# Patient Record
Sex: FEMALE | Race: WHITE | NOT HISPANIC OR LATINO | Employment: FULL TIME | URBAN - METROPOLITAN AREA
[De-identification: names, ages, dates, MRNs, and addresses within clinical notes are randomized per-mention and may not be internally consistent; named-entity substitution may affect disease eponyms.]

---

## 2017-06-20 ENCOUNTER — HOSPITAL ENCOUNTER (EMERGENCY)
Facility: HOSPITAL | Age: 22
Discharge: HOME | End: 2017-06-21
Attending: EMERGENCY MEDICINE | Admitting: EMERGENCY MEDICINE
Payer: COMMERCIAL

## 2017-06-20 VITALS
DIASTOLIC BLOOD PRESSURE: 88 MMHG | BODY MASS INDEX: 33.74 KG/M2 | TEMPERATURE: 97.5 F | SYSTOLIC BLOOD PRESSURE: 143 MMHG | WEIGHT: 214.95 LBS | OXYGEN SATURATION: 98 % | HEIGHT: 67 IN | RESPIRATION RATE: 18 BRPM | HEART RATE: 78 BPM

## 2017-06-20 DIAGNOSIS — L76.82 PAIN AT SURGICAL INCISION: Primary | ICD-10-CM

## 2017-06-20 RX ORDER — CEPHALEXIN 500 MG/1
500 CAPSULE ORAL 4 TIMES DAILY
Qty: 40 CAPSULE | Refills: 0 | Status: SHIPPED | OUTPATIENT
Start: 2017-06-20 | End: 2017-06-30

## 2017-06-20 RX ORDER — LACOSAMIDE 100 MG/1
100 TABLET ORAL 4 TIMES DAILY
COMMUNITY
End: 2017-08-04 | Stop reason: HOSPADM

## 2017-06-20 RX ORDER — CEPHALEXIN 250 MG/1
500 CAPSULE ORAL ONCE
Status: COMPLETED | OUTPATIENT
Start: 2017-06-20 | End: 2017-06-20

## 2017-06-20 RX ADMIN — CEPHALEXIN 500 MG: 250 CAPSULE ORAL at 23:49

## 2017-06-21 PROCEDURE — 99283 EMERGENCY DEPT VISIT LOW MDM: CPT

## 2017-07-30 ENCOUNTER — HOSPITAL ENCOUNTER (EMERGENCY)
Facility: HOSPITAL | Age: 22
Discharge: LEFT BEFORE DISCHARGE | End: 2017-07-30
Attending: EMERGENCY MEDICINE
Payer: COMMERCIAL

## 2017-07-30 ENCOUNTER — APPOINTMENT (EMERGENCY)
Dept: CT IMAGING | Facility: HOSPITAL | Age: 22
End: 2017-07-30
Payer: COMMERCIAL

## 2017-07-30 ENCOUNTER — HOSPITAL ENCOUNTER (EMERGENCY)
Facility: HOSPITAL | Age: 22
Discharge: LEFT BEFORE DISCHARGE | End: 2017-07-30
Admitting: EMERGENCY MEDICINE
Payer: COMMERCIAL

## 2017-07-30 ENCOUNTER — APPOINTMENT (EMERGENCY)
Dept: RADIOLOGY | Facility: HOSPITAL | Age: 22
End: 2017-07-30
Payer: COMMERCIAL

## 2017-07-30 VITALS
TEMPERATURE: 98.7 F | SYSTOLIC BLOOD PRESSURE: 115 MMHG | HEART RATE: 93 BPM | BODY MASS INDEX: 36.53 KG/M2 | OXYGEN SATURATION: 96 % | WEIGHT: 233.25 LBS | DIASTOLIC BLOOD PRESSURE: 62 MMHG | RESPIRATION RATE: 18 BRPM

## 2017-07-30 DIAGNOSIS — R10.9 ABDOMINAL PAIN: ICD-10-CM

## 2017-07-30 DIAGNOSIS — G40.901 STATUS EPILEPTICUS (HCC): Primary | ICD-10-CM

## 2017-07-30 DIAGNOSIS — S09.90XA HEAD TRAUMA, INITIAL ENCOUNTER: ICD-10-CM

## 2017-07-30 LAB
ALBUMIN SERPL BCP-MCNC: 3.7 G/DL (ref 3.5–5)
ALP SERPL-CCNC: 93 U/L (ref 46–116)
ALT SERPL W P-5'-P-CCNC: 75 U/L (ref 12–78)
ANION GAP BLD CALC-SCNC: 14 MMOL/L (ref 4–13)
ANION GAP SERPL CALCULATED.3IONS-SCNC: 11 MMOL/L (ref 4–13)
AST SERPL W P-5'-P-CCNC: 27 U/L (ref 5–45)
B-HCG SERPL-ACNC: <2 MIU/ML
BACTERIA UR QL AUTO: ABNORMAL /HPF
BASOPHILS # BLD AUTO: 0.05 THOUSANDS/ΜL (ref 0–0.1)
BASOPHILS NFR BLD AUTO: 0 % (ref 0–1)
BILIRUB SERPL-MCNC: 0.3 MG/DL (ref 0.2–1)
BILIRUB UR QL STRIP: NEGATIVE
BUN BLD-MCNC: 21 MG/DL (ref 5–25)
BUN SERPL-MCNC: 18 MG/DL (ref 5–25)
CA-I BLD-SCNC: 1.09 MMOL/L (ref 1.12–1.32)
CALCIUM SERPL-MCNC: 9.1 MG/DL (ref 8.3–10.1)
CHLORIDE BLD-SCNC: 109 MMOL/L (ref 100–108)
CHLORIDE SERPL-SCNC: 106 MMOL/L (ref 100–108)
CLARITY UR: CLEAR
CO2 SERPL-SCNC: 23 MMOL/L (ref 21–32)
COLOR UR: YELLOW
CREAT BLD-MCNC: 1.3 MG/DL (ref 0.6–1.3)
CREAT SERPL-MCNC: 1.21 MG/DL (ref 0.6–1.3)
EOSINOPHIL # BLD AUTO: 0.25 THOUSAND/ΜL (ref 0–0.61)
EOSINOPHIL NFR BLD AUTO: 2 % (ref 0–6)
ERYTHROCYTE [DISTWIDTH] IN BLOOD BY AUTOMATED COUNT: 15.8 % (ref 11.6–15.1)
GFR SERPL CREATININE-BSD FRML MDRD: 58 ML/MIN/1.73SQ M
GFR SERPL CREATININE-BSD FRML MDRD: 63 ML/MIN/1.73SQ M
GLUCOSE SERPL-MCNC: 103 MG/DL (ref 65–140)
GLUCOSE SERPL-MCNC: 91 MG/DL (ref 65–140)
GLUCOSE UR STRIP-MCNC: NEGATIVE MG/DL
HCT VFR BLD AUTO: 38.5 % (ref 34.8–46.1)
HCT VFR BLD CALC: 38 % (ref 34.8–46.1)
HGB BLD-MCNC: 12.5 G/DL (ref 11.5–15.4)
HGB BLDA-MCNC: 12.9 G/DL (ref 11.5–15.4)
HGB UR QL STRIP.AUTO: NEGATIVE
KETONES UR STRIP-MCNC: NEGATIVE MG/DL
LACTATE SERPL-SCNC: 1.8 MMOL/L (ref 0.5–2)
LEUKOCYTE ESTERASE UR QL STRIP: NEGATIVE
LIPASE SERPL-CCNC: 133 U/L (ref 73–393)
LYMPHOCYTES # BLD AUTO: 3.7 THOUSANDS/ΜL (ref 0.6–4.47)
LYMPHOCYTES NFR BLD AUTO: 33 % (ref 14–44)
MCH RBC QN AUTO: 24.9 PG (ref 26.8–34.3)
MCHC RBC AUTO-ENTMCNC: 32.5 G/DL (ref 31.4–37.4)
MCV RBC AUTO: 77 FL (ref 82–98)
MONOCYTES # BLD AUTO: 1.04 THOUSAND/ΜL (ref 0.17–1.22)
MONOCYTES NFR BLD AUTO: 9 % (ref 4–12)
NEUTROPHILS # BLD AUTO: 6.18 THOUSANDS/ΜL (ref 1.85–7.62)
NEUTS SEG NFR BLD AUTO: 55 % (ref 43–75)
NITRITE UR QL STRIP: NEGATIVE
NON-SQ EPI CELLS URNS QL MICRO: ABNORMAL /HPF
NRBC BLD AUTO-RTO: 0 /100 WBCS
PCO2 BLD: 25 MMOL/L (ref 21–32)
PH UR STRIP.AUTO: 5.5 [PH] (ref 4.5–8)
PLATELET # BLD AUTO: 311 THOUSANDS/UL (ref 149–390)
PMV BLD AUTO: 10.3 FL (ref 8.9–12.7)
POTASSIUM BLD-SCNC: 4.6 MMOL/L (ref 3.5–5.3)
POTASSIUM SERPL-SCNC: 4 MMOL/L (ref 3.5–5.3)
PROT SERPL-MCNC: 7.5 G/DL (ref 6.4–8.2)
PROT UR STRIP-MCNC: NEGATIVE MG/DL
RBC # BLD AUTO: 5.03 MILLION/UL (ref 3.81–5.12)
RBC #/AREA URNS AUTO: ABNORMAL /HPF
SODIUM BLD-SCNC: 142 MMOL/L (ref 136–145)
SODIUM SERPL-SCNC: 140 MMOL/L (ref 136–145)
SP GR UR STRIP.AUTO: 1.01 (ref 1–1.03)
SPECIMEN SOURCE: ABNORMAL
SPECIMEN SOURCE: NORMAL
TROPONIN I BLD-MCNC: 0 NG/ML (ref 0–0.08)
UROBILINOGEN UR QL STRIP.AUTO: 0.2 E.U./DL
WBC # BLD AUTO: 11.25 THOUSAND/UL (ref 4.31–10.16)
WBC #/AREA URNS AUTO: ABNORMAL /HPF

## 2017-07-30 PROCEDURE — 96361 HYDRATE IV INFUSION ADD-ON: CPT

## 2017-07-30 PROCEDURE — 81001 URINALYSIS AUTO W/SCOPE: CPT | Performed by: EMERGENCY MEDICINE

## 2017-07-30 PROCEDURE — 83605 ASSAY OF LACTIC ACID: CPT | Performed by: EMERGENCY MEDICINE

## 2017-07-30 PROCEDURE — 80047 BASIC METABLC PNL IONIZED CA: CPT

## 2017-07-30 PROCEDURE — 99285 EMERGENCY DEPT VISIT HI MDM: CPT

## 2017-07-30 PROCEDURE — 71020 HB CHEST X-RAY 2VW FRONTAL&LATL: CPT

## 2017-07-30 PROCEDURE — 84702 CHORIONIC GONADOTROPIN TEST: CPT | Performed by: EMERGENCY MEDICINE

## 2017-07-30 PROCEDURE — 96375 TX/PRO/DX INJ NEW DRUG ADDON: CPT

## 2017-07-30 PROCEDURE — 84484 ASSAY OF TROPONIN QUANT: CPT

## 2017-07-30 PROCEDURE — 85014 HEMATOCRIT: CPT

## 2017-07-30 PROCEDURE — 72125 CT NECK SPINE W/O DYE: CPT

## 2017-07-30 PROCEDURE — 70450 CT HEAD/BRAIN W/O DYE: CPT

## 2017-07-30 PROCEDURE — 96374 THER/PROPH/DIAG INJ IV PUSH: CPT

## 2017-07-30 PROCEDURE — 85025 COMPLETE CBC W/AUTO DIFF WBC: CPT | Performed by: EMERGENCY MEDICINE

## 2017-07-30 PROCEDURE — 93005 ELECTROCARDIOGRAM TRACING: CPT | Performed by: EMERGENCY MEDICINE

## 2017-07-30 PROCEDURE — 80053 COMPREHEN METABOLIC PANEL: CPT | Performed by: EMERGENCY MEDICINE

## 2017-07-30 PROCEDURE — 36415 COLL VENOUS BLD VENIPUNCTURE: CPT | Performed by: EMERGENCY MEDICINE

## 2017-07-30 PROCEDURE — 83690 ASSAY OF LIPASE: CPT | Performed by: EMERGENCY MEDICINE

## 2017-07-30 PROCEDURE — 96365 THER/PROPH/DIAG IV INF INIT: CPT

## 2017-07-30 PROCEDURE — 74177 CT ABD & PELVIS W/CONTRAST: CPT

## 2017-07-30 PROCEDURE — 80177 DRUG SCRN QUAN LEVETIRACETAM: CPT | Performed by: EMERGENCY MEDICINE

## 2017-07-30 RX ORDER — LORAZEPAM 2 MG/ML
2 INJECTION INTRAMUSCULAR ONCE
Status: DISCONTINUED | OUTPATIENT
Start: 2017-07-30 | End: 2017-07-30 | Stop reason: HOSPADM

## 2017-07-30 RX ORDER — FENTANYL CITRATE 50 UG/ML
50 INJECTION, SOLUTION INTRAMUSCULAR; INTRAVENOUS ONCE
Status: COMPLETED | OUTPATIENT
Start: 2017-07-30 | End: 2017-07-30

## 2017-07-30 RX ORDER — LORAZEPAM 2 MG/ML
2 INJECTION INTRAMUSCULAR ONCE
Status: COMPLETED | OUTPATIENT
Start: 2017-07-30 | End: 2017-07-30

## 2017-07-30 RX ORDER — LORAZEPAM 2 MG/ML
INJECTION INTRAMUSCULAR
Status: DISCONTINUED
Start: 2017-07-30 | End: 2017-07-30 | Stop reason: HOSPADM

## 2017-07-30 RX ADMIN — FENTANYL CITRATE 50 MCG: 50 INJECTION INTRAMUSCULAR; INTRAVENOUS at 18:29

## 2017-07-30 RX ADMIN — LEVETIRACETAM 1000 MG: 500 INJECTION, SOLUTION, CONCENTRATE INTRAVENOUS at 18:00

## 2017-07-30 RX ADMIN — SODIUM CHLORIDE 1000 ML: 0.9 INJECTION, SOLUTION INTRAVENOUS at 17:59

## 2017-07-30 RX ADMIN — LORAZEPAM 2 MG: 2 INJECTION INTRAMUSCULAR; INTRAVENOUS at 17:32

## 2017-07-30 RX ADMIN — IOHEXOL 100 ML: 350 INJECTION, SOLUTION INTRAVENOUS at 19:39

## 2017-07-31 ENCOUNTER — APPOINTMENT (EMERGENCY)
Dept: CT IMAGING | Facility: HOSPITAL | Age: 22
End: 2017-07-31
Payer: COMMERCIAL

## 2017-07-31 ENCOUNTER — HOSPITAL ENCOUNTER (EMERGENCY)
Facility: HOSPITAL | Age: 22
End: 2017-08-01
Attending: EMERGENCY MEDICINE | Admitting: EMERGENCY MEDICINE
Payer: COMMERCIAL

## 2017-07-31 DIAGNOSIS — G40.901 STATUS EPILEPTICUS (HCC): Primary | ICD-10-CM

## 2017-07-31 LAB
ANION GAP SERPL CALCULATED.3IONS-SCNC: 11 MMOL/L (ref 4–13)
ATRIAL RATE: 109 BPM
BASOPHILS # BLD AUTO: 0.04 THOUSANDS/ΜL (ref 0–0.1)
BASOPHILS NFR BLD AUTO: 0 % (ref 0–1)
BUN SERPL-MCNC: 13 MG/DL (ref 5–25)
CALCIUM SERPL-MCNC: 8.7 MG/DL (ref 8.3–10.1)
CHLORIDE SERPL-SCNC: 107 MMOL/L (ref 100–108)
CO2 SERPL-SCNC: 23 MMOL/L (ref 21–32)
CREAT SERPL-MCNC: 0.8 MG/DL (ref 0.6–1.3)
EOSINOPHIL # BLD AUTO: 0.28 THOUSAND/ΜL (ref 0–0.61)
EOSINOPHIL NFR BLD AUTO: 3 % (ref 0–6)
ERYTHROCYTE [DISTWIDTH] IN BLOOD BY AUTOMATED COUNT: 15.7 % (ref 11.6–15.1)
GFR SERPL CREATININE-BSD FRML MDRD: 104 ML/MIN/1.73SQ M
GLUCOSE SERPL-MCNC: 119 MG/DL (ref 65–140)
HCT VFR BLD AUTO: 34.4 % (ref 34.8–46.1)
HGB BLD-MCNC: 11.3 G/DL (ref 11.5–15.4)
LYMPHOCYTES # BLD AUTO: 3.63 THOUSANDS/ΜL (ref 0.6–4.47)
LYMPHOCYTES NFR BLD AUTO: 33 % (ref 14–44)
MCH RBC QN AUTO: 25 PG (ref 26.8–34.3)
MCHC RBC AUTO-ENTMCNC: 32.8 G/DL (ref 31.4–37.4)
MCV RBC AUTO: 76 FL (ref 82–98)
MONOCYTES # BLD AUTO: 0.69 THOUSAND/ΜL (ref 0.17–1.22)
MONOCYTES NFR BLD AUTO: 6 % (ref 4–12)
NEUTROPHILS # BLD AUTO: 6.21 THOUSANDS/ΜL (ref 1.85–7.62)
NEUTS SEG NFR BLD AUTO: 57 % (ref 43–75)
NRBC BLD AUTO-RTO: 0 /100 WBCS
P AXIS: 53 DEGREES
PLATELET # BLD AUTO: 279 THOUSANDS/UL (ref 149–390)
PMV BLD AUTO: 10.1 FL (ref 8.9–12.7)
POTASSIUM SERPL-SCNC: 3.8 MMOL/L (ref 3.5–5.3)
PR INTERVAL: 146 MS
QRS AXIS: 37 DEGREES
QRSD INTERVAL: 86 MS
QT INTERVAL: 332 MS
QTC INTERVAL: 447 MS
RBC # BLD AUTO: 4.52 MILLION/UL (ref 3.81–5.12)
SODIUM SERPL-SCNC: 141 MMOL/L (ref 136–145)
T WAVE AXIS: 38 DEGREES
VENTRICULAR RATE: 109 BPM
WBC # BLD AUTO: 10.88 THOUSAND/UL (ref 4.31–10.16)

## 2017-07-31 PROCEDURE — 85025 COMPLETE CBC W/AUTO DIFF WBC: CPT | Performed by: EMERGENCY MEDICINE

## 2017-07-31 PROCEDURE — 80048 BASIC METABOLIC PNL TOTAL CA: CPT | Performed by: EMERGENCY MEDICINE

## 2017-07-31 PROCEDURE — 93005 ELECTROCARDIOGRAM TRACING: CPT

## 2017-07-31 PROCEDURE — 96374 THER/PROPH/DIAG INJ IV PUSH: CPT

## 2017-07-31 PROCEDURE — 70496 CT ANGIOGRAPHY HEAD: CPT

## 2017-07-31 PROCEDURE — 36415 COLL VENOUS BLD VENIPUNCTURE: CPT | Performed by: EMERGENCY MEDICINE

## 2017-07-31 RX ORDER — KETOROLAC TROMETHAMINE 30 MG/ML
15 INJECTION, SOLUTION INTRAMUSCULAR; INTRAVENOUS ONCE
Status: COMPLETED | OUTPATIENT
Start: 2017-08-01 | End: 2017-08-01

## 2017-07-31 RX ORDER — LEVETIRACETAM 500 MG/1
500 TABLET ORAL ONCE
Status: COMPLETED | OUTPATIENT
Start: 2017-07-31 | End: 2017-07-31

## 2017-07-31 RX ORDER — LACOSAMIDE 50 MG/1
100 TABLET ORAL ONCE
Status: COMPLETED | OUTPATIENT
Start: 2017-07-31 | End: 2017-07-31

## 2017-07-31 RX ADMIN — LEVETIRACETAM 1000 MG: 100 INJECTION, SOLUTION INTRAVENOUS at 20:43

## 2017-07-31 RX ADMIN — IOHEXOL 85 ML: 350 INJECTION, SOLUTION INTRAVENOUS at 20:07

## 2017-07-31 RX ADMIN — LEVETIRACETAM 500 MG: 500 TABLET ORAL at 19:38

## 2017-07-31 RX ADMIN — LACOSAMIDE 100 MG: 50 TABLET, FILM COATED ORAL at 19:38

## 2017-08-01 ENCOUNTER — APPOINTMENT (INPATIENT)
Dept: NEUROLOGY | Facility: AMBULATORY SURGERY CENTER | Age: 22
DRG: 101 | End: 2017-08-01
Payer: COMMERCIAL

## 2017-08-01 ENCOUNTER — HOSPITAL ENCOUNTER (INPATIENT)
Facility: HOSPITAL | Age: 22
LOS: 3 days | Discharge: HOME | DRG: 101 | End: 2017-08-04
Attending: ANESTHESIOLOGY | Admitting: ANESTHESIOLOGY
Payer: COMMERCIAL

## 2017-08-01 VITALS
TEMPERATURE: 98.2 F | DIASTOLIC BLOOD PRESSURE: 80 MMHG | RESPIRATION RATE: 18 BRPM | HEIGHT: 67 IN | OXYGEN SATURATION: 98 % | WEIGHT: 233.03 LBS | SYSTOLIC BLOOD PRESSURE: 128 MMHG | BODY MASS INDEX: 36.57 KG/M2 | HEART RATE: 80 BPM

## 2017-08-01 DIAGNOSIS — G40.901 STATUS EPILEPTICUS (HCC): Primary | ICD-10-CM

## 2017-08-01 DIAGNOSIS — G40.909 SEIZURE DISORDER (HCC): ICD-10-CM

## 2017-08-01 PROBLEM — R11.0 NAUSEA: Status: ACTIVE | Noted: 2017-08-01

## 2017-08-01 PROBLEM — H57.89 IRRITATION OF RIGHT EYE: Status: ACTIVE | Noted: 2017-08-01

## 2017-08-01 PROBLEM — Z98.891 S/P CESAREAN SECTION: Status: ACTIVE | Noted: 2017-08-01

## 2017-08-01 LAB
ANION GAP SERPL CALCULATED.3IONS-SCNC: 7 MMOL/L (ref 4–13)
BUN SERPL-MCNC: 13 MG/DL (ref 5–25)
CALCIUM SERPL-MCNC: 8.8 MG/DL (ref 8.3–10.1)
CHLORIDE SERPL-SCNC: 110 MMOL/L (ref 100–108)
CO2 SERPL-SCNC: 23 MMOL/L (ref 21–32)
CREAT SERPL-MCNC: 0.64 MG/DL (ref 0.6–1.3)
GFR SERPL CREATININE-BSD FRML MDRD: 125 ML/MIN/1.73SQ M
GLUCOSE SERPL-MCNC: 86 MG/DL (ref 65–140)
POTASSIUM SERPL-SCNC: 4 MMOL/L (ref 3.5–5.3)
SODIUM SERPL-SCNC: 140 MMOL/L (ref 136–145)

## 2017-08-01 PROCEDURE — 96375 TX/PRO/DX INJ NEW DRUG ADDON: CPT

## 2017-08-01 PROCEDURE — 99285 EMERGENCY DEPT VISIT HI MDM: CPT

## 2017-08-01 PROCEDURE — 80048 BASIC METABOLIC PNL TOTAL CA: CPT | Performed by: EMERGENCY MEDICINE

## 2017-08-01 PROCEDURE — 95951 HB EEG MONITORING/VIDEORECORD: CPT

## 2017-08-01 RX ORDER — LACOSAMIDE 100 MG/1
100 TABLET ORAL EVERY 12 HOURS SCHEDULED
Status: DISCONTINUED | OUTPATIENT
Start: 2017-08-01 | End: 2017-08-03

## 2017-08-01 RX ORDER — ACETAMINOPHEN 325 MG/1
975 TABLET ORAL EVERY 6 HOURS PRN
Status: DISCONTINUED | OUTPATIENT
Start: 2017-08-01 | End: 2017-08-04

## 2017-08-01 RX ORDER — LEVETIRACETAM 500 MG/1
500 TABLET ORAL 4 TIMES DAILY
Status: DISCONTINUED | OUTPATIENT
Start: 2017-08-01 | End: 2017-08-01

## 2017-08-01 RX ORDER — LORAZEPAM 2 MG/ML
1 INJECTION INTRAMUSCULAR EVERY 4 HOURS PRN
Status: DISCONTINUED | OUTPATIENT
Start: 2017-08-01 | End: 2017-08-04 | Stop reason: HOSPADM

## 2017-08-01 RX ORDER — LEVETIRACETAM 750 MG/1
750 TABLET ORAL EVERY 12 HOURS
Status: DISCONTINUED | OUTPATIENT
Start: 2017-08-02 | End: 2017-08-03

## 2017-08-01 RX ORDER — LACOSAMIDE 100 MG/1
100 TABLET ORAL 4 TIMES DAILY
Status: DISCONTINUED | OUTPATIENT
Start: 2017-08-01 | End: 2017-08-01

## 2017-08-01 RX ADMIN — LEVETIRACETAM 500 MG: 500 TABLET, FILM COATED ORAL at 08:39

## 2017-08-01 RX ADMIN — LACOSAMIDE 100 MG: 100 TABLET, FILM COATED ORAL at 20:34

## 2017-08-01 RX ADMIN — LEVETIRACETAM 500 MG: 500 TABLET, FILM COATED ORAL at 12:42

## 2017-08-01 RX ADMIN — ENOXAPARIN SODIUM 40 MG: 40 INJECTION SUBCUTANEOUS at 08:39

## 2017-08-01 RX ADMIN — LACOSAMIDE 100 MG: 100 TABLET, FILM COATED ORAL at 08:39

## 2017-08-01 RX ADMIN — ACETAMINOPHEN 975 MG: 325 TABLET, FILM COATED ORAL at 16:21

## 2017-08-01 RX ADMIN — LACOSAMIDE 100 MG: 100 TABLET, FILM COATED ORAL at 12:42

## 2017-08-01 RX ADMIN — KETOROLAC TROMETHAMINE 15 MG: 30 INJECTION, SOLUTION INTRAMUSCULAR at 00:06

## 2017-08-02 ENCOUNTER — APPOINTMENT (INPATIENT)
Dept: NEUROLOGY | Facility: AMBULATORY SURGERY CENTER | Age: 22
DRG: 101 | End: 2017-08-02
Payer: COMMERCIAL

## 2017-08-02 LAB
ANION GAP SERPL CALCULATED.3IONS-SCNC: 6 MMOL/L (ref 4–13)
ATRIAL RATE: 93 BPM
BUN SERPL-MCNC: 13 MG/DL (ref 5–25)
CALCIUM SERPL-MCNC: 8.7 MG/DL (ref 8.3–10.1)
CHLORIDE SERPL-SCNC: 108 MMOL/L (ref 100–108)
CO2 SERPL-SCNC: 25 MMOL/L (ref 21–32)
CREAT SERPL-MCNC: 0.66 MG/DL (ref 0.6–1.3)
GFR SERPL CREATININE-BSD FRML MDRD: 124 ML/MIN/1.73SQ M
GLUCOSE SERPL-MCNC: 96 MG/DL (ref 65–140)
LEVETIRACETAM SERPL-MCNC: 33.5 UG/ML (ref 10–40)
P AXIS: 33 DEGREES
POTASSIUM SERPL-SCNC: 4 MMOL/L (ref 3.5–5.3)
PR INTERVAL: 156 MS
QRS AXIS: 23 DEGREES
QRSD INTERVAL: 86 MS
QT INTERVAL: 334 MS
QTC INTERVAL: 415 MS
SODIUM SERPL-SCNC: 139 MMOL/L (ref 136–145)
T WAVE AXIS: 17 DEGREES
VENTRICULAR RATE: 93 BPM

## 2017-08-02 PROCEDURE — 80048 BASIC METABOLIC PNL TOTAL CA: CPT | Performed by: EMERGENCY MEDICINE

## 2017-08-02 PROCEDURE — 95951 HB EEG MONITORING/VIDEORECORD: CPT

## 2017-08-02 RX ADMIN — ACETAMINOPHEN 975 MG: 325 TABLET, FILM COATED ORAL at 16:25

## 2017-08-02 RX ADMIN — ENOXAPARIN SODIUM 40 MG: 40 INJECTION SUBCUTANEOUS at 08:16

## 2017-08-02 RX ADMIN — LEVETIRACETAM 750 MG: 750 TABLET, FILM COATED ORAL at 00:27

## 2017-08-02 RX ADMIN — LEVETIRACETAM 750 MG: 750 TABLET, FILM COATED ORAL at 12:54

## 2017-08-02 RX ADMIN — LACOSAMIDE 100 MG: 100 TABLET, FILM COATED ORAL at 21:24

## 2017-08-02 RX ADMIN — LACOSAMIDE 100 MG: 100 TABLET, FILM COATED ORAL at 08:16

## 2017-08-03 ENCOUNTER — APPOINTMENT (INPATIENT)
Dept: NEUROLOGY | Facility: AMBULATORY SURGERY CENTER | Age: 22
DRG: 101 | End: 2017-08-03
Payer: COMMERCIAL

## 2017-08-03 PROBLEM — H57.89 IRRITATION OF RIGHT EYE: Status: RESOLVED | Noted: 2017-08-01 | Resolved: 2017-08-03

## 2017-08-03 PROCEDURE — 95951 HB EEG MONITORING/VIDEORECORD: CPT

## 2017-08-03 RX ADMIN — LEVETIRACETAM 750 MG: 750 TABLET, FILM COATED ORAL at 00:55

## 2017-08-03 RX ADMIN — ENOXAPARIN SODIUM 40 MG: 40 INJECTION SUBCUTANEOUS at 08:43

## 2017-08-04 ENCOUNTER — GENERIC CONVERSION - ENCOUNTER (OUTPATIENT)
Dept: OTHER | Facility: OTHER | Age: 22
End: 2017-08-04

## 2017-08-04 ENCOUNTER — APPOINTMENT (INPATIENT)
Dept: NEUROLOGY | Facility: AMBULATORY SURGERY CENTER | Age: 22
DRG: 101 | End: 2017-08-04
Payer: COMMERCIAL

## 2017-08-04 VITALS
HEART RATE: 74 BPM | TEMPERATURE: 98.4 F | BODY MASS INDEX: 35.29 KG/M2 | RESPIRATION RATE: 20 BRPM | OXYGEN SATURATION: 96 % | DIASTOLIC BLOOD PRESSURE: 82 MMHG | HEIGHT: 67 IN | WEIGHT: 224.87 LBS | SYSTOLIC BLOOD PRESSURE: 120 MMHG

## 2017-08-04 PROBLEM — R11.0 NAUSEA: Status: RESOLVED | Noted: 2017-08-01 | Resolved: 2017-08-04

## 2017-08-04 PROCEDURE — 95951 HB EEG MONITORING/VIDEORECORD: CPT

## 2017-08-04 RX ORDER — LEVETIRACETAM 500 MG/1
1000 TABLET ORAL EVERY 12 HOURS SCHEDULED
Qty: 60 TABLET | Refills: 0 | Status: SHIPPED | OUTPATIENT
Start: 2017-08-04 | End: 2018-06-18 | Stop reason: HOSPADM

## 2017-08-04 RX ADMIN — ENOXAPARIN SODIUM 40 MG: 40 INJECTION SUBCUTANEOUS at 09:50

## 2017-11-13 ENCOUNTER — APPOINTMENT (EMERGENCY)
Dept: RADIOLOGY | Facility: HOSPITAL | Age: 22
End: 2017-11-13
Payer: COMMERCIAL

## 2017-11-13 ENCOUNTER — HOSPITAL ENCOUNTER (EMERGENCY)
Facility: HOSPITAL | Age: 22
Discharge: HOME | End: 2017-11-13
Attending: EMERGENCY MEDICINE | Admitting: EMERGENCY MEDICINE
Payer: COMMERCIAL

## 2017-11-13 VITALS
DIASTOLIC BLOOD PRESSURE: 78 MMHG | RESPIRATION RATE: 20 BRPM | OXYGEN SATURATION: 97 % | HEART RATE: 81 BPM | TEMPERATURE: 98.2 F | WEIGHT: 218 LBS | HEIGHT: 67 IN | SYSTOLIC BLOOD PRESSURE: 135 MMHG | BODY MASS INDEX: 34.21 KG/M2

## 2017-11-13 DIAGNOSIS — M54.41 ACUTE BILATERAL LOW BACK PAIN WITH RIGHT-SIDED SCIATICA: Primary | ICD-10-CM

## 2017-11-13 LAB — EXT PREG TEST URINE: NORMAL

## 2017-11-13 PROCEDURE — 99283 EMERGENCY DEPT VISIT LOW MDM: CPT

## 2017-11-13 PROCEDURE — 72100 X-RAY EXAM L-S SPINE 2/3 VWS: CPT

## 2017-11-13 PROCEDURE — 81025 URINE PREGNANCY TEST: CPT | Performed by: PHYSICIAN ASSISTANT

## 2017-11-13 RX ORDER — NAPROXEN 500 MG/1
500 TABLET ORAL 2 TIMES DAILY WITH MEALS
Qty: 20 TABLET | Refills: 0 | Status: SHIPPED | OUTPATIENT
Start: 2017-11-13 | End: 2018-06-10

## 2017-11-13 RX ORDER — METHOCARBAMOL 750 MG/1
750 TABLET, FILM COATED ORAL 4 TIMES DAILY PRN
Qty: 20 TABLET | Refills: 0 | Status: SHIPPED | OUTPATIENT
Start: 2017-11-13 | End: 2018-06-10

## 2017-11-13 RX ORDER — ESCITALOPRAM OXALATE 10 MG/1
10 TABLET ORAL DAILY
COMMUNITY
End: 2018-06-10

## 2017-11-13 NOTE — ED PROVIDER NOTES
History  Chief Complaint   Patient presents with    Back Pain     Pt states she had a mechanical fall on Thursday  Pt states she began to have B/L lower back pain  Pt c/o pain in RLE  Pt has control of bladder and bowels  23 y o  female with past medical history significant for seizures presents to the Emergency Department with chief complaint of back pain  Onset of symptoms is reported as 4 days ago  Location of symptoms is reported as left and right lower back with radiation down the right leg  Quality of symptoms is reported as sharp tight pain  Severity of symptoms is reported as moderate-severe  Associated symptoms:  Denies urinary retention  Denies bowel or bladder incontinence  Denies abdominal pain  Denies fevers  Denies lower extremity paralysis, or weakness  Denies dysuria, urinary frequency or hematuria  Modifiers: Movement, bending and twisting exacerbate pain  Rest partially relieves pain  Context:  Patient reports that she sustained a mechanical fall from standing 4 days ago  She reports since then she has had pain into her lower back  She denies head injury or loss of consciousness  Denies other injuries in the fall  She reports initially she did not have pain but gradually over the past 4 days the pain has gotten increasingly worse and has become radiating down her right leg  Review of past visit history via [facility] demonstrates patient was recently admitted and discharged on August 1, 2017 to August 4, 2017 to [facility] for monitoring of seizure disorder    ddx includes but is not limited to:  Myofascial pain, diskogenic pain, radicular pain, muscle spasm, vertebral compression fracture, spinal stenosis, spondylosis, cancer, osteoporosis, OA, RA, consider but doubt epidural abscess, cauda equina  Plan check xray given mechanical fall to rule out fracture               History provided by:  Patient   used: No    Back Pain   Associated symptoms: no abdominal pain, no chest pain, no dysuria, no fever, no headaches, no numbness, no pelvic pain and no weakness        Prior to Admission Medications   Prescriptions Last Dose Informant Patient Reported? Taking?   escitalopram (LEXAPRO) 10 mg tablet   Yes Yes   Sig: Take 10 mg by mouth daily   levETIRAcetam (KEPPRA) 500 mg tablet   No No   Sig: Take 2 tablets by mouth every 12 (twelve) hours      Facility-Administered Medications: None       Past Medical History:   Diagnosis Date    Seizures ([facility] [address] )        Past Surgical History:   Procedure Laterality Date     SECTION       SECTION      SHOULDER SURGERY      TUMOR REMOVAL      from ear       History reviewed  No pertinent family history  I have reviewed and agree with the history as documented  Social History   Substance Use Topics    Smoking status: Never Smoker    Smokeless tobacco: Never Used    Alcohol use No        Review of Systems   Constitutional: Negative for activity change, appetite change, chills, diaphoresis, fatigue, fever and unexpected weight change  HENT: Negative for congestion, dental problem, drooling, ear discharge, ear pain, facial swelling, hearing loss, mouth sores, nosebleeds, postnasal drip, rhinorrhea, sinus pain, sinus pressure, sneezing, sore throat, tinnitus, trouble swallowing and voice change  Eyes: Negative for photophobia, pain, discharge, redness and itching  Respiratory: Negative for cough, chest tightness, shortness of breath and wheezing  Cardiovascular: Negative for chest pain, palpitations and leg swelling  Gastrointestinal: Negative for abdominal distention, abdominal pain, blood in stool, constipation, diarrhea, nausea and vomiting  Endocrine: Negative for cold intolerance, heat intolerance, polydipsia, polyphagia and polyuria     Genitourinary: Negative for decreased urine volume, difficulty urinating, dysuria, flank pain, frequency, hematuria, menstrual problem, pelvic pain, urgency, vaginal bleeding, vaginal discharge and vaginal pain  Musculoskeletal: Positive for back pain  Negative for arthralgias, gait problem, joint swelling, myalgias, neck pain and neck stiffness  Skin: Negative for color change, pallor, rash and wound  Allergic/Immunologic: Negative for environmental allergies, food allergies and immunocompromised state  Neurological: Negative for dizziness, tremors, seizures, syncope, facial asymmetry, speech difficulty, weakness, light-headedness, numbness and headaches  Hematological: Negative for adenopathy  Does not bruise/bleed easily  Psychiatric/Behavioral: Negative for agitation, confusion, decreased concentration and hallucinations  The patient is not nervous/anxious  All other systems reviewed and are negative  Physical Exam  ED Triage Vitals [11/13/17 1100]   Temperature Pulse Respirations Blood Pressure SpO2   98 2 °F (36 8 °C) 81 20 135/78 97 %      Temp Source Heart Rate Source Patient Position - Orthostatic VS BP Location FiO2 (%)   Oral Monitor Sitting Left arm --      Pain Score       9           Orthostatic Vital Signs  Vitals:    11/13/17 1100   BP: 135/78   Pulse: 81   Patient Position - Orthostatic VS: Sitting       Physical Exam   Constitutional: She is oriented to person, place, and time  She appears well-developed and well-nourished  No distress  /78   Pulse 81   Temp 98 2 °F (36 8 °C) (Oral)   Resp 20   Ht 5' 7" (1 702 m)   Wt 98 9 kg (218 lb)   SpO2 97%   BMI 34 14 kg/m²   Interpretation normal, no intervention    HENT:   Head: Normocephalic and atraumatic  Right Ear: External ear normal    Left Ear: External ear normal    Nose: Nose normal    Mouth/Throat: Oropharynx is clear and moist  No oropharyngeal exudate  Eyes: Conjunctivae and EOM are normal  Pupils are equal, round, and reactive to light  Right eye exhibits no discharge  Left eye exhibits no discharge  No scleral icterus  Neck: Normal range of motion   Neck supple  No JVD present  No tracheal deviation present  Cardiovascular: Normal rate, regular rhythm and intact distal pulses  Pulmonary/Chest: Effort normal and breath sounds normal  No respiratory distress  She has no wheezes  She exhibits no tenderness  Abdominal: Soft  Bowel sounds are normal  She exhibits no distension and no mass  There is no tenderness  There is no rebound and no guarding  No hernia  Musculoskeletal: Normal range of motion  She exhibits tenderness  She exhibits no edema or deformity  There is no midline thoracic spinal tenderness to palpation  Patient reports midline lumbar spinal tenderness at L3/4 level with associated bilateral lumbar paraspinal muscle tenderness to palpation  No bony step offs or deformities on palpation  No saddle anesthesia  Positive straight leg raise test to right leg  Nontender over the costovertebral angle bilaterally  Bilateral lower extremities: The patient is neurovascularly intact in the superficial and deep peroneal, sural, tibial, and saphenous nerve distributions there is normal sensation and good capillary refill within the toes  Strength 5/5 normal to bilateral lower extremities  FROM throughout BLE  No posterior calf pain or palpable cords  Lymphadenopathy:     She has no cervical adenopathy  Neurological: She is alert and oriented to person, place, and time  She displays normal reflexes  No cranial nerve deficit or sensory deficit  She exhibits normal muscle tone  Coordination normal    Skin: Skin is warm and dry  Capillary refill takes less than 2 seconds  No rash noted  She is not diaphoretic  No erythema  No pallor  Psychiatric: She has a normal mood and affect  Her behavior is normal  Judgment and thought content normal    Nursing note and vitals reviewed        ED Medications  Medications - No data to display    Diagnostic Studies  Results Reviewed     Procedure Component Value Units Date/Time    POCT pregnancy, urine [[id number]]  (Normal) Resulted:  11/13/17 1215    Lab Status:  Final result Updated:  11/13/17 1215     EXT PREG TEST UR (Ref: Negative) neg                 XR lumbar spine 2 or 3 views   Final Result by Gerard Dang MD (11/13 1205)      No evidence of a lumbar spine fracture or subluxation  Workstation performed: [id number]                    Procedures  Procedures       Phone Contacts  ED Phone Contact    ED Course  ED Course                                MDM  Number of Diagnoses or Management Options  Acute bilateral low back pain with right-sided sciatica: new and requires workup  Diagnosis management comments: Xray images lumbar spine independently visualized and interpreted by me - no acute vertebral compression fracture or subluxation  Patient meets Back pain low risk criteria, no fever chills or weight loss, no neurological deficit, no history of cancer, no history of injecting drugs, pain improved with rest       I discussed x-ray results with the patient at bedside  Discussed with patient discharge plan of care including rest, use of ice, avoidance of lifting greater than 5 lbs and no bending or twisting  Discussed outpatient use of NSAIDS, and prescribed medications  Instructed regarding follow up with primary care physician in 3-5 days and outpatient neurologist/orthopedist/spine specialist in 5-7 days for further evaluation  Verbally reviewed with patient reasons to return to ED including but not limited to urinary retention, bowel or bladder incontinence, fevers of 100 4 F or higher, lower extremity weakness/paralysis, worsening pain or any other worsening or worrisome symptoms  Patient verbalized understanding and agreement of same           Amount and/or Complexity of Data Reviewed  Clinical lab tests: ordered and reviewed  Tests in the radiology section of CPT®: ordered and reviewed  Discussion of test results with the performing providers: yes  Decide to obtain previous medical records or to obtain history from someone other than the patient: yes (Family member at bedside)  Obtain history from someone other than the patient: yes (Family member at bedside)  Review and summarize past medical records: yes  Independent visualization of images, tracings, or specimens: yes    Patient Progress  Patient progress: stable    CritCare Time    Disposition  Final diagnoses:   Acute bilateral low back pain with right-sided sciatica     Time reflects when diagnosis was documented in both MDM as applicable and the Disposition within this note     Time User Action Codes Description Comment    11/13/2017 12:33 PM Cadnice Queen Add [M54 41] Acute bilateral low back pain with right-sided sciatica       ED Disposition     ED Disposition Condition Comment    Discharge  Rodney Landrum discharge to home/self care      Condition at discharge: Stable        Follow-up Information     Follow up With Specialties Details Why Contact Info [facility]  Call in 2 days for further evaluation of symptoms [address] , [place] Orthopedic Surgery Call in 3 days for further evaluation of symptoms [address] [address] Emergency Department Emergency Medicine Go to If symptoms worsen [address] [facility] [address]  [phone] [place] ED, [address], [place], [address]        Discharge Medication List as of 11/13/2017 12:35 PM      START taking these medications    Details   methocarbamol (ROBAXIN) 750 mg tablet Take 1 tablet by mouth 4 (four) times a day as needed for muscle spasms for up to 5 days, Starting Mon 11/13/2017, Until Sat 11/18/2017, Print      naproxen (EC NAPROSYN) 500 MG EC tablet Take 1 tablet by mouth 2 (two) times a day with meals for 10 days, Starting Mon 11/13/2017, Until Thu 11/23/2017, Print         CONTINUE these medications which have NOT CHANGED    Details   escitalopram (LEXAPRO) 10 mg tablet Take 10 mg by mouth daily, Historical Med      levETIRAcetam (KEPPRA) 500 mg tablet Take 2 tablets by mouth every 12 (twelve) hours, Starting Fri 8/4/2017, Print           No discharge procedures on file      ED Provider  Electronically Signed by           Uli Giron PA-C  11/13/17 4444

## 2017-11-13 NOTE — DISCHARGE INSTRUCTIONS
Acute Low Back Pain   WHAT YOU NEED TO KNOW:   Acute low back pain is sudden discomfort in your lower back area that lasts for up to 6 weeks  The discomfort makes it difficult to tolerate activity  DISCHARGE INSTRUCTIONS:   Return to the emergency department if:   · You have severe pain  · You have sudden stiffness and heaviness on both buttocks down to both legs  · You have numbness or weakness in one leg, or pain in both legs  · You have numbness in your genital area or across your lower back  · You cannot control your urine or bowel movements  Contact your healthcare provider if:   · You have a fever  · You have pain at night or when you rest     · Your pain does not get better with treatment  · You have pain that worsens when you cough or sneeze  · You suddenly feel something pop or snap in your back  · You have questions or concerns about your condition or care  Medicines: The following medicines may be ordered by your healthcare provider:  · Acetaminophen  decreases pain  It is available without a doctor's order  Ask how much to take and how often to take it  Follow directions  Acetaminophen can cause liver damage if not taken correctly  · NSAIDs  help decrease swelling and pain  This medicine is available with or without a doctor's order  NSAIDs can cause stomach bleeding or kidney problems in certain people  If you take blood thinner medicine, always ask your healthcare provider if NSAIDs are safe for you  Always read the medicine label and follow directions  · Prescription pain medicine  may be given  Ask your healthcare provider how to take this medicine safely  · Muscle relaxers  decrease pain by relaxing the muscles in your lower spine  · Take your medicine as directed  Contact your healthcare provider if you think your medicine is not helping or if you have side effects  Tell him of her if you are allergic to any medicine   Keep a list of the medicines, vitamins, and herbs you take  Include the amounts, and when and why you take them  Bring the list or the pill bottles to follow-up visits  Carry your medicine list with you in case of an emergency  Self-care:   · Stay active  as much as you can without causing more pain  Bed rest could make your back pain worse  Start with some light exercises such as walking  Avoid heavy lifting until your pain is gone  Ask for more information about the activities or exercises that are right for you  · Ice  helps decrease swelling, pain, and muscle spams  Put crushed ice in a plastic bag  Cover it with a towel  Place it on your lower back for 20 to 30 minutes every 2 hours  Do this for about 2 to 3 days after your pain starts, or as directed  · Heat  helps decrease pain and muscle spasms  Start to use heat after treatment with ice has stopped  Use a small towel dampened with warm water or a heating pad, or sit in a warm bath  Apply heat on the area for 20 to 30 minutes every 2 hours for as many days as directed  Alternate heat and ice  Prevent acute low back pain:   · Use proper body mechanics  ¨ Bend at the hips and knees when you  objects  Do not bend from the waist  Use your leg muscles as you lift the load  Do not use your back  Keep the object close to your chest as you lift it  Try not to twist or lift anything above your waist     ¨ Change your position often when you stand for long periods of time  Rest one foot on a small box or footrest, and then switch to the other foot often  ¨ Try not to sit for long periods of time  When you do, sit in a straight-backed chair with your feet flat on the floor  Never reach, pull, or push while you are sitting  · Do exercises that strengthen your back muscles  Warm up before you exercise  Ask your healthcare provider the best exercises for you  · Maintain a healthy weight  Ask your healthcare provider how much you should weigh   Ask him to help you create a weight loss plan if you are overweight  Follow up with your healthcare provider as directed:  Return for a follow-up visit if you still have pain after 1 to 3 weeks of treatment  You may need to visit an orthopedist if your back pain lasts more than 12 weeks  Write down your questions so you remember to ask them during your visits  © 2017 [address]  Information is for End User's use only and may not be sold, redistributed or otherwise used for commercial purposes  All illustrations and images included in CareNotes® are the copyrighted property of [employer]  or [employer]  The above information is an  only  It is not intended as medical advice for individual conditions or treatments  Talk to your doctor, nurse or pharmacist before following any medical regimen to see if it is safe and effective for you

## 2017-11-13 NOTE — ED NOTES
D/c instructions and rx reviewed w/ pt  All questions and concerns addressed at this time         Jess Kuo RN  11/13/17 5761

## 2018-01-10 NOTE — PROCEDURES
Procedures by Jennifer Parson MD at 2017   1:38 PM      Author:  Jennifer Parson MD Service:  Neurology Author Type:  Physician    Filed:  2017  1:54 PM Date of Service:  2017  1:38 PM Status:  Signed    :  Jennifer Parson MD (Physician)        Procedure Orders:       1  EEG Video Monitoring 24 Hour [id number] ordered by Jennifer Parson MD at 17 1138                  Continuous Video EEG Monitoring       Patient Name:  Emmanuel Cope  MRN: [id number]   :  1995 File #: [id number] [id number]   Age: 23 y o  Encounter #: [id number]   Date performed: 2017 - 8/3/2017           Report date: 2017          Study type: Continuous video EEG    ICD 10 diagnosis: Spells/Fit NOS R56 9    Start time: 2017 08:00   End time: 8/3/2017: 07:59     -------------------------------------------------------------------------------------------------------------------   Patient History: This recording was observed in a 23 y o  female to determine whether spells  of unresponsiveness and shaking are seizures  Medications include:     enoxaparin 40 mg Subcutaneous Daily       -------------------------------------------------------------------------------------------------------------------   Description of Procedure:  ·  32 channel digital recording with electrodes placed according to the International 10-20 system with additional  T1/T2 electrodes, EOG, EKG, and simultaneous  video  A monitoring technologist supervised the continuous recording  The recording was technically satisfactory  -------------------------------------------------------------------------------------------------------------------   Results:   Manual Review:   Manual review of the tracing was unchanged from the prior day's recording  During wakefulness, there were long  runs of well regulated, low amplitude, posteriorly dominant, symmetric  7-8 cps alpha rhythm that attenuated with eye opening   There were symmetric low amplitude, frontally dominant beta activities  With drowsiness, alpha activity attenuated and was replaced by diffusely distributed theta activities  With sleep, symmetric vertex sharp waves, K complexes  and sleep spindles were present  Other findings: The single lead ECG demonstrated a regular rhythm  Events:   No push buttons were activated  -------------------------------------------------------------------------------------------------------------------   Interpretation: This prolonged, continuous video-EEG recording is abnormal  Mild slowing of the posteirorly dominant alpha rhythm suggests mild, diffuse cerebral dysfunction of non-specific etiology  No clinical  events were reported  No electrographic seizures or interictal epileptiform discharges were seen  Thuan Cr MD   [address] [facility]               Received for:Hakan ABARCA    Aug  4 2017  1:55PM [facility] Standard Time

## 2018-01-10 NOTE — PROCEDURES
Procedures by Richardean Bamberger, MD at 2017   1:38 PM      Author:  Richardean Bamberger, MD Service:  Neurology Author Type:  Physician    Filed:  2017  2:02 PM Date of Service:  2017  1:38 PM Status:  Signed    :  Richardean Bamberger, MD (Physician)        Procedure Orders:       1  EEG Video Monitoring 24 Hour [id number] ordered by Richardean Bamberger, MD at 17 1148                  Continuous Video EEG Monitoring       Patient Name:  Tito Shay  MRN: [id number]   :  1995 File #: [id number] [id number]   Age: 23 y o  Encounter #: [id number]   Date performed: 2017           Report date: 2017          Study type: Continuous video EEG    ICD 10 diagnosis: Spells/Fit NOS R56 9    Start time: 2017 08:00   End time: 2017: 11:14     -------------------------------------------------------------------------------------------------------------------   Patient History: This recording was observed in a 23 y o  female to determine whether spells  of unresponsiveness and shaking are seizures  Medications include:     enoxaparin 40 mg Subcutaneous Daily       -------------------------------------------------------------------------------------------------------------------   Description of Procedure:  ·  32 channel digital recording with electrodes placed according to the International 10-20 system with additional  T1/T2 electrodes, EOG, EKG, and simultaneous  video  A monitoring technologist supervised the continuous recording  The recording was technically satisfactory  -------------------------------------------------------------------------------------------------------------------   Results:   Manual Review:   Manual review of the tracing was unchanged from the prior day's recording  During wakefulness, there were long  runs of well regulated, low amplitude, posteriorly dominant, symmetric  7-8 cps alpha rhythm that attenuated with eye opening   There were symmetric low amplitude, frontally dominant beta activities  Drowsiness and sleep were not attained on this portion of monitoring  Other findings: The single lead ECG demonstrated a regular rhythm  Events:   No push buttons were activated  -------------------------------------------------------------------------------------------------------------------   Interpretation: This prolonged, continuous video-EEG recording is abnormal  Mild slowing of the posteirorly dominant alpha rhythm suggests mild, diffuse cerebral dysfunction of non-specific etiology  No clinical  events were reported  No electrographic seizures or interictal epileptiform discharges were seen  Ting Calderon MD   [address] [facility]               Received for:Hakan ABARCA    Aug  4 2017  2:02PM [facility] Standard Time

## 2018-01-14 NOTE — PROCEDURES
Procedures by Fide Hill MD at 2017   1:38 PM      Author:  Fide Hill MD Service:  Neurology Author Type:  Physician    Filed:  2017  1:57 PM Date of Service:  2017  1:38 PM Status:  Signed    :  Fide Hill MD (Physician)        Procedure Orders:       1  EEG Video Monitoring 24 Hour [id number] ordered by Fide Hill MD at 17 1001                  Continuous Video EEG Monitoring       Patient Name:  Lakeisha Churchillutant  MRN: [id number]   :  1995 File #: LTM 16 - 1   Age: 23 y o  Encounter #: [id number]   Date performed: 8/3/2017 - 2017           Report date: 2017          Study type: Continuous video EEG    ICD 10 diagnosis: Spells/Fit NOS R56 9    Start time: 8/3/2017 08:00   End time: 2017: 07:59     -------------------------------------------------------------------------------------------------------------------   Patient History: This recording was observed in a 23 y o  female to determine whether spells  of unresponsiveness and shaking are seizures  Medications include:     enoxaparin 40 mg Subcutaneous Daily       -------------------------------------------------------------------------------------------------------------------   Description of Procedure:  ·  32 channel digital recording with electrodes placed according to the International 10-20 system with additional  T1/T2 electrodes, EOG, EKG, and simultaneous  video  A monitoring technologist supervised the continuous recording  The recording was technically satisfactory  -------------------------------------------------------------------------------------------------------------------   Results:   Manual Review:   Manual review of the tracing was unchanged from the prior day's recording  During wakefulness, there were long  runs of well regulated, low amplitude, posteriorly dominant, symmetric  7-8 cps alpha rhythm that attenuated with eye opening   There were symmetric low amplitude,

## 2018-01-16 NOTE — MISCELLANEOUS
Message   Recorded as Task   Date: 11/15/2016 01:14 PM, Created By: Elise Nageotte   Task Name: Order Notification   Assigned To: Linette Harris   Regarding Patient: Kapil Zaragoza, Status: Active   CommentBurr Munir - 15 Nov 2016 1:14 PM             Active Problems    1  Bacteria in urine (791 9) (R82 71)   2  Epilepsy (345 90) (G40 909)   3  Pregnancy, obstetrical care (V22 1) (Z34 90)   4  Sickle cell trait (282 5) (D57 3)    Current Meds   1  Folic Acid 1 MG Oral Tablet; Therapy: (Recorded:09Nov2016) to Recorded   2  Keppra 250 MG Oral Tablet (LevETIRAcetam); Therapy: (Recorded:09Nov2016) to Recorded   3  Macrobid 100 MG Oral Capsule (Nitrofurantoin Monohyd Macro); TAKE 1 CAPSULE   EVERY 12 HOURS DAILY; Therapy: 17MGX4781 to (Evaluate:22Nov2016) Recorded   4  Prenatal TABS; Therapy: (Recorded:09Nov2016) to Recorded    Allergies    1  Erythromycin TABS    Plan  Bacteria in urine, Pregnancy, obstetrical care    · (1) URINALYSIS w URINE C/S REFLEX (will reflex a microscopy if leukocytes, occult  blood, or nitrites are not within normal limits); Status:Active - Retrospective Authorization;   Requested for:25Nov2016;     Signatures   Electronically signed by : Dulce Larry, ; Nov 15 2016  1:20PM EST                       (Author)

## 2018-01-16 NOTE — PROCEDURES
Procedures by Marylouise Lombard, MD at 2017   1:34 PM      Author:  Marylouise Lombard, MD Service:  Neurology Author Type:  Physician    Filed:  2017  1:38 PM Date of Service:  2017  1:34 PM Status:  Signed    :  Marylouise Lombard, MD (Physician)        Procedure Orders:       1  EEG Video Monitoring 24 Hour [id number] ordered by Estuardo Khan DO at 17 0819                  Continuous Video EEG Monitoring       Patient Name:  Delisa Everett  MRN: [id number]   :  1995 File #: LTM 16 - 36   Age: 23 y o  Encounter #: [id number]   Date performed: 2017 - 2017           Report date: 2017          Study type: Continuous video EEG    ICD 10 diagnosis: Spells/Fit NOS R56 9    Start time: 2017 11:58   End time: 2017: 07:59     -------------------------------------------------------------------------------------------------------------------   Patient History: This recording was observed in a 23 y o  female to determine whether spells  of unresponsiveness and shaking are seizures  Medications include:   enoxaparin 40 mg Subcutaneous Daily       -------------------------------------------------------------------------------------------------------------------   Description of Procedure:  ·  32 channel digital recording with electrodes placed according to the International 10-20 system with additional  T1/T2 electrodes, EOG, EKG, and simultaneous  video  A monitoring technologist supervised the continuous recording  The recording was technically satisfactory  -------------------------------------------------------------------------------------------------------------------   Results:   Manual Review:   During wakefulness, there were long runs of well regulated,  low amplitude, posteriorly dominant, symmetric 7-8 cps alpha rhythm that attenuate d with eye opening  There were symmetric low amplitude, frontally dominant beta activities       With drowsiness, alpha activity attenuated and was replaced by diffusely distributed theta activities  With sleep, symmetric vertex sharp waves, K complexes  and sleep spindles were present  Other findings: The single lead ECG demonstrated a regular rhythm  Events:   No push buttons were activated  -------------------------------------------------------------------------------------------------------------------   Interpretation: This prolonged, continuous video-EEG recording is abnormal  Mild slowing of the posteirorly dominant alpha rhythm suggests mild, diffuse cerebral dysfunction of non-specific etiology  No clinical  events were reported  No electrographic seizures or interictal epileptiform discharges were seen  Nicci Cunningham MD   [facility]               Received for:Hakan ABARCA    Aug  4 2017  1:39PM [facility] Standard Time

## 2018-06-05 ENCOUNTER — APPOINTMENT (EMERGENCY)
Dept: CT IMAGING | Facility: HOSPITAL | Age: 23
End: 2018-06-05
Payer: COMMERCIAL

## 2018-06-05 ENCOUNTER — HOSPITAL ENCOUNTER (EMERGENCY)
Facility: HOSPITAL | Age: 23
Discharge: HOME | End: 2018-06-06
Admitting: EMERGENCY MEDICINE
Payer: COMMERCIAL

## 2018-06-05 DIAGNOSIS — R10.9 NONSPECIFIC ABDOMINAL PAIN: Primary | ICD-10-CM

## 2018-06-05 DIAGNOSIS — L02.415 ABSCESS OF RIGHT THIGH: ICD-10-CM

## 2018-06-05 LAB
ALBUMIN SERPL BCP-MCNC: 3.7 G/DL (ref 3.5–5)
ALP SERPL-CCNC: 101 U/L (ref 46–116)
ALT SERPL W P-5'-P-CCNC: 29 U/L (ref 12–78)
ANION GAP SERPL CALCULATED.3IONS-SCNC: 9 MMOL/L (ref 4–13)
AST SERPL W P-5'-P-CCNC: 15 U/L (ref 5–45)
BASOPHILS # BLD AUTO: 0.04 THOUSANDS/ΜL (ref 0–0.1)
BASOPHILS NFR BLD AUTO: 0 % (ref 0–1)
BILIRUB DIRECT SERPL-MCNC: 0.07 MG/DL (ref 0–0.2)
BILIRUB SERPL-MCNC: 0.4 MG/DL (ref 0.2–1)
BILIRUB UR QL STRIP: NEGATIVE
BUN SERPL-MCNC: 12 MG/DL (ref 5–25)
CALCIUM SERPL-MCNC: 9.1 MG/DL (ref 8.3–10.1)
CHLORIDE SERPL-SCNC: 103 MMOL/L (ref 100–108)
CLARITY UR: CLEAR
CO2 SERPL-SCNC: 28 MMOL/L (ref 21–32)
COLOR UR: YELLOW
CREAT SERPL-MCNC: 0.87 MG/DL (ref 0.6–1.3)
EOSINOPHIL # BLD AUTO: 0.22 THOUSAND/ΜL (ref 0–0.61)
EOSINOPHIL NFR BLD AUTO: 2 % (ref 0–6)
ERYTHROCYTE [DISTWIDTH] IN BLOOD BY AUTOMATED COUNT: 13.8 % (ref 11.6–15.1)
EXT PREG TEST URINE: NEGATIVE
GFR SERPL CREATININE-BSD FRML MDRD: 93 ML/MIN/1.73SQ M
GLUCOSE SERPL-MCNC: 120 MG/DL (ref 65–140)
GLUCOSE UR STRIP-MCNC: NEGATIVE MG/DL
HCG SERPL QL: NEGATIVE
HCT VFR BLD AUTO: 37.9 % (ref 34.8–46.1)
HGB BLD-MCNC: 12.6 G/DL (ref 11.5–15.4)
HGB UR QL STRIP.AUTO: NEGATIVE
IMM GRANULOCYTES # BLD AUTO: 0.02 THOUSAND/UL (ref 0–0.2)
IMM GRANULOCYTES NFR BLD AUTO: 0 % (ref 0–2)
KETONES UR STRIP-MCNC: NEGATIVE MG/DL
LEUKOCYTE ESTERASE UR QL STRIP: NEGATIVE
LIPASE SERPL-CCNC: 103 U/L (ref 73–393)
LYMPHOCYTES # BLD AUTO: 2.96 THOUSANDS/ΜL (ref 0.6–4.47)
LYMPHOCYTES NFR BLD AUTO: 32 % (ref 14–44)
MCH RBC QN AUTO: 25.8 PG (ref 26.8–34.3)
MCHC RBC AUTO-ENTMCNC: 33.2 G/DL (ref 31.4–37.4)
MCV RBC AUTO: 78 FL (ref 82–98)
MONOCYTES # BLD AUTO: 0.59 THOUSAND/ΜL (ref 0.17–1.22)
MONOCYTES NFR BLD AUTO: 6 % (ref 4–12)
NEUTROPHILS # BLD AUTO: 5.53 THOUSANDS/ΜL (ref 1.85–7.62)
NEUTS SEG NFR BLD AUTO: 60 % (ref 43–75)
NITRITE UR QL STRIP: NEGATIVE
NRBC BLD AUTO-RTO: 0 /100 WBCS
PH UR STRIP.AUTO: 5.5 [PH] (ref 4.5–8)
PLATELET # BLD AUTO: 301 THOUSANDS/UL (ref 149–390)
PMV BLD AUTO: 10.5 FL (ref 8.9–12.7)
POTASSIUM SERPL-SCNC: 3.5 MMOL/L (ref 3.5–5.3)
PROT SERPL-MCNC: 7.5 G/DL (ref 6.4–8.2)
PROT UR STRIP-MCNC: NEGATIVE MG/DL
RBC # BLD AUTO: 4.88 MILLION/UL (ref 3.81–5.12)
SODIUM SERPL-SCNC: 140 MMOL/L (ref 136–145)
SP GR UR STRIP.AUTO: 1.02 (ref 1–1.03)
UROBILINOGEN UR QL STRIP.AUTO: 0.2 E.U./DL
WBC # BLD AUTO: 9.36 THOUSAND/UL (ref 4.31–10.16)

## 2018-06-05 PROCEDURE — 85025 COMPLETE CBC W/AUTO DIFF WBC: CPT | Performed by: PHYSICIAN ASSISTANT

## 2018-06-05 PROCEDURE — 83690 ASSAY OF LIPASE: CPT | Performed by: PHYSICIAN ASSISTANT

## 2018-06-05 PROCEDURE — 96361 HYDRATE IV INFUSION ADD-ON: CPT

## 2018-06-05 PROCEDURE — 80076 HEPATIC FUNCTION PANEL: CPT | Performed by: PHYSICIAN ASSISTANT

## 2018-06-05 PROCEDURE — 74177 CT ABD & PELVIS W/CONTRAST: CPT

## 2018-06-05 PROCEDURE — 36415 COLL VENOUS BLD VENIPUNCTURE: CPT | Performed by: PHYSICIAN ASSISTANT

## 2018-06-05 PROCEDURE — 96374 THER/PROPH/DIAG INJ IV PUSH: CPT

## 2018-06-05 PROCEDURE — 80048 BASIC METABOLIC PNL TOTAL CA: CPT | Performed by: PHYSICIAN ASSISTANT

## 2018-06-05 PROCEDURE — 84703 CHORIONIC GONADOTROPIN ASSAY: CPT | Performed by: PHYSICIAN ASSISTANT

## 2018-06-05 PROCEDURE — 81025 URINE PREGNANCY TEST: CPT | Performed by: PHYSICIAN ASSISTANT

## 2018-06-05 PROCEDURE — 81003 URINALYSIS AUTO W/O SCOPE: CPT | Performed by: PHYSICIAN ASSISTANT

## 2018-06-05 RX ORDER — ONDANSETRON 2 MG/ML
4 INJECTION INTRAMUSCULAR; INTRAVENOUS ONCE
Status: COMPLETED | OUTPATIENT
Start: 2018-06-05 | End: 2018-06-05

## 2018-06-05 RX ORDER — CEPHALEXIN 500 MG/1
500 CAPSULE ORAL 4 TIMES DAILY
Qty: 28 CAPSULE | Refills: 0 | Status: SHIPPED | OUTPATIENT
Start: 2018-06-05 | End: 2018-06-10

## 2018-06-05 RX ORDER — SUCRALFATE ORAL 1 G/10ML
1000 SUSPENSION ORAL ONCE
Status: COMPLETED | OUTPATIENT
Start: 2018-06-05 | End: 2018-06-05

## 2018-06-05 RX ORDER — FAMOTIDINE 20 MG/1
20 TABLET, FILM COATED ORAL 2 TIMES DAILY
Qty: 10 TABLET | Refills: 0 | Status: SHIPPED | OUTPATIENT
Start: 2018-06-05 | End: 2018-06-10

## 2018-06-05 RX ORDER — ONDANSETRON 4 MG/1
4 TABLET, ORALLY DISINTEGRATING ORAL EVERY 8 HOURS PRN
Qty: 15 TABLET | Refills: 0 | Status: SHIPPED | OUTPATIENT
Start: 2018-06-05 | End: 2018-06-10

## 2018-06-05 RX ADMIN — ONDANSETRON 4 MG: 2 INJECTION INTRAMUSCULAR; INTRAVENOUS at 21:21

## 2018-06-05 RX ADMIN — SUCRALFATE 1000 MG: 1 SUSPENSION ORAL at 21:21

## 2018-06-05 RX ADMIN — LIDOCAINE HYDROCHLORIDE 15 ML: 20 SOLUTION ORAL; TOPICAL at 21:21

## 2018-06-05 RX ADMIN — SODIUM CHLORIDE 1000 ML: 0.9 INJECTION, SOLUTION INTRAVENOUS at 21:21

## 2018-06-05 RX ADMIN — IOHEXOL 100 ML: 350 INJECTION, SOLUTION INTRAVENOUS at 23:10

## 2018-06-06 VITALS
HEIGHT: 67 IN | WEIGHT: 245 LBS | RESPIRATION RATE: 17 BRPM | TEMPERATURE: 98.7 F | OXYGEN SATURATION: 98 % | DIASTOLIC BLOOD PRESSURE: 71 MMHG | SYSTOLIC BLOOD PRESSURE: 127 MMHG | BODY MASS INDEX: 38.45 KG/M2 | HEART RATE: 82 BPM

## 2018-06-06 PROCEDURE — 99284 EMERGENCY DEPT VISIT MOD MDM: CPT

## 2018-06-06 NOTE — ED PROVIDER NOTES
History  Chief Complaint   Patient presents with    Abdominal Pain     Patient reports having abdominal pain for the past day or so  Patient reports nausea and bloating  Patient reports this is how she felt with her last pregnancy, but recently had a tubal ligation last year  Patient also reports abscess on upper leg     23-year-old female here for abdominal pain  Onset 3-4 days ago  States in the epigastric region and shoots to her bilateral flanks  Feels like an aching cramping pain  States it feels like she was pregnant  She states she has had a tubal ligation and is worried that she could have an ectopic pregnancy  She has had no vaginal bleeding, does have vaginal discharge but states that is typical for her usual period  She has had nausea but no vomiting  She has had decreased appetite  Pain has no association with food  She has had normal bowel movements, normal urination  She has had 2 prior C-sections  Otherwise no surgeries on the abdomen  No prior history of similar symptoms or pain  Denies fevers or chills  History provided by:  Patient   used: No    Abdominal Pain   Pain location:  Epigastric  Pain quality: aching and cramping    Pain radiation: Bilateral flanks    Pain severity:  Moderate  Onset quality:  Gradual  Duration:  4 days  Timing:  Constant  Progression:  Waxing and waning  Chronicity:  New  Context: not alcohol use, not awakening from sleep, not diet changes, not eating, not laxative use, not medication withdrawal, not previous surgeries, not recent illness, not recent sexual activity, not recent travel, not retching, not sick contacts, not suspicious food intake and not trauma    Relieved by:  Nothing  Worsened by:  Nothing  Ineffective treatments:  None tried  Associated symptoms: nausea and vaginal discharge    Associated symptoms: no anorexia, no belching, no chest pain, no chills, no constipation, no cough, no diarrhea, no dysuria, no fatigue, no fever, no flatus, no hematemesis, no hematochezia, no hematuria, no melena, no shortness of breath, no sore throat, no vaginal bleeding and no vomiting    Risk factors: obesity    Risk factors: no alcohol abuse, no aspirin use, not elderly, has not had multiple surgeries, no NSAID use, not pregnant and no recent hospitalization        Prior to Admission Medications   Prescriptions Last Dose Informant Patient Reported? Taking?   escitalopram (LEXAPRO) 10 mg tablet   Yes No   Sig: Take 10 mg by mouth daily   levETIRAcetam (KEPPRA) 500 mg tablet   No No   Sig: Take 2 tablets by mouth every 12 (twelve) hours   methocarbamol (ROBAXIN) 750 mg tablet   No No   Sig: Take 1 tablet by mouth 4 (four) times a day as needed for muscle spasms for up to 5 days   naproxen (EC NAPROSYN) 500 MG EC tablet   No No   Sig: Take 1 tablet by mouth 2 (two) times a day with meals for 10 days      Facility-Administered Medications: None       Past Medical History:   Diagnosis Date    Migraine     Seizures ([facility] [address] )        Past Surgical History:   Procedure Laterality Date     SECTION       SECTION      SHOULDER SURGERY      TUBAL LIGATION      TUMOR REMOVAL      from ear       History reviewed  No pertinent family history  I have reviewed and agree with the history as documented  Social History   Substance Use Topics    Smoking status: Never Smoker    Smokeless tobacco: Never Used    Alcohol use No        Review of Systems   Constitutional: Negative for activity change, appetite change, chills, diaphoresis, fatigue, fever and unexpected weight change  HENT: Negative for congestion, rhinorrhea, sinus pressure, sore throat and trouble swallowing  Eyes: Negative for photophobia and visual disturbance  Respiratory: Negative for apnea, cough, choking, chest tightness, shortness of breath, wheezing and stridor  Cardiovascular: Negative for chest pain, palpitations and leg swelling     Gastrointestinal: Positive for abdominal pain and nausea  Negative for abdominal distention, anorexia, blood in stool, constipation, diarrhea, flatus, hematemesis, hematochezia, melena and vomiting  Genitourinary: Positive for vaginal discharge  Negative for decreased urine volume, difficulty urinating, dysuria, enuresis, flank pain, frequency, hematuria, urgency and vaginal bleeding  Musculoskeletal: Negative for arthralgias, myalgias, neck pain and neck stiffness  Skin: Negative for color change, pallor, rash and wound  Allergic/Immunologic: Negative  Neurological: Negative for dizziness, tremors, syncope, weakness, light-headedness, numbness and headaches  Hematological: Negative  Psychiatric/Behavioral: Negative  All other systems reviewed and are negative  Physical Exam  Physical Exam   Constitutional: She is oriented to person, place, and time  She appears well-developed and well-nourished  Non-toxic appearance  She does not have a sickly appearance  She does not appear ill  No distress  HENT:   Head: Normocephalic and atraumatic  Eyes: EOM and lids are normal  Pupils are equal, round, and reactive to light  Neck: Normal range of motion  Neck supple  Cardiovascular: Normal rate, regular rhythm, S1 normal, S2 normal, normal heart sounds, intact distal pulses and normal pulses  Exam reveals no gallop, no distant heart sounds, no friction rub and no decreased pulses  No murmur heard  Pulses:       Radial pulses are 2+ on the right side, and 2+ on the left side  Pulmonary/Chest: Effort normal and breath sounds normal  No accessory muscle usage  No apnea, no tachypnea and no bradypnea  No respiratory distress  She has no decreased breath sounds  She has no wheezes  She has no rhonchi  She has no rales  Abdominal: Soft  Normal appearance and bowel sounds are normal  She exhibits no distension and no mass  There is no tenderness  There is no rigidity, no rebound and no guarding  No hernia  Musculoskeletal: Normal range of motion  She exhibits no edema, tenderness or deformity  Neurological: She is alert and oriented to person, place, and time  No cranial nerve deficit  GCS eye subscore is 4  GCS verbal subscore is 5  GCS motor subscore is 6  GCS 15  AAOx3  Ambulating in department without difficulty  CN II-XII grossly intact  No focal neuro deficits  Skin: Skin is warm, dry and intact  No rash noted  She is not diaphoretic  No erythema  No pallor  Psychiatric: Her speech is normal    Nursing note and vitals reviewed        Vital Signs  ED Triage Vitals [06/05/18 1929]   Temperature Pulse Respirations Blood Pressure SpO2   98 7 °F (37 1 °C) 91 18 135/83 99 %      Temp Source Heart Rate Source Patient Position - Orthostatic VS BP Location FiO2 (%)   Oral Monitor Sitting Left arm --      Pain Score       6           Vitals:    06/05/18 1929 06/05/18 2238   BP: 135/83 120/68   Pulse: 91 82   Patient Position - Orthostatic VS: Sitting Lying       Visual Acuity      ED Medications  Medications   sodium chloride 0 9 % bolus 1,000 mL (1,000 mL Intravenous New Bag 6/5/18 2121)   ondansetron (ZOFRAN) injection 4 mg (4 mg Intravenous Given 6/5/18 2121)   sucralfate (CARAFATE) oral suspension 1,000 mg (1,000 mg Oral Given 6/5/18 2121)   lidocaine viscous (XYLOCAINE) 2 % mucosal solution 15 mL (15 mL Swish & Swallow Given 6/5/18 2121)   iohexol (OMNIPAQUE) 350 MG/ML injection (MULTI-DOSE) 100 mL (100 mL Intravenous Given 6/5/18 2310)       Diagnostic Studies  Results Reviewed     Procedure Component Value Units Date/Time    POCT pregnancy, urine [[id number]]  (Normal) Resulted:  06/05/18 2220    Lab Status:  Final result Updated:  06/05/18 2220     EXT PREG TEST UR (Ref: Negative) Negative    hCG, qualitative pregnancy [[id number]]  (Normal) Collected:  06/05/18 2106    Lab Status:  Final result Specimen:  Blood from Arm, Left Updated:  06/05/18 2217     Preg, Serum Negative    Basic metabolic panel [[id number]] Collected:  06/05/18 2106    Lab Status:  Final result Specimen:  Blood from Arm, Left Updated:  06/05/18 2128     Sodium 140 mmol/L      Potassium 3 5 mmol/L      Chloride 103 mmol/L      CO2 28 mmol/L      Anion Gap 9 mmol/L      BUN 12 mg/dL      Creatinine 0 87 mg/dL      Glucose 120 mg/dL      Calcium 9 1 mg/dL      eGFR 93 ml/min/1 73sq m     Narrative:         National Kidney Disease Education Program recommendations are as follows:  GFR calculation is accurate only with a steady state creatinine  Chronic Kidney disease less than 60 ml/min/1 73 sq  meters  Kidney failure less than 15 ml/min/1 73 sq  meters      Hepatic function panel [[id number]]  (Normal) Collected:  06/05/18 2106    Lab Status:  Final result Specimen:  Blood from Arm, Left Updated:  06/05/18 2128     Total Bilirubin 0 40 mg/dL      Bilirubin, Direct 0 07 mg/dL      Alkaline Phosphatase 101 U/L      AST 15 U/L      ALT 29 U/L      Total Protein 7 5 g/dL      Albumin 3 7 g/dL     Lipase [[id number]]  (Normal) Collected:  06/05/18 2106    Lab Status:  Final result Specimen:  Blood from Arm, Left Updated:  06/05/18 2128     Lipase 103 u/L     UA w Reflex to Microscopic w Reflex to Culture [[id number]] Collected:  06/05/18 2107    Lab Status:  Final result Specimen:  Urine from Urine, Clean Catch Updated:  06/05/18 2115     Color, UA Yellow     Clarity, UA Clear     Specific Gravity, UA 1 025     pH, UA 5 5     Leukocytes, UA Negative     Nitrite, UA Negative     Protein, UA Negative mg/dl      Glucose, UA Negative mg/dl      Ketones, UA Negative mg/dl      Urobilinogen, UA 0 2 E U /dl      Bilirubin, UA Negative     Blood, UA Negative    CBC and differential [[id number]]  (Abnormal) Collected:  06/05/18 2106    Lab Status:  Final result Specimen:  Blood from Arm, Left Updated:  06/05/18 2111     WBC 9 36 Thousand/uL      RBC 4 88 Million/uL      Hemoglobin 12 6 g/dL      Hematocrit 37 9 %      MCV 78 (L) fL      MCH 25 8 (L) pg      MCHC 33 2 g/dL      RDW 13 8 %      MPV 10 5 fL      Platelets 632 Thousands/uL      nRBC 0 /100 WBCs      Neutrophils Relative 60 %      Immat GRANS % 0 %      Lymphocytes Relative 32 %      Monocytes Relative 6 %      Eosinophils Relative 2 %      Basophils Relative 0 %      Neutrophils Absolute 5 53 Thousands/µL      Immature Grans Absolute 0 02 Thousand/uL      Lymphocytes Absolute 2 96 Thousands/µL      Monocytes Absolute 0 59 Thousand/µL      Eosinophils Absolute 0 22 Thousand/µL      Basophils Absolute 0 04 Thousands/µL                  CT abdomen pelvis with contrast   Final Result by Amber Mcgraw DO (06/05 2331)      No acute findings            Workstation performed: [id number]                    Procedures  Procedures       Phone Contacts  ED Phone Contact    ED Course                               MDM  Number of Diagnoses or Management Options  Abscess of right thigh: new and requires workup  Nonspecific abdominal pain: new and requires workup  Diagnosis management comments: DDx including but not limited to: appendicitis, gastroenteritis, gastritis, PUD, GERD, gastroparesis, hepatitis, pancreatitis, colitis, enteritis, food poisoning, mesenteric adenitis, IBD, IBS, ileus, bowel obstruction, volvulus, cholecystitis, biliary colic, choledocholithiasis, perforated viscus, splenic etiology, diverticulitis, internal hernia, constipation, pelvic pathology, renal colic, pyelonephritis, UTI  Plan: check preg  CT a/p r/o acute surgical pathology  Laboratory evaluation  GI cocktail  dispo pending  Amount and/or Complexity of Data Reviewed  Clinical lab tests: ordered and reviewed  Tests in the radiology section of CPT®: reviewed and ordered  Independent visualization of images, tracings, or specimens: yes    Risk of Complications, Morbidity, and/or Mortality  Presenting problems: moderate  Management options: low  General comments: 33-year-old female with abdominal pain  Slight improvement after GI cocktail    CT scan shows no acute abnormalities  Labs are unremarkable  She is resting comfortably  She has negative pregnancy  She does have an abscess on her inner thigh which has already been draining, she does have mild surrounding cellulitis for which we will treat with Keflex  Will discharge home with Pepcid and Zofran  Recommended she see her family doctor for continued care and evaluation  She understands and agrees with this plan  Patient Progress  Patient progress: stable    CritCare Time    Disposition  Final diagnoses:   Nonspecific abdominal pain   Abscess of right thigh     Time reflects when diagnosis was documented in both MDM as applicable and the Disposition within this note     Time User Action Codes Description Comment    6/5/2018 11:54 PM Cristian QUEEN Add [R10 9] Nonspecific abdominal pain     6/5/2018 11:54 PM Cristian QUEEN Add [L02 415] Abscess of right thigh       ED Disposition     ED Disposition Condition Comment    Discharge  Emmanuel Slider discharge to home/self care      Condition at discharge: Good        Follow-up Information     Follow up With Specialties Details Why Lobitotayo Ye  26  Call in 1 day for follow up appointment [id number] [id number]            Patient's Medications   Discharge Prescriptions    CEPHALEXIN (KEFLEX) 500 MG CAPSULE    Take 1 capsule (500 mg total) by mouth 4 (four) times a day for 7 days       Start Date: 6/5/2018  End Date: 6/12/2018       Order Dose: 500 mg       Quantity: 28 capsule    Refills: 0    FAMOTIDINE (PEPCID) 20 MG TABLET    Take 1 tablet (20 mg total) by mouth 2 (two) times a day for 5 days       Start Date: 6/5/2018  End Date: 6/10/2018       Order Dose: 20 mg       Quantity: 10 tablet    Refills: 0    ONDANSETRON (ZOFRAN-ODT) 4 MG DISINTEGRATING TABLET    Take 1 tablet (4 mg total) by mouth every 8 (eight) hours as needed for nausea or vomiting       Start Date: 6/5/2018  End Date: --       Order Dose: 4 mg       Quantity: 15 tablet    Refills: 0     No discharge procedures on file      ED Provider  Electronically Signed by           Pina Porras PA-C  06/05/18 2152       Pina Porars PA-C  06/06/18 6772

## 2018-06-06 NOTE — DISCHARGE INSTRUCTIONS
Return to the Emergency Department sooner if increased pain, fever, vomiting, diarrhea, difficulty breathing or urinating, bleeding  Clear fluids for 1-2 days and then advance diet as tolerated  Abscess   WHAT YOU NEED TO KNOW:   What is an abscess? An abscess is an area under the skin where pus (infected fluid) collects  An abscess is often caused by bacteria, fungi or other germs that get into an open wound  You can get an abscess anywhere on your body  What increases my risk for an abscess? · An animal bite    · A foreign object lodged under your skin    · Heavy or frequent sweating    · A health problem, such as diabetes or obesity    · Injecting illegal drugs  What are the signs and symptoms of an abscess? You may have a swollen mass that is red and painful  Pus may leak out of the mass  The pus will be white or yellow and may smell bad  You may have redness and pain days before the mass appears  You may have a fever and chills if the infection spreads  How is an abscess diagnosed? Your healthcare provider will examine the area  He will check to see if your abscess is draining  A sample of fluid from your abscess may show what is causing your infection  How is an abscess treated? · Incision and drainage  is a procedure used to remove pus and fluid from the abscess  Your healthcare provider will make a cut in the abscess so it can drain  Then gauze will be put into the wound and it will be covered with a bandage  · Surgery  may be needed to remove your abscess  Your healthcare provider may do this if the abscess is on your hands or buttocks  Surgery can decrease the risk that the abscess will come back  What can I do to care for my self? · Apply a warm compress to your abscess  This will help it open and drain  Wet a washcloth in warm, but not hot, water  Apply the compress for 10 minutes  Repeat this 4 times each day  Do not  press on an abscess or try to open it with a needle  You may push the bacteria deeper or into your blood  · Do not share your clothes, towels, or sheets with anyone  This can spread the infection to others  · Wash your hands often  This can help prevent the spread of germs  Use soap and water or an alcohol-based hand rub  What can I do to care for my wound after it is drained? · Care for your wound as directed  If your healthcare provider says it is okay, carefully remove the bandage and gauze packing  You may need to soak the gauze to get it out of your wound  Clean your wound and the area around it as directed  Dry the area and put on new, clean bandages  Change your bandages when they get wet or dirty  · Ask your healthcare provider how to change the gauze in your wound  Keep track of how many pieces of gauze are placed inside the wound  Do not put too much packing in the wound  Do not pack the gauze too tightly in your wound  When should I seek immediate care? · The area around your abscess becomes very painful, warm, or has red streaks  · You have a fever and chills  · Your heart is beating faster than usual      · You feel faint or confused  When should I contact my healthcare provider? · Your abscess gets bigger  · Your abscess returns  · You have questions or concerns about your condition or care  CARE AGREEMENT:   You have the right to help plan your care  Learn about your health condition and how it may be treated  Discuss treatment options with your caregivers to decide what care you want to receive  You always have the right to refuse treatment  The above information is an  only  It is not intended as medical advice for individual conditions or treatments  Talk to your doctor, nurse or pharmacist before following any medical regimen to see if it is safe and effective for you    © 2017 [employer] Information is for End User's use only and may not be sold, redistributed or otherwise used for commercial purposes  All illustrations and images included in CareNotes® are the copyrighted property of [employer]  or Raleigh Watts  Acute Abdominal Pain   WHAT YOU NEED TO KNOW:   The cause of your abdominal pain may not be found  If a cause is found, treatment will depend on what the cause is  DISCHARGE INSTRUCTIONS:   Return to the emergency department if:   · You vomit blood or cannot stop vomiting  · You have blood in your bowel movement or it looks like tar  · You have bleeding from your rectum  · Your abdomen is larger than usual, more painful, and hard  · You have severe pain in your abdomen  · You stop passing gas and having bowel movements  · You feel weak, dizzy, or faint  Contact your healthcare provider if:   · You have a fever  · You have new signs and symptoms  · Your symptoms do not get better with treatment  · You have questions or concerns about your condition or care  Medicines  may be given to decrease pain, treat an infection, and manage your symptoms  Take your medicine as directed  Call your healthcare provider if you think your medicine is not helping or if you have side effects  Tell him if you are allergic to any medicine  Keep a list of the medicines, vitamins, and herbs you take  Include the amounts, and when and why you take them  Bring the list or the pill bottles to follow-up visits  Carry your medicine list with you in case of an emergency  Manage your symptoms:   · Apply heat  on your abdomen for 20 to 30 minutes every 2 hours for as many days as directed  Heat helps decrease pain and muscle spasms  · Manage your stress  Stress may cause abdominal pain  Your healthcare provider may recommend relaxation techniques and deep breathing exercises to help decrease your stress  Your healthcare provider may recommend you talk to someone about your stress or anxiety, such as a counselor or a trusted friend   Get plenty of sleep and exercise regularly  · Limit or do not drink alcohol  Alcohol can make your abdominal pain worse  Ask your healthcare provider if it is safe for you to drink alcohol  Also ask how much is safe for you to drink  · Do not smoke  Nicotine and other chemicals in cigarettes can damage your esophagus and stomach  Ask your healthcare provider for information if you currently smoke and need help to quit  E-cigarettes or smokeless tobacco still contain nicotine  Talk to your healthcare provider before you use these products  Make changes to the food you eat as directed:  Do not eat foods that cause abdominal pain or other symptoms  Eat small meals more often  · Eat more high-fiber foods if you are constipated  High-fiber foods include fruits, vegetables, whole-grain foods, and legumes  · Do not eat foods that cause gas if you have bloating  Examples include broccoli, cabbage, and cauliflower  Do not drink soda or carbonated drinks, because these may also cause gas  · Do not eat foods or drinks that contain sorbitol or fructose if you have diarrhea and bloating  Some examples are fruit juices, candy, jelly, and sugar-free gum  · Do not eat high-fat foods, such as fried foods, cheeseburgers, hot dogs, and desserts  · Limit or do not drink caffeine  Caffeine may make symptoms, such as heart burn or nausea, worse  · Drink plenty of liquids to prevent dehydration from diarrhea or vomiting  Ask your healthcare provider how much liquid to drink each day and which liquids are best for you  Follow up with your healthcare provider as directed:  Write down your questions so you remember to ask them during your visits  © 2017 [facility] Information is for End User's use only and may not be sold, redistributed or otherwise used for commercial purposes  All illustrations and images included in CareNotes® are the copyrighted property of [employer]  or Raleigh Watts    The above information is an  only  It is not intended as medical advice for individual conditions or treatments  Talk to your doctor, nurse or pharmacist before following any medical regimen to see if it is safe and effective for you

## 2018-06-10 ENCOUNTER — HOSPITAL ENCOUNTER (EMERGENCY)
Facility: HOSPITAL | Age: 23
End: 2018-06-12
Attending: EMERGENCY MEDICINE | Admitting: EMERGENCY MEDICINE
Payer: COMMERCIAL

## 2018-06-10 DIAGNOSIS — F33.2 SEVERE EPISODE OF RECURRENT MAJOR DEPRESSIVE DISORDER, WITHOUT PSYCHOTIC FEATURES (HCC): ICD-10-CM

## 2018-06-10 DIAGNOSIS — T14.91XA SUICIDAL BEHAVIOR WITH ATTEMPTED SELF-INJURY (HCC): Primary | ICD-10-CM

## 2018-06-10 DIAGNOSIS — G40.909 SEIZURE DISORDER (HCC): ICD-10-CM

## 2018-06-10 LAB
AMORPH URATE CRY URNS QL MICRO: ABNORMAL /HPF
AMPHETAMINES SERPL QL SCN: NEGATIVE
BACTERIA UR QL AUTO: ABNORMAL /HPF
BARBITURATES UR QL: NEGATIVE
BENZODIAZ UR QL: NEGATIVE
BILIRUB UR QL STRIP: NEGATIVE
CLARITY UR: ABNORMAL
COCAINE UR QL: NEGATIVE
COLOR UR: YELLOW
ETHANOL EXG-MCNC: 0 MG/DL
EXT PREG TEST URINE: NEGATIVE
GLUCOSE UR STRIP-MCNC: NEGATIVE MG/DL
HGB UR QL STRIP.AUTO: NEGATIVE
KETONES UR STRIP-MCNC: NEGATIVE MG/DL
LEUKOCYTE ESTERASE UR QL STRIP: ABNORMAL
METHADONE UR QL: NEGATIVE
NITRITE UR QL STRIP: NEGATIVE
NON-SQ EPI CELLS URNS QL MICRO: ABNORMAL /HPF
OPIATES UR QL SCN: NEGATIVE
PCP UR QL: NEGATIVE
PH UR STRIP.AUTO: 5.5 [PH] (ref 4.5–8)
PROT UR STRIP-MCNC: NEGATIVE MG/DL
RBC #/AREA URNS AUTO: ABNORMAL /HPF
SP GR UR STRIP.AUTO: 1.02 (ref 1–1.03)
THC UR QL: NEGATIVE
UROBILINOGEN UR QL STRIP.AUTO: 0.2 E.U./DL
WBC #/AREA URNS AUTO: ABNORMAL /HPF

## 2018-06-10 PROCEDURE — 81001 URINALYSIS AUTO W/SCOPE: CPT | Performed by: EMERGENCY MEDICINE

## 2018-06-10 PROCEDURE — 81025 URINE PREGNANCY TEST: CPT | Performed by: EMERGENCY MEDICINE

## 2018-06-10 PROCEDURE — 82075 ASSAY OF BREATH ETHANOL: CPT | Performed by: EMERGENCY MEDICINE

## 2018-06-10 PROCEDURE — 80307 DRUG TEST PRSMV CHEM ANLYZR: CPT | Performed by: EMERGENCY MEDICINE

## 2018-06-10 RX ORDER — FAMOTIDINE 20 MG/1
20 TABLET, FILM COATED ORAL 2 TIMES DAILY
Status: DISCONTINUED | OUTPATIENT
Start: 2018-06-10 | End: 2018-06-12 | Stop reason: HOSPADM

## 2018-06-10 RX ORDER — ACETAMINOPHEN 325 MG/1
650 TABLET ORAL ONCE
Status: COMPLETED | OUTPATIENT
Start: 2018-06-10 | End: 2018-06-10

## 2018-06-10 RX ORDER — LEVETIRACETAM 500 MG/1
1000 TABLET ORAL
COMMUNITY
Start: 2017-08-04 | End: 2018-06-10

## 2018-06-10 RX ORDER — LEVETIRACETAM 1000 MG/1
TABLET ORAL
COMMUNITY
Start: 2018-05-17 | End: 2018-06-10

## 2018-06-10 RX ORDER — ESCITALOPRAM OXALATE 10 MG/1
10 TABLET ORAL DAILY
COMMUNITY
End: 2018-06-18 | Stop reason: HOSPADM

## 2018-06-10 RX ORDER — LEVETIRACETAM 750 MG/1
TABLET ORAL
COMMUNITY
End: 2018-06-10

## 2018-06-10 RX ORDER — LEVETIRACETAM 500 MG/1
1000 TABLET ORAL EVERY 12 HOURS SCHEDULED
Status: DISCONTINUED | OUTPATIENT
Start: 2018-06-10 | End: 2018-06-12 | Stop reason: HOSPADM

## 2018-06-10 RX ADMIN — LEVETIRACETAM 1000 MG: 500 TABLET ORAL at 12:46

## 2018-06-10 RX ADMIN — ACETAMINOPHEN 650 MG: 325 TABLET ORAL at 18:17

## 2018-06-10 RX ADMIN — FAMOTIDINE 20 MG: 20 TABLET ORAL at 18:17

## 2018-06-10 NOTE — LETTER
[address]  [place] [facility] [address]  Dept: [phone]                                                                   EMTALA TRANSFER CONSENT    NAME Dayami Nam                                         1995                              MRN [id number]    I have been informed of my rights regarding examination, treatment, and transfer   by Dr Lexis Porras, *    Benefits: Other benefits (Include comment)_______________________ (Inpatient Psych Tx)    Risks: Potential deterioration of medical condition      { ED EMTALA TRANSFER CHOICES:[id number]}    I authorize the performance of emergency medical procedures and treatments upon me in both transit and upon arrival at the receiving facility  Additionally, I authorize the release of any and all medical records to the receiving facility and request they be transported with me, if possible  I understand that the safest mode of transportation during a medical emergency is an ambulance and that the Hospital advocates the use of this mode of transport  Risks of traveling to the receiving facility by car, including absence of medical control, life sustaining equipment, such as oxygen, and medical personnel has been explained to me and I fully understand them  ([place] CORRECT BOX BELOW)  [  ]  I consent to the stated transfer and to be transported by ambulance/helicopter  [  ]  I consent to the stated transfer, but refuse transportation by ambulance and accept full responsibility for my transportation by car    I understand the risks of non-ambulance transfers and I exonerate the Hospital and its staff from any deterioration in my condition that results from this refusal     X___________________________________________    DATE  18  TIME________  Signature of patient or legally responsible individual signing on patient behalf           RELATIONSHIP TO PATIENT_________________________          Provider Certification    NAME Ashish Luong                                         1995                              MRN [id number]    A medical screening exam was performed on the above named patient  Based on the examination:    Condition Necessitating Transfer The primary encounter diagnosis was Suicidal behavior with attempted self-injury ([facility] [address] )  Diagnoses of Severe episode of recurrent major depressive disorder, without psychotic features ([address] [address] ) and Seizure disorder ([facility] [address] ) were also pertinent to this visit  Patient Condition: The patient has been stabilized such that within reasonable medical probability, no material deterioration of the patient condition or the condition of the unborn child(bradley) is likely to result from the transfer    Reason for Transfer: Level of Care needed not available at this facility    Transfer Requirements: [address]    · Space available and qualified personnel available for treatment as acknowledged by DANY Aragon  · Agreed to accept transfer and to provide appropriate medical treatment as acknowledged by       Dr Erin Linn  · Appropriate medical records of the examination and treatment of the patient are provided at the time of transfer   [address] _______  · Transfer will be performed by qualified personnel from [facility]  and appropriate transfer equipment as required, including the use of necessary and appropriate life support measures      Provider Certification: I have examined the patient and explained the following risks and benefits of being transferred/refusing transfer to the patient/family:  The patient is stable for psychiatric transfer because they are medically stable, and is protected from harming him/herself or others during transport      Based on these reasonable risks and benefits to the patient and/or the unborn child(bradley), and based upon the information available at the time of the patients examination, I certify that the medical benefits reasonably to be expected from the provision of appropriate medical treatments at another medical facility outweigh the increasing risks, if any, to the individuals medical condition, and in the case of labor to the unborn child, from effecting the transfer      X____________________________________________ DATE 06/12/18        TIME_______      ORIGINAL - SEND TO MEDICAL RECORDS   COPY - SEND WITH PATIENT DURING TRANSFER

## 2018-06-10 NOTE — ED NOTES
Pt's boyfriend at bedside  Boyfriend's belongings placed in visitor locker        Karissa Lay  06/10/18 7059

## 2018-06-10 NOTE — ED NOTES
Pt reported stomach pain from "stomach ulcers" and pt also stated having a migraine  Will call Charge RN to make aware        Milagro Espinosa  06/10/18 3515

## 2018-06-10 NOTE — ED NOTES
Pt resting in bed  Lights off  No distress seen  Will continue to monitor       Reece Marus  06/10/18 5925

## 2018-06-10 NOTE — ED NOTES
No beds in-network ([facility], [facility], [facility], [facility]); Bed search to following facilities:    [facility]: Spoke with Carmina Moody, no appropriate beds for pt available  [facility]: Only detox beds available  [facility]: No beds available  [facility]: Only have dual bed available  [facility]: Spoke with Tashi Sarah, bed available; chart faxed for review  [place]: Spoke Yg Jones, no beds available and no pending discharges for tomorrow  [place]: Spoke with Ang Noriega, reports no beds available  [facility]: No answer; will try again later  [place]: Provided information to answering service for return call from admissions  [place]: Spoke with Susanna, No beds available       DANY Mitchell  06/10/18   1057

## 2018-06-10 NOTE — ED NOTES
Call placed to [address], spoke with Stephen Hammer, who confirmed chart was received and is currently being processed       Desmond DANY Kapadia  06/10/18   3254
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
## 2018-06-10 NOTE — LETTER
600 [facility] 20  [address] [facility] [address]  Dept: [phone]                                                            EMTALA TRANSFER CONSENT    NAME Michael Henry                1995                              MRN [id number]    I have been informed of my rights regarding examination, treatment, and transfer   by Dr Kate Aviles, *    Benefits: Other benefits (Include comment)_______________________ (Inpatient Psych Tx)    Risks: Potential deterioration of medical condition    Consent for Transfer:  I acknowledge that my medical condition has been evaluated and explained to me by the emergency department physician or other qualified medical person and/or my attending physician, who has recommended that I be transferred to the service of  Accepting Physician: Dr Martinez Members at [address] [place] [address] Name, [address] : [facility]  The above potential benefits of such transfer, the potential risks associated with such transfer, and the probable risks of not being transferred have been explained to me, and I fully understand them  The doctor has explained that, in my case, the benefits of transfer outweigh the risks  I agree to be transferred  I authorize the performance of emergency medical procedures and treatments upon me in both transit and upon arrival at the receiving facility  Additionally, I authorize the release of any and all medical records to the receiving facility and request they be transported with me, if possible  I understand that the safest mode of transportation during a medical emergency is an ambulance and that the Hospital advocates the use of this mode of transport  Risks of traveling to the receiving facility by car, including absence of medical control, life sustaining equipment, such as oxygen, and medical personnel has been explained to me and I fully understand them      ([place] CORRECT BOX BELOW)  [X] I consent to the stated transfer and to be transported by ambulance/helicopter  [  ]  I consent to the stated transfer, but refuse transportation by ambulance and accept full responsibility for my transportation by car  I understand the risks of non-ambulance transfers and I exonerate the Hospital and its staff from any deterioration in my condition that results from this refusal     X___________________________________________    DATE  18  TIME________  Signature of patient or legally responsible individual signing on patient behalf           RELATIONSHIP TO PATIENT_________________________                        Provider Certification    NAME Michael Henry                         1995                              MRN [id number]    A medical screening exam was performed on the above named patient  Based on the examination:    Condition Necessitating Transfer The primary encounter diagnosis was Suicidal behavior with attempted self-injury ([facility] [address] )  Diagnoses of Severe episode of recurrent major depressive disorder, without psychotic features ([facility] [address] ) and Seizure disorder ([facility] [address] ) were also pertinent to this visit      Patient Condition: The patient has been stabilized such that within reasonable medical probability, no material deterioration of the patient condition or the condition of the unborn child(bradley) is likely to result from the transfer    Reason for Transfer: Level of Care needed not available at this facility    Transfer Requirements: [address]   · Space available and qualified personnel available for treatment as acknowledged by DANY Salas  · Agreed to accept transfer and to provide appropriate medical treatment as acknowledged by       Dr Martinez Members  · Appropriate medical records of the examination and treatment of the patient are provided at the time of transfer   [address] __JG_____  · Transfer will be performed by qualified personnel from [facility]  and appropriate transfer equipment as required, including the use of necessary and appropriate life support measures  Provider Certification: I have examined the patient and explained the following risks and benefits of being transferred/refusing transfer to the patient/family:  The patient is stable for psychiatric transfer because they are medically stable, and is protected from harming him/herself or others during transport      Based on these reasonable risks and benefits to the patient and/or the unborn child(bradley), and based upon the information available at the time of the patients examination, I certify that the medical benefits reasonably to be expected from the provision of appropriate medical treatments at another medical facility outweigh the increasing risks, if any, to the individuals medical condition, and in the case of labor to the unborn child, from effecting the transfer      X____________________________________________ DATE 06/12/18        TIME_______      ORIGINAL - SEND TO MEDICAL RECORDS   COPY - SEND WITH PATIENT DURING TRANSFER

## 2018-06-10 NOTE — ED NOTES
Call placed to pt's father, Reggie Miller, to inform of accepting facility and that there are no plans for transport as of this time  Will update as more information is obtained       DANY Santiago  06/10/18   9900

## 2018-06-10 NOTE — ED NOTES
Call placed to [address] to provide Auth info  [facility] will call when we are able to set up transport, will likely be for tomorrow      DANY Tolentino  06/10/18   3260

## 2018-06-10 NOTE — LETTER
[address] [facility] [address]  [address] [facility] [address]  Dept: [phone]                                                                    EMTALA TRANSFER CONSENT    NAME Ernie Perez                       1995                              MRN [id number]    I have been informed of my rights regarding examination, treatment, and transfer   by Dr Jose att  providers found    Benefits: Other benefits (Include comment)_______________________ (Inpatient Psych Tx)    Risks: Potential deterioration of medical condition    Consent for Transfer:  I acknowledge that my medical condition has been evaluated and explained to me by the emergency department physician or other qualified medical person and/or my attending physician, who has recommended that I be transferred to the service of  Accepting Physician: Dr Abi Gomez at [address] Name, [address] : [address]   The above potential benefits of such transfer, the potential risks associated with such transfer, and the probable risks of not being transferred have been explained to me, and I fully understand them  The doctor has explained that, in my case, the benefits of transfer outweigh the risks  I agree to be transferred  I authorize the performance of emergency medical procedures and treatments upon me in both transit and upon arrival at the receiving facility  Additionally, I authorize the release of any and all medical records to the receiving facility and request they be transported with me, if possible  I understand that the safest mode of transportation during a medical emergency is an ambulance and that the Hospital advocates the use of this mode of transport  Risks of traveling to the receiving facility by car, including absence of medical control, life sustaining equipment, such as oxygen, and medical personnel has been explained to me and I fully understand them      ([place] BELOW)  [ X ]  I consent to the stated transfer and to be transported by ambulance/helicopter  [  ]  I consent to the stated transfer, but refuse transportation by ambulance and accept full responsibility for my transportation by car  I understand the risks of non-ambulance transfers and I exonerate the Hospital and its staff from any deterioration in my condition that results from this refusal     X___________________________________________    DATE  18  TIME________  Signature of patient or legally responsible individual signing on patient behalf           RELATIONSHIP TO PATIENT_________________________                          Provider Certification    NAME Dayami Nam                           1995                              MRN [id number]    A medical screening exam was performed on the above named patient  Based on the examination:    Condition Necessitating Transfer The encounter diagnosis was Suicidal behavior with attempted self-injury ([facility] [address] )  Patient Condition: The patient has been stabilized such that within reasonable medical probability, no material deterioration of the patient condition or the condition of the unborn child(bradley) is likely to result from the transfer    Reason for Transfer: Level of Care needed not available at this facility    Transfer Requirements: [address]    · Space available and qualified personnel available for treatment as acknowledged by DANY Costa  · Agreed to accept transfer and to provide appropriate medical treatment as acknowledged by       Dr Lawrnce Leyden  · Appropriate medical records of the examination and treatment of the patient are provided at the time of transfer   [address] __JG_____  · Transfer will be performed by qualified personnel from [facility] [phone]  and appropriate transfer equipment as required, including the use of necessary and appropriate life support measures      Provider Certification: I have examined the patient and explained the following risks and benefits of being transferred/refusing transfer to the patient/family:  The patient is stable for psychiatric transfer because they are medically stable, and is protected from harming him/herself or others during transport      Based on these reasonable risks and benefits to the patient and/or the unborn child(bradley), and based upon the information available at the time of the patients examination, I certify that the medical benefits reasonably to be expected from the provision of appropriate medical treatments at another medical facility outweigh the increasing risks, if any, to the individuals medical condition, and in the case of labor to the unborn child, from effecting the transfer      X____________________________________________ DATE 06/11/18        TIME_______      ORIGINAL - SEND TO MEDICAL RECORDS   COPY - SEND WITH PATIENT DURING TRANSFER

## 2018-06-10 NOTE — ED NOTES
Provided pt with breakfast  Pt sitting up eating  Parents at bedside  Continual audio and visually monitoring       Vero Jimenez  06/10/18 0231

## 2018-06-10 NOTE — ED NOTES
Patient is accepted at [facility]  Patient is accepted by Dr Osbaldo Vick per Enedina Lacey; # [phone], fax [phone]  Facility is currently on an acuity hold and therefore transport cannot be set up at this time  [facility] will call when they have more information regarding the hold      DANY Torres  06/10/18   6868

## 2018-06-10 NOTE — ED NOTES
Pt's parents at bedside  Parents' belongings placed in visitor locker        Adelaide Chilango  06/10/18 1941
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
## 2018-06-10 NOTE — ED NOTES
Pt changed into scrubs, pt belongings in locker #3, include  Sweatshirt  Underwear  tanktop  Pants  Shoes  Hair bands  [employer] Caryn Ansari  06/10/18 7462

## 2018-06-10 NOTE — ED NOTES
Pt parents belongings stored in secure holding locker outside of nursing station  Pt parents at bedside  Will continue to audio and visually monitor       Kailyn Jimenez  06/10/18 [facility]  06/10/18 4958

## 2018-06-10 NOTE — ED NOTES
Pt and parents were made aware of current status of bed search  They will be leaving for now and will return later if pt will remain here through the night  Pt's father, Jonna Cifuentes, will be available to bring her things prior to transport and would like to be contacted with updates at [phone]       DANY Torres  06/10/18   6720

## 2018-06-10 NOTE — ED NOTES
Received report from [facility]  Pt appears to be resting  No signs of distress at this time  Will continue to monitor        Shaniqua Alireza  06/10/18 0033

## 2018-06-10 NOTE — ED NOTES
Insurance Authorization:   Phone call placed to [employer]  Phone number: [phone]  Call was transferred to [facility], [facility]  Spoke to Ann      5 days approved (starting 06/11/2018)  Level of care: Inpatient MH tx   Review on 06/15/2018  Authorization # [id number]      DANY Lobo  06/10/18   1429

## 2018-06-10 NOTE — ED NOTES
Patient gave phone, wallet and keys to parents to take home   Patient is now resting     Yudelka Marrow  06/10/18 4576

## 2018-06-10 NOTE — ED NOTES
Insurance Authorization:   Phone call placed to [employer]  Phone number: [phone]  Spoke to Ron Mitchell to request pre-cert  Reports someone will call back within the next two hours for clinical and auth       Sussy Jett also noted that Paulino Denson is not in network with pt's insurance*    Sabrina Sanders, [place]  06/10/18   7057

## 2018-06-10 NOTE — ED NOTES
Pt and Dr Delaney Randolph signed 267       [facility], [facility] [place]  06/10/18   8589

## 2018-06-10 NOTE — ED NOTES
Pt care handoff given from tech LR  Pt resting in bed  No distress seen at this time  Will continue to monitor       Shaniqua Salter  06/10/18 9113

## 2018-06-10 NOTE — ED NOTES
Pt laying in bed with boyfriend at bedside  No signs of distress at this time  Will continue to monitor        Carmen Matthews  06/10/18 6088

## 2018-06-10 NOTE — ED NOTES
Pt currently sitting on the edge of bed and watching the television with the lights off  No signs of distress at this time  Will continue to monitor        Carmen Matthews  06/10/18 1922

## 2018-06-10 NOTE — ED NOTES
Pt gave phone back to staff  Meal tray delivered  Pt currently sitting in bed and eating  No signs of distress at this time  Will continue to monitor        Adelaide Kee  06/10/18 7948

## 2018-06-10 NOTE — ED NOTES
Pt lying in bed talking to parents who are at bedside  Will continue to audio and visually monitor       Yasmin Christina  06/10/18 0519

## 2018-06-10 NOTE — ED NOTES
Obtained patients vitals  No patient complaints  Offered lunch, patient would like to wait  Patient fell back asleep       Nancy Stanton  06/10/18 6837

## 2018-06-10 NOTE — ED NOTES
Requested jose luis order from dr lo  States she will place        Louvenia Councilman, [place]  06/10/18 9429

## 2018-06-11 RX ORDER — ACETAMINOPHEN 325 MG/1
650 TABLET ORAL ONCE
Status: COMPLETED | OUTPATIENT
Start: 2018-06-11 | End: 2018-06-11

## 2018-06-11 RX ADMIN — LEVETIRACETAM 1000 MG: 500 TABLET ORAL at 10:53

## 2018-06-11 RX ADMIN — FAMOTIDINE 20 MG: 20 TABLET ORAL at 10:53

## 2018-06-11 RX ADMIN — ACETAMINOPHEN 650 MG: 325 TABLET ORAL at 10:55

## 2018-06-11 NOTE — ED NOTES
Pt given phone to call her boyfriend and let him know that she will most likely be staying overnight again     Jessie Reveal  06/11/18 1940

## 2018-06-11 NOTE — ED NOTES
Pt remains on the phone   Presenting in no distress nor discomfort at this time, will continue to monitor      Holly Landry  06/11/18 4289

## 2018-06-11 NOTE — ED NOTES
Call placed to [address] and spoke with [facility] in admissions  Per [facility], they are still on an acuity hold and will notify us when a bed is available      Rigoberto Caceres LMSW  06/11/18  9225

## 2018-06-11 NOTE — ED NOTES
Received report from [address] [place] [address] Drive  Pt is currently in bed talking on the phone  Pt requested that she could call her mother so she could see her children before leaving  Pt calm and cooperative  No signs of distress  Ice water given upon request as well        Sg Hoffman  06/11/18 9719

## 2018-06-11 NOTE — ED NOTES
Pt ambulated to the bathroom and requested a phone, which was given        Luis Baker  06/11/18 0800

## 2018-06-11 NOTE — ED NOTES
Took over observation from [facility]  Pt appears to be asleep in bed  No signs of distress noted at this time  Will continue to monitor        Ortiz Lehman  06/11/18 5155

## 2018-06-11 NOTE — ED NOTES
Pt is laying in bed watching TV waiting for dinner to arrive  Presenting in no distress   Will continue to monitor     Real Cayetano  06/11/18 0242

## 2018-06-11 NOTE — ED NOTES
Pt appears to be resting  No signs of distress  Will continue to monitor       Sunita Page  06/11/18 0022

## 2018-06-11 NOTE — ED NOTES
Pt remains calm and quietly watching tv  No s/s of discomfort/pain  Respirations even and unlabored   Will continue visual/audio monitoring      Kimani Bateman  06/11/18 8494

## 2018-06-11 NOTE — ED NOTES
Pt appears to be resting  No signs of distress at this time  Will continue to monitor        Seward Page  06/10/18 7874

## 2018-06-11 NOTE — ED NOTES
Pt appears to be resting  No signs of distress  Will continue to monitor       Melanie Paredes  06/11/18 4264

## 2018-06-11 NOTE — ED NOTES
Pts pants ripped changed into new scrub pants- Pt currently lying in bed quietly- watching tv  No s/s of discomfort/pain  Respirations even and unlabored  Will continue visual/audio monitoring       Tanisha Peters  06/11/18 1503

## 2018-06-11 NOTE — ED NOTES
Pt continues to quietly rest in bed with eyes closed  No s/s of discomfort/pain  Respirations even and unlabored        Sánchez Fails  06/11/18 8748

## 2018-06-11 NOTE — ED NOTES
Pt appears to be resting  No signs of distress at this time  Will continue to monitor        Vera Ryan  06/10/18 5476

## 2018-06-11 NOTE — ED NOTES
Pt is laying in bed watching TV at this time  She requested the door be closed until the pt in [address] calmed down  No signs of distress nor discomfort at this time   Will continue to monitor     Lelia Haskins  06/11/18 1929

## 2018-06-11 NOTE — ED NOTES
Pt ambulated to the bathroom and back to room  Pt is sitting in bed watching tv  No signs of distress noted at this time        Shamir Santillan  06/11/18 5107

## 2018-06-11 NOTE — ED NOTES
Pt report received from BD, ERT  Pt is currently resting on the stretcher with the lights off, showing no signs nor symptoms of distress at this time   Will continue to monitor     Holly Gris  06/11/18 0000

## 2018-06-11 NOTE — ED NOTES
Pt appears to be sleeping with no signs of distress  Will continue to monitor        Chapman Ahumada  06/11/18 4169

## 2018-06-11 NOTE — ED NOTES
CW spoke with Mariah Mullins of [facility] who stated there are no beds available at [facility], \[facility] [place] [facility]\"" nor at [facility]  CW spoke with Bess Alexis of [facility], [address] [facility], [facility] [address], [facility], [address] [facility] [facility] [address] [facility] and [address] who all stated there are no beds available tonight      Bed search to resume in the AM

## 2018-06-11 NOTE — ED NOTES
Pt returned phone  Breakfast tray ordered  Pt c/o stomach ache and asked for tylenol  Charge Nurse made aware        Rc Tsai  06/11/18 1231

## 2018-06-11 NOTE — ED NOTES
Pt is resting in bed with the lights off and tv on  No signs of distress noted at this time  Will continue to monitor        Leonard   06/11/18 8511

## 2018-06-11 NOTE — ED NOTES
Pt's children and mother at bedside  No signs of distress  Will continue to monitor        Troy Casillas  06/11/18 1052

## 2018-06-11 NOTE — ED NOTES
Report received from [address]  Pt currently laying in bed  No s/s of discomfort/pain  Respirations even and unlabored        Gibson Covington  06/11/18 3397

## 2018-06-11 NOTE — ED NOTES
Pt returned phone at this time and asked if she could order some dinner   Pt also ambulated to the bathroom at this time     Sweta Olguin  06/11/18 5675

## 2018-06-11 NOTE — ED NOTES
Patient is accepted at [facility]  Patient is accepted by Dr Nathan Lilly per Robinson Morales in admissions  Transportation is arranged with Verito Crockett at [address]  Transportation is scheduled for 7pm      PER [facility] IN ADMISSIONS, THEIR FACILITY IS CURRENTLY ON AN ACUITY HOLD, AND THEY HOPE TO HAVE IT LIFTED TODAY  TRANSPORT PUSHED BACK TO ACCOMMODATE  Nurse report is to not required      Suzie Wayne LMSW  06/11/18  7608

## 2018-06-11 NOTE — ED NOTES
Pt continues to quietly rest in bed with eyes closed  No s/s of discomfort/pain  Respirations are even and unlabored  Will continue with visual/audio monitoring        Melody Baird  06/11/18 1211

## 2018-06-11 NOTE — ED NOTES
Pt appears to be resting  No signs of distress  Will continue to monitor       Caryle Hamlet  06/11/18 6913

## 2018-06-11 NOTE — ED NOTES
CW spoke with Jennie Moreland of [facility]'s admissions department who states they remain on an acuity hold at this time, and cannot estimate when the hold will be lifted  CW informed Suresh Gerardo of [facility] of the above [facility]

## 2018-06-11 NOTE — ED NOTES
Pt continues to rest with the lights off at this time  Showing no signs/symptoms of distress   Will continue to monitor     Whitney Hai  06/11/18 7016

## 2018-06-11 NOTE — ED NOTES
Received pt care handoff from [facility]  Pt appears to be resting  No signs of distress  Will continue to monitor       Yg Bottom  06/10/18 4794

## 2018-06-11 NOTE — ED NOTES
Pt's parents left bedside, and pt's parents took their belongings out of the visitor locker  Pt requested phone to call boyfriend  Pt currently sitting on the edge of the bed and talking on the phone  No signs of distress at this time  Will continue to monitor        Karissa Lay  06/10/18 2047

## 2018-06-12 ENCOUNTER — HOSPITAL ENCOUNTER (INPATIENT)
Facility: HOSPITAL | Age: 23
LOS: 6 days | Discharge: HOME | DRG: 885 | End: 2018-06-18
Attending: PSYCHIATRY & NEUROLOGY | Admitting: PSYCHIATRY & NEUROLOGY
Payer: COMMERCIAL

## 2018-06-12 VITALS
HEART RATE: 91 BPM | SYSTOLIC BLOOD PRESSURE: 116 MMHG | OXYGEN SATURATION: 98 % | RESPIRATION RATE: 16 BRPM | TEMPERATURE: 98 F | DIASTOLIC BLOOD PRESSURE: 81 MMHG

## 2018-06-12 DIAGNOSIS — G40.909 SEIZURE DISORDER (HCC): ICD-10-CM

## 2018-06-12 DIAGNOSIS — F51.5 NIGHTMARES: ICD-10-CM

## 2018-06-12 DIAGNOSIS — N76.4 VULVAR ABSCESS: ICD-10-CM

## 2018-06-12 DIAGNOSIS — K21.9 GASTROESOPHAGEAL REFLUX DISEASE, ESOPHAGITIS PRESENCE NOT SPECIFIED: ICD-10-CM

## 2018-06-12 DIAGNOSIS — F31.4 BIPOLAR I DISORDER, MOST RECENT EPISODE DEPRESSED, SEVERE WITHOUT PSYCHOTIC FEATURES (HCC): Primary | ICD-10-CM

## 2018-06-12 DIAGNOSIS — F33.2 SEVERE EPISODE OF RECURRENT MAJOR DEPRESSIVE DISORDER, WITHOUT PSYCHOTIC FEATURES (HCC): ICD-10-CM

## 2018-06-12 PROCEDURE — 99285 EMERGENCY DEPT VISIT HI MDM: CPT

## 2018-06-12 PROCEDURE — 99254 IP/OBS CNSLTJ NEW/EST MOD 60: CPT | Performed by: PSYCHIATRY & NEUROLOGY

## 2018-06-12 PROCEDURE — 99252 IP/OBS CONSLTJ NEW/EST SF 35: CPT | Performed by: PHYSICIAN ASSISTANT

## 2018-06-12 RX ORDER — IBUPROFEN 600 MG/1
600 TABLET ORAL EVERY 8 HOURS PRN
Status: CANCELLED | OUTPATIENT
Start: 2018-06-12

## 2018-06-12 RX ORDER — RISPERIDONE 1 MG/1
1 TABLET, ORALLY DISINTEGRATING ORAL EVERY 4 HOURS PRN
Status: CANCELLED | OUTPATIENT
Start: 2018-06-12

## 2018-06-12 RX ORDER — BENZTROPINE MESYLATE 1 MG/1
1 TABLET ORAL EVERY 6 HOURS PRN
Status: CANCELLED | OUTPATIENT
Start: 2018-06-12

## 2018-06-12 RX ORDER — OLANZAPINE 10 MG/1
10 INJECTION, POWDER, LYOPHILIZED, FOR SOLUTION INTRAMUSCULAR
Status: DISCONTINUED | OUTPATIENT
Start: 2018-06-12 | End: 2018-06-18 | Stop reason: HOSPADM

## 2018-06-12 RX ORDER — TRAZODONE HYDROCHLORIDE 50 MG/1
50 TABLET ORAL
Status: CANCELLED | OUTPATIENT
Start: 2018-06-12

## 2018-06-12 RX ORDER — LORAZEPAM 2 MG/ML
1 INJECTION INTRAMUSCULAR EVERY 6 HOURS PRN
Status: DISCONTINUED | OUTPATIENT
Start: 2018-06-12 | End: 2018-06-18 | Stop reason: HOSPADM

## 2018-06-12 RX ORDER — RISPERIDONE 1 MG/1
1 TABLET, ORALLY DISINTEGRATING ORAL EVERY 4 HOURS PRN
Status: DISCONTINUED | OUTPATIENT
Start: 2018-06-12 | End: 2018-06-18 | Stop reason: HOSPADM

## 2018-06-12 RX ORDER — HALOPERIDOL 5 MG
5 TABLET ORAL EVERY 8 HOURS PRN
Status: DISCONTINUED | OUTPATIENT
Start: 2018-06-12 | End: 2018-06-18 | Stop reason: HOSPADM

## 2018-06-12 RX ORDER — ACETAMINOPHEN 325 MG/1
650 TABLET ORAL EVERY 6 HOURS PRN
Status: DISCONTINUED | OUTPATIENT
Start: 2018-06-12 | End: 2018-06-18 | Stop reason: HOSPADM

## 2018-06-12 RX ORDER — FAMOTIDINE 20 MG/1
20 TABLET, FILM COATED ORAL 2 TIMES DAILY
Status: CANCELLED | OUTPATIENT
Start: 2018-06-12

## 2018-06-12 RX ORDER — HALOPERIDOL 5 MG
5 TABLET ORAL EVERY 8 HOURS PRN
Status: CANCELLED | OUTPATIENT
Start: 2018-06-12

## 2018-06-12 RX ORDER — FAMOTIDINE 20 MG/1
20 TABLET, FILM COATED ORAL 2 TIMES DAILY
Status: DISCONTINUED | OUTPATIENT
Start: 2018-06-12 | End: 2018-06-18 | Stop reason: HOSPADM

## 2018-06-12 RX ORDER — HALOPERIDOL 5 MG/ML
5 INJECTION INTRAMUSCULAR EVERY 6 HOURS PRN
Status: DISCONTINUED | OUTPATIENT
Start: 2018-06-12 | End: 2018-06-18 | Stop reason: HOSPADM

## 2018-06-12 RX ORDER — HYDROXYZINE HYDROCHLORIDE 25 MG/1
25 TABLET, FILM COATED ORAL EVERY 6 HOURS PRN
Status: CANCELLED | OUTPATIENT
Start: 2018-06-12

## 2018-06-12 RX ORDER — LORAZEPAM 2 MG/ML
1 INJECTION INTRAMUSCULAR EVERY 6 HOURS PRN
Status: CANCELLED | OUTPATIENT
Start: 2018-06-12

## 2018-06-12 RX ORDER — TRAZODONE HYDROCHLORIDE 50 MG/1
50 TABLET ORAL
Status: DISCONTINUED | OUTPATIENT
Start: 2018-06-12 | End: 2018-06-18 | Stop reason: HOSPADM

## 2018-06-12 RX ORDER — BENZTROPINE MESYLATE 1 MG/ML
1 INJECTION INTRAMUSCULAR; INTRAVENOUS EVERY 6 HOURS PRN
Status: CANCELLED | OUTPATIENT
Start: 2018-06-12

## 2018-06-12 RX ORDER — HALOPERIDOL 5 MG/ML
5 INJECTION INTRAMUSCULAR EVERY 6 HOURS PRN
Status: CANCELLED | OUTPATIENT
Start: 2018-06-12

## 2018-06-12 RX ORDER — OLANZAPINE 10 MG/1
10 TABLET ORAL
Status: DISCONTINUED | OUTPATIENT
Start: 2018-06-12 | End: 2018-06-18 | Stop reason: HOSPADM

## 2018-06-12 RX ORDER — IBUPROFEN 400 MG/1
800 TABLET ORAL EVERY 8 HOURS PRN
Status: CANCELLED | OUTPATIENT
Start: 2018-06-12

## 2018-06-12 RX ORDER — IBUPROFEN 600 MG/1
600 TABLET ORAL EVERY 8 HOURS PRN
Status: DISCONTINUED | OUTPATIENT
Start: 2018-06-12 | End: 2018-06-18 | Stop reason: HOSPADM

## 2018-06-12 RX ORDER — BENZTROPINE MESYLATE 1 MG/1
1 TABLET ORAL EVERY 6 HOURS PRN
Status: DISCONTINUED | OUTPATIENT
Start: 2018-06-12 | End: 2018-06-18 | Stop reason: HOSPADM

## 2018-06-12 RX ORDER — LEVETIRACETAM 500 MG/1
1000 TABLET ORAL EVERY 12 HOURS SCHEDULED
Status: CANCELLED | OUTPATIENT
Start: 2018-06-12

## 2018-06-12 RX ORDER — LORAZEPAM 1 MG/1
1 TABLET ORAL EVERY 8 HOURS PRN
Status: CANCELLED | OUTPATIENT
Start: 2018-06-12

## 2018-06-12 RX ORDER — LORAZEPAM 1 MG/1
1 TABLET ORAL EVERY 8 HOURS PRN
Status: DISCONTINUED | OUTPATIENT
Start: 2018-06-12 | End: 2018-06-18 | Stop reason: HOSPADM

## 2018-06-12 RX ORDER — BENZTROPINE MESYLATE 1 MG/ML
1 INJECTION INTRAMUSCULAR; INTRAVENOUS EVERY 6 HOURS PRN
Status: DISCONTINUED | OUTPATIENT
Start: 2018-06-12 | End: 2018-06-18 | Stop reason: HOSPADM

## 2018-06-12 RX ORDER — LEVETIRACETAM 500 MG/1
1000 TABLET ORAL EVERY 12 HOURS SCHEDULED
Status: DISCONTINUED | OUTPATIENT
Start: 2018-06-12 | End: 2018-06-13

## 2018-06-12 RX ORDER — MAGNESIUM HYDROXIDE/ALUMINUM HYDROXICE/SIMETHICONE 120; 1200; 1200 MG/30ML; MG/30ML; MG/30ML
20 SUSPENSION ORAL EVERY 4 HOURS PRN
Status: CANCELLED | OUTPATIENT
Start: 2018-06-12

## 2018-06-12 RX ORDER — OLANZAPINE 10 MG/1
10 INJECTION, POWDER, LYOPHILIZED, FOR SOLUTION INTRAMUSCULAR
Status: CANCELLED | OUTPATIENT
Start: 2018-06-12

## 2018-06-12 RX ORDER — IBUPROFEN 400 MG/1
800 TABLET ORAL EVERY 8 HOURS PRN
Status: DISCONTINUED | OUTPATIENT
Start: 2018-06-12 | End: 2018-06-18 | Stop reason: HOSPADM

## 2018-06-12 RX ORDER — HYDROXYZINE HYDROCHLORIDE 25 MG/1
25 TABLET, FILM COATED ORAL EVERY 6 HOURS PRN
Status: DISCONTINUED | OUTPATIENT
Start: 2018-06-12 | End: 2018-06-18 | Stop reason: HOSPADM

## 2018-06-12 RX ORDER — MAGNESIUM HYDROXIDE/ALUMINUM HYDROXICE/SIMETHICONE 120; 1200; 1200 MG/30ML; MG/30ML; MG/30ML
20 SUSPENSION ORAL EVERY 4 HOURS PRN
Status: DISCONTINUED | OUTPATIENT
Start: 2018-06-12 | End: 2018-06-18 | Stop reason: HOSPADM

## 2018-06-12 RX ORDER — OLANZAPINE 2.5 MG/1
10 TABLET ORAL
Status: CANCELLED | OUTPATIENT
Start: 2018-06-12

## 2018-06-12 RX ORDER — ACETAMINOPHEN 325 MG/1
650 TABLET ORAL EVERY 6 HOURS PRN
Status: CANCELLED | OUTPATIENT
Start: 2018-06-12

## 2018-06-12 RX ADMIN — LEVETIRACETAM 1000 MG: 500 TABLET ORAL at 21:04

## 2018-06-12 RX ADMIN — FAMOTIDINE 20 MG: 20 TABLET ORAL at 17:47

## 2018-06-12 RX ADMIN — LEVETIRACETAM 1000 MG: 500 TABLET ORAL at 08:46

## 2018-06-12 RX ADMIN — FAMOTIDINE 20 MG: 20 TABLET ORAL at 08:46

## 2018-06-12 RX ADMIN — LEVETIRACETAM 1000 MG: 500 TABLET ORAL at 02:33

## 2018-06-12 NOTE — ED NOTES
Call received from Yair at [facility], stating they filled their female bed already and will not be able to review pt's chart       DANY Viveros  06/12/18   4807

## 2018-06-12 NOTE — ED NOTES
Pt is currently resting on the stretcher with the lights out, showing no signs nor symptoms of distress nor discomfort   Will continue to monitor     Whitney Valles  06/11/18 1852

## 2018-06-12 NOTE — ED NOTES
Received pt care handoff from tech [facility]  Pt appears to be resting in bed  No signs of distress noted  Will continue to monitor       Jose Johnson  06/12/18 4598

## 2018-06-12 NOTE — ED NOTES
Call placed to [facility], [phone], spoke with Martha Ross who took information to be passed on to a care manager  Awaiting return call to discuss out-of-network agreement as Martha Ross was unable to determine if that was possible       Marce Petit, DANY  06/12/18   6870

## 2018-06-12 NOTE — ED NOTES
Pt appears to be resting  No signs of distress at this time  Will continue to monitor        [place] Page  06/12/18 0001

## 2018-06-12 NOTE — ED NOTES
Call placed to [facility], report bed available and they are in network with pt's insurance  Chart faxed for review       DANY Barrientos  06/12/18   6593

## 2018-06-12 NOTE — ED NOTES
Pt returned phone at this time and asked for something to eat   Boxed lunch provided     Graciela Yañez  06/11/18 2034

## 2018-06-12 NOTE — ED NOTES
Ordered lunch for pt  CW at bedside       Rancho Hernandez  06/12/18 9705 [facility] Valerie Glover  06/12/18 4845

## 2018-06-12 NOTE — ED NOTES
Medication provided  Pt cooperative asking to speak to the crisis worker,  Informed that she was making rounds on other patient at the time and would be in to see her  Breakfast tray ordered  Waiting on delivery    Pt ricki Coon RN  06/12/18 9665

## 2018-06-12 NOTE — CONSULTS
Consultation - Neurology   Vernell Turner 20 y o  female MRN: [id number]  Unit/Bed#: [facility] 275-43 Encounter: [id number]      Assessment/Plan   20year old female with hx of generalized tonic clonic seizures, with hx of status epilepticus  She reports hx of two types of seizure epileptic and non epileptic (she cannot clarify which type is epileptic and which is not - one type generalized tonic, another eye flickering and confused)  Admission in August of 2017 - had an admission for breakthrough spells of seizure like activity with no recollection of events - video EEG monitoring with out events or electrographic seizures - etiology of events unclear if true seizure vs non epileptic  Hx of headache  Current admission for suicidal ideation    -  Will review with attending need for change in antiepileptic drug, will review with psychiatrist  -  Continue psychiatric treatment  -  Hold on EEG or MRI of brain imaging at this time  -  Follow up with out patient neurologist Dr Brittnee Ramirez as out patient  -  Attending to see and advise    History of Present Illness     Reason for Consult / Principal Problem: Seizure hx on Keppra    HPI: Vernell Turner is a 20 y o   female with hx of depression, anxiety, and hx of seizures  She presented to [facility] with a suicidal ideation, she was driving her car she felt particular depressed and felt like she was going to crash her car, patient reported feeling helpless and overwhelmed at home, she is a mother of two little children and lives with boyfriend in parents house  Patient signed a 12, she was transferred to [facility] behavioral health unit neurology was asked to see her in regards to her, seizure hx and status epilepticus history  Upon record review, she was seen by neurology on 8/1/2017 - she presented to [facility] on 7/30 with abdominal pain and seizures, she left AMA    She reported to have eight witnessed seizures that day, on 7-31 the patient had break through seizure activity and came to the ED  Per records she was diagnosed with seizures in September of 2016, she had left sided weakness and numbness, including face  She had a periods of unresponsive  She had reported negative MRI and EEG testing in past    She was started on Keppra  , during pregnancy she was noted to have breakthrough seizure activity - one point with fall down steps while pregnant with seizure activity  She was [facility]  At time of encounter she was on Vimpat and Keppra  The seizures were described as full body twitching with loss of awareness  The patient was seen by neurology on August 1 - the 3rd, her medication for antiepileptic medication dosing was decreased while on video eeg  While on Video EEG - there were no electrographic seizures seen  She was discharged on Keppra 1000 mg twice daily  It was unclear the etiology of these spells  She was to follow up with her neurologist Dr Gage Mary as an out patient  She reports after the last visit she did see Dr Cyndy Rivera,  She reports she has two types of seizures nonepileptic and epileptic seizures  She describes her seizures as one type to be a funny feeling in her neck, flashing lights, reported loc and generalized seizure activity  Her other types will come on when she is stressed or has abdominal pain - flickering of eyes, decreased awareness  She reports she takes her medications daily  She reports her last seizure to be in March, she has seen Dr Gage Mary in April - he did not escalate her medication  She can have up to one grand mal seizure event a month, she has multiple smaller events per month up to 4-5  Reviewed with Psychiatry ok for Keppra per there perspective, wanted to make sure on correct dosing and to review seizure hx  She has lower back pain from reported injury with left lower extremity weakness, chronic       Inpatient consult to Neurology  Consult performed by: [facility], [address] ordered by: Cathy Sunshine        Review of Systems   + LBP hx of disc herniation reported  Otherwise unremarkable ros  Historical Information   Past Medical History:   Diagnosis Date    Migraine     Seizures ([address] )      Past Surgical History:   Procedure Laterality Date     SECTION       SECTION      SHOULDER SURGERY      TUBAL LIGATION      TUMOR REMOVAL      from ear     Social History   History   Alcohol Use No     History   Drug Use No     History   Smoking Status    Never Smoker   Smokeless Tobacco    Never Used     Family History: No family history on file  Review of previous medical records was completed please see hpi - please see discharge summary from 2018 admission, and neurology notes from that admission, reviewed in detail  CTA completed - unremarkable CT angiogram of the brain  , no evidence of CT venous thrombosis  Per [facility] - records in 2017 - the patient was admitted to [facility] due to epileptic seizure, she had a total of for seizures, on day two the patient had a grand mal seizure and receive ativan, the patient was transferred to neuro ICU - at that time her Keppra was increased to 1000 mg tid, and Vimpat BID  She had an EEG which was normal     Per HPI - she was pregnant at that time, she was transferred to [facility] from [facility] due to repeated epileptic seizures, she was first diagnosed with seizures in 2016, she was driving not feeling well, pulled over, and than developed left sided weakness/numbness, EEG consistent with seizures per records  The patient follows with Dr Kali Sanford neurologist - placed on Keppra divided doses 750/750/500  On on 3/1/2017 she hda a grand mal seizure followed by a second grand mal seizure - she fell to floor and hit head  En route she had two more seizures       Meds/Allergies   all current active meds have been reviewed, current meds:   Current Facility-Administered Medications Medication Dose Route Frequency    acetaminophen (TYLENOL) tablet 650 mg  650 mg Oral Q6H PRN    aluminum-magnesium hydroxide-simethicone (MYLANTA) 200-200-20 mg/5 mL oral suspension 20 mL  20 mL Oral Q4H PRN    benztropine (COGENTIN) injection 1 mg  1 mg Intramuscular Q6H PRN    benztropine (COGENTIN) tablet 1 mg  1 mg Oral Q6H PRN    famotidine (PEPCID) tablet 20 mg  20 mg Oral BID    haloperidol (HALDOL) tablet 5 mg  5 mg Oral Q8H PRN    haloperidol lactate (HALDOL) injection 5 mg  5 mg Intramuscular Q6H PRN    hydrOXYzine HCL (ATARAX) tablet 25 mg  25 mg Oral Q6H PRN    ibuprofen (MOTRIN) tablet 600 mg  600 mg Oral Q8H PRN    ibuprofen (MOTRIN) tablet 800 mg  800 mg Oral Q8H PRN    levETIRAcetam (KEPPRA) tablet 1,000 mg  1,000 mg Oral Q12H [facility]    LORazepam (ATIVAN) 2 mg/mL injection 1 mg  1 mg Intramuscular Q6H PRN    LORazepam (ATIVAN) tablet 1 mg  1 mg Oral Q8H PRN    magnesium hydroxide (MILK OF MAGNESIA) 400 mg/5 mL oral suspension 30 mL  30 mL Oral Daily PRN    OLANZapine (ZyPREXA) IM injection 10 mg  10 mg Intramuscular Q3H PRN    OLANZapine (ZyPREXA) tablet 10 mg  10 mg Oral Q3H PRN    risperiDONE (RisperDAL M-TABS) dispersible tablet 1 mg  1 mg Oral Q4H PRN    traZODone (DESYREL) tablet 50 mg  50 mg Oral HS PRN    and PTA meds:   Prior to Admission Medications   Prescriptions Last Dose Informant Patient Reported? Taking?   escitalopram (LEXAPRO) 10 mg tablet   Yes No   Sig: Take 10 mg by mouth   levETIRAcetam (KEPPRA) 500 mg tablet   No No   Sig: Take 2 tablets by mouth every 12 (twelve) hours      Facility-Administered Medications: None     Allergies   Allergen Reactions    Erythromycin Swelling    Peanut Oil Hives     Objective   Vitals:Blood pressure 115/69, pulse 93, temperature 98 1 °F (36 7 °C), temperature source Oral, resp  rate 18, height 5' 7" (1 702 m), weight 111 kg (245 lb), unknown if currently breastfeeding  ,Body mass index is 38 37 kg/m²    No intake or output data in the 24 hours ending 06/12/18 1542    Invasive Devices: Invasive Devices          No matching active lines, drains, or airways        Physical Exam   Constitutional: She is oriented to person, place, and time  She appears well-developed and well-nourished  No distress  HENT:   Head: Normocephalic and atraumatic  Mouth/Throat: No oropharyngeal exudate  No tongue bite noted   Eyes: Pupils are equal, round, and reactive to light  Right eye exhibits no discharge  Left eye exhibits no discharge  Neck: No tracheal deviation present  Cardiovascular: Normal rate and regular rhythm  No murmur heard  Pulmonary/Chest: Effort normal    Abdominal: She exhibits no distension  Musculoskeletal: She exhibits no edema  Neurological: She is oriented to person, place, and time  She has a normal Finger-Nose-Finger Test  Gait normal    Reflex Scores:       Tricep reflexes are 1+ on the right side and 1+ on the left side  Bicep reflexes are 1+ on the right side and 1+ on the left side  Brachioradialis reflexes are 1+ on the right side  Patellar reflexes are 1+ on the right side and 1+ on the left side  Skin: No rash noted  She is not diaphoretic  Psychiatric: Her speech is normal      Neurologic Exam     Mental Status   Oriented to person, place, and time  Follows 2 step commands  Attention: normal  Concentration: normal    Speech: speech is normal   Level of consciousness: arousable by tactile stimuli  Able to name object  Cranial Nerves   Cranial nerves II through XII intact  CN III, IV, VI   Pupils are equal, round, and reactive to light  Motor Exam     Strength   Strength 5/5 except as noted   4/5 on left lower - with pain radiating to back     Sensory Exam   Light touch normal    Vibration normal      Gait, Coordination, and Reflexes     Gait  Gait: normal    Coordination   Finger to nose coordination: normal    Tremor   Resting tremor: absent  Intention tremor: absent  Action tremor: absent    Reflexes   Right brachioradialis: 1+  Right biceps: 1+  Left biceps: 1+  Right triceps: 1+  Left triceps: 1+  Right patellar: 1+  Left patellar: 1+  Right plantar: normal  Left plantar: normal  Right Yee: absent  Left Yee: absent      Lab Results:    No results found for this or any previous visit (from the past 24 hour(s))  Ct Abdomen Pelvis With Contrast    Result Date: 6/5/2018  Narrative: CT ABDOMEN AND PELVIS WITH IV CONTRAST INDICATION:   epigastric abd pain, nausea  COMPARISON: June 30, 2017 TECHNIQUE:  CT examination of the abdomen and pelvis was performed  Axial, sagittal, and coronal 2D reformatted images were created from the source data and submitted for interpretation  Radiation dose length product (DLP) for this visit:  1139 mGy-cm   This examination, like all CT scans performed in the [facility], was performed utilizing techniques to minimize radiation dose exposure, including the use of iterative reconstruction and automated exposure control  IV Contrast:  100 mL of iohexol (OMNIPAQUE) Enteric Contrast:  Enteric contrast was not administered  FINDINGS: ABDOMEN LOWER CHEST:  No clinically significant abnormality identified in the visualized lower chest  LIVER/BILIARY TREE:  Unremarkable  GALLBLADDER:  No calcified gallstones  No pericholecystic inflammatory change  SPLEEN:  Mildly enlarged PANCREAS:  Unremarkable  ADRENAL GLANDS:  Unremarkable  KIDNEYS/URETERS:  Parapelvic cysts are again visualized within the kidneys  STOMACH AND BOWEL:  Unremarkable  APPENDIX:  No findings to suggest appendicitis  ABDOMINOPELVIC CAVITY:  No ascites or free intraperitoneal air  No lymphadenopathy  VESSELS:  Unremarkable for patient's age  PELVIS REPRODUCTIVE ORGANS:  Unremarkable for patient's age  URINARY BLADDER:  Unremarkable  ABDOMINAL WALL/INGUINAL REGIONS:  Unremarkable  OSSEOUS STRUCTURES:  No acute fracture or destructive osseous lesion       Impression: No acute findings Workstation performed: [id number]     Code Status: Level 1 - Full Code    Counseling / Coordination of Care  Total time spent today 35 minutes  Greater than 50% of total time was spent with the patient and / or family counseling and / or coordination of care   A description of the counseling / coordination of care: floor time, reviewing records, exam

## 2018-06-12 NOTE — ED NOTES
201 faxed to [address] for review in network      Scooby Juarez LMSW  06/12/18  5897

## 2018-06-12 NOTE — ED NOTES
Pt appears to be resting quietly with her eyes closed  No signs of distress  Will continue to monitor         Adebayo Kiersten Hallman  06/12/18 0239

## 2018-06-12 NOTE — ED NOTES
Pt is laying on stretcher watching TV showing no signs/symptoms of distress at this time   Will continue to monitor     David Diana  06/11/18 3010

## 2018-06-12 NOTE — ED NOTES
Pt appears to be resting  No signs of distress at this time  Will continue to monitor        Andreasmacy Jean  06/12/18 0107

## 2018-06-12 NOTE — ED NOTES
Provided pt breakfast tray as well as personal items to freshen up and new scrubs  Pt ambulated to the bathroom       Evie Jimenez  06/12/18 0903

## 2018-06-12 NOTE — ED NOTES
Pt remains on phone with boyfriend at this time, showing no signs/symptoms of distress/discomfort   Will continue to monitor     Evan Villa  06/11/18 2006

## 2018-06-12 NOTE — ED NOTES
Pt ambulated to the restroom and returned back to her room and layed in bed       [facility] Jeet Lexx  06/12/18 0744

## 2018-06-12 NOTE — ED NOTES
Pt appears to be resting in bed  No signs of distress  Will continue to monitor       Scott Hado  06/12/18 0377

## 2018-06-12 NOTE — ED NOTES
Pts vitals obtained  Pt awoke to verbal stimuli  Pt was pleasant and has no needs at this time  Will continue to monitor       Eligio Hallman  06/12/18 0968

## 2018-06-12 NOTE — ED NOTES
Call placed to [address], spoke with Meri Cornejo who reports the facility is still on [employer] hold       Desmondcindy Kapadia, DANY  06/12/18   8827

## 2018-06-12 NOTE — ED NOTES
Pt appears to be resting  No signs of distress at this time  Will continue to monitor        Milagro Espinosa  06/12/18 0200

## 2018-06-12 NOTE — ED NOTES
Pt ambulated back to [address], turned off light, and is now resting comfortably in bed  Will continue to monitor       Kristy Snyder  06/12/18 2219

## 2018-06-12 NOTE — ED NOTES
Pt appears to be resting  No signs of distress at this time  Will continue to monitor        Mikaela Vanger  06/12/18 6703

## 2018-06-12 NOTE — ED NOTES
Call placed to [facility] spoke with Savannah Cardenas, who stated they are currently reviewing a chart for their last female bed, but could still send chart for review incase something changes  Chart faxed for review      DANY Noble  06/12/18   7161

## 2018-06-12 NOTE — ED NOTES
Spoke with UR, Nikolai Faulkner, who stated to contact the insurance to inquire about an out of network agreement, as there are beds in network at this time       Cleveland Decker, DANY  06/12/18   1019

## 2018-06-12 NOTE — ED NOTES
Insurance Authorization:   Phone call placed to [employer]  Phone number: [phone]  Spoke to Gretchen BRASHER , who approved a single case agreement due to the lack of availability in network  4 days approved  Level of care: Darshan Tobin   Review on 6/15  Authorization # [id number]  UR will receive a call from [employer] on date of review      Shira Padgett LMSW  06/12/18  1121

## 2018-06-12 NOTE — ED NOTES
PT care handoff given from John Paul  Pt appears resting, no distress seen  Will continue to monitor       Carmelita Bush  06/12/18 0526

## 2018-06-12 NOTE — ED NOTES
Received report from [facility]  Pt appears to be resting  No signs of distress at this time  Will continue to monitor        Katerine Shin  06/11/18 7091

## 2018-06-12 NOTE — ED NOTES
Patient is accepted at [facility]  Patient is accepted by Dr Bridger Starr per Joselin Esteban in crisis  Transportation is arranged with Madeline Mace at [facility]  Transportation is scheduled for 130pm      Nurse report is to be called to [phone] prior to patient transfer      Madhu Quinn LMSW  06/12/18 8356

## 2018-06-13 PROBLEM — F41.8 INSOMNIA SECONDARY TO DEPRESSION WITH ANXIETY: Chronic | Status: ACTIVE | Noted: 2018-06-13

## 2018-06-13 PROBLEM — F51.5 NIGHTMARES: Status: ACTIVE | Noted: 2018-06-13

## 2018-06-13 PROBLEM — F51.05 INSOMNIA SECONDARY TO DEPRESSION WITH ANXIETY: Chronic | Status: ACTIVE | Noted: 2018-06-13

## 2018-06-13 LAB
ALBUMIN SERPL BCP-MCNC: 3.5 G/DL (ref 3.5–5)
ALP SERPL-CCNC: 100 U/L (ref 46–116)
ALT SERPL W P-5'-P-CCNC: 30 U/L (ref 12–78)
ANION GAP SERPL CALCULATED.3IONS-SCNC: 9 MMOL/L (ref 4–13)
AST SERPL W P-5'-P-CCNC: 10 U/L (ref 5–45)
BASOPHILS # BLD AUTO: 0.03 THOUSANDS/ΜL (ref 0–0.1)
BASOPHILS NFR BLD AUTO: 0 % (ref 0–1)
BILIRUB SERPL-MCNC: 0.57 MG/DL (ref 0.2–1)
BUN SERPL-MCNC: 15 MG/DL (ref 5–25)
CALCIUM SERPL-MCNC: 8.5 MG/DL (ref 8.3–10.1)
CHLORIDE SERPL-SCNC: 107 MMOL/L (ref 100–108)
CHOLEST SERPL-MCNC: 148 MG/DL (ref 50–200)
CO2 SERPL-SCNC: 23 MMOL/L (ref 21–32)
CREAT SERPL-MCNC: 0.79 MG/DL (ref 0.6–1.3)
EOSINOPHIL # BLD AUTO: 0.23 THOUSAND/ΜL (ref 0–0.61)
EOSINOPHIL NFR BLD AUTO: 2 % (ref 0–6)
ERYTHROCYTE [DISTWIDTH] IN BLOOD BY AUTOMATED COUNT: 13.9 % (ref 11.6–15.1)
GFR SERPL CREATININE-BSD FRML MDRD: 104 ML/MIN/1.73SQ M
GLUCOSE P FAST SERPL-MCNC: 90 MG/DL (ref 65–99)
GLUCOSE SERPL-MCNC: 90 MG/DL (ref 65–140)
HCG SERPL QL: NEGATIVE
HCT VFR BLD AUTO: 40.3 % (ref 34.8–46.1)
HDLC SERPL-MCNC: 28 MG/DL (ref 40–60)
HGB BLD-MCNC: 13.1 G/DL (ref 11.5–15.4)
IMM GRANULOCYTES # BLD AUTO: 0.03 THOUSAND/UL (ref 0–0.2)
IMM GRANULOCYTES NFR BLD AUTO: 0 % (ref 0–2)
LDLC SERPL CALC-MCNC: 89 MG/DL (ref 0–100)
LYMPHOCYTES # BLD AUTO: 3.78 THOUSANDS/ΜL (ref 0.6–4.47)
LYMPHOCYTES NFR BLD AUTO: 36 % (ref 14–44)
MAGNESIUM SERPL-MCNC: 2.4 MG/DL (ref 1.6–2.6)
MCH RBC QN AUTO: 25.6 PG (ref 26.8–34.3)
MCHC RBC AUTO-ENTMCNC: 32.5 G/DL (ref 31.4–37.4)
MCV RBC AUTO: 79 FL (ref 82–98)
MONOCYTES # BLD AUTO: 0.61 THOUSAND/ΜL (ref 0.17–1.22)
MONOCYTES NFR BLD AUTO: 6 % (ref 4–12)
NEUTROPHILS # BLD AUTO: 5.76 THOUSANDS/ΜL (ref 1.85–7.62)
NEUTS SEG NFR BLD AUTO: 56 % (ref 43–75)
NONHDLC SERPL-MCNC: 120 MG/DL
NRBC BLD AUTO-RTO: 0 /100 WBCS
PLATELET # BLD AUTO: 322 THOUSANDS/UL (ref 149–390)
PMV BLD AUTO: 10.2 FL (ref 8.9–12.7)
POTASSIUM SERPL-SCNC: 4.2 MMOL/L (ref 3.5–5.3)
PROT SERPL-MCNC: 7.3 G/DL (ref 6.4–8.2)
RBC # BLD AUTO: 5.11 MILLION/UL (ref 3.81–5.12)
RPR SER QL: NORMAL
SODIUM SERPL-SCNC: 139 MMOL/L (ref 136–145)
TRIGL SERPL-MCNC: 157 MG/DL
TSH SERPL DL<=0.05 MIU/L-ACNC: 2.36 UIU/ML (ref 0.36–3.74)
WBC # BLD AUTO: 10.44 THOUSAND/UL (ref 4.31–10.16)

## 2018-06-13 PROCEDURE — 80061 LIPID PANEL: CPT | Performed by: PSYCHIATRY & NEUROLOGY

## 2018-06-13 PROCEDURE — 85025 COMPLETE CBC W/AUTO DIFF WBC: CPT | Performed by: PSYCHIATRY & NEUROLOGY

## 2018-06-13 PROCEDURE — 83735 ASSAY OF MAGNESIUM: CPT | Performed by: PSYCHIATRY & NEUROLOGY

## 2018-06-13 PROCEDURE — 99251 PR INITL INPATIENT CONSULT NEW/ESTAB PT 20 MIN: CPT | Performed by: OBSTETRICS & GYNECOLOGY

## 2018-06-13 PROCEDURE — 99223 1ST HOSP IP/OBS HIGH 75: CPT | Performed by: NURSE PRACTITIONER

## 2018-06-13 PROCEDURE — 87591 N.GONORRHOEAE DNA AMP PROB: CPT | Performed by: OBSTETRICS & GYNECOLOGY

## 2018-06-13 PROCEDURE — 84443 ASSAY THYROID STIM HORMONE: CPT | Performed by: PSYCHIATRY & NEUROLOGY

## 2018-06-13 PROCEDURE — 80053 COMPREHEN METABOLIC PANEL: CPT | Performed by: PSYCHIATRY & NEUROLOGY

## 2018-06-13 PROCEDURE — 87491 CHLMYD TRACH DNA AMP PROBE: CPT | Performed by: OBSTETRICS & GYNECOLOGY

## 2018-06-13 PROCEDURE — 84703 CHORIONIC GONADOTROPIN ASSAY: CPT | Performed by: PSYCHIATRY & NEUROLOGY

## 2018-06-13 PROCEDURE — 86592 SYPHILIS TEST NON-TREP QUAL: CPT | Performed by: PSYCHIATRY & NEUROLOGY

## 2018-06-13 RX ORDER — SULFAMETHOXAZOLE AND TRIMETHOPRIM 800; 160 MG/1; MG/1
1 TABLET ORAL EVERY 12 HOURS SCHEDULED
Status: DISCONTINUED | OUTPATIENT
Start: 2018-06-13 | End: 2018-06-18 | Stop reason: HOSPADM

## 2018-06-13 RX ORDER — LIDOCAINE HYDROCHLORIDE 10 MG/ML
5 INJECTION, SOLUTION EPIDURAL; INFILTRATION; INTRACAUDAL; PERINEURAL ONCE
Status: DISCONTINUED | OUTPATIENT
Start: 2018-06-13 | End: 2018-06-13

## 2018-06-13 RX ORDER — PRAZOSIN HYDROCHLORIDE 1 MG/1
1 CAPSULE ORAL
Status: DISCONTINUED | OUTPATIENT
Start: 2018-06-13 | End: 2018-06-18 | Stop reason: HOSPADM

## 2018-06-13 RX ORDER — DIVALPROEX SODIUM 500 MG/1
1000 TABLET, EXTENDED RELEASE ORAL
Status: DISCONTINUED | OUTPATIENT
Start: 2018-06-13 | End: 2018-06-13

## 2018-06-13 RX ORDER — LEVETIRACETAM 500 MG/1
1000 TABLET ORAL DAILY
Status: COMPLETED | OUTPATIENT
Start: 2018-06-14 | End: 2018-06-14

## 2018-06-13 RX ORDER — DIVALPROEX SODIUM 500 MG/1
1000 TABLET, EXTENDED RELEASE ORAL
Status: COMPLETED | OUTPATIENT
Start: 2018-06-13 | End: 2018-06-13

## 2018-06-13 RX ADMIN — LURASIDONE HYDROCHLORIDE 20 MG: 20 TABLET, FILM COATED ORAL at 18:44

## 2018-06-13 RX ADMIN — FAMOTIDINE 20 MG: 20 TABLET ORAL at 18:40

## 2018-06-13 RX ADMIN — SULFAMETHOXAZOLE AND TRIMETHOPRIM 1 TABLET: 800; 160 TABLET ORAL at 15:42

## 2018-06-13 RX ADMIN — DIVALPROEX SODIUM 1000 MG: 500 TABLET, EXTENDED RELEASE ORAL at 13:58

## 2018-06-13 RX ADMIN — SULFAMETHOXAZOLE AND TRIMETHOPRIM 1 TABLET: 800; 160 TABLET ORAL at 21:16

## 2018-06-13 RX ADMIN — FAMOTIDINE 20 MG: 20 TABLET ORAL at 08:15

## 2018-06-13 RX ADMIN — PRAZOSIN HYDROCHLORIDE 1 MG: 1 CAPSULE ORAL at 21:15

## 2018-06-13 RX ADMIN — DIVALPROEX SODIUM 1000 MG: 500 TABLET, EXTENDED RELEASE ORAL at 21:15

## 2018-06-13 RX ADMIN — LEVETIRACETAM 1000 MG: 500 TABLET ORAL at 08:15

## 2018-06-13 NOTE — PLAN OF CARE
Problem: Nutrition/Hydration-ADULT  Goal: Nutrient/Hydration intake appropriate for improving, restoring or maintaining nutritional needs  Monitor and assess patient's nutrition/hydration status for malnutrition (ex- brittle hair, bruises, dry skin, pale skin and conjunctiva, muscle wasting, smooth red tongue, and disorientation)  Collaborate with interdisciplinary team and initiate plan and interventions as ordered  Monitor patient's weight and dietary intake as ordered or per policy  Utilize nutrition screening tool and intervene per policy  Determine patient's food preferences and provide high-protein, high-caloric foods as appropriate       INTERVENTIONS:  - Monitor oral intake, urinary output, labs, and treatment plans  - Assess nutrition and hydration status and recommend course of action  - Evaluate amount of meals eaten  - Assist patient with eating if necessary   - Allow adequate time for meals  - Recommend/ encourage appropriate diets, oral nutritional supplements, and vitamin/mineral supplements  - Order, calculate, and assess calorie counts as needed  - Recommend, monitor, and adjust tube feedings and TPN/PPN based on assessed needs  - Assess need for intravenous fluids  - Provide specific nutrition/hydration education as appropriate  - Include patient/family/caregiver in decisions related to nutrition   Outcome: Progressing      Problem: DISCHARGE PLANNING  Goal: Discharge to home or other facility with appropriate resources  INTERVENTIONS:  - Identify barriers to discharge w/patient and caregiver  - Arrange for needed discharge resources and transportation as appropriate  - Identify discharge learning needs (meds, wound care, etc )  - Arrange for interpretive services to assist at discharge as needed  - Refer to Case Management Department for coordinating discharge planning if the patient needs post-hospital services based on physician/advanced practitioner order or complex needs related to functional status, cognitive ability, or social support system   Outcome: Progressing
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
## 2018-06-13 NOTE — PROGRESS NOTES
Spoke with Suresh White, patient's RN  Discussed Keppra switch to Depakote and placed orders  Depakote 1000 BID today  Then 750 BID maintenance dosing from tomorrow  Keppra 1000 x 1 tomorrow AM then stop  CBC/CMP reviewed with no significant abnormalities noted, other than mildly reduced MCV (? Iron deficiency)    VPA level AM  Discussed potential med a/e with patient yesterday and she was agreeable with switch, and she has had tubal ligation thus less concern from a birth defect standpoint in case of pregnancy      Follow up with Caleb Hillman OP neurologist OP

## 2018-06-13 NOTE — PLAN OF CARE
Problem: Risk for Self Injury/Neglect  Goal: Verbalize thoughts and feelings  Interventions:  - Assess and re-assess patient's lethality and potential for self-injury  - Engage patient in 1:1 interactions, daily, for a minimum of 15 minutes  - Encourage patient to express feelings, fears, frustrations, hopes  - Establish rapport/trust with patient   Outcome: Progressing    Goal: Refrain from harming self  Interventions:  - Monitor patient closely, per order  - Develop a trusting relationship  - Supervise medication ingestion, monitor effects and side effects   Outcome: Progressing    Goal: Attend and participate in unit activities, including therapeutic, recreational, and educational groups  Interventions:  - Provide therapeutic and educational activities daily, encourage attendance and participation, and document same in the medical record  - Obtain collateral information, encourage visitation and family involvement in care   Outcome: Progressing
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
## 2018-06-13 NOTE — PROGRESS NOTES
Patient about the unit and social with select peers  Pleasant and cooperative  Denies SI, compliant with medications  Patient reports that she had a "panic attack" earlier but was able to "work it out"  No complaints  Will monitor

## 2018-06-13 NOTE — CASE MANAGEMENT
This  met with patient  Patient was calm, cooperative, and maintained eye contact during session  Reports that there is conflict at home with mother, the cause of her increased depression and anxiety  She feels inadequate around her, with mother constantly expressing concerns on how she is raising her children  Patient began having suicidal ideations a few days ago and is looking to find outpatient treatment when medication management is completed  Hx of suicide attempts, yet patient is no longer hopeless, is able to list reasons to live  Denies psychosis, does not seem to be responding to internal stimuli; denies homicidal ideations  This  reviewed the goals of inpatient treatment, signed treatment plan  Signed ROIs for boyfriend, father, outpatient mental health provider, and PCP  This  will locate services upon discharge and continue to monitor

## 2018-06-13 NOTE — CONSULTS
Consultation - Gynecology   Evan Mejia 20 y o  female MRN: [id number]  Unit/Bed#: KF3 762-01 Encounter: [id number]     History of Present Illness   Physician Requesting Consult: Cirilo Quezada MD  Reason for Consult / Principal Problem: Vulvar Abscess   Subspeciality: General GYN  HPI: Evan Mejia is a 20 y o  [id number] who requests evaluation of suspected vulvar abscess  Reports onset of right vulvar irritation approximately 1 week ago, first noticed while patient was working at her [employer] job  States that source of irritation to be a small and hard mass, with associated bleeding and discharge  States that bleeding and discharge has improved throughout the week, now noting minimal drainage from abscess  Denies using pain medications, topical remedies for relief  Denies any additional gynecologic complaints  Follows with OB-GYN in [place]  Reports history of chlamydia infection when she was 23, s/p treatment  Denies other sexually transmitted diseases  States she was recently tested for STDs, but agreeable to repeat testing  Previously underwent tubal ligation  Inpatient consult to Obstetrics / Gynecology  Consult performed by: Holly Kwan ordered by: Kyle Mccormack        Review of Systems   Constitutional: Positive for chills  Negative for fatigue and fever  Gastrointestinal: Negative for abdominal pain  Genitourinary: Positive for frequency and genital sores  Negative for dysuria, hematuria, vaginal bleeding, vaginal discharge and vaginal pain         Historical Information   Past Medical History:   Diagnosis Date    Depression     Migraine     Seizures ([address] )      Past Surgical History:   Procedure Laterality Date     SECTION       SECTION      SHOULDER SURGERY      TUBAL LIGATION      TUMOR REMOVAL      from ear     OB History    Para Term  AB Living   1             SAB TAB Ectopic Multiple Live Births                  # Outcome Date GA Lbr Bj/2nd Weight Sex Delivery Anes PTL Lv   1                  Family History   Problem Relation Age of Onset    No Known Problems Mother     No Known Problems Father     No Known Problems Sister     No Known Problems Brother     No Known Problems Maternal Aunt     No Known Problems Paternal Aunt     No Known Problems Maternal Uncle     No Known Problems Paternal Uncle     No Known Problems Maternal Grandfather     No Known Problems Maternal Grandmother     No Known Problems Paternal Grandfather     No Known Problems Paternal Grandmother     No Known Problems Cousin     ADD / ADHD Neg Hx     Alcohol abuse Neg Hx     Anxiety disorder Neg Hx     Bipolar disorder Neg Hx     Completed Suicide  Neg Hx     Dementia Neg Hx     Depression Neg Hx     Drug abuse Neg Hx     OCD Neg Hx     Psychiatric Illness Neg Hx     Psychosis Neg Hx     Schizoaffective Disorder  Neg Hx     Schizophrenia Neg Hx     Self-Injury Neg Hx     Suicide Attempts Neg Hx      Social History   History   Alcohol Use No     History   Drug Use No     History   Smoking Status    Never Smoker   Smokeless Tobacco    Never Used       Meds/Allergies   Current Facility-Administered Medications   Medication Dose Route Frequency    acetaminophen (TYLENOL) tablet 650 mg  650 mg Oral Q6H PRN    aluminum-magnesium hydroxide-simethicone (MYLANTA) 200-200-20 mg/5 mL oral suspension 20 mL  20 mL Oral Q4H PRN    benztropine (COGENTIN) injection 1 mg  1 mg Intramuscular Q6H PRN    benztropine (COGENTIN) tablet 1 mg  1 mg Oral Q6H PRN    divalproex sodium (DEPAKOTE ER) 24 hr tablet 1,000 mg  1,000 mg Oral BID after meals    [START ON 2018] divalproex sodium (DEPAKOTE ER) 24 hr tablet 750 mg  750 mg Oral BID    famotidine (PEPCID) tablet 20 mg  20 mg Oral BID    haloperidol (HALDOL) tablet 5 mg  5 mg Oral Q8H PRN    haloperidol lactate (HALDOL) injection 5 mg  5 mg Intramuscular Q6H PRN    hydrOXYzine HCL (ATARAX) tablet 25 mg  25 mg Oral Q6H PRN    ibuprofen (MOTRIN) tablet 600 mg  600 mg Oral Q8H PRN    ibuprofen (MOTRIN) tablet 800 mg  800 mg Oral Q8H PRN    LET gel 1 application  1 application Topical Once    [START ON 6/14/2018] levETIRAcetam (KEPPRA) tablet 1,000 mg  1,000 mg Oral Daily    lidocaine (PF) (XYLOCAINE-MPF) 1 % injection 5 mL  5 mL Infiltration Once    LORazepam (ATIVAN) 2 mg/mL injection 1 mg  1 mg Intramuscular Q6H PRN    LORazepam (ATIVAN) tablet 1 mg  1 mg Oral Q8H PRN    magnesium hydroxide (MILK OF MAGNESIA) 400 mg/5 mL oral suspension 30 mL  30 mL Oral Daily PRN    OLANZapine (ZyPREXA) IM injection 10 mg  10 mg Intramuscular Q3H PRN    OLANZapine (ZyPREXA) tablet 10 mg  10 mg Oral Q3H PRN    risperiDONE (RisperDAL M-TABS) dispersible tablet 1 mg  1 mg Oral Q4H PRN    traZODone (DESYREL) tablet 50 mg  50 mg Oral HS PRN         Allergies   Allergen Reactions    Erythromycin Swelling    Peanut Oil Hives       Objective   Vitals: Blood pressure 120/75, pulse 89, temperature 97 7 °F (36 5 °C), temperature source Oral, resp  rate 16, height 5' 7" (1 702 m), weight 111 kg (245 lb), unknown if currently breastfeeding  Body mass index is 38 37 kg/m²  No intake or output data in the 24 hours ending 06/13/18 1357    Invasive Devices          No matching active lines, drains, or airways          Physical Exam   Constitutional: She appears well-developed and well-nourished  No distress  Genitourinary:       There is tenderness and lesion on the right labia  There is no rash or injury on the right labia  There is no rash, tenderness or lesion on the left labia         Lab Results:   Admission on 06/12/2018   Component Date Value    WBC 06/13/2018 10 44*    RBC 06/13/2018 5 11     Hemoglobin 06/13/2018 13 1     Hematocrit 06/13/2018 40 3     MCV 06/13/2018 79*    MCH 06/13/2018 25 6*    MCHC 06/13/2018 32 5     RDW 06/13/2018 13 9     MPV 06/13/2018 10 2     Platelets 90/74/0312 322     nRBC 06/13/2018 0     Neutrophils Relative 06/13/2018 56     Immat GRANS % 06/13/2018 0     Lymphocytes Relative 06/13/2018 36     Monocytes Relative 06/13/2018 6     Eosinophils Relative 06/13/2018 2     Basophils Relative 06/13/2018 0     Neutrophils Absolute 06/13/2018 5 76     Immature Grans Absolute 06/13/2018 0 03     Lymphocytes Absolute 06/13/2018 3 78     Monocytes Absolute 06/13/2018 0 61     Eosinophils Absolute 06/13/2018 0 23     Basophils Absolute 06/13/2018 0 03     Sodium 06/13/2018 139     Potassium 06/13/2018 4 2     Chloride 06/13/2018 107     CO2 06/13/2018 23     Anion Gap 06/13/2018 9     BUN 06/13/2018 15     Creatinine 06/13/2018 0 79     Glucose 06/13/2018 90     Glucose, Fasting 06/13/2018 90     Calcium 06/13/2018 8 5     AST 06/13/2018 10     ALT 06/13/2018 30     Alkaline Phosphatase 06/13/2018 100     Total Protein 06/13/2018 7 3     Albumin 06/13/2018 3 5     Total Bilirubin 06/13/2018 0 57     eGFR 06/13/2018 104     Preg, Serum 06/13/2018 Negative     Cholesterol 06/13/2018 148     Triglycerides 06/13/2018 157*    HDL, Direct 06/13/2018 28*    LDL Calculated 06/13/2018 89     Non-HDL-Chol (CHOL-HDL) 06/13/2018 120     Magnesium 06/13/2018 2 4     RPR 06/13/2018 Non-Reactive     TSH 3RD GENERATON 06/13/2018 2 360      Imaging Studies: I have personally reviewed pertinent reports  EKG, Pathology, and Other Studies: I have personally reviewed pertinent reports  Assessment/Plan     Assessment:  20 y o  [id number] with vulvar and inner thigh folliculitis     Plan:  1  Vulvar folliculitis: Bactrim DS BID x 7 days for empiric coverage for MRSA, recommend sitz baths for discomfort, recommend wearing loose fitting cotton clothing, promote pelvic dryness to decrease irritation  2  Desire for STD testing: GC/CT from urine; HIV, Hep B, RPR  Will follow-up with any positive results  3   For routine outpatient follow-up with outpatient OB-GYN    Code Status: Level 1 - Full Code  Advance Directive and Living Will:      Power of :    POLST:      Counseling / Coordination of Care  Total floor / unit time spent today30 minutes  minutes  Greater than 50% of total time was spent with the patient and / or family counseling and / or coordination of care  A description of the counseling / coordination of care: Thank you very much for the consultation  Gynecology will sign off       [id number] [facility], DO

## 2018-06-14 LAB
HBV SURFACE AG SER QL: NORMAL
VALPROATE SERPL-MCNC: 57 UG/ML (ref 50–100)

## 2018-06-14 PROCEDURE — 87340 HEPATITIS B SURFACE AG IA: CPT | Performed by: OBSTETRICS & GYNECOLOGY

## 2018-06-14 PROCEDURE — 99232 SBSQ HOSP IP/OBS MODERATE 35: CPT | Performed by: NURSE PRACTITIONER

## 2018-06-14 PROCEDURE — 80164 ASSAY DIPROPYLACETIC ACD TOT: CPT | Performed by: PSYCHIATRY & NEUROLOGY

## 2018-06-14 PROCEDURE — 87389 HIV-1 AG W/HIV-1&-2 AB AG IA: CPT | Performed by: OBSTETRICS & GYNECOLOGY

## 2018-06-14 RX ADMIN — LEVETIRACETAM 1000 MG: 500 TABLET ORAL at 08:11

## 2018-06-14 RX ADMIN — DIVALPROEX SODIUM 750 MG: 250 TABLET, FILM COATED, EXTENDED RELEASE ORAL at 08:11

## 2018-06-14 RX ADMIN — DIVALPROEX SODIUM 750 MG: 250 TABLET, FILM COATED, EXTENDED RELEASE ORAL at 17:11

## 2018-06-14 RX ADMIN — LURASIDONE HYDROCHLORIDE 40 MG: 40 TABLET, FILM COATED ORAL at 17:13

## 2018-06-14 RX ADMIN — PRAZOSIN HYDROCHLORIDE 1 MG: 1 CAPSULE ORAL at 21:29

## 2018-06-14 RX ADMIN — FAMOTIDINE 20 MG: 20 TABLET ORAL at 08:11

## 2018-06-14 RX ADMIN — FAMOTIDINE 20 MG: 20 TABLET ORAL at 17:11

## 2018-06-14 RX ADMIN — SULFAMETHOXAZOLE AND TRIMETHOPRIM 1 TABLET: 800; 160 TABLET ORAL at 21:28

## 2018-06-14 RX ADMIN — SULFAMETHOXAZOLE AND TRIMETHOPRIM 1 TABLET: 800; 160 TABLET ORAL at 08:11

## 2018-06-14 NOTE — DISCHARGE INSTR - APPOINTMENTS
Please see detailed instructions in the follow up provider section        [facility] Mental Health Crisis Line:  [phone]    [place], [place]

## 2018-06-14 NOTE — PROGRESS NOTES
Progress Note - Behavioral Health     Korey Lugo 20 y o  female MRN: [id number]   Unit/Bed#: NW7 762-01 Encounter: [id number]    Behavior over the last 24 hours: minimal improvement  Suman Domínguez still anxious about thoughts of going home because boyfriend is in [place] looking for a home, appears anxious and depressed, denies auditory hallucinations, homicidal ideation, suicidal ideation and visual hallucinations today  Suman Domínguez had a good visit with her children last evening  She states that she did not interact much with her parents and stayed focused on the kids  Suman Domínguez states that last night is the first night in a long time that she did not have a nightmare and she was able to sleep through the night  Has been taking medications  Participates appropriately in milieu  Attends some groups  Social in milieu      Sleep: improving  Appetite: slowly improving  Medication side effects: No   ROS: reports minimal discharge from vaginal abscess (slightly bloody), denies any headache, shortness of breath, chest pain, abdominal pain, constipation or diarrhea    Mental Status Evaluation:    Appearance:  wearing hospital clothes   Behavior:  cooperative, calm   Speech:  slow, soft   Mood:  slightly less anxious, slightly less depressed   Affect:  slightly less labile   Thought Process:  organized, logical   Associations: intact associations   Thought Content:  no overt delusions   Perceptual Disturbances: no auditory hallucinations, no visual hallucinations   Risk Potential: Suicidal ideation - None at present  Homicidal ideation - None  Potential for aggression - No   Sensorium:  oriented to person, place and time/date   Memory:  recent and remote memory grossly intact   Consciousness:  alert and awake   Attention: attention span and concentration are age appropriate   Insight:  improving   Judgment: improving   Gait/Station: normal gait/station and normal balance   Motor Activity: no abnormal movements Vital signs in last 24 hours:    Temp:  [97 4 °F (36 3 °C)-97 6 °F (36 4 °C)] 97 4 °F (36 3 °C)  HR:  [] 108  Resp:  [16] 16  BP: ()/(56-94) 119/71    Laboratory results:    I have personally reviewed all pertinent laboratory/tests results  Most Recent Labs:   Lab Results   Component Value Date    WBC 10 44 (H) 06/13/2018    RBC 5 11 06/13/2018    HGB 13 1 06/13/2018    HCT 40 3 06/13/2018     06/13/2018    RDW 13 9 06/13/2018    NEUTROABS 5 76 06/13/2018     06/13/2018    K 4 2 06/13/2018     06/13/2018    CO2 23 06/13/2018    BUN 15 06/13/2018    CREATININE 0 79 06/13/2018    GLUCOSE 90 06/13/2018    CALCIUM 8 5 06/13/2018    AST 10 06/13/2018    ALT 30 06/13/2018    ALKPHOS 100 06/13/2018    PROT 7 3 06/13/2018    BILITOT 0 57 06/13/2018    CHOL 148 06/13/2018    HDL 28 (L) 06/13/2018    TRIG 157 (H) 06/13/2018    LDLCALC 89 06/13/2018    VALPROICTOT 57 06/14/2018    BKP0AOXGEWLC 2 360 06/13/2018    PREGTESTUR positive 11/25/2016    PREGSERUM Negative 06/13/2018    HCG <2 07/30/2017    RPR Non-Reactive 06/13/2018       Progress Toward Goals: progressing slow    Assessment/Plan   Principal Problem:    Bipolar I disorder, most recent episode depressed, severe without psychotic features ([facility] [address] )  Active Problems:    Seizure disorder ([facility] [address] )    Insomnia secondary to depression with anxiety    Nightmares    Recommended Treatment:     Planned medication and treatment changes: All current active medications have been reviewed    Encourage group therapy, milieu therapy and occupational therapy  Behavioral Health checks every 5 minutes  Increase Latuda to 40 mg po daily    Current Facility-Administered Medications:  acetaminophen 650 mg Oral Q6H PRN Robb Laboy MD   aluminum-magnesium hydroxide-simethicone 20 mL Oral Q4H PRN Robb Laboy MD   benztropine 1 mg Intramuscular Q6H PRN Robb Labyo MD   benztropine 1 mg Oral Q6H PRN Robb Laboy MD   divalproex sodium 750 mg Oral BID Sun Kenny DO   famotidine 20 mg Oral BID Kevyn Brumfield, MD   haloperidol 5 mg Oral Q8H PRN Kevyn Labor, MD   haloperidol lactate 5 mg Intramuscular Q6H PRN Kevyn Labor, MD   hydrOXYzine HCL 25 mg Oral Q6H PRN Kevyn Labor, MD   ibuprofen 600 mg Oral Q8H PRN Kevyn Labor, MD   ibuprofen 800 mg Oral Q8H PRN Kevyn Labor, MD   LORazepam 1 mg Intramuscular Q6H PRN Kevyn Labor, MD   LORazepam 1 mg Oral Q8H PRN Kevyn Labor, MD   lurasidone 40 mg Oral Daily With Dinner Reggy Fees, CRNP   magnesium hydroxide 30 mL Oral Daily PRN Kevyn Labor, MD   OLANZapine 10 mg Intramuscular Q3H PRN Kevyn Labor, MD   OLANZapine 10 mg Oral Q3H PRN Kevyn Labor, MD   prazosin 1 mg Oral HS Reggy Fees, CRNP   risperiDONE 1 mg Oral Q4H PRN Kevyn Labor, MD   sulfamethoxazole-trimethoprim 1 tablet Oral Q12H [facility] Rita Castle DO   traZODone 50 mg Oral HS PRN Kevyn Labor, MD       Risks / Benefits of Treatment:    Risks, benefits, and possible side effects of medications explained to patient and patient verbalizes understanding and agreement for treatment  Counseling / Coordination of Care:    Patient's progress discussed with staff in treatment team meeting  Medications, treatment progress and treatment plan reviewed with patient  Supportive counseling provided to the patient

## 2018-06-14 NOTE — CASE MANAGEMENT
This  met with patient  Patient was calm, cooperative, smiling during much of session  Reports additional medication that was added helped her sleep the most she has in a while  No current symptoms of suicidal ideations, homicidal ideations, or danger to self or others  Patient states she did not have nightmares last night, relieved and less anxious due to this  Appears to be social with peers, appropriately, and is completing all daily living skills  Maintained eye contact during session  This  reviewed appointment set for outpatient treatment at [facility] on July 6, 2018 at 12 noon for an intake with the nurse practitioner, patient expressed clear understanding  Will continue to monitor symptoms and plan for discharge back to home environment next week

## 2018-06-14 NOTE — PROGRESS NOTES
Patient has been visible in the milieu the entire shift  Socializing with peers  Pleasant on approach  Declined sitz bath or irritation this shift  Will monitor

## 2018-06-14 NOTE — PROGRESS NOTES
Pt denies all s/s  Pt had good visit w/ boyfriend and her children this shift  PT is pleasant and cooperative

## 2018-06-14 NOTE — PLAN OF CARE
Problem: Nutrition/Hydration-ADULT  Goal: Nutrient/Hydration intake appropriate for improving, restoring or maintaining nutritional needs  Monitor and assess patient's nutrition/hydration status for malnutrition (ex- brittle hair, bruises, dry skin, pale skin and conjunctiva, muscle wasting, smooth red tongue, and disorientation)  Collaborate with interdisciplinary team and initiate plan and interventions as ordered  Monitor patient's weight and dietary intake as ordered or per policy  Utilize nutrition screening tool and intervene per policy  Determine patient's food preferences and provide high-protein, high-caloric foods as appropriate       INTERVENTIONS:  - Monitor oral intake, urinary output, labs, and treatment plans  - Assess nutrition and hydration status and recommend course of action  - Evaluate amount of meals eaten  - Assist patient with eating if necessary   - Allow adequate time for meals  - Recommend/ encourage appropriate diets, oral nutritional supplements, and vitamin/mineral supplements  - Order, calculate, and assess calorie counts as needed  - Recommend, monitor, and adjust tube feedings and TPN/PPN based on assessed needs  - Assess need for intravenous fluids  - Provide specific nutrition/hydration education as appropriate  - Include patient/family/caregiver in decisions related to nutrition   Outcome: Progressing      Problem: DISCHARGE PLANNING  Goal: Discharge to home or other facility with appropriate resources  INTERVENTIONS:  - Identify barriers to discharge w/patient and caregiver  - Arrange for needed discharge resources and transportation as appropriate  - Identify discharge learning needs (meds, wound care, etc )  - Arrange for interpretive services to assist at discharge as needed  - Refer to Case Management Department for coordinating discharge planning if the patient needs post-hospital services based on physician/advanced practitioner order or complex needs related to functional status, cognitive ability, or social support system   Outcome: Progressing      Problem: Risk for Self Injury/Neglect  Goal: Verbalize thoughts and feelings  Interventions:  - Assess and re-assess patient's lethality and potential for self-injury  - Engage patient in 1:1 interactions, daily, for a minimum of 15 minutes  - Encourage patient to express feelings, fears, frustrations, hopes  - Establish rapport/trust with patient    Outcome: Progressing    Goal: Refrain from harming self  Interventions:  - Monitor patient closely, per order  - Develop a trusting relationship  - Supervise medication ingestion, monitor effects and side effects    Outcome: Progressing    Goal: Attend and participate in unit activities, including therapeutic, recreational, and educational groups  Interventions:  - Provide therapeutic and educational activities daily, encourage attendance and participation, and document same in the medical record  - Obtain collateral information, encourage visitation and family involvement in care    Outcome: Progressing      Problem: Ineffective Coping  Goal: Participates in unit activities  Interventions:  - Provide therapeutic environment   - Provide required programming   - Redirect inappropriate behaviors    Outcome: Progressing

## 2018-06-14 NOTE — DISCHARGE INSTR - OTHER ORDERS
[facility]  [address]  [address]  [phone]    For Substance Abuse treatment Assessment:  BEHAVIORAL MEDICINE AT [facility] ([facility])  [address]  Phone: [phone] [address] [facility], [address] [facility] [address]  [facility] [address]  New Admissions [phone]  Local office [phone]

## 2018-06-14 NOTE — PROGRESS NOTES
Pt denies all sxs's  She is calm, pleasant and is playing cards with 2 peers  She denies any concerns, is compliant with medications

## 2018-06-15 LAB
CHLAMYDIA DNA CVX QL NAA+PROBE: NORMAL
HIV 1+2 AB+HIV1 P24 AG SERPL QL IA: NORMAL
N GONORRHOEA DNA GENITAL QL NAA+PROBE: NORMAL

## 2018-06-15 PROCEDURE — 99232 SBSQ HOSP IP/OBS MODERATE 35: CPT | Performed by: NURSE PRACTITIONER

## 2018-06-15 RX ADMIN — DIVALPROEX SODIUM 750 MG: 250 TABLET, FILM COATED, EXTENDED RELEASE ORAL at 08:17

## 2018-06-15 RX ADMIN — PRAZOSIN HYDROCHLORIDE 1 MG: 1 CAPSULE ORAL at 21:36

## 2018-06-15 RX ADMIN — SULFAMETHOXAZOLE AND TRIMETHOPRIM 1 TABLET: 800; 160 TABLET ORAL at 21:36

## 2018-06-15 RX ADMIN — FAMOTIDINE 20 MG: 20 TABLET ORAL at 18:06

## 2018-06-15 RX ADMIN — FAMOTIDINE 20 MG: 20 TABLET ORAL at 08:17

## 2018-06-15 RX ADMIN — DIVALPROEX SODIUM 750 MG: 250 TABLET, FILM COATED, EXTENDED RELEASE ORAL at 18:06

## 2018-06-15 RX ADMIN — LURASIDONE HYDROCHLORIDE 40 MG: 40 TABLET, FILM COATED ORAL at 16:25

## 2018-06-15 RX ADMIN — SULFAMETHOXAZOLE AND TRIMETHOPRIM 1 TABLET: 800; 160 TABLET ORAL at 08:17
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
## 2018-06-15 NOTE — PROGRESS NOTES
Progress Note - Behavioral Health     Lakeisha Adjutant 20 y o  female MRN: [id number]   Unit/Bed#: NW7 762-01 Encounter: [id number]    Behavior over the last 24 hours: minimal improvement  Cindy Bhatti she had a difficult visit with mom last evening and she does not want to "get into it with her in front of her children "  "Mom was here for a visit and she was saying my boyfriend doesn't care about me so I was upset, I can't confront her because my father is with her and if I try to stand up he will tell me I am being disrespectful  I was very upset, I called my boyfriend later after they left and spoke with him and cried it out "  Cosme Juarez feels slighlty less anxious and depressed, denies auditory hallucinations, homicidal ideation, suicidal ideation and visual hallucinations today  Cosme Juarez states she is nervous about her return home to her parents house and hopes that her fiance can find a place so they can move soon  She feels that her life and her children's lives will be better when they are away from her parents  Has been taking medications  Follows directions appropriately  Participates appropriately in milieu  Attends groups  Attends group therapy  Socializes with peers  Social in milieu      Sleep: improving-no nightmares since starting prazosin  Appetite: normal  Medication side effects: No   ROS: denies any headache, shortness of breath, chest pain, abdominal pain, constipation, diarrhea or issues with labial abscess    Mental Status Evaluation:    Appearance:  casually dressed   Behavior:  cooperative, calm   Speech:  normal rate, normal volume, normal pitch   Mood:  less anxious, less depressed   Affect:  mood-congruent   Thought Process:  logical, goal directed   Associations: intact associations   Thought Content:  no overt delusions   Perceptual Disturbances: no auditory hallucinations, no visual hallucinations   Risk Potential: Suicidal ideation - None at present  Homicidal ideation - None  Potential for aggression - No   Sensorium:  oriented to person, place and time/date   Memory:  recent and remote memory grossly intact   Consciousness:  alert and awake   Attention: attention span and concentration are age appropriate   Insight:  improving   Judgment: improving   Gait/Station: normal gait/station and normal balance   Motor Activity: no abnormal movements     Vital signs in last 24 hours:    Temp:  [97 8 °F (36 6 °C)-98 °F (36 7 °C)] 98 °F (36 7 °C)  HR:  [] 100  Resp:  [16] 16  BP: (113-125)/(55-75) 116/55    Laboratory results:    I have personally reviewed all pertinent laboratory/tests results  Most Recent Labs:   Lab Results   Component Value Date    WBC 10 44 (H) 06/13/2018    RBC 5 11 06/13/2018    HGB 13 1 06/13/2018    HCT 40 3 06/13/2018     06/13/2018    RDW 13 9 06/13/2018    NEUTROABS 5 76 06/13/2018     06/13/2018    K 4 2 06/13/2018     06/13/2018    CO2 23 06/13/2018    BUN 15 06/13/2018    CREATININE 0 79 06/13/2018    GLUCOSE 90 06/13/2018    CALCIUM 8 5 06/13/2018    AST 10 06/13/2018    ALT 30 06/13/2018    ALKPHOS 100 06/13/2018    PROT 7 3 06/13/2018    BILITOT 0 57 06/13/2018    CHOL 148 06/13/2018    HDL 28 (L) 06/13/2018    TRIG 157 (H) 06/13/2018    LDLCALC 89 06/13/2018    VALPROICTOT 57 06/14/2018    EDO8ZWJRTJRL 2 360 06/13/2018    PREGTESTUR positive 11/25/2016    PREGSERUM Negative 06/13/2018    HCG <2 07/30/2017    RPR Non-Reactive 06/13/2018       Progress Toward Goals: progressing gradually, patient continues to have some anxiety and is working on coping skills when dealing with family issues  Becomes anxious when discussing parents  Attends groups, eating and sleep improved, has not had a nightmare since starting prazosin      Assessment/Plan   Principal Problem:    Bipolar I disorder, most recent episode depressed, severe without psychotic features ([facility] [address] )  Active Problems:    Seizure disorder ([facility] [address] )    Insomnia secondary to depression with anxiety    Nightmares    Recommended Treatment:     Planned medication and treatment changes: All current active medications have been reviewed  Encourage group therapy, milieu therapy and occupational therapy  Behavioral Health checks every 5 minutes  Continue current medications:    Current Facility-Administered Medications:  acetaminophen 650 mg Oral Q6H PRN Letty Fay MD   aluminum-magnesium hydroxide-simethicone 20 mL Oral Q4H PRN Letty Fay MD   benztropine 1 mg Intramuscular Q6H PRN Letty Fay MD   benztropine 1 mg Oral Q6H PRN Letty Fay MD   divalproex sodium 750 mg Oral BID Sun Devarinti, DO   famotidine 20 mg Oral BID Letty Fay MD   haloperidol 5 mg Oral Q8H PRN Letty Fay MD   haloperidol lactate 5 mg Intramuscular Q6H PRN Letty Fay MD   hydrOXYzine HCL 25 mg Oral Q6H PRN Letty Fay MD   ibuprofen 600 mg Oral Q8H PRN Letty Fay MD   ibuprofen 800 mg Oral Q8H PRN Letty Fay MD   LORazepam 1 mg Intramuscular Q6H PRN Letty Fay MD   LORazepam 1 mg Oral Q8H PRN Letty Fay MD   lurasidone 40 mg Oral Daily With Dinner JESSICA Greer   magnesium hydroxide 30 mL Oral Daily PRN Letty Fay MD   OLANZapine 10 mg Intramuscular Q3H PRN Letty Fay MD   OLANZapine 10 mg Oral Q3H PRN Letty Fay MD   prazosin 1 mg Oral HS JESSICA Greer   risperiDONE 1 mg Oral Q4H PRN Letty Fay MD   sulfamethoxazole-trimethoprim 1 tablet Oral Q12H [facility] Rita Castle, DO   traZODone 50 mg Oral HS PRN Letty Fay MD       Risks / Benefits of Treatment:    Risks, benefits, and possible side effects of medications explained to patient and patient verbalizes understanding and agreement for treatment  Counseling / Coordination of Care:    Patient's progress discussed with staff in treatment team meeting    Medications, treatment progress and treatment plan reviewed with patient  Supportive counseling provided to the patient

## 2018-06-15 NOTE — PLAN OF CARE
Problem: Nutrition/Hydration-ADULT  Goal: Nutrient/Hydration intake appropriate for improving, restoring or maintaining nutritional needs  Monitor and assess patient's nutrition/hydration status for malnutrition (ex- brittle hair, bruises, dry skin, pale skin and conjunctiva, muscle wasting, smooth red tongue, and disorientation)  Collaborate with interdisciplinary team and initiate plan and interventions as ordered  Monitor patient's weight and dietary intake as ordered or per policy  Utilize nutrition screening tool and intervene per policy  Determine patient's food preferences and provide high-protein, high-caloric foods as appropriate       INTERVENTIONS:  - Monitor oral intake, urinary output, labs, and treatment plans  - Assess nutrition and hydration status and recommend course of action  - Evaluate amount of meals eaten  - Assist patient with eating if necessary   - Allow adequate time for meals  - Recommend/ encourage appropriate diets, oral nutritional supplements, and vitamin/mineral supplements  - Order, calculate, and assess calorie counts as needed  - Recommend, monitor, and adjust tube feedings and TPN/PPN based on assessed needs  - Assess need for intravenous fluids  - Provide specific nutrition/hydration education as appropriate  - Include patient/family/caregiver in decisions related to nutrition   Outcome: Completed Date Met: 06/15/18      Problem: DISCHARGE PLANNING  Goal: Discharge to home or other facility with appropriate resources  INTERVENTIONS:  - Identify barriers to discharge w/patient and caregiver  - Arrange for needed discharge resources and transportation as appropriate  - Identify discharge learning needs (meds, wound care, etc )  - Arrange for interpretive services to assist at discharge as needed  - Refer to Case Management Department for coordinating discharge planning if the patient needs post-hospital services based on physician/advanced practitioner order or complex needs related to functional status, cognitive ability, or social support system   Outcome: Progressing      Problem: Risk for Self Injury/Neglect  Goal: Verbalize thoughts and feelings  Interventions:  - Assess and re-assess patient's lethality and potential for self-injury  - Engage patient in 1:1 interactions, daily, for a minimum of 15 minutes  - Encourage patient to express feelings, fears, frustrations, hopes  - Establish rapport/trust with patient    Outcome: Progressing    Goal: Refrain from harming self  Interventions:  - Monitor patient closely, per order  - Develop a trusting relationship  - Supervise medication ingestion, monitor effects and side effects    Outcome: Progressing    Goal: Attend and participate in unit activities, including therapeutic, recreational, and educational groups  Interventions:  - Provide therapeutic and educational activities daily, encourage attendance and participation, and document same in the medical record  - Obtain collateral information, encourage visitation and family involvement in care    Outcome: Progressing      Problem: Ineffective Coping  Goal: Participates in unit activities  Interventions:  - Provide therapeutic environment   - Provide required programming   - Redirect inappropriate behaviors    Outcome: Progressing

## 2018-06-16 PROCEDURE — 99231 SBSQ HOSP IP/OBS SF/LOW 25: CPT | Performed by: PSYCHIATRY & NEUROLOGY

## 2018-06-16 RX ADMIN — SULFAMETHOXAZOLE AND TRIMETHOPRIM 1 TABLET: 800; 160 TABLET ORAL at 21:44

## 2018-06-16 RX ADMIN — DIVALPROEX SODIUM 750 MG: 250 TABLET, FILM COATED, EXTENDED RELEASE ORAL at 18:35

## 2018-06-16 RX ADMIN — SULFAMETHOXAZOLE AND TRIMETHOPRIM 1 TABLET: 800; 160 TABLET ORAL at 08:36

## 2018-06-16 RX ADMIN — LORAZEPAM 1 MG: 1 TABLET ORAL at 13:00

## 2018-06-16 RX ADMIN — DIVALPROEX SODIUM 750 MG: 250 TABLET, FILM COATED, EXTENDED RELEASE ORAL at 08:36

## 2018-06-16 RX ADMIN — FAMOTIDINE 20 MG: 20 TABLET ORAL at 08:36

## 2018-06-16 RX ADMIN — FAMOTIDINE 20 MG: 20 TABLET ORAL at 18:35

## 2018-06-16 RX ADMIN — LURASIDONE HYDROCHLORIDE 40 MG: 40 TABLET, FILM COATED ORAL at 16:38

## 2018-06-16 RX ADMIN — PRAZOSIN HYDROCHLORIDE 1 MG: 1 CAPSULE ORAL at 21:46

## 2018-06-16 NOTE — PROGRESS NOTES
PT states she has anxiety and denies all other s/s  PT does not want any anxiety medication now because she had something earlier  PT became tearful due to visit w/ her parents  PT states sit did not go well  Pt called her boyfriend for support

## 2018-06-16 NOTE — PROGRESS NOTES
Patient has been visible in the milieu  Socializing with peers  Affect bright  Denies SI and reports a decrease in both anxiety and depression  No complaints  Will monitor

## 2018-06-16 NOTE — PROGRESS NOTES
Psychiatric Evaluation - Behavioral Health       Assessment/Plan  Principal Problem:  F31 5 Bipolar DO depressed and psych feature  F41 9 Anxiety DO NOS  PLAN:   1) continue medications as before  2) Continue group and individual therapy  Interval History:  Patient said she is a little anxious today because of a patient on the unit who is very sick and she is a little nervous for that  She said she is sleeping well and eating well  No hallucinations or delusions  No suicidal or homicidal ideas  Her anxiety is much improved now  She feels medications have helped  She is calm and cooperative      Scheduled Meds:  Current Facility-Administered Medications:  acetaminophen 650 mg Oral Q6H PRN Pankaj Domínguez MD   aluminum-magnesium hydroxide-simethicone 20 mL Oral Q4H PRN Pankaj Domínguez MD   benztropine 1 mg Intramuscular Q6H PRN Pankaj Domínguez MD   benztropine 1 mg Oral Q6H PRN Pankaj Domínguez MD   divalproex sodium 750 mg Oral BID Sunrocío Kenny,    famotidine 20 mg Oral BID Pankaj Domínguez MD   haloperidol 5 mg Oral Q8H PRN Pankaj Domínguez MD   haloperidol lactate 5 mg Intramuscular Q6H PRN Pankaj Domínguez MD   hydrOXYzine HCL 25 mg Oral Q6H PRN Pankaj Domínguez MD   ibuprofen 600 mg Oral Q8H PRN Pankaj Domínguez MD   ibuprofen 800 mg Oral Q8H PRN Pankaj Domínguez MD   LORazepam 1 mg Intramuscular Q6H PRN Pankaj Domínguez MD   LORazepam 1 mg Oral Q8H PRN Pankaj Domínguez MD   lurasidone 40 mg Oral Daily With Dinner JESSICA Mota   magnesium hydroxide 30 mL Oral Daily PRN Pankaj Domínguez MD   OLANZapine 10 mg Intramuscular Q3H PRN Pankaj Domínguez MD   OLANZapine 10 mg Oral Q3H PRN Pankaj Domínguez MD   prazosin 1 mg Oral HS JESSICA Mota   risperiDONE 1 mg Oral Q4H PRN Pankaj Domínguez MD   sulfamethoxazole-trimethoprim 1 tablet Oral Q12H [facility] Rita Castle DO   traZODone 50 mg Oral HS PRN Pankaj Domínguez MD     Continuous Infusions:   PRN Meds:   acetaminophen    aluminum-magnesium hydroxide-simethicone    benztropine    benztropine    haloperidol    haloperidol lactate    hydrOXYzine HCL    ibuprofen    ibuprofen    LORazepam    LORazepam    magnesium hydroxide    OLANZapine    OLANZapine    risperiDONE    traZODone     Vitals:    06/16/18 0738   BP: 120/57   Pulse: 84   Resp: 16   Temp: 97 5 °F (36 4 °C)       Allergies   Allergen Reactions    Erythromycin Swelling    Peanut Oil Hives       Mental Status Evaluation:  Appearance:   appeared of age and had good hygiene    Behavior:   behavior was cooperative    Speech:   speech is normal goal directed    Mood:  Depressed   Affect Anxious and sad  Language: naming objects and Goal directed  Thought Process:   logical and linear    Thought Content:  Normal   Perceptual Disturbances:  denying any auditory and visual hallucinations  Risk Potential: No suicidal ideas but feels hopeless   Sensorium:  person, place, time/date, situation, day of week, month of year and year   Cognition:  grossly intact   Consciousness:   alert, awake, and oriented ×3    Attention: Good attention span   Intellect: Normal   Fund of Knowledge: awareness of current events: normal    Insight:  Good   Judgment: Good   Muscle Strength and Tone: Normal    Gait/Station:  gait was not assessed    Motor Activity: no abnormal movements     Lab Results: I have personally reviewed pertinent lab results  NOTE:  Total of 15 minutes were spent in talking to patient completing this medical record reviewing medical chart medical decision making    Reva Bar MD

## 2018-06-16 NOTE — PLAN OF CARE
Problem: DISCHARGE PLANNING  Goal: Discharge to home or other facility with appropriate resources  INTERVENTIONS:  - Identify barriers to discharge w/patient and caregiver  - Arrange for needed discharge resources and transportation as appropriate  - Identify discharge learning needs (meds, wound care, etc )  - Arrange for interpretive services to assist at discharge as needed  - Refer to Case Management Department for coordinating discharge planning if the patient needs post-hospital services based on physician/advanced practitioner order or complex needs related to functional status, cognitive ability, or social support system   Outcome: Progressing      Problem: Risk for Self Injury/Neglect  Goal: Verbalize thoughts and feelings  Interventions:  - Assess and re-assess patient's lethality and potential for self-injury  - Engage patient in 1:1 interactions, daily, for a minimum of 15 minutes  - Encourage patient to express feelings, fears, frustrations, hopes  - Establish rapport/trust with patient    Outcome: Progressing    Goal: Refrain from harming self  Interventions:  - Monitor patient closely, per order  - Develop a trusting relationship  - Supervise medication ingestion, monitor effects and side effects    Outcome: Progressing    Goal: Attend and participate in unit activities, including therapeutic, recreational, and educational groups  Interventions:  - Provide therapeutic and educational activities daily, encourage attendance and participation, and document same in the medical record  - Obtain collateral information, encourage visitation and family involvement in care    Outcome: Progressing      Problem: Ineffective Coping  Goal: Participates in unit activities  Interventions:  - Provide therapeutic environment   - Provide required programming   - Redirect inappropriate behaviors    Outcome: Progressing

## 2018-06-17 PROCEDURE — 99231 SBSQ HOSP IP/OBS SF/LOW 25: CPT | Performed by: PSYCHIATRY & NEUROLOGY

## 2018-06-17 RX ADMIN — FAMOTIDINE 20 MG: 20 TABLET ORAL at 08:27

## 2018-06-17 RX ADMIN — DIVALPROEX SODIUM 750 MG: 250 TABLET, FILM COATED, EXTENDED RELEASE ORAL at 08:27

## 2018-06-17 RX ADMIN — SULFAMETHOXAZOLE AND TRIMETHOPRIM 1 TABLET: 800; 160 TABLET ORAL at 08:27

## 2018-06-17 RX ADMIN — LURASIDONE HYDROCHLORIDE 40 MG: 40 TABLET, FILM COATED ORAL at 15:58

## 2018-06-17 RX ADMIN — SULFAMETHOXAZOLE AND TRIMETHOPRIM 1 TABLET: 800; 160 TABLET ORAL at 21:18

## 2018-06-17 RX ADMIN — FAMOTIDINE 20 MG: 20 TABLET ORAL at 17:14

## 2018-06-17 RX ADMIN — HYDROXYZINE HYDROCHLORIDE 25 MG: 25 TABLET, FILM COATED ORAL at 15:59

## 2018-06-17 RX ADMIN — DIVALPROEX SODIUM 750 MG: 250 TABLET, FILM COATED, EXTENDED RELEASE ORAL at 17:13

## 2018-06-17 NOTE — PROGRESS NOTES
Patient about the unit and socializing with select peers  Pleasant  Denies all symptoms  Will monitor

## 2018-06-17 NOTE — PLAN OF CARE
Problem: DEPRESSION  Goal: Will be euthymic at discharge  INTERVENTIONS:  - Administer medication as ordered  - Provide emotional support via 1:1 interaction with staff  - Encourage involvement in milieu/groups/activities  - Monitor for social isolation  Outcome: Progressing

## 2018-06-17 NOTE — PROGRESS NOTES
PT denies all s/s  Except anxiety  Pt  has anxiety because her mother is coming to visit  Pt tells RN " I feel much better today but I am anxious because my mother  She started in front of another pt yesterday and it was embarrassing  " Pt is overly bright and is social on unit  PT given PRN atarax 25 mg PO for anxiety  PRN atarax was effective

## 2018-06-18 VITALS
RESPIRATION RATE: 16 BRPM | SYSTOLIC BLOOD PRESSURE: 117 MMHG | DIASTOLIC BLOOD PRESSURE: 59 MMHG | TEMPERATURE: 98 F | HEIGHT: 67 IN | HEART RATE: 89 BPM | WEIGHT: 245 LBS | BODY MASS INDEX: 38.45 KG/M2

## 2018-06-18 PROCEDURE — 99239 HOSP IP/OBS DSCHRG MGMT >30: CPT | Performed by: PSYCHIATRY & NEUROLOGY

## 2018-06-18 RX ORDER — PRAZOSIN HYDROCHLORIDE 1 MG/1
1 CAPSULE ORAL
Qty: 30 CAPSULE | Refills: 0 | Status: SHIPPED | OUTPATIENT
Start: 2018-06-18 | End: 2020-08-29

## 2018-06-18 RX ORDER — SULFAMETHOXAZOLE AND TRIMETHOPRIM 800; 160 MG/1; MG/1
1 TABLET ORAL EVERY 12 HOURS SCHEDULED
Qty: 3 TABLET | Refills: 0 | Status: SHIPPED | OUTPATIENT
Start: 2018-06-18 | End: 2018-06-20

## 2018-06-18 RX ORDER — FAMOTIDINE 20 MG/1
20 TABLET, FILM COATED ORAL 2 TIMES DAILY
Qty: 60 TABLET | Refills: 0 | Status: SHIPPED | OUTPATIENT
Start: 2018-06-18 | End: 2020-08-29

## 2018-06-18 RX ORDER — DIVALPROEX SODIUM 250 MG/1
750 TABLET, EXTENDED RELEASE ORAL 2 TIMES DAILY
Qty: 180 TABLET | Refills: 0 | Status: SHIPPED | OUTPATIENT
Start: 2018-06-18 | End: 2018-07-18 | Stop reason: ALTCHOICE

## 2018-06-18 RX ADMIN — FAMOTIDINE 20 MG: 20 TABLET ORAL at 08:39

## 2018-06-18 RX ADMIN — SULFAMETHOXAZOLE AND TRIMETHOPRIM 1 TABLET: 800; 160 TABLET ORAL at 08:39

## 2018-06-18 RX ADMIN — DIVALPROEX SODIUM 750 MG: 250 TABLET, FILM COATED, EXTENDED RELEASE ORAL at 08:38

## 2018-06-18 NOTE — PROGRESS NOTES
Patient was cooperative with medications and pleasant in conversation this morning  Said she slept well last night but wondered if the combination of Latuda and Atarax taken last evening might have made her overly tired  Denied other symptoms or needs  Out and social in the milieu

## 2018-06-18 NOTE — DISCHARGE INSTRUCTIONS
Divalproex (By mouth)   Divalproex Sodium (dye-LEONID-proe-ex RE-leonie-um)  Treats seizures  Also treats bipolar disorder and helps prevent migraine headaches  Brand Name(s): Depakote, Depakote ER, Depakote Sprinkles   There may be other brand names for this medicine  When This Medicine Should Not Be Used: This medicine is not right for everyone  Do not use it if you had an allergic reaction to divalproex, valproate sodium, valproic acid, if you are pregnant, or if you have certain genetic disorders (such as urea cycle disorder or mitochondrial disorders)  How to Use This Medicine:   Delayed Release Capsule, Delayed Release Tablet, Coated Tablet, Long Acting Tablet  · Take your medicine as directed  Your dose may need to be changed several times to find what works best for you  · You may take this medicine with food to decrease stomach upset  · Capsule, tablet, or extended-release tablet: Swallow the medicine whole  Do not crush, break, or chew it  · Sprinkle capsule: You may open the capsule and pour the medicine into a small amount of soft food such as pudding or applesauce  Stir this mixture well and swallow it without chewing  · This medicine should come with a Medication Guide  Ask your pharmacist for a copy if you do not have one  · Missed dose: Take a dose as soon as you remember  If it is almost time for your next dose, wait until then and take a regular dose  Do not take extra medicine to make up for a missed dose  If you miss 2 or more doses, call your doctor  · Store the medicine in a closed container at room temperature, away from heat, moisture, and direct light  Drugs and Foods to Avoid:   Ask your doctor or pharmacist before using any other medicine, including over-the-counter medicines, vitamins, and herbal products  · Some medicines can affect how divalproex sodium works   Tell your doctor if you are using any of the following:   ¨ Amitriptyline, aspirin, clonazepam, diazepam, nortriptyline, propofol, rifampin, ritonavir, rufinamide, tolbutamide, or zidovudine  ¨ Birth control pill  ¨ Blood thinner (including warfarin)  ¨ Carbapenem antibiotic (including ertapenem, imipenem, meropenem)  ¨ Other seizure medicines (including carbamazepine, ethosuximide, felbamate, lamotrigine, phenobarbital, phenytoin, primidone, topiramate)  · Alcohol, narcotic pain relievers, or sleeping pills may cause you to feel more lightheaded, dizzy, or faint when used with this medicine  Warnings While Using This Medicine:   · It is not safe to take this medicine during pregnancy  It could harm an unborn baby  Tell your doctor right away if you become pregnant  · Tell your doctor if you are breastfeeding, or if you have kidney disease, liver disease, a blood disease, or pancreas problems, or a history of depression or mental health problems  · This medicine may cause the following problems:  ¨ Liver problems  ¨ Pancreatitis  ¨ Hyperammonemic encephalopathy (too much ammonia in your blood)  ¨ Depression or thoughts of suicide  ¨ Thrombocytopenia (decrease in blood cells that affect clotting)  ¨ Hypothermia (low body temperature)  ¨ Drug reaction with eosinophilia and systemic symptoms (DRESS), which may damage organs such as the liver, kidney, or heart  · This medicine may make you dizzy or drowsy  Do not drive or do anything that could be dangerous until you know how this medicine affects you  · Do not stop using this medicine suddenly  Your doctor will need to slowly decrease your dose before you stop it completely  · Tell any doctor or dentist who treats you that you are using this medicine  This medicine may affect certain medical test results  · Your doctor will check your progress and the effects of this medicine at regular visits  Keep all appointments  · Keep all medicine out of the reach of children  Never share your medicine with anyone    Possible Side Effects While Using This Medicine:   Call your doctor right away if you notice any of these side effects:  · Allergic reaction: Itching or hives, swelling in your face or hands, swelling or tingling in your mouth or throat, chest tightness, trouble breathing  · Blistering, peeling, red skin rash  · Confusion, problems with memory, unusual drowsiness, clumsiness  · Dark urine or pale stools, loss of appetite, stomach pain, yellow skin or eyes  · Fever, rash, swollen glands in the neck, armpit, or groin  · Sudden and severe stomach pain, nausea, vomiting, lightheadedness  · Thoughts of hurting yourself, depression, unusual changes in behavior or moods  · Unusual bleeding, bruising, or weakness  If you notice these less serious side effects, talk with your doctor:   · Diarrhea, stomach upset  · Hair loss  · Tiredness, sleepiness  · Trouble sleeping, tremor  · Vision changes, dizziness, headache  If you notice other side effects that you think are caused by this medicine, tell your doctor  Call your doctor for medical advice about side effects  You may report side effects to FDA at 2-253-FDA-3029  © 2017 [address] Information is for End User's use only and may not be sold, redistributed or otherwise used for commercial purposes  The above information is an  only  It is not intended as medical advice for individual conditions or treatments  Talk to your doctor, nurse or pharmacist before following any medical regimen to see if it is safe and effective for you  Lurasidone (By mouth)   Lurasidone (uii-ONQ-a-done)  Treats schizophrenia and depression  Brand Name(s): Latuda   There may be other brand names for this medicine  When This Medicine Should Not Be Used: This medicine is not right for everyone  Do not use it if you had an allergic reaction to lurasidone  How to Use This Medicine:   Tablet  · Take your medicine as directed  Your dose may need to be changed several times to find what works best for you    · It is best to take this medicine with food or milk  · Keep using this medicine for the full treatment time, even if you feel better after the first few doses  · This medicine should come with a Medication Guide  Ask your pharmacist for a copy if you do not have one  · Missed dose: Take a dose as soon as you remember  If it is almost time for your next dose, wait until then and take a regular dose  Do not take extra medicine to make up for a missed dose  · Store the medicine in a closed container at room temperature, away from heat, moisture, and direct light  Drugs and Foods to Avoid:   Ask your doctor or pharmacist before using any other medicine, including over-the-counter medicines, vitamins, and herbal products  · Do not use this medicine if you are using certain other medicines including carbamazepine, clarithromycin, ketoconazole, phenytoin, rifampin, ritonavir, Kassandra's wort, or voriconazole  · Some medicines can affect how lurasidone works  Tell your doctor if you are using any of the following:  ¨ Atazanavir, bosentan, diltiazem, efavirenz, erythromycin, etravirine, fluconazole, modafinil, nafcillin, or verapamil  ¨ Blood pressure medicine  · Do not eat grapefruit or drink grapefruit juice while you are using this medicine  · Tell your doctor if you use anything else that makes you sleepy  Some examples are allergy medicine, narcotic pain medicine, and alcohol  · Do not drink alcohol while you are using this medicine  Warnings While Using This Medicine:   · Tell your doctor if you are pregnant or breastfeeding, or if you have kidney disease, liver disease, blood or bone marrow problems, diabetes, prolactin-dependent breast cancer, Parkinson's disease, trouble swallowing, or a history of seizures or neuroleptic malignant syndrome (NMS)   Tell your doctor if you have any kind of blood vessel or heart problems, including low blood pressure, heart failure, a low amount of blood, heart rhythm problems, or a history of a heart attack or stroke  · This medicine may cause the following problems:  ¨ Increased risk of stroke  ¨ Neuroleptic malignant syndrome (NMS)  ¨ Tardive dyskinesia (a movement disorder)  ¨ Changes in blood sugar levels  · This medicine can increase thoughts of suicide  Tell your doctor right away if you start to feel depressed and have thoughts about hurting yourself  · This medicine may make you bleed, bruise, or get infections more easily  Take precautions to prevent illness and injury  Wash your hands often  · This medicine may make you dizzy or drowsy, or to have trouble with thinking or controlling body movements, which may lead to falls, fractures or other injuries  Do not drive or do anything else that could be dangerous until you know how this medicine affects you  · This medicine might reduce how much you sweat  Your body could get too hot if you do not sweat enough  If your body gets too hot, you might feel dizzy, weak, tired, or confused  You might vomit or have an upset stomach  Do not get too hot while you are exercising  Avoid places that are very hot  · Tell any doctor or dentist who treats you that you are using this medicine  This medicine may affect certain medical test results  · Your doctor will do lab tests at regular visits to check on the effects of this medicine  Keep all appointments  · Keep all medicine out of the reach of children  Never share your medicine with anyone    Possible Side Effects While Using This Medicine:   Call your doctor right away if you notice any of these side effects:  · Allergic reaction: Itching or hives, swelling in your face or hands, swelling or tingling in your mouth or throat, chest tightness, trouble breathing  · Chest pain, fast, slow, pounding, or uneven heartbeat  · Confusion, double vision, headache, trouble speaking, thinking, or walking  · Fever, chills, cough, runny or stuffy nose, sore throat, body aches  · Increased thirst, hunger, or urination  · Lightheadedness, dizziness, or fainting  · Mood or behavioral changes, or thoughts of hurting yourself or others  · Neck muscle spasm, throat tightness, trouble swallowing or breathing, or sticking out of the tongue  · Seizures  · Sweating, muscle stiffness  · Twitching or muscle movements you cannot control (often in your face, tongue, or jaw)  If you notice these less serious side effects, talk with your doctor:   · Anxiety or restlessness  · Diarrhea, nausea, vomiting, or upset stomach  · Sleepiness or unusual drowsiness, tiredness  · Weight gain  If you notice other side effects that you think are caused by this medicine, tell your doctor  Call your doctor for medical advice about side effects  You may report side effects to FDA at 1-463-FDA-5589  © 2017 [address] Information is for End User's use only and may not be sold, redistributed or otherwise used for commercial purposes  The above information is an  only  It is not intended as medical advice for individual conditions or treatments  Talk to your doctor, nurse or pharmacist before following any medical regimen to see if it is safe and effective for you  Sulfamethoxazole/Trimethoprim (By mouth)   Sulfamethoxazole (sul-fa-meth-OX-a-zole), Trimethoprim (trye-METH-oh-prim)  Treats or prevents infections  Brand Name(s): Bactrim, Bactrim DS, SMZ-TMP Pediatric, Sulfatrim, Sulfatrim Pediatric   There may be other brand names for this medicine  When This Medicine Should Not Be Used: This medicine is not right for everyone  Do not use it if you had an allergic reaction to trimethoprim, sulfamethoxazole, or any sulfa drug  Do not use this medicine if you are pregnant, if you have anemia caused by low levels of folic acid, or if you have a history of drug-induced thrombocytopenia  How to Use This Medicine:   Liquid, Tablet  · Your doctor will tell you how much medicine to use   Do not use more than directed  · Measure the oral liquid medicine with a marked measuring spoon, oral syringe, or medicine cup  · Drink extra fluids so you will urinate more often and help prevent kidney problems  · Take all of the medicine in your prescription to clear up your infection, even if you feel better after the first few doses  · Missed dose: Take a dose as soon as you remember  If it is almost time for your next dose, wait until then and take a regular dose  Do not take extra medicine to make up for a missed dose  · Store the medicine in a closed container at room temperature, away from heat, moisture, and direct light  Do not freeze the oral liquid  Drugs and Foods to Avoid:   Ask your doctor or pharmacist before using any other medicine, including over-the-counter medicines, vitamins, and herbal products  · Some medicines can affect how this medicine works  Tell your doctor if you also use the following:   ¨ amantadine, cyclosporine, digoxin, indomethacin, memantine, methotrexate, phenytoin, pyrimethamine, or warfarin  ¨ an ACE inhibitor, diabetes medicine (glipizide, glyburide, metformin, pioglitazone, repaglinide, rosiglitazone), a diuretic (water pill, such as hydrochlorothiazide), or a tricyclic antidepressant  Warnings While Using This Medicine:   · It is not safe to take this medicine during pregnancy  It could harm an unborn baby  Tell your doctor right away if you become pregnant  · Tell your doctor if you are breastfeeding, or if you have kidney disease, liver disease, diabetes, malabsorption or malnutrition, folate deficiency, porphyria, thyroid problems, or a history of alcoholism  Tell your doctor if you have asthma or severe allergies, especially if you are allergic to any medicines  It is important for your doctor to know if you have HIV or AIDS, because this medicine might work differently for you  · This medicine may cause a severe allergic reaction    · This medicine may lower the number of platelets in your body, which are necessary for proper blood clotting  This may cause you to bleed or get infections more easily  Talk with your doctor if you have concerns about this  · This medicine can cause diarrhea  Call your doctor if the diarrhea becomes severe, does not stop, or is bloody  Do not take any medicine to stop diarrhea until you have talked to your doctor  Diarrhea can occur 2 months or more after you stop taking this medicine  · Tell any doctor or dentist who treats you that you are using this medicine  This medicine may affect certain medical test results  · Your doctor will do lab tests at regular visits to check on the effects of this medicine  Keep all appointments  · Keep all medicine out of the reach of children  Never share your medicine with anyone  Possible Side Effects While Using This Medicine:   Call your doctor right away if you notice any of these side effects:  · Allergic reaction: Itching or hives, swelling in your face or hands, swelling or tingling in your mouth or throat, chest tightness, trouble breathing  · Blistering, peeling, or red skin rash  · Dark urine or pale stools, nausea, vomiting, loss of appetite, stomach pain, yellow skin or eyes  · Chest pain, cough, or trouble breathing  · Confusion, weakness  · Muscle twitching  · Severe diarrhea, stomach pain, cramps, bloating  · Skin rash, purple spots on your skin, or very pale or yellow skin  · Sore throat, fever, muscle pain  · Uneven heartbeat, numbness or tingling in your hands, feet, or lips  · Unusual bleeding, bruising, or weakness  If you notice these less serious side effects, talk with your doctor:   · Mild nausea, vomiting, or loss of appetite  If you notice other side effects that you think are caused by this medicine, tell your doctor  Call your doctor for medical advice about side effects   You may report side effects to FDA at 9-019-FDA-5505  © 2017 [address] Information is for End User's use only and may not be sold, redistributed or otherwise used for commercial purposes  The above information is an  only  It is not intended as medical advice for individual conditions or treatments  Talk to your doctor, nurse or pharmacist before following any medical regimen to see if it is safe and effective for you  Famotidine (By mouth)   Famotidine (nmj-ER-ta-vasile)  Treats ulcers, gastroesophageal reflux disease (GERD), and conditions that cause the stomach to produce too much stomach acid  Also treats heartburn caused by acid indigestion  Brand Name(s): Acid Controller, Acid Reducer, [facility] Acid Reducer, Good Sense Acid Reducer, Heartburn Relief, [employer] Acid Reducer, Pepcid, Pepcid AC, [employer] Acid Controller, [employer] Acid Reducer, [facility] Famotidine Acid Reducer, TopCare Acid Reducer   There may be other brand names for this medicine  When This Medicine Should Not Be Used: You should not use this medicine if you have had an allergic reaction to famotidine or to similar medicines such as ranitidine (Zantac®), cimetidine (Tagamet®), or nizatidine (Axid®)  How to Use This Medicine:   Tablet, Chewable Tablet, Dissolving Tablet, Liquid  · Your doctor will tell you how much medicine to use  Do not use more than directed  · Follow the instructions on the medicine label if you are using this medicine without a prescription  · The chewable tablet must be chewed completely before you swallow it  · If you are using the disintegrating tablet, make sure your hands are dry before you handle the tablet  Do not open the blister pack that contains the tablet until you are ready to take it  Remove the tablet from the blister pack by peeling back the foil, then taking the tablet out  Do not push the tablet through the foil  Place the tablet in your mouth  It should melt within 2 minutes  Swallow after the tablet has melted    · Shake the oral liquid medicine for 5 to 10 seconds before each use  Measure the medicine with a marked measuring spoon or medicine cup  If a dose is missed:   · Take a dose as soon as you remember  If it is almost time for your next dose, wait until then and take a regular dose  Do not take extra medicine to make up for a missed dose  How to Store and Dispose of This Medicine:   · Store the medicine in a closed container at room temperature, away from heat, moisture, and direct light  Do not freeze the oral liquid  · Ask your pharmacist, doctor, or health caregiver about the best way to dispose of any outdated medicine or medicine no longer needed  Throw away any unused oral liquid that is more than 3month old  · Keep all medicine out of the reach of children  Never share your medicine with anyone  Drugs and Foods to Avoid:      Ask your doctor or pharmacist before using any other medicine, including over-the-counter medicines, vitamins, and herbal products  Warnings While Using This Medicine:   · Make sure your doctor knows if you are pregnant or breastfeeding, or if you have kidney disease or liver disease  · This medicine might contain phenylalanine (aspartame)  This is only a concern if you have a disorder called phenylketonuria (a problem with amino acids)  If you have this condition, talk to your doctor before using this medicine  · Call your doctor if your symptoms do not improve or if they get worse  Possible Side Effects While Using This Medicine:   Call your doctor right away if you notice any of these side effects:  · Allergic reaction: Itching or hives, swelling in your face or hands, swelling or tingling in your mouth or throat, chest tightness, trouble breathing  · Blistering, peeling, or red skin rash  · Dark-colored urine or pale stools  · Fast, pounding, or uneven heartbeat  · Fever, chills, cough, sore throat, and body aches  · Seizures  · Unusual bleeding, bruising, or weakness    · Yellowing of your skin or the whites of your eyes  If you notice these less serious side effects, talk with your doctor:   · Constipation, diarrhea, or upset stomach  · Headache or dizziness  · Nausea or vomiting  If you notice other side effects that you think are caused by this medicine, tell your doctor  Call your doctor for medical advice about side effects  You may report side effects to FDA at 4-329-FDA-0574  © 2017 [address] Information is for End User's use only and may not be sold, redistributed or otherwise used for commercial purposes  The above information is an  only  It is not intended as medical advice for individual conditions or treatments  Talk to your doctor, nurse or pharmacist before following any medical regimen to see if it is safe and effective for you  Prazosin (By mouth)   Prazosin (PRAZ-oh-sin)  Treats high blood pressure  Brand Name(s): Minipress   There may be other brand names for this medicine  When This Medicine Should Not Be Used: This medicine is not right for everyone  Do not use it if you had an allergic reaction to prazosin or quinazolines  How to Use This Medicine:   Capsule  · Take your medicine as directed  Your dose may need to be changed several times to find what works best for you  · Missed dose: Take a dose as soon as you remember  If it is almost time for your next dose, wait until then and take a regular dose  Do not take extra medicine to make up for a missed dose  · Store the medicine in a closed container at room temperature, away from heat, moisture, and direct light  Drugs and Foods to Avoid:   Ask your doctor or pharmacist before using any other medicine, including over-the-counter medicines, vitamins, and herbal products  · Some medicines can affect how prazosin works   Tell your doctor if you also use any of the following:  ¨ Sildenafil, tadalafil, vardenafil, or similar medicines  ¨ Beta-blockers, including propranolol  ¨ Diuretic (water pill)  Warnings While Using This Medicine:   · Tell your doctor if you are pregnant or breastfeeding, or if you have kidney disease or cataracts  · This medicine could lower your blood pressure too much, especially when you first use it or if you are dehydrated  This can make you dizzy or lightheaded  Do not drive or do anything else that could be dangerous until you know how this medicine affects you  Stand or sit up slowly if you feel lightheaded or dizzy  These symptoms may be worse if you drink alcohol  · Tell any doctor or dentist who treats you that you are using this medicine  This medicine may affect certain medical test results  · Your doctor will check your progress and the effects of this medicine at regular visits  Keep all appointments  · Keep all medicine out of the reach of children  Never share your medicine with anyone  Possible Side Effects While Using This Medicine:   Call your doctor right away if you notice any of these side effects:  · Allergic reaction: Itching or hives, swelling in your face or hands, swelling or tingling in your mouth or throat, chest tightness, trouble breathing  · Fast, pounding, or uneven heartbeat  · Lightheadedness, dizziness, or fainting  · Painful erection of your penis for more than 4 hours  If you notice these less serious side effects, talk with your doctor:   · Headache  · Tiredness  If you notice other side effects that you think are caused by this medicine, tell your doctor  Call your doctor for medical advice about side effects  You may report side effects to FDA at 8-773-FDA-8734  © 2017 [address] Information is for End User's use only and may not be sold, redistributed or otherwise used for commercial purposes  The above information is an  only  It is not intended as medical advice for individual conditions or treatments   Talk to your doctor, nurse or pharmacist before following any medical regimen to see if it is safe and effective for you

## 2018-06-18 NOTE — NURSING NOTE
AVS reviewed and pt stated understanding and intent to follow plan  She denied all symptoms at discharge

## 2018-06-18 NOTE — PLAN OF CARE

## 2018-06-18 NOTE — PLAN OF CARE
DEPRESSION     Will be euthymic at discharge Progressing        Risk for Self Injury/Neglect     Verbalize thoughts and feelings Progressing     Refrain from harming self Progressing

## 2018-06-18 NOTE — PLAN OF CARE
DEPRESSION     Will be euthymic at discharge Completed        Ineffective Coping     Participates in unit activities Completed        Risk for Self Injury/Neglect     Verbalize thoughts and feelings Completed     Refrain from harming self Completed     Attend and participate in unit activities, including therapeutic, recreational, and educational groups Completed

## 2018-06-18 NOTE — DISCHARGE INSTR - LAB
Contact Information: If you have any questions, concerns, pended studies, tests and/or procedures, or emergencies regarding your inpatient behavioral health visit  Please contact [facility] [phone] and ask to speak to a , nurse or physician  A contact is available 24 hours/ 7 days a week at this number  Summary of Procedures Performed During your Stay:  Below is a list of major procedures performed during your hospital stay and a summary of results:  - No major procedures performed  Pending Studies     None        If studies are pending at discharge, follow up with your PCP and/or referring provider

## 2018-06-18 NOTE — DISCHARGE INSTR - ACTIVITY
Contact Information: If you have any questions, concerns, pended studies, tests and/or procedures, or emergencies regarding your inpatient behavioral health visit  Please [place] [facility] [phone] and ask to speak to a , nurse or physician  A contact is available 24 hours/ 7 days a week at this number  Summary of Procedures Performed During your Stay:  Below is a list of major procedures performed during your hospital stay and a summary of results: If studies are pending at discharge, follow up with your PCP and/or referring provider

## 2018-06-18 NOTE — PROGRESS NOTES
Psychiatric Evaluation - Behavioral Health       Assessment/Plan  Principal Problem:  F[age] Bipolar DO depressed and psych feature  F41 9 Anxiety DO NOS  PLAN:   1) continue medications as before  2) Continue group and individual therapy  3) for to her discharge planning  4) discussed with staff members  Interval History:  Patient says that she is much better today  She is less anxious she said she slept well last night  She says that she is getting more used to off unit and however different patients out here  She said she is eating her meals regularly she still misses her two sons  She said that she is completing her treatment so she can go home faster  Staff also reported that patient has been compliant times doing well overall she denied any hallucinations delusions denied any suicidal homicidal ideas  Patient said she is a little anxious today because of a patient on the unit who is very sick and she is a little nervous for that  She said she is sleeping well and eating well  No hallucinations or delusions  No suicidal or homicidal ideas  Her anxiety is much improved now  She feels medications have helped  She is calm and cooperative      Scheduled Meds:    Current Facility-Administered Medications:  acetaminophen 650 mg Oral Q6H PRN [facility] Naveed, MD   aluminum-magnesium hydroxide-simethicone 20 mL Oral Q4H PRN [facility] MD Naveed   benztropine 1 mg Intramuscular Q6H PRN Maria L Lucio MD   benztropine 1 mg Oral Q6H PRN Maria Ljorge Lucio, MD   divalproex sodium 750 mg Oral BID Sun Kenny,    famotidine 20 mg Oral BID Maria L Lucio MD   haloperidol 5 mg Oral Q8H PRN Maria L Lucio MD   haloperidol lactate 5 mg Intramuscular Q6H PRN Maria L Lucio MD   hydrOXYzine HCL 25 mg Oral Q6H PRN [facility] Naveed, MD   ibuprofen 600 mg Oral Q8H PRN [facility] Naveed, MD   ibuprofen 800 mg Oral Q8H PRN Maria Ljorge Lucio MD   LORazepam 1 mg Intramuscular Q6H PRN Alpha Griggs MD Ady   LORazepam 1 mg Oral Q8H PRN Dimple Crain MD   lurasidone 40 mg Oral Daily With Dinner Alejandra Bony, CRNP   magnesium hydroxide 30 mL Oral Daily PRN Dimple Crain MD   OLANZapine 10 mg Intramuscular Q3H PRN Dimple Crain MD   OLANZapine 10 mg Oral Q3H PRN Dimple Crain MD   prazosin 1 mg Oral HS Alejandra Bony, CRNP   risperiDONE 1 mg Oral Q4H PRN Dimple Crain MD   sulfamethoxazole-trimethoprim 1 tablet Oral Q12H [address] [place] BRIGITTE [place], DO   traZODone 50 mg Oral HS PRN Dimple Crain MD     Continuous Infusions:   PRN Meds:   acetaminophen    aluminum-magnesium hydroxide-simethicone    benztropine    benztropine    haloperidol    haloperidol lactate    hydrOXYzine HCL    ibuprofen    ibuprofen    LORazepam    LORazepam    magnesium hydroxide    OLANZapine    OLANZapine    risperiDONE    traZODone     Vitals:    06/17/18 2027   BP: 92/54   Pulse: 90   Resp: 16   Temp:        Allergies   Allergen Reactions    Erythromycin Swelling    Peanut Oil Hives       Mental Status Evaluation:  Appearance:  Patient appeared much calmer today  Behavior:  Cooperative pleasant  Speech:   speech is normal goal directed    Mood:  Less depressed  Affect less anxious  Language: naming objects and Goal directed  Thought Process:   logical and linear    Thought Content:  Normal   Perceptual Disturbances:  denying any auditory and visual hallucinations  Risk Potential: No suicidal homicidal ideas  Sensorium:  person, place, time/date, situation, day of week, month of year and year   Cognition:  Grossly intact and her recent and remote    memory is intact    alert, awake, and oriented ×3    Attention: Good attention span   Intellect: Normal   Fund of Knowledge: awareness of current events: normal    Insight:  Good   Judgment: Good   Muscle Strength and Tone: Normal    Gait/Station:  normal    Motor Activity: no abnormal movements     Lab Results: I have personally reviewed pertinent lab results  NOTE:  Total of 15 minutes were spent in talking to patient completing this medical record reviewing medical chart medical decision making    Yevgeniy Albrecht MD

## 2018-06-21 NOTE — DISCHARGE SUMMARY
Discharge Summary - [address] 20 y o  female MRN: [id number]  Unit/Bed#: PI8 188-45 Encounter: [id number]     Admission Date: 6/12/2018         Discharge Date: 6/18/2018      Attending Psychiatrist: RICARDO [place]     Reason for Admission/HPI:     The patient was history of bipolar depression was admitted after worsening of depression and suicidal behavior as a result of arguing with her mother about upbringing of patient's children  The patient stated that his mother is a over intrusive, the patient cannot tolerated  She endorse major symptoms of major depressive episode and the patient also had history of mixed and hypomanic state in the past,    Hospital Course:       Ms Grupo Granados  was admitted and all appropriate precautions were started  Pt was oriented to the unit  Her treatment, including medication management and therapy was discussed with the patient, and  she agreed  Risks, benefits, and possible side effects of medications explained to patient including risk of liver impairment related to treatment with Depakote and risk of parkinsonian symptoms, Tardive Dyskinesia and metabolic syndrome related to treatment with antipsychotic medications  The patient verbalizes understanding and agreement for treatment  During the hospitalization the patient was encouraged to attend individual therapy, group therapy, milieu therapy and occupational therapy  Patient condition significantly improved after her  medications were started  Pt agreed to start her medication Latuda after discussion of potential benefits and side effects  Neurology consult fall to the patient because of her history of seizure disorder in change Keppra to Depakote  Grey Tong also participated in therapy  Pt  sleep improved and appetite improved as well   During her treatment at the Unit the patient did not have any episodes of self-harming or harming to others behaviors, was cooperative with the treatment plan  Individual CBT based  therapy was provided to deal with cognitive-behavioural aspects of her stressful interaction was her mother, and the patient role as the mother himself  Tolerated medications without side effects  Vitals were stable  Denied any SI or HI  I discussed the medication regimen and possible side effects of the medications with the patient prior to discharge, including potential future pregnancy  The patient stated that she has tubal ligation and cannot be pregnant  At the time of discharge Ms Schneider  was tolerating psychiatric medications  Please see After Discharge Summary for a completed list of discharge medications  Safety plan was discussed and approved by the patient  She was not manic, depressed, angry, or confused or psychotic on a day of discharge and was accountable for her actions  Latuda coupons were provided which may help to significantly reduce the cost of this medication  I discussed outpatient follow up with the patient, was arranged by the unit  upon discharge  Safety plan was discussed and approved by Ms Schneider  Reviewed with the patient importance of compliance with medications and outpatient treatment after discharge  The patient was competent to understand of risks and benefits of her actions         Mental Status at time of Discharge:     Mental Status Evaluation:    Appearance:  dressed appropriately, casually dressed   Behavior:  normal, cooperative, calm   Mood:  normal   Affect: normal range and intensity, appropriate    Speech:  normal rate and volume, normal pitch   Language: appropriate   Thought Process:  normal   Associations: intact associations   Thought Content:  normal   Perceptual Disturbances: no auditory hallucinations, no visual hallucinations, denies auditory hallucinations when asked, does not appear responding to internal stimuli   Risk Potential: Suicidal ideation - None  Homicidal ideation - None  Potential for aggression - No   Sensorium:  oriented to person, place and time   Memory:  recent and remote memory grossly intact   Consciousness:  alert and awake   Attention: attention span and concentration are normal   Intellect: normal   Fund of Knowledge: awareness of current events appropriate   Insight:  improved   Judgment: normal   Muscle Tone: normal   Gait/Station: normal gait/station and normal balance   Motor Activity: no abnormal movements         Discharge Diagnosis:   Patient Active Problem List   Diagnosis    Seizure disorder ([facility] )    S/P  section    Bipolar I disorder, most recent episode depressed, severe without psychotic features ([facility] )    Insomnia secondary to depression with anxiety    Nightmares       Discharge Medications:  See after visit summary for reconciled discharge medications provided to patient and family  Discharge instructions/Information to patient and family:   See after visit summary for information provided to patient and family  Provisions for Follow-Up Care:  See after visit summary for information related to follow-up care and any pertinent home health orders  Discharge Statement   I spent 35 minutes discharging the patient  This time was spent on the day of discharge  I had direct contact with the patient on the day of discharge  Additional documentation is required if more than 30 minutes were spent on discharge  Portions of the record may have been created with voice recognition software

## 2018-12-21 ENCOUNTER — HOSPITAL ENCOUNTER (EMERGENCY)
Facility: HOSPITAL | Age: 23
Discharge: HOME | End: 2018-12-21
Attending: EMERGENCY MEDICINE | Admitting: EMERGENCY MEDICINE

## 2018-12-21 ENCOUNTER — APPOINTMENT (EMERGENCY)
Dept: CT IMAGING | Facility: HOSPITAL | Age: 23
End: 2018-12-21

## 2018-12-21 VITALS
SYSTOLIC BLOOD PRESSURE: 121 MMHG | TEMPERATURE: 98.7 F | RESPIRATION RATE: 16 BRPM | HEART RATE: 108 BPM | DIASTOLIC BLOOD PRESSURE: 64 MMHG | OXYGEN SATURATION: 96 %

## 2018-12-21 DIAGNOSIS — R56.9 SEIZURE (HCC): Primary | ICD-10-CM

## 2018-12-21 LAB
ALBUMIN SERPL BCP-MCNC: 3.2 G/DL (ref 3.5–5)
ALP SERPL-CCNC: 95 U/L (ref 46–116)
ALT SERPL W P-5'-P-CCNC: 30 U/L (ref 12–78)
AMPHETAMINES SERPL QL SCN: NEGATIVE
ANION GAP SERPL CALCULATED.3IONS-SCNC: 9 MMOL/L (ref 4–13)
APTT PPP: 32 SECONDS (ref 26–38)
AST SERPL W P-5'-P-CCNC: 15 U/L (ref 5–45)
ATRIAL RATE: 101 BPM
BACTERIA UR QL AUTO: ABNORMAL /HPF
BARBITURATES UR QL: NEGATIVE
BASOPHILS # BLD AUTO: 0.05 THOUSANDS/ΜL (ref 0–0.1)
BASOPHILS NFR BLD AUTO: 1 % (ref 0–1)
BENZODIAZ UR QL: NEGATIVE
BILIRUB SERPL-MCNC: 0.2 MG/DL (ref 0.2–1)
BILIRUB UR QL STRIP: NEGATIVE
BUN SERPL-MCNC: 12 MG/DL (ref 5–25)
CALCIUM SERPL-MCNC: 8.5 MG/DL (ref 8.3–10.1)
CHLORIDE SERPL-SCNC: 107 MMOL/L (ref 100–108)
CLARITY UR: CLEAR
CO2 SERPL-SCNC: 26 MMOL/L (ref 21–32)
COCAINE UR QL: NEGATIVE
COLOR UR: YELLOW
CREAT SERPL-MCNC: 0.98 MG/DL (ref 0.6–1.3)
EOSINOPHIL # BLD AUTO: 0.15 THOUSAND/ΜL (ref 0–0.61)
EOSINOPHIL NFR BLD AUTO: 2 % (ref 0–6)
ERYTHROCYTE [DISTWIDTH] IN BLOOD BY AUTOMATED COUNT: 13.7 % (ref 11.6–15.1)
EXT PREG TEST URINE: NEGATIVE
GFR SERPL CREATININE-BSD FRML MDRD: 80 ML/MIN/1.73SQ M
GLUCOSE SERPL-MCNC: 88 MG/DL (ref 65–140)
GLUCOSE UR STRIP-MCNC: NEGATIVE MG/DL
HCT VFR BLD AUTO: 38.4 % (ref 34.8–46.1)
HGB BLD-MCNC: 12.6 G/DL (ref 11.5–15.4)
HGB UR QL STRIP.AUTO: NEGATIVE
IMM GRANULOCYTES # BLD AUTO: 0.04 THOUSAND/UL (ref 0–0.2)
IMM GRANULOCYTES NFR BLD AUTO: 1 % (ref 0–2)
INR PPP: 1.03 (ref 0.86–1.17)
KETONES UR STRIP-MCNC: NEGATIVE MG/DL
LEUKOCYTE ESTERASE UR QL STRIP: ABNORMAL
LYMPHOCYTES # BLD AUTO: 2.93 THOUSANDS/ΜL (ref 0.6–4.47)
LYMPHOCYTES NFR BLD AUTO: 36 % (ref 14–44)
MAGNESIUM SERPL-MCNC: 2 MG/DL (ref 1.6–2.6)
MCH RBC QN AUTO: 25.7 PG (ref 26.8–34.3)
MCHC RBC AUTO-ENTMCNC: 32.8 G/DL (ref 31.4–37.4)
MCV RBC AUTO: 78 FL (ref 82–98)
METHADONE UR QL: NEGATIVE
MONOCYTES # BLD AUTO: 0.58 THOUSAND/ΜL (ref 0.17–1.22)
MONOCYTES NFR BLD AUTO: 7 % (ref 4–12)
NEUTROPHILS # BLD AUTO: 4.46 THOUSANDS/ΜL (ref 1.85–7.62)
NEUTS SEG NFR BLD AUTO: 53 % (ref 43–75)
NITRITE UR QL STRIP: NEGATIVE
NON-SQ EPI CELLS URNS QL MICRO: ABNORMAL /HPF
NRBC BLD AUTO-RTO: 0 /100 WBCS
OPIATES UR QL SCN: NEGATIVE
P AXIS: 74 DEGREES
PCP UR QL: NEGATIVE
PH UR STRIP.AUTO: 7.5 [PH] (ref 4.5–8)
PLATELET # BLD AUTO: 302 THOUSANDS/UL (ref 149–390)
PMV BLD AUTO: 10.1 FL (ref 8.9–12.7)
POTASSIUM SERPL-SCNC: 4 MMOL/L (ref 3.5–5.3)
PR INTERVAL: 160 MS
PROT SERPL-MCNC: 6.8 G/DL (ref 6.4–8.2)
PROT UR STRIP-MCNC: NEGATIVE MG/DL
PROTHROMBIN TIME: 13.4 SECONDS (ref 11.8–14.2)
QRS AXIS: 40 DEGREES
QRSD INTERVAL: 90 MS
QT INTERVAL: 336 MS
QTC INTERVAL: 435 MS
RBC # BLD AUTO: 4.9 MILLION/UL (ref 3.81–5.12)
RBC #/AREA URNS AUTO: ABNORMAL /HPF
SODIUM SERPL-SCNC: 142 MMOL/L (ref 136–145)
SP GR UR STRIP.AUTO: 1.01 (ref 1–1.03)
T WAVE AXIS: 59 DEGREES
THC UR QL: NEGATIVE
TSH SERPL DL<=0.05 MIU/L-ACNC: 1.68 UIU/ML (ref 0.36–3.74)
UROBILINOGEN UR QL STRIP.AUTO: 0.2 E.U./DL
VENTRICULAR RATE: 101 BPM
WBC # BLD AUTO: 8.21 THOUSAND/UL (ref 4.31–10.16)
WBC #/AREA URNS AUTO: ABNORMAL /HPF

## 2018-12-21 PROCEDURE — 81025 URINE PREGNANCY TEST: CPT | Performed by: EMERGENCY MEDICINE

## 2018-12-21 PROCEDURE — 85730 THROMBOPLASTIN TIME PARTIAL: CPT | Performed by: EMERGENCY MEDICINE

## 2018-12-21 PROCEDURE — 84146 ASSAY OF PROLACTIN: CPT | Performed by: EMERGENCY MEDICINE

## 2018-12-21 PROCEDURE — 81001 URINALYSIS AUTO W/SCOPE: CPT | Performed by: EMERGENCY MEDICINE

## 2018-12-21 PROCEDURE — 84443 ASSAY THYROID STIM HORMONE: CPT | Performed by: EMERGENCY MEDICINE

## 2018-12-21 PROCEDURE — 80307 DRUG TEST PRSMV CHEM ANLYZR: CPT | Performed by: EMERGENCY MEDICINE

## 2018-12-21 PROCEDURE — 36415 COLL VENOUS BLD VENIPUNCTURE: CPT | Performed by: EMERGENCY MEDICINE

## 2018-12-21 PROCEDURE — 85610 PROTHROMBIN TIME: CPT | Performed by: EMERGENCY MEDICINE

## 2018-12-21 PROCEDURE — 70450 CT HEAD/BRAIN W/O DYE: CPT

## 2018-12-21 PROCEDURE — 99285 EMERGENCY DEPT VISIT HI MDM: CPT

## 2018-12-21 PROCEDURE — 83735 ASSAY OF MAGNESIUM: CPT | Performed by: EMERGENCY MEDICINE

## 2018-12-21 PROCEDURE — 93010 ELECTROCARDIOGRAM REPORT: CPT | Performed by: INTERNAL MEDICINE

## 2018-12-21 PROCEDURE — 80053 COMPREHEN METABOLIC PANEL: CPT | Performed by: EMERGENCY MEDICINE

## 2018-12-21 PROCEDURE — 96361 HYDRATE IV INFUSION ADD-ON: CPT

## 2018-12-21 PROCEDURE — 93005 ELECTROCARDIOGRAM TRACING: CPT

## 2018-12-21 PROCEDURE — 96360 HYDRATION IV INFUSION INIT: CPT

## 2018-12-21 PROCEDURE — 85025 COMPLETE CBC W/AUTO DIFF WBC: CPT | Performed by: EMERGENCY MEDICINE

## 2018-12-21 RX ORDER — DIVALPROEX SODIUM 250 MG/1
250 TABLET, DELAYED RELEASE ORAL ONCE
Status: COMPLETED | OUTPATIENT
Start: 2018-12-21 | End: 2018-12-21

## 2018-12-21 RX ORDER — LORAZEPAM 1 MG/1
1 TABLET ORAL 2 TIMES DAILY PRN
Qty: 12 TABLET | Refills: 0 | Status: SHIPPED | OUTPATIENT
Start: 2018-12-21 | End: 2020-08-29

## 2018-12-21 RX ORDER — ACETAMINOPHEN 325 MG/1
975 TABLET ORAL ONCE
Status: COMPLETED | OUTPATIENT
Start: 2018-12-21 | End: 2018-12-21

## 2018-12-21 RX ORDER — DIVALPROEX SODIUM 250 MG/1
250 TABLET, DELAYED RELEASE ORAL EVERY 12 HOURS SCHEDULED
Qty: 42 TABLET | Refills: 0 | Status: ON HOLD | OUTPATIENT
Start: 2018-12-21 | End: 2019-01-11 | Stop reason: ALTCHOICE

## 2018-12-21 RX ADMIN — DIVALPROEX SODIUM 250 MG: 250 TABLET, DELAYED RELEASE ORAL at 21:05

## 2018-12-21 RX ADMIN — ACETAMINOPHEN 975 MG: 325 TABLET, FILM COATED ORAL at 18:57

## 2018-12-21 RX ADMIN — SODIUM CHLORIDE 1000 ML: 0.9 INJECTION, SOLUTION INTRAVENOUS at 17:58

## 2018-12-21 NOTE — ED NOTES
Patient transported to [address] [place] [address], [address] [place] [address]  12/21/18 2951

## 2018-12-22 LAB — PROLACTIN SERPL-MCNC: 6.9 NG/ML

## 2018-12-22 NOTE — DISCHARGE INSTRUCTIONS
Please return if you worsening or other concerning symptoms otherwise follow up as instructed as discussed    Nonepileptic Seizures   WHAT YOU NEED TO KNOW:   A nonepileptic seizure (AQUILINO) is a short period of changes in how you move, think, or feel  It is sometimes called a nonepileptic event or episode  A AQUILINO looks like an epileptic seizure, but there are no electrical changes in the brain  Epilepsy medicine will not stop or prevent a AQUILINO  A AQUILINO is a serious condition  Early diagnosis and treatment are needed to prevent more problems  DISCHARGE INSTRUCTIONS:   Call 911 for any of the following:   · You have chest pain, tightness, or pressure that may spread to your shoulders, arms, jaw, neck, or back  · You are having breathing problems and your lips, fingernails, or face turn blue  · You had a seizure that continued for more than 5 minutes  Return to the emergency department if:   · You feel like fainting or are lightheaded or too dizzy to stand up  · You were injured during or after a seizure  · You think about hurting or killing yourself or someone else  Contact your healthcare provider if:   · You are depressed and feel you cannot cope with your illness  · You are confused or cannot think clearly  · You have new symptoms that you did not have at your last healthcare provider visit  · You have questions or concerns about your condition or care  Medicines: You may need any of the following:  · Medicines  may be given to treat a physical cause, such as blood sugar or blood pressure problems  Medicines may instead be given for severe mental stress if that is the cause of your AQUILINO  You may need antianxiety medicines to help keep you calm and relaxed  Antidepressants help decrease depression  · Take your medicine as directed  Contact your healthcare provider if you think your medicine is not helping or if you have side effects  Tell him or her if you are allergic to any medicine   Keep a list of the medicines, vitamins, and herbs you take  Include the amounts, and when and why you take them  Bring the list or the pill bottles to follow-up visits  Carry your medicine list with you in case of an emergency  What you can do to manage AQUILINO:   · Ask what safety precautions you should take  Talk with your healthcare provider about driving  You may not be able to drive until you are seizure-free for a period of time  You will need to check the law where you live  Also talk to your healthcare provider about swimming and bathing  You may drown or develop life-threatening heart or lung damage if you have a seizure in water  · Tell your friends, family members, and coworkers that you have had a seizure  Give them written instructions to follow if you have another seizure  Your healthcare provider can help you create a list that is specific to your signs and symptoms  · Keep a seizure diary  This can help you find your triggers and avoid them  Write down the dates of your seizures, where you were, and what you were doing  Include how you felt before and after  Possible triggers include illness, lack of sleep, hormonal changes, alcohol, drugs, lights, or stress  What you can do to prevent a AQUILINO:  You may not be able to prevent every seizure  The following can help you manage triggers that may make a seizure start:  · Set a regular sleep schedule  Try to go to sleep and wake up at the same times each day  Sleep problems can trigger a AQUILINO  Talk to your healthcare provider if you have trouble sleeping  · Exercise as often as possible  Exercise can relieve stress and help you sleep better  You may feel better with 30 minutes of exercise most days of the week  Your healthcare provider can help you create a safe exercise plan  · Limit or do not drink alcohol  Alcohol can trigger a AQUILINO  Ask your healthcare provider how much alcohol is safe for you to drink   A drink of alcohol is 12 ounces of beer, 1½ ounces of liquor, or 5 ounces of wine  · Do not use illegal drugs  Drugs can trigger a AQUILINO  Talk to your healthcare provider if you use illegal drugs and need help to quit  · Manage stress  Breathe deeply and slowly when you feel stressed or anxious  Relax each part of your body one at a time  Try activities that you find relaxing, such as yoga or a short walk  Music can help you relax  You may also want to join a support group so you can talk with others who have AQUILINO  Talk to someone you trust about your feelings  · Do not smoke  Nicotine and other chemicals in cigarettes and cigars can make stress and anxiety worse  Ask your healthcare provider for information if you currently smoke and need help to quit  E-cigarettes or smokeless tobacco still contain nicotine  Talk to your healthcare provider before you use these products  Follow up with your healthcare provider as directed: Your healthcare provider may refer you to a therapist or psychologist  Write down your questions so you remember to ask them during your visits  © 2017 [address] Information is for End User's use only and may not be sold, redistributed or otherwise used for commercial purposes  All illustrations and images included in CareNotes® are the copyrighted property of [employer]  or [employer]  The above information is an  only  It is not intended as medical advice for individual conditions or treatments  Talk to your doctor, nurse or pharmacist before following any medical regimen to see if it is safe and effective for you  Sonia CLARKNA:   [place]  [place] [facility] [place] napady drgawkofernando benavides? napadami padaczkowymi  Napad padaczkoremediosy oznacza, ?e nieprawid? owy obszar w [facility] wysy?a czasem serie impulsów elektrycznych  Napad padaczkowy mo?e rozpocz?? si? w jednej cz? ?ci mózgu lub chorob? mog? by? obj?te claudine po?owy mózgu   W zale?no?ci od rodzaju napadu padaczkowego mog? wyst?powa? niekontrolowane ruchy usha?a, utrata ?wiadomo?ci lub znieruchomienie i patrzenie w randall  Po napadzie padaczkowym mo?esz odczuwa? rozbicie lub zm?czenie  Napad padaczkowy mo?e trwa? od kilku sekund do ponad 5 minut  Padaczka zwykle jest diagnozowana w sytuacji wyst?pienia co najmniej 2 napadów drgawkowych w ci?gu 24 godzin  Przyczyn? padaczki mo?e by? wada wrodzona, zbigniew, udar mózgu, demencja, uraz lub infekcja  Przyczyna padaczki mo?e by? nieznana  Je?li napady padaczkowe cyrus s? kontrolowane, padaczka mo?e stanowi? zagro?enie ?ycia  Zadzwo? pod numer 911, je?gloria wyst?pi którykolwiek z poni?szych objawów:   · Napad padaczkowy trwa? d?u?ej ni? 5 minut  · Masz problemy z oddychaniem po napadzie padaczkowym  · Masz cukrzyc? lub jeste? w ci??y i wyst?pi? u Ciebie napad padaczkowy  · Napad padaczkowy wyst?pi? podczas przebywania w wodzie, np  w basenie lub w wannie  Natychmiast zg?o? si? do Mollie , je?li:   · Wyst?pi? drugi napad padaczkowy w ci?gu 24 godzin po pierwszym  · Podczas napadu padaczkowego dosz?o u Ciebie do urazu  Skontaktuj si? z opiekunem medycznym, je?gloria:   · Czujesz, ?e cyrus mo?esz poradzi? sobie z postawionym rozpoznaniem  · Napady padaczkowe zaczynaj? wyst?powa? cz??ciej  · Uczucie rozbicia po napadzie padaczkowym trwa d?u?ej ni? zwykle  · Planujesz zaj? ? w ci? ?? lub jeste? w ci??y     · Masz pytania lub obawy zwi?jeffrey ze swoim schorzeniem lub leczeniem  Leczenie padaczki:  Celem leczenia jest próba przerwania wyst?powania napadów padaczkowych  Mo?esz potrzebowa? niektórych z terapii:  · Lek  przeciwdrgawkowy mo?e by? podawany w celu skracania napadów drgawkowych lub zapobiegania napadom  Cyrus przerywaj stosowania tego leku  bez konsultacji z opiekunem medycznym  · Chirurgia  mo?e zmniejszy? cz?sto?? napadów padaczkowych w przypadku nieskuteczno? ci leków  Popro?  opiekuna medycznego o wi?cej informacji na temat zabiegu chirurgicznego w leczeniu padaczki  Sposoby zapobiegania napadom padaczkowym:  Mo?esz cyrus by? w stanie unikn? ? wszystkich napadów padaczkowych  Nast?puj?ce zalecenia mog? u?atwi? radzenie sobie z czynnikami wyzwalaj?cymi napad padaczkowy:  · Przyjmuj lek o ac samej porze ka?dego dnia  Pomo?e to równie? zapobiec wyst?pieniu dzia?a? niepo? ?danych leku  Ustaw alarm, który b?dzie Ci przypomina? o przyjmowaniu leku ka?dego dnia  · Panuj nad stresem  Stres mo?e wywo? a? napad padaczkowy  ?wiczenia fizyczne mog? pomóc zmniejszy? stres  Zapytaj opiekuna medycznego, adin ?wiczenia s? dla Ciebie bezpieczne  Choroba mo?e by? jedn? z postaci stresu  Stosuj urozmaicon? diet? sk?adaj?c? si? ze zdrowej ?ywno? ci i pij du?o p?ynów podczas choroby  Porozmawiaj ze swoim opiekunem medycznym o innych sposobach radzenia sobie ze stresem  · Opracuj regularny schemat snu  Brak snu mo?e wywo? a? napad padaczkowy  Staraj si? chodzi? spa? i budzi? si? codziennie o ac samej porze  Dopilnuj, jose w sypialni by?o cicho i ciemno  Porozmawiaj ze swoim opiekunem medycznym, je?li masz problemy ze snem  · Ogranicz spo?ycie alkoholu lub cyrus pij wcale, zgodnie z zaleceniami  Alkohol mo?e wywo? a? napad padaczkowy, szczególnie w przypadku jednorazowego spo?ycia jego du?ej ilo?ci  Jeden drink odpowiada 12 uncjom piwa, 1½ uncji napojów wysokoprocentowych lub 5 uncjom wina  Zapytaj opiekuna medycznego, jaka ilo?? alkoholu jest dla Ciebie bezpieczna  Twój opiekun medyczny mo?e zaleci? ca?kowite powstrzymanie si? od spo?ywania alkoholu  Poinformuj go, je?li potrzebujesz pomocy w zaprzestaniu picia alkoholu  Sposoby radzenia sobie z padaczk?:   · Prowad? dziennik napadów padaczkowych  U?atwi on ustalenie czynników [facility]?cych, co umo? liwi ich unikanie  Zapisuj daty napadów padaczkowych, a nolberto?e informacje dotycz?ce wykonywanych w tym momencie czynno?ci i miejsca pobytu  [place] równie?  informacje dotycz?ce samopoczucia przed napadem padaczkowym i po jego zako?czeniu  Mo?liwe czynniki wyzwalaj?ce to mi?dzy innymi choroba, brak snu, zmiany hormonalne, alkohol, leki, ?wiat?o lub stres  · Zapisuj przypadki wyst?pienia elliot przed napadem padaczkowym  Aura to oznaka zbli?aj?cego si? napadu padaczkowego  Aura wyst?puje przed okre?lonymi rodzajami napadów padaczkowych, które s? zlokalizowane tylko w 1 cz??ci mózgu  Aura mo?e wyst?pi? na kilka sekund lub nawet na godzin? przed napadem padaczkowym  Mo?esz co? odczuwa?, widzie?, s?ysze? lub czu? Maryjane Ventura? adowo cz??? Twojego usha?a mo?e sta? si? ciep?a  Mo?esz widzie? rozb?yski ?wiat?a lub co? s?ysze? Jaleesa Cruel Mo?esz odczuwa? niepokój lub tg? déjà vu  Je?li wyst?pi?a u Ciebie aura, zapisz to w dzienniku napadów padaczkowych  · Stwórz plan opieki  Poinformuj rodzin?, przyjació? i wspó?pracowników o swojej padaczce  Przeka? im instrukcje dotycz?ce zapewnienia Twojego bezpiecze?stwa w przypadku wyst?pienia napadu padaczkowego  · Poszukaj wsparcia  Mo?esz dosta? skierowanie do psychologa lub pracownika spo?ecznego  Zapytaj opiekuna medycznego o grupy wsparcia dla osób z padaczk? Jaleesa Cruel · Spytaj, adin ?rodki ostro?no?ci nale?y podj??   Spytaj swojego opiekuna medycznego, czy mo?esz prowadzi? samochód  Mo?esz cyrus by? w stanie prowadzi? samochodu do czasu ustania napadów padaczkowych na pewien czas  Konieczne b?dzie zapoznanie si? z prawem obowi?zuj?cym w Twoim miejscu zamieszkania  Zapytaj równie? swojego opiekuna medycznego, czy mo?esz p?ywa? i bra? k?piel  Je?li napad padaczkowy wyst?pi w wodzie, mo?esz uton? ? lub nabawi? si? zagra?aj?cego ?yciu uszkodzenia serca b?d? p?uc  · Korzystaj z identyfikacji medycznej  No? bi?uteri? z informacjami medycznymi lub kart? informuj?c? o tym, ?e chorujesz na padaczk? Jaleesa Cruel Popro? opiekuna medycznego o informacje, gdzie mo?esz naby? te przedmioty  Zapewnianie bezpiecze?stwa podczas napadu padaczkowego:  Lindsay Crain?  nast?puj?ce instrukcje cz?onkom rodziny, przyjacio?om i wspó?pracownikom:  · Madeline Ruelas wpadajcie w panik?      · Cyrus przytrzymujcie mnie ani cyrus umieszczajcie ?adnych przedmiotów w Scott Simms  · Ostro?cyrus po?ó?cie mnie na pod?odze lub na mi?kkiej powierzchni  · Po? ó?cie mnie na boku, jose zapobiec jens?y?ni?ciu si? ?naz? lub wymiocinami  · ?cie mnie przed urazem  Usu?cie ostre lub twarde przedmioty z otoczenia lub po? ó?cie poduszk? pod moj? g?ow?      · Polu?nijcie ubranie wokó? mojej g?owy i szyi  · Zanotujcie czas trwania mojego napadu padaczkowego  Zadzwo? cie pod numer 911, je?li mój napad padaczkowy trwa d?u?ej ni? 5 minut lub je?li wyst?pi? u mnie drugi napad  · Zosta?cie przy mnie do ko?ca mojego napadu padaczkowego  Pozwólcie mi odpocz?? do momentu odzyskania pe?nej ?wiadomo?ci  · Wykonajcie resuscytacj? kr??eniowo-oddechow?, je?gloria przestan? oddycha? lub gdy cyrus mo?na wyczu? mojego t?tna      · Cyrus podawajcie mi niczego do jedzenia ani do picia, dopóki cyrus odzyskam pe?nej ?wiadomo?ci   Zg?o? si? na kolejn? wizyt? u neurologa, zgodnie z zaleceniem:  Konieczne mo?e okaza? si? wykonanie bada? w celu sprawdzenia poziomu leku przeciwdrgawkowego we krwi  Jerriczna mo?e by? zmiana leku lub dostosowacyrus lilly neurologa  Melani siegel pytania, jose cyrus zapomnie? ich zada? podczas kolejnych wizyt u Queta Lava  © 2017 [address] Information is for End User's use only and may not be sold, redistributed or otherwise used for commercial purposes  All illustrations and images included in CareNotes® are the copyrighted property of [employer]  or [employer]  The above information is an  only  It is not intended as medical advice for individual conditions or treatments  Talk to your doctor, nurse or pharmacist before following any medical regimen to see if it is safe and effective for you

## 2019-07-11 ENCOUNTER — HOSPITAL ENCOUNTER (EMERGENCY)
Facility: HOSPITAL | Age: 24
Discharge: HOME | End: 2019-07-11
Attending: EMERGENCY MEDICINE
Payer: COMMERCIAL

## 2019-07-11 VITALS
BODY MASS INDEX: 38.31 KG/M2 | TEMPERATURE: 99 F | HEART RATE: 83 BPM | DIASTOLIC BLOOD PRESSURE: 57 MMHG | RESPIRATION RATE: 18 BRPM | WEIGHT: 244.6 LBS | OXYGEN SATURATION: 100 % | SYSTOLIC BLOOD PRESSURE: 113 MMHG

## 2019-07-11 DIAGNOSIS — K52.9 GASTROENTERITIS: ICD-10-CM

## 2019-07-11 DIAGNOSIS — R10.9 ABDOMINAL PAIN: Primary | ICD-10-CM

## 2019-07-11 LAB
ALBUMIN SERPL BCP-MCNC: 3.6 G/DL (ref 3.5–5)
ALP SERPL-CCNC: 106 U/L (ref 46–116)
ALT SERPL W P-5'-P-CCNC: 35 U/L (ref 12–78)
ANION GAP SERPL CALCULATED.3IONS-SCNC: 15 MMOL/L (ref 4–13)
AST SERPL W P-5'-P-CCNC: 15 U/L (ref 5–45)
BACTERIA UR QL AUTO: ABNORMAL /HPF
BASOPHILS # BLD AUTO: 0.03 THOUSANDS/ΜL (ref 0–0.1)
BASOPHILS NFR BLD AUTO: 0 % (ref 0–1)
BILIRUB SERPL-MCNC: 0.3 MG/DL (ref 0.2–1)
BILIRUB UR QL STRIP: NEGATIVE
BUN SERPL-MCNC: 11 MG/DL (ref 5–25)
CALCIUM SERPL-MCNC: 8.9 MG/DL (ref 8.3–10.1)
CHLORIDE SERPL-SCNC: 99 MMOL/L (ref 100–108)
CLARITY UR: ABNORMAL
CO2 SERPL-SCNC: 26 MMOL/L (ref 21–32)
COLOR UR: YELLOW
CREAT SERPL-MCNC: 1.05 MG/DL (ref 0.6–1.3)
EOSINOPHIL # BLD AUTO: 0.23 THOUSAND/ΜL (ref 0–0.61)
EOSINOPHIL NFR BLD AUTO: 2 % (ref 0–6)
ERYTHROCYTE [DISTWIDTH] IN BLOOD BY AUTOMATED COUNT: 13.9 % (ref 11.6–15.1)
EXT PREG TEST URINE: NEGATIVE
EXT. CONTROL ED NAV: NORMAL
GFR SERPL CREATININE-BSD FRML MDRD: 73 ML/MIN/1.73SQ M
GLUCOSE SERPL-MCNC: 139 MG/DL (ref 65–140)
GLUCOSE UR STRIP-MCNC: NEGATIVE MG/DL
HCT VFR BLD AUTO: 39.4 % (ref 34.8–46.1)
HGB BLD-MCNC: 13.2 G/DL (ref 11.5–15.4)
HGB UR QL STRIP.AUTO: NEGATIVE
IMM GRANULOCYTES # BLD AUTO: 0.05 THOUSAND/UL (ref 0–0.2)
IMM GRANULOCYTES NFR BLD AUTO: 0 % (ref 0–2)
KETONES UR STRIP-MCNC: NEGATIVE MG/DL
LEUKOCYTE ESTERASE UR QL STRIP: ABNORMAL
LIPASE SERPL-CCNC: 90 U/L (ref 73–393)
LYMPHOCYTES # BLD AUTO: 3.43 THOUSANDS/ΜL (ref 0.6–4.47)
LYMPHOCYTES NFR BLD AUTO: 30 % (ref 14–44)
MCH RBC QN AUTO: 25.9 PG (ref 26.8–34.3)
MCHC RBC AUTO-ENTMCNC: 33.5 G/DL (ref 31.4–37.4)
MCV RBC AUTO: 77 FL (ref 82–98)
MONOCYTES # BLD AUTO: 0.5 THOUSAND/ΜL (ref 0.17–1.22)
MONOCYTES NFR BLD AUTO: 4 % (ref 4–12)
NEUTROPHILS # BLD AUTO: 7.25 THOUSANDS/ΜL (ref 1.85–7.62)
NEUTS SEG NFR BLD AUTO: 64 % (ref 43–75)
NITRITE UR QL STRIP: NEGATIVE
NON-SQ EPI CELLS URNS QL MICRO: ABNORMAL /HPF
NRBC BLD AUTO-RTO: 0 /100 WBCS
PH UR STRIP.AUTO: 5.5 [PH]
PLATELET # BLD AUTO: 309 THOUSANDS/UL (ref 149–390)
PMV BLD AUTO: 10 FL (ref 8.9–12.7)
POTASSIUM SERPL-SCNC: 3.7 MMOL/L (ref 3.5–5.3)
PROT SERPL-MCNC: 7.8 G/DL (ref 6.4–8.2)
PROT UR STRIP-MCNC: NEGATIVE MG/DL
RBC # BLD AUTO: 5.09 MILLION/UL (ref 3.81–5.12)
RBC #/AREA URNS AUTO: ABNORMAL /HPF
SODIUM SERPL-SCNC: 140 MMOL/L (ref 136–145)
SP GR UR STRIP.AUTO: 1.02 (ref 1–1.03)
UROBILINOGEN UR QL STRIP.AUTO: 0.2 E.U./DL
WBC # BLD AUTO: 11.49 THOUSAND/UL (ref 4.31–10.16)
WBC #/AREA URNS AUTO: ABNORMAL /HPF

## 2019-07-11 PROCEDURE — 85025 COMPLETE CBC W/AUTO DIFF WBC: CPT | Performed by: EMERGENCY MEDICINE

## 2019-07-11 PROCEDURE — 36415 COLL VENOUS BLD VENIPUNCTURE: CPT | Performed by: EMERGENCY MEDICINE

## 2019-07-11 PROCEDURE — 81001 URINALYSIS AUTO W/SCOPE: CPT | Performed by: EMERGENCY MEDICINE

## 2019-07-11 PROCEDURE — 96361 HYDRATE IV INFUSION ADD-ON: CPT

## 2019-07-11 PROCEDURE — 81025 URINE PREGNANCY TEST: CPT | Performed by: EMERGENCY MEDICINE

## 2019-07-11 PROCEDURE — 83690 ASSAY OF LIPASE: CPT | Performed by: EMERGENCY MEDICINE

## 2019-07-11 PROCEDURE — 96375 TX/PRO/DX INJ NEW DRUG ADDON: CPT

## 2019-07-11 PROCEDURE — 96374 THER/PROPH/DIAG INJ IV PUSH: CPT

## 2019-07-11 PROCEDURE — 99283 EMERGENCY DEPT VISIT LOW MDM: CPT | Performed by: EMERGENCY MEDICINE

## 2019-07-11 PROCEDURE — 80053 COMPREHEN METABOLIC PANEL: CPT | Performed by: EMERGENCY MEDICINE

## 2019-07-11 PROCEDURE — 99284 EMERGENCY DEPT VISIT MOD MDM: CPT

## 2019-07-11 RX ORDER — ONDANSETRON 4 MG/1
4 TABLET, ORALLY DISINTEGRATING ORAL EVERY 6 HOURS PRN
Qty: 10 TABLET | Refills: 0 | Status: SHIPPED | OUTPATIENT
Start: 2019-07-11 | End: 2020-08-29

## 2019-07-11 RX ORDER — KETOROLAC TROMETHAMINE 30 MG/ML
30 INJECTION, SOLUTION INTRAMUSCULAR; INTRAVENOUS ONCE
Status: COMPLETED | OUTPATIENT
Start: 2019-07-11 | End: 2019-07-11

## 2019-07-11 RX ORDER — ONDANSETRON 2 MG/ML
4 INJECTION INTRAMUSCULAR; INTRAVENOUS ONCE
Status: COMPLETED | OUTPATIENT
Start: 2019-07-11 | End: 2019-07-11

## 2019-07-11 RX ADMIN — SODIUM CHLORIDE 1000 ML: 0.9 INJECTION, SOLUTION INTRAVENOUS at 19:05

## 2019-07-11 RX ADMIN — KETOROLAC TROMETHAMINE 30 MG: 30 INJECTION, SOLUTION INTRAMUSCULAR at 19:09

## 2019-07-11 RX ADMIN — ONDANSETRON 4 MG: 2 INJECTION INTRAMUSCULAR; INTRAVENOUS at 20:06

## 2019-07-11 NOTE — ED PROVIDER NOTES
History  Chief Complaint   Patient presents with    Abdominal Pain     bilateral upper abdominal pain that feels numb/prickly  states "I have 2 kids and it feels like something is moving inside of me but I know I'm not pregnant"     Patient presents to the emergency department for evaluation of abdominal pain with nausea vomiting and diarrhea that began 2 days ago  She states she know she is not pregnant because she took a home pregnancy test but she feels movement in her abdomen  She denies trauma fall injury  Denies back pain  Denies urinary symptoms  Denies fever chills rash  Her son is ill at home with similar symptoms  Prior to Admission Medications   Prescriptions Last Dose Informant Patient Reported? Taking?    LORazepam (ATIVAN) 1 mg tablet   No No   Sig: Take 1 tablet (1 mg total) by mouth 2 (two) times a day as needed for anxiety or seizures for up to 3 days   divalproex sodium (DEPAKOTE ER) 250 mg 24 hr tablet   No No   Sig: Take 3 tablets (750 mg total) by mouth 2 (two) times a day for 30 days   divalproex sodium (DEPAKOTE) 250 mg EC tablet   No No   Sig: Take 1 tablet (250 mg total) by mouth every 12 (twelve) hours for 21 days   famotidine (PEPCID) 20 mg tablet   No No   Sig: Take 1 tablet (20 mg total) by mouth 2 (two) times a day for 30 days   lurasidone (LATUDA) 40 mg tablet   No No   Sig: Take 1 tablet (40 mg total) by mouth daily with dinner for 30 days   prazosin (MINIPRESS) 1 mg capsule   No No   Sig: Take 1 capsule (1 mg total) by mouth daily at bedtime for 30 days      Facility-Administered Medications: None       Past Medical History:   Diagnosis Date    Depression     Migraine     Seizures (HCC)        Past Surgical History:   Procedure Laterality Date     SECTION       SECTION      SHOULDER SURGERY      TUBAL LIGATION      TUMOR REMOVAL      from ear       Family History   Problem Relation Age of Onset    No Known Problems Mother     No Known Problems Father     No Known Problems Sister     No Known Problems Brother     No Known Problems Maternal Aunt     No Known Problems Paternal Aunt     No Known Problems Maternal Uncle     No Known Problems Paternal Uncle     No Known Problems Maternal Grandfather     No Known Problems Maternal Grandmother     No Known Problems Paternal Grandfather     No Known Problems Paternal Grandmother     No Known Problems Cousin     ADD / ADHD Neg Hx     Alcohol abuse Neg Hx     Anxiety disorder Neg Hx     Bipolar disorder Neg Hx     Completed Suicide  Neg Hx     Dementia Neg Hx     Depression Neg Hx     Drug abuse Neg Hx     OCD Neg Hx     Psychiatric Illness Neg Hx     Psychosis Neg Hx     Schizoaffective Disorder  Neg Hx     Schizophrenia Neg Hx     Self-Injury Neg Hx     Suicide Attempts Neg Hx      I have reviewed and agree with the history as documented  Social History     Tobacco Use    Smoking status: Never Smoker    Smokeless tobacco: Never Used   Substance Use Topics    Alcohol use: No    Drug use: No        Review of Systems   Constitutional: Negative  Negative for activity change, appetite change, chills, diaphoresis, fatigue and fever  HENT: Negative  Negative for congestion, drooling, rhinorrhea, sinus pain, sneezing and voice change  Eyes: Negative  Negative for photophobia and visual disturbance  Respiratory: Negative for apnea, cough, choking, chest tightness, shortness of breath, wheezing and stridor  Cardiovascular: Negative  Negative for chest pain, palpitations and leg swelling  Gastrointestinal: Positive for abdominal pain, diarrhea, nausea and vomiting  Negative for abdominal distention, anal bleeding, blood in stool and constipation  Endocrine: Negative  Genitourinary: Negative  Negative for difficulty urinating, dysuria, flank pain, frequency, urgency, vaginal bleeding, vaginal discharge and vaginal pain  Musculoskeletal: Negative    Negative for arthralgias, back pain, gait problem, joint swelling, neck pain and neck stiffness  Skin: Negative  Negative for rash and wound  Allergic/Immunologic: Negative  Neurological: Negative  Negative for dizziness, tremors, seizures, syncope, facial asymmetry, speech difficulty, weakness, light-headedness, numbness and headaches  Hematological: Negative  Does not bruise/bleed easily  Psychiatric/Behavioral: Negative  Negative for confusion  Physical Exam  Physical Exam   Constitutional: She is oriented to person, place, and time  She appears well-developed and well-nourished  Non-toxic appearance  She does not appear ill  No distress  Nontoxic appearance  No respiratory distress  Patient looks comfortable sitting upright in the stretcher  HENT:   Head: Normocephalic and atraumatic  Right Ear: External ear normal    Left Ear: External ear normal    Mouth/Throat: Oropharynx is clear and moist    Eyes: Pupils are equal, round, and reactive to light  Conjunctivae and EOM are normal    Cardiovascular: Normal rate, regular rhythm, normal heart sounds and intact distal pulses  Pulmonary/Chest: Effort normal and breath sounds normal    Abdominal: Soft  Normal appearance, normal aorta and bowel sounds are normal  She exhibits no shifting dullness, no distension, no pulsatile liver, no fluid wave, no abdominal bruit, no ascites, no pulsatile midline mass and no mass  There is no hepatosplenomegaly, splenomegaly or hepatomegaly  There is generalized tenderness  There is no rigidity, no rebound, no guarding, no CVA tenderness, no tenderness at McBurney's point and negative Camilo's sign  No hernia  Musculoskeletal: Normal range of motion  Neurological: She is alert and oriented to person, place, and time  She has normal reflexes  Skin: Skin is warm and dry  Capillary refill takes less than 2 seconds  No rash noted  She is not diaphoretic  No cyanosis     Psychiatric: She has a normal mood and affect  Her behavior is normal    Nursing note and vitals reviewed        Vital Signs  ED Triage Vitals   Temperature Pulse Respirations Blood Pressure SpO2   07/11/19 1808 07/11/19 1808 07/11/19 1808 07/11/19 1808 07/11/19 1808   99 °F (37 2 °C) 94 17 143/76 96 %      Temp Source Heart Rate Source Patient Position - Orthostatic VS BP Location FiO2 (%)   07/11/19 1808 07/11/19 1808 07/11/19 1808 07/11/19 1808 --   Oral Monitor Sitting Left arm       Pain Score       07/11/19 1909       8           Vitals:    07/11/19 1808 07/11/19 2019   BP: 143/76 113/57   Pulse: 94 83   Patient Position - Orthostatic VS: Sitting Lying         Visual Acuity      ED Medications  Medications   sodium chloride 0 9 % bolus 1,000 mL (0 mL Intravenous Stopped 7/11/19 2019)   ketorolac (TORADOL) injection 30 mg (30 mg Intravenous Given 7/11/19 1909)   ondansetron (ZOFRAN) injection 4 mg (4 mg Intravenous Given 7/11/19 2006)       Diagnostic Studies  Results Reviewed     Procedure Component Value Units Date/Time    Comprehensive metabolic panel [[id number]]  (Abnormal) Collected:  07/11/19 1904    Lab Status:  Final result Specimen:  Blood from Arm, Right Updated:  07/11/19 1932     Sodium 140 mmol/L      Potassium 3 7 mmol/L      Chloride 99 mmol/L      CO2 26 mmol/L      ANION GAP 15 mmol/L      BUN 11 mg/dL      Creatinine 1 05 mg/dL      Glucose 139 mg/dL      Calcium 8 9 mg/dL      AST 15 U/L      ALT 35 U/L      Alkaline Phosphatase 106 U/L      Total Protein 7 8 g/dL      Albumin 3 6 g/dL      Total Bilirubin 0 30 mg/dL      eGFR 73 ml/min/1 73sq m     Narrative:       Monse guidelines for Chronic Kidney Disease (CKD):     Stage 1 with normal or high GFR (GFR > 90 mL/min/1 73 square meters)    Stage 2 Mild CKD (GFR = 60-89 mL/min/1 73 square meters)    Stage 3A Moderate CKD (GFR = 45-59 mL/min/1 73 square meters)    Stage 3B Moderate CKD (GFR = 30-44 mL/min/1 73 square meters)    Stage 4 Severe CKD (GFR = 15-29 mL/min/1 73 square meters)    Stage 5 End Stage CKD (GFR <15 mL/min/1 73 square meters)  Note: GFR calculation is accurate only with a steady state creatinine    Lipase [[id number]]  (Normal) Collected:  07/11/19 1904    Lab Status:  Final result Specimen:  Blood from Arm, Right Updated:  07/11/19 1932     Lipase 90 u/L     Urine Microscopic [[id number]]  (Abnormal) Collected:  07/11/19 1856    Lab Status:  Final result Specimen:  Urine, Clean Catch Updated:  07/11/19 1924     RBC, UA None Seen /hpf      WBC, UA 4-10 /hpf      Epithelial Cells Moderate /hpf      Bacteria, UA Moderate /hpf     CBC and differential [[id number]]  (Abnormal) Collected:  07/11/19 1904    Lab Status:  Final result Specimen:  Blood from Arm, Right Updated:  07/11/19 1913     WBC 11 49 Thousand/uL      RBC 5 09 Million/uL      Hemoglobin 13 2 g/dL      Hematocrit 39 4 %      MCV 77 fL      MCH 25 9 pg      MCHC 33 5 g/dL      RDW 13 9 %      MPV 10 0 fL      Platelets 971 Thousands/uL      nRBC 0 /100 WBCs      Neutrophils Relative 64 %      Immat GRANS % 0 %      Lymphocytes Relative 30 %      Monocytes Relative 4 %      Eosinophils Relative 2 %      Basophils Relative 0 %      Neutrophils Absolute 7 25 Thousands/µL      Immature Grans Absolute 0 05 Thousand/uL      Lymphocytes Absolute 3 43 Thousands/µL      Monocytes Absolute 0 50 Thousand/µL      Eosinophils Absolute 0 23 Thousand/µL      Basophils Absolute 0 03 Thousands/µL     UA w Reflex to Microscopic [[id number]]  (Abnormal) Collected:  07/11/19 1856    Lab Status:  Final result Specimen:  Urine, Clean Catch Updated:  07/11/19 1905     Color, UA Yellow     Clarity, UA Slightly Cloudy     Specific Gravity, UA 1 025     pH, UA 5 5     Leukocytes, UA Small     Nitrite, UA Negative     Protein, UA Negative mg/dl      Glucose, UA Negative mg/dl      Ketones, UA Negative mg/dl      Urobilinogen, UA 0 2 E U /dl      Bilirubin, UA Negative     Blood, UA Negative    POCT pregnancy, urine [[id number]]  (Normal) Resulted:  07/11/19 1901    Lab Status:  Final result Updated:  07/11/19 1901     EXT PREG TEST UR (Ref: Negative) negative     Control valid                 No orders to display              Procedures  Procedures       ED Course                               MDM    Disposition  Final diagnoses:   Abdominal pain   Gastroenteritis     Time reflects when diagnosis was documented in both MDM as applicable and the Disposition within this note     Time User Action Codes Description Comment    7/11/2019  7:56 PM Estefania Calender Add [R10 9] Abdominal pain     7/11/2019  7:56 PM Estefania Calender Add [K52 9] Gastroenteritis       ED Disposition     ED Disposition Condition Date/Time Comment    Discharge Stable Thu Jul 11, 2019  7:56 PM Anette Tico discharge to home/self care              Follow-up Information    None         Discharge Medication List as of 7/11/2019  8:00 PM      START taking these medications    Details   ondansetron (ZOFRAN-ODT) 4 mg disintegrating tablet Take 1 tablet (4 mg total) by mouth every 6 (six) hours as needed for nausea or vomiting, Starting Thu 7/11/2019, Print         CONTINUE these medications which have NOT CHANGED    Details   divalproex sodium (DEPAKOTE ER) 250 mg 24 hr tablet Take 3 tablets (750 mg total) by mouth 2 (two) times a day for 30 days, Starting Mon 6/18/2018, Until Wed 7/18/2018, Print      divalproex sodium (DEPAKOTE) 250 mg EC tablet Take 1 tablet (250 mg total) by mouth every 12 (twelve) hours for 21 days, Starting Fri 12/21/2018, Until Fri 1/11/2019, Print      famotidine (PEPCID) 20 mg tablet Take 1 tablet (20 mg total) by mouth 2 (two) times a day for 30 days, Starting Mon 6/18/2018, Until Wed 7/18/2018, Print      LORazepam (ATIVAN) 1 mg tablet Take 1 tablet (1 mg total) by mouth 2 (two) times a day as needed for anxiety or seizures for up to 3 days, Starting Fri 12/21/2018, Until Mon 12/24/2018, Print      lurasidone (LATUDA) 40 mg tablet Take 1 tablet (40 mg total) by mouth daily with dinner for 30 days, Starting Mon 6/18/2018, Until Wed 7/18/2018, Print      prazosin (MINIPRESS) 1 mg capsule Take 1 capsule (1 mg total) by mouth daily at bedtime for 30 days, Starting Mon 6/18/2018, Until Wed 7/18/2018, Print           No discharge procedures on file      ED Provider  Electronically Signed by           William Ramirez MD  07/11/19 3908

## 2019-09-27 ENCOUNTER — APPOINTMENT (EMERGENCY)
Dept: CT IMAGING | Facility: HOSPITAL | Age: 24
End: 2019-09-27
Payer: COMMERCIAL

## 2019-09-27 ENCOUNTER — HOSPITAL ENCOUNTER (OUTPATIENT)
Facility: HOSPITAL | Age: 24
Setting detail: OBSERVATION
Discharge: HOME | End: 2019-09-30
Attending: EMERGENCY MEDICINE | Admitting: EMERGENCY MEDICINE
Payer: COMMERCIAL

## 2019-09-27 ENCOUNTER — APPOINTMENT (EMERGENCY)
Dept: RADIOLOGY | Facility: HOSPITAL | Age: 24
End: 2019-09-27
Payer: COMMERCIAL

## 2019-09-27 DIAGNOSIS — R56.9 SEIZURE (HCC): ICD-10-CM

## 2019-09-27 DIAGNOSIS — G40.909 SEIZURE DISORDER (HCC): ICD-10-CM

## 2019-09-27 DIAGNOSIS — G40.901 STATUS EPILEPTICUS (HCC): Primary | ICD-10-CM

## 2019-09-27 DIAGNOSIS — E86.0 DEHYDRATION: ICD-10-CM

## 2019-09-27 PROBLEM — R31.21 ASYMPTOMATIC MICROSCOPIC HEMATURIA: Status: ACTIVE | Noted: 2019-09-27

## 2019-09-27 PROBLEM — E87.2 INCREASED ANION GAP METABOLIC ACIDOSIS: Status: ACTIVE | Noted: 2019-09-27

## 2019-09-27 PROBLEM — E87.29 INCREASED ANION GAP METABOLIC ACIDOSIS: Status: ACTIVE | Noted: 2019-09-27

## 2019-09-27 PROBLEM — N17.9 AKI (ACUTE KIDNEY INJURY) (HCC): Status: ACTIVE | Noted: 2019-09-27

## 2019-09-27 LAB
ALBUMIN SERPL BCP-MCNC: 3.9 G/DL (ref 3.5–5)
ALP SERPL-CCNC: 96 U/L (ref 46–116)
ALT SERPL W P-5'-P-CCNC: 35 U/L (ref 12–78)
AMPHETAMINES SERPL QL SCN: NEGATIVE
ANION GAP BLD CALC-SCNC: 19 MMOL/L (ref 4–13)
ANION GAP SERPL CALCULATED.3IONS-SCNC: 18 MMOL/L (ref 4–13)
AST SERPL W P-5'-P-CCNC: 14 U/L (ref 5–45)
BACTERIA UR QL AUTO: ABNORMAL /HPF
BARBITURATES UR QL: NEGATIVE
BASOPHILS # BLD AUTO: 0.06 THOUSANDS/ΜL (ref 0–0.1)
BASOPHILS NFR BLD AUTO: 0 % (ref 0–1)
BENZODIAZ UR QL: NEGATIVE
BILIRUB SERPL-MCNC: 0.4 MG/DL (ref 0.2–1)
BILIRUB UR QL STRIP: NEGATIVE
BUN BLD-MCNC: 16 MG/DL (ref 5–25)
BUN SERPL-MCNC: 16 MG/DL (ref 5–25)
CA-I BLD-SCNC: 1.21 MMOL/L (ref 1.12–1.32)
CALCIUM SERPL-MCNC: 9.6 MG/DL (ref 8.3–10.1)
CHLORIDE BLD-SCNC: 108 MMOL/L (ref 100–108)
CHLORIDE SERPL-SCNC: 105 MMOL/L (ref 100–108)
CLARITY UR: CLEAR
CO2 SERPL-SCNC: 20 MMOL/L (ref 21–32)
COCAINE UR QL: NEGATIVE
COLOR UR: YELLOW
CREAT BLD-MCNC: 1.1 MG/DL (ref 0.6–1.3)
CREAT SERPL-MCNC: 1.31 MG/DL (ref 0.6–1.3)
EOSINOPHIL # BLD AUTO: 0.27 THOUSAND/ΜL (ref 0–0.61)
EOSINOPHIL NFR BLD AUTO: 2 % (ref 0–6)
ERYTHROCYTE [DISTWIDTH] IN BLOOD BY AUTOMATED COUNT: 13.6 % (ref 11.6–15.1)
EXT PREG TEST URINE: NEGATIVE
EXT. CONTROL ED NAV: NORMAL
GFR SERPL CREATININE-BSD FRML MDRD: 56 ML/MIN/1.73SQ M
GFR SERPL CREATININE-BSD FRML MDRD: 69 ML/MIN/1.73SQ M
GLUCOSE SERPL-MCNC: 86 MG/DL (ref 65–140)
GLUCOSE SERPL-MCNC: 89 MG/DL (ref 65–140)
GLUCOSE UR STRIP-MCNC: NEGATIVE MG/DL
HCT VFR BLD AUTO: 42.9 % (ref 34.8–46.1)
HCT VFR BLD CALC: 41 % (ref 34.8–46.1)
HGB BLD-MCNC: 13.9 G/DL (ref 11.5–15.4)
HGB BLDA-MCNC: 13.9 G/DL (ref 11.5–15.4)
HGB UR QL STRIP.AUTO: ABNORMAL
IMM GRANULOCYTES # BLD AUTO: 0.04 THOUSAND/UL (ref 0–0.2)
IMM GRANULOCYTES NFR BLD AUTO: 0 % (ref 0–2)
KETONES UR STRIP-MCNC: NEGATIVE MG/DL
LEUKOCYTE ESTERASE UR QL STRIP: NEGATIVE
LYMPHOCYTES # BLD AUTO: 5.12 THOUSANDS/ΜL (ref 0.6–4.47)
LYMPHOCYTES NFR BLD AUTO: 37 % (ref 14–44)
MCH RBC QN AUTO: 25.4 PG (ref 26.8–34.3)
MCHC RBC AUTO-ENTMCNC: 32.4 G/DL (ref 31.4–37.4)
MCV RBC AUTO: 78 FL (ref 82–98)
METHADONE UR QL: NEGATIVE
MONOCYTES # BLD AUTO: 1.09 THOUSAND/ΜL (ref 0.17–1.22)
MONOCYTES NFR BLD AUTO: 8 % (ref 4–12)
NEUTROPHILS # BLD AUTO: 7.27 THOUSANDS/ΜL (ref 1.85–7.62)
NEUTS SEG NFR BLD AUTO: 53 % (ref 43–75)
NITRITE UR QL STRIP: NEGATIVE
NON-SQ EPI CELLS URNS QL MICRO: ABNORMAL /HPF
NRBC BLD AUTO-RTO: 0 /100 WBCS
OPIATES UR QL SCN: NEGATIVE
PCO2 BLD: 20 MMOL/L (ref 21–32)
PCP UR QL: NEGATIVE
PH UR STRIP.AUTO: 6 [PH]
PLATELET # BLD AUTO: 406 THOUSANDS/UL (ref 149–390)
PMV BLD AUTO: 10.3 FL (ref 8.9–12.7)
POTASSIUM BLD-SCNC: 4.1 MMOL/L (ref 3.5–5.3)
POTASSIUM SERPL-SCNC: 4 MMOL/L (ref 3.5–5.3)
PROT SERPL-MCNC: 8.1 G/DL (ref 6.4–8.2)
PROT UR STRIP-MCNC: NEGATIVE MG/DL
RBC # BLD AUTO: 5.47 MILLION/UL (ref 3.81–5.12)
RBC #/AREA URNS AUTO: ABNORMAL /HPF
SODIUM BLD-SCNC: 141 MMOL/L (ref 136–145)
SODIUM SERPL-SCNC: 143 MMOL/L (ref 136–145)
SP GR UR STRIP.AUTO: 1.02 (ref 1–1.03)
SPECIMEN SOURCE: ABNORMAL
THC UR QL: NEGATIVE
UROBILINOGEN UR QL STRIP.AUTO: 1 E.U./DL
WBC # BLD AUTO: 13.85 THOUSAND/UL (ref 4.31–10.16)
WBC #/AREA URNS AUTO: ABNORMAL /HPF

## 2019-09-27 PROCEDURE — 99291 CRITICAL CARE FIRST HOUR: CPT | Performed by: EMERGENCY MEDICINE

## 2019-09-27 PROCEDURE — 71045 X-RAY EXAM CHEST 1 VIEW: CPT

## 2019-09-27 PROCEDURE — 81025 URINE PREGNANCY TEST: CPT | Performed by: EMERGENCY MEDICINE

## 2019-09-27 PROCEDURE — 96361 HYDRATE IV INFUSION ADD-ON: CPT

## 2019-09-27 PROCEDURE — 99220 PR INITIAL OBSERVATION CARE/DAY 70 MINUTES: CPT | Performed by: INTERNAL MEDICINE

## 2019-09-27 PROCEDURE — 80307 DRUG TEST PRSMV CHEM ANLYZR: CPT | Performed by: EMERGENCY MEDICINE

## 2019-09-27 PROCEDURE — 36415 COLL VENOUS BLD VENIPUNCTURE: CPT | Performed by: EMERGENCY MEDICINE

## 2019-09-27 PROCEDURE — 99285 EMERGENCY DEPT VISIT HI MDM: CPT

## 2019-09-27 PROCEDURE — 80047 BASIC METABLC PNL IONIZED CA: CPT

## 2019-09-27 PROCEDURE — 96365 THER/PROPH/DIAG IV INF INIT: CPT

## 2019-09-27 PROCEDURE — 72125 CT NECK SPINE W/O DYE: CPT

## 2019-09-27 PROCEDURE — 85014 HEMATOCRIT: CPT

## 2019-09-27 PROCEDURE — 70450 CT HEAD/BRAIN W/O DYE: CPT

## 2019-09-27 PROCEDURE — 85025 COMPLETE CBC W/AUTO DIFF WBC: CPT | Performed by: EMERGENCY MEDICINE

## 2019-09-27 PROCEDURE — 81001 URINALYSIS AUTO W/SCOPE: CPT | Performed by: EMERGENCY MEDICINE

## 2019-09-27 PROCEDURE — 96375 TX/PRO/DX INJ NEW DRUG ADDON: CPT

## 2019-09-27 PROCEDURE — 80053 COMPREHEN METABOLIC PANEL: CPT | Performed by: EMERGENCY MEDICINE

## 2019-09-27 RX ORDER — VALPROIC ACID 250 MG/1
500 CAPSULE, LIQUID FILLED ORAL ONCE
Status: COMPLETED | OUTPATIENT
Start: 2019-09-27 | End: 2019-09-27

## 2019-09-27 RX ORDER — ACETAMINOPHEN 325 MG/1
650 TABLET ORAL EVERY 6 HOURS PRN
Status: DISCONTINUED | OUTPATIENT
Start: 2019-09-27 | End: 2019-09-30 | Stop reason: HOSPADM

## 2019-09-27 RX ORDER — LORAZEPAM 2 MG/ML
2 INJECTION INTRAMUSCULAR ONCE
Status: COMPLETED | OUTPATIENT
Start: 2019-09-27 | End: 2019-09-27

## 2019-09-27 RX ORDER — DIVALPROEX SODIUM 250 MG/1
250 TABLET, DELAYED RELEASE ORAL EVERY 12 HOURS SCHEDULED
Status: DISCONTINUED | OUTPATIENT
Start: 2019-09-28 | End: 2019-09-28

## 2019-09-27 RX ORDER — LORAZEPAM 2 MG/ML
INJECTION INTRAMUSCULAR
Status: COMPLETED
Start: 2019-09-27 | End: 2019-09-27

## 2019-09-27 RX ORDER — METHYLPREDNISOLONE SODIUM SUCCINATE 125 MG/2ML
125 INJECTION, POWDER, LYOPHILIZED, FOR SOLUTION INTRAMUSCULAR; INTRAVENOUS ONCE
Status: COMPLETED | OUTPATIENT
Start: 2019-09-27 | End: 2019-09-27

## 2019-09-27 RX ORDER — ACETAMINOPHEN 325 MG/1
650 TABLET ORAL ONCE
Status: COMPLETED | OUTPATIENT
Start: 2019-09-27 | End: 2019-09-27

## 2019-09-27 RX ORDER — DIPHENHYDRAMINE HYDROCHLORIDE 50 MG/ML
50 INJECTION INTRAMUSCULAR; INTRAVENOUS ONCE
Status: COMPLETED | OUTPATIENT
Start: 2019-09-27 | End: 2019-09-27

## 2019-09-27 RX ORDER — HEPARIN SODIUM 5000 [USP'U]/ML
5000 INJECTION, SOLUTION INTRAVENOUS; SUBCUTANEOUS EVERY 8 HOURS SCHEDULED
Status: DISCONTINUED | OUTPATIENT
Start: 2019-09-27 | End: 2019-09-30 | Stop reason: HOSPADM

## 2019-09-27 RX ORDER — SODIUM CHLORIDE, SODIUM GLUCONATE, SODIUM ACETATE, POTASSIUM CHLORIDE, MAGNESIUM CHLORIDE, SODIUM PHOSPHATE, DIBASIC, AND POTASSIUM PHOSPHATE .53; .5; .37; .037; .03; .012; .00082 G/100ML; G/100ML; G/100ML; G/100ML; G/100ML; G/100ML; G/100ML
125 INJECTION, SOLUTION INTRAVENOUS CONTINUOUS
Status: DISPENSED | OUTPATIENT
Start: 2019-09-27 | End: 2019-09-28

## 2019-09-27 RX ADMIN — ACETAMINOPHEN 650 MG: 325 TABLET, FILM COATED ORAL at 21:46

## 2019-09-27 RX ADMIN — LORAZEPAM 2 MG: 2 INJECTION INTRAMUSCULAR at 18:28

## 2019-09-27 RX ADMIN — METHYLPREDNISOLONE SODIUM SUCCINATE 125 MG: 125 INJECTION, POWDER, FOR SOLUTION INTRAMUSCULAR; INTRAVENOUS at 18:47

## 2019-09-27 RX ADMIN — VALPROIC ACID 500 MG: 250 CAPSULE, LIQUID FILLED ORAL at 21:45

## 2019-09-27 RX ADMIN — LEVETIRACETAM 1000 MG: 100 INJECTION, SOLUTION INTRAVENOUS at 18:53

## 2019-09-27 RX ADMIN — LORAZEPAM 2 MG: 2 INJECTION INTRAMUSCULAR; INTRAVENOUS at 18:28

## 2019-09-27 RX ADMIN — DIPHENHYDRAMINE HYDROCHLORIDE 50 MG: 50 INJECTION, SOLUTION INTRAMUSCULAR; INTRAVENOUS at 18:47

## 2019-09-27 RX ADMIN — SODIUM CHLORIDE 1000 ML: 0.9 INJECTION, SOLUTION INTRAVENOUS at 18:53

## 2019-09-27 NOTE — ED PROVIDER NOTES
Pt Name: Jacob Jones  MRN: [id number]  [place] 1995  Age/Sex: 30 y o  female  Date of evaluation: 2019  PCP: No primary care provider on file  CHIEF COMPLAINT    Chief Complaint   Patient presents with    Seizure - Actively Seizing on Arrival     Patient seizing on arrival         HPI    30 y o  female presenting with seizure  I was called parking lot patient found to be having convulsive movements in the car after being safely extracted transported ER by stretcher, and patient regained lucidity she was able to provide the following history  Patient notes a history of seizures, both epileptic as well as nonepileptic, states that she has not taken her Depakote that she was prescribed for her seizures for the past month  She also notes disturbed sleep over the past 3 days and states that immediately prior to the seizure she had eaten some peanut butter which she is allergic to  She states she felt some mild shortness of breath and then remembers nothing until waking up in the ER  She denies any other symptoms although she complains of severe pain in the head and middle of the neck  Patient's mother states that she began seizing while in the car and she did her best to hold her still but she slipped between the seats and may have struck her head and neck repeatedly        HPI      Past Medical and Surgical History    Past Medical History:   Diagnosis Date    Depression     Migraine     Seizures ([address] )        Past Surgical History:   Procedure Laterality Date     SECTION       SECTION      SHOULDER SURGERY      TUBAL LIGATION      TUMOR REMOVAL      from ear       Family History   Problem Relation Age of Onset    No Known Problems Mother     No Known Problems Father     No Known Problems Sister     No Known Problems Brother     No Known Problems Maternal Aunt     No Known Problems Paternal Aunt     No Known Problems Maternal Uncle     No Known Problems Paternal Uncle  No Known Problems Maternal Grandfather     No Known Problems Maternal Grandmother     No Known Problems Paternal Grandfather     No Known Problems Paternal Grandmother     No Known Problems Cousin     ADD / ADHD Neg Hx     Alcohol abuse Neg Hx     Anxiety disorder Neg Hx     Bipolar disorder Neg Hx     Completed Suicide  Neg Hx     Dementia Neg Hx     Depression Neg Hx     Drug abuse Neg Hx     OCD Neg Hx     Psychiatric Illness Neg Hx     Psychosis Neg Hx     Schizoaffective Disorder  Neg Hx     Schizophrenia Neg Hx     Self-Injury Neg Hx     Suicide Attempts Neg Hx        Social History     Tobacco Use    Smoking status: Never Smoker    Smokeless tobacco: Never Used   Substance Use Topics    Alcohol use: No    Drug use: No           Allergies    Allergies   Allergen Reactions    Erythromycin Swelling    Peanut Oil Hives       Home Medications    Prior to Admission medications    Medication Sig Start Date End Date Taking?  Authorizing Provider   divalproex sodium (DEPAKOTE) 250 mg EC tablet Take 1 tablet (250 mg total) by mouth every 12 (twelve) hours for 21 days 12/21/18 1/11/19  Dwight Lerma DO   famotidine (PEPCID) 20 mg tablet Take 1 tablet (20 mg total) by mouth 2 (two) times a day for 30 days 6/18/18 7/18/18  Irvin Uriostegui MD   LORazepam (ATIVAN) 1 mg tablet Take 1 tablet (1 mg total) by mouth 2 (two) times a day as needed for anxiety or seizures for up to 3 days 12/21/18 12/24/18  [facility] Diann Charles DO   lurasidone (LATUDA) 40 mg tablet Take 1 tablet (40 mg total) by mouth daily with dinner for 30 days 6/18/18 7/18/18  Irvin Uriostegui MD   ondansetron (ZOFRAN-ODT) 4 mg disintegrating tablet Take 1 tablet (4 mg total) by mouth every 6 (six) hours as needed for nausea or vomiting 7/11/19   Sloan Mccarty MD   prazosin (MINIPRESS) 1 mg capsule Take 1 capsule (1 mg total) by mouth daily at bedtime for 30 days 6/18/18 7/18/18  Irvin Uriostegui MD           Review of Systems    Review of Systems   Constitutional: Negative for activity change, chills and fever  HENT: Positive for trouble swallowing  Negative for drooling and facial swelling  Eyes: Negative for pain, discharge and visual disturbance  Respiratory: Positive for shortness of breath  Negative for apnea, cough, chest tightness and wheezing  Cardiovascular: Negative for chest pain and leg swelling  Gastrointestinal: Negative for abdominal pain, constipation, diarrhea, nausea and vomiting  Genitourinary: Negative for difficulty urinating, dysuria and urgency  Musculoskeletal: Positive for neck pain  Negative for arthralgias, back pain and gait problem  Skin: Negative for color change and rash  Neurological: Positive for seizures and headaches  Negative for dizziness, speech difficulty and weakness  Psychiatric/Behavioral: Negative for agitation, behavioral problems and confusion  All other systems reviewed and negative  Physical Exam      ED Triage Vitals   Temperature Pulse Respirations Blood Pressure SpO2   09/27/19 1930 09/27/19 1832 09/27/19 1843 09/27/19 1843 09/27/19 1832   98 3 °F (36 8 °C) (!) 115 20 149/69 93 %      Temp Source Heart Rate Source Patient Position - Orthostatic VS BP Location FiO2 (%)   09/27/19 1930 09/27/19 1832 09/27/19 1930 09/27/19 2030 --   Oral Monitor Lying Right arm       Pain Score       09/27/19 1930       No Pain               Physical Exam   Constitutional: She appears well-developed and well-nourished  She appears distressed  HENT:   Head: Normocephalic and atraumatic  Oropharynx clear and dry, no significant swelling noted on the tongue or posterior pharynx  No facial swelling  Eyes: Conjunctivae and EOM are normal    Anisocoria but both pupils briskly responsive to light as detailed neurologic exam   Neck: Normal range of motion  Neck supple  Cardiovascular: Normal rate, regular rhythm, normal heart sounds and intact distal pulses  Pulmonary/Chest: Effort normal and breath sounds normal  No respiratory distress  She has no wheezes  She has no rales  Abdominal: Soft  She exhibits no distension  There is no tenderness  There is no rebound and no guarding  Musculoskeletal: Normal range of motion  She exhibits tenderness  She exhibits no edema or deformity  Tender to palpation midline C-spine overlying C4-C6  Neurological:   Patient initially seizing, with synchronous movements of both arms and legs as well as fluttering the eyelashes, alternating rotatory nystagmus and deviation to the upper left of the eyes  After cessation of seizure activity, cranial nerves 2-12 intact, anisocoria noted with right pupil approximately 7 mm moving to 4 and left pupil 5 mm moving to 3, patient states this is usual for her, 5/5 strength in all extremities, normal speech  Skin: Skin is warm  No rash noted  She is diaphoretic  No erythema  Psychiatric: She has a normal mood and affect  Her behavior is normal  Judgment and thought content normal    Nursing note and vitals reviewed             Diagnostic Results      Labs:    Results for orders placed or performed during the hospital encounter of 09/27/19   CBC and differential   Result Value Ref Range    WBC 13 85 (H) 4 31 - 10 16 Thousand/uL    RBC 5 47 (H) 3 81 - 5 12 Million/uL    Hemoglobin 13 9 11 5 - 15 4 g/dL    Hematocrit 42 9 34 8 - 46 1 %    MCV 78 (L) 82 - 98 fL    MCH 25 4 (L) 26 8 - 34 3 pg    MCHC 32 4 31 4 - 37 4 g/dL    RDW 13 6 11 6 - 15 1 %    MPV 10 3 8 9 - 12 7 fL    Platelets 760 (H) 582 - 390 Thousands/uL    nRBC 0 /100 WBCs    Neutrophils Relative 53 43 - 75 %    Immat GRANS % 0 0 - 2 %    Lymphocytes Relative 37 14 - 44 %    Monocytes Relative 8 4 - 12 %    Eosinophils Relative 2 0 - 6 %    Basophils Relative 0 0 - 1 %    Neutrophils Absolute 7 27 1 85 - 7 62 Thousands/µL    Immature Grans Absolute 0 04 0 00 - 0 20 Thousand/uL    Lymphocytes Absolute 5 12 (H) 0 60 - 4 47 Thousands/µL    Monocytes Absolute 1 09 0 17 - 1 22 Thousand/µL    Eosinophils Absolute 0 27 0 00 - 0 61 Thousand/µL    Basophils Absolute 0 06 0 00 - 0 10 Thousands/µL   Comprehensive metabolic panel   Result Value Ref Range    Sodium 143 136 - 145 mmol/L    Potassium 4 0 3 5 - 5 3 mmol/L    Chloride 105 100 - 108 mmol/L    CO2 20 (L) 21 - 32 mmol/L    ANION GAP 18 (H) 4 - 13 mmol/L    BUN 16 5 - 25 mg/dL    Creatinine 1 31 (H) 0 60 - 1 30 mg/dL    Glucose 89 65 - 140 mg/dL    Calcium 9 6 8 3 - 10 1 mg/dL    AST 14 5 - 45 U/L    ALT 35 12 - 78 U/L    Alkaline Phosphatase 96 46 - 116 U/L    Total Protein 8 1 6 4 - 8 2 g/dL    Albumin 3 9 3 5 - 5 0 g/dL    Total Bilirubin 0 40 0 20 - 1 00 mg/dL    eGFR 56 ml/min/1 73sq m   UA w Reflex to Microscopic   Result Value Ref Range    Color, UA Yellow     Clarity, UA Clear     Specific Gravity, UA 1 020 1 003 - 1 030    pH, UA 6 0 4 5, 5 0, 5 5, 6 0, 6 5, 7 0, 7 5, 8 0    Leukocytes, UA Negative Negative    Nitrite, UA Negative Negative    Protein, UA Negative Negative mg/dl    Glucose, UA Negative Negative mg/dl    Ketones, UA Negative Negative mg/dl    Urobilinogen, UA 1 0 0 2, 1 0 E U /dl E U /dl    Bilirubin, UA Negative Negative    Blood, UA Large (A) Negative   Urine Microscopic   Result Value Ref Range    RBC, UA 1-2 (A) None Seen, 0-5 /hpf    WBC, UA 0-1 (A) None Seen, 0-5, 5-55, 5-65 /hpf    Epithelial Cells None Seen None Seen, Occasional /hpf    Bacteria, UA Moderate (A) None Seen, Occasional /hpf   POCT pregnancy, urine   Result Value Ref Range    EXT PREG TEST UR (Ref: Negative) negative     Control valid        All labs reviewed and utilized in the medical decision making process    Radiology:    XR chest 1 view portable   ED Interpretation   No acute disease      CT head without contrast   Final Result      No acute intracranial abnormality                    Workstation performed: [id number]         CT cervical spine without contrast   Final Result      No cervical spine fracture or traumatic malalignment  Workstation performed: [id number]             All radiology studies independently viewed by me and interpreted by the radiologist     Procedure    CriticalCare Time  Performed by: Horace Murphy MD  Authorized by: Horace Murphy MD     Critical care provider statement:     Critical care time (minutes):  60    Critical care time was exclusive of:  Teaching time and separately billable procedures and treating other patients    Critical care was necessary to treat or prevent imminent or life-threatening deterioration of the following conditions:  CNS failure or compromise    Critical care was time spent personally by me on the following activities:  Obtaining history from patient or surrogate, development of treatment plan with patient or surrogate, discussions with consultants, evaluation of patient's response to treatment, examination of patient, ordering and performing treatments and interventions, ordering and review of laboratory studies, ordering and review of radiographic studies, review of old charts and re-evaluation of patient's condition            ED Course of Care and Re-Assessments      Initial seizure activity terminate with 2 mg of lorazepam, patient gradually regained consciousness  With concern for possible allergic reaction based on history of peanut ingestion, given methylprednisolone and Benadryl with resolution of those symptoms  Loaded with Keppra and later given home dose of Depakote  Discussed case with Dr Anaid Kim the epileptologist who recommended admission at this institution to allow for drug loading with medication noncompliance and lack of sleep felt to be trigger for seizures      Medications   acetaminophen (TYLENOL) tablet 650 mg (has no administration in time range)   valproic acid (DEPAKENE) capsule 500 mg (has no administration in time range)   LORazepam (ATIVAN) 2 mg/mL injection 2 mg (2 mg Intravenous Given 9/27/19 1828)   sodium chloride 0 9 % bolus 1,000 mL (1,000 mL Intravenous New Bag 9/27/19 1853)   diphenhydrAMINE (BENADRYL) injection 50 mg (50 mg Intravenous Given 9/27/19 1847)   methylPREDNISolone sodium succinate (Solu-MEDROL) injection 125 mg (125 mg Intravenous Given 9/27/19 1847)   levETIRAcetam (KEPPRA) 1,000 mg in sodium chloride 0 9 % 100 mL IVPB (0 mg Intravenous Stopped 9/27/19 1918)           FINAL IMPRESSION    Final diagnoses:   Status epilepticus ([facility] [address] )   Dehydration         DISPOSITION/PLAN    Presentations felt most consistent with status epilepticus secondary to medication noncompliance and lack of sleep with additional trigger of mild allergic reaction  Treated as above cessation of seizure activity and return to baseline  Hemodynamically stable and comfortable at time of admit to Internal Medicine after consultation with epileptologist   Time reflects when diagnosis was documented in both MDM as applicable and the Disposition within this note     Time User Action Codes Description Comment    9/27/2019  9:08 PM Hollie OBRIEN Add [G40 901] Status epilepticus ([facility] [address] )     9/27/2019  9:08 PM Dilshad Garcia Add [E86 0] Dehydration       ED Disposition     ED Disposition Condition Date/Time Comment    Admit Stable Fri Sep 27, 2019  9:09 PM Case was discussed with AARON and the patient's admission status was agreed to be Admission Status: observation status to the service of Dr Yao Decker   Follow-up Information    None           PATIENT REFERRED TO:    No follow-up provider specified  DISCHARGE MEDICATIONS:    Patient's Medications   Discharge Prescriptions    No medications on file       No discharge procedures on file           MD Elaine Santiago MD  09/27/19 3736

## 2019-09-27 NOTE — ED NOTES
Patient seizing on arrival  Patient put on stretcher from car and brought into ED room 30  Side rails padded for seizure precautions  IV access obtained and 2mg ativan given IV  Patient became alert and oriented a minute later        Eugenio Constantino RN  09/27/19 [id number]

## 2019-09-28 LAB
ANION GAP SERPL CALCULATED.3IONS-SCNC: 12 MMOL/L (ref 4–13)
BASE EX.OXY STD BLDV CALC-SCNC: 90.5 % (ref 60–80)
BASE EXCESS BLDV CALC-SCNC: -4.6 MMOL/L
BUN SERPL-MCNC: 12 MG/DL (ref 5–25)
CALCIUM SERPL-MCNC: 9.4 MG/DL (ref 8.3–10.1)
CHLORIDE SERPL-SCNC: 104 MMOL/L (ref 100–108)
CO2 SERPL-SCNC: 21 MMOL/L (ref 21–32)
CREAT SERPL-MCNC: 0.91 MG/DL (ref 0.6–1.3)
GFR SERPL CREATININE-BSD FRML MDRD: 87 ML/MIN/1.73SQ M
GLUCOSE P FAST SERPL-MCNC: 149 MG/DL (ref 65–99)
GLUCOSE SERPL-MCNC: 149 MG/DL (ref 65–140)
GLUCOSE SERPL-MCNC: 219 MG/DL (ref 65–140)
HCO3 BLDV-SCNC: 19.9 MMOL/L (ref 24–30)
O2 CT BLDV-SCNC: 18.2 ML/DL
PCO2 BLDV: 35 MM HG (ref 42–50)
PH BLDV: 7.37 [PH] (ref 7.3–7.4)
PLATELET # BLD AUTO: 346 THOUSANDS/UL (ref 149–390)
PMV BLD AUTO: 9.9 FL (ref 8.9–12.7)
PO2 BLDV: 65.1 MM HG (ref 35–45)
POTASSIUM SERPL-SCNC: 4.8 MMOL/L (ref 3.5–5.3)
SALICYLATES SERPL-MCNC: <3 MG/DL (ref 3–20)
SODIUM SERPL-SCNC: 137 MMOL/L (ref 136–145)

## 2019-09-28 PROCEDURE — 85049 AUTOMATED PLATELET COUNT: CPT | Performed by: INTERNAL MEDICINE

## 2019-09-28 PROCEDURE — 82948 REAGENT STRIP/BLOOD GLUCOSE: CPT

## 2019-09-28 PROCEDURE — 82805 BLOOD GASES W/O2 SATURATION: CPT | Performed by: INTERNAL MEDICINE

## 2019-09-28 PROCEDURE — 80329 ANALGESICS NON-OPIOID 1 OR 2: CPT | Performed by: INTERNAL MEDICINE

## 2019-09-28 PROCEDURE — 80048 BASIC METABOLIC PNL TOTAL CA: CPT | Performed by: INTERNAL MEDICINE

## 2019-09-28 PROCEDURE — 99244 OFF/OP CNSLTJ NEW/EST MOD 40: CPT | Performed by: PSYCHIATRY & NEUROLOGY

## 2019-09-28 RX ORDER — DIVALPROEX SODIUM 500 MG/1
500 TABLET, DELAYED RELEASE ORAL ONCE
Status: COMPLETED | OUTPATIENT
Start: 2019-09-28 | End: 2019-09-28

## 2019-09-28 RX ADMIN — DIVALPROEX SODIUM 250 MG: 250 TABLET, DELAYED RELEASE ORAL at 08:00

## 2019-09-28 RX ADMIN — HEPARIN SODIUM 5000 UNITS: 5000 INJECTION INTRAVENOUS; SUBCUTANEOUS at 00:22

## 2019-09-28 RX ADMIN — DIVALPROEX SODIUM 500 MG: 500 TABLET, DELAYED RELEASE ORAL at 10:32

## 2019-09-28 RX ADMIN — ACETAMINOPHEN 650 MG: 325 TABLET ORAL at 08:06

## 2019-09-28 RX ADMIN — HEPARIN SODIUM 5000 UNITS: 5000 INJECTION INTRAVENOUS; SUBCUTANEOUS at 22:11

## 2019-09-28 RX ADMIN — DIVALPROEX SODIUM 750 MG: 500 TABLET, DELAYED RELEASE ORAL at 22:10

## 2019-09-28 RX ADMIN — HEPARIN SODIUM 5000 UNITS: 5000 INJECTION INTRAVENOUS; SUBCUTANEOUS at 14:02

## 2019-09-28 RX ADMIN — HEPARIN SODIUM 5000 UNITS: 5000 INJECTION INTRAVENOUS; SUBCUTANEOUS at 05:12

## 2019-09-28 RX ADMIN — SODIUM CHLORIDE, SODIUM GLUCONATE, SODIUM ACETATE, POTASSIUM CHLORIDE AND MAGNESIUM CHLORIDE 125 ML/HR: 526; 502; 368; 37; 30 INJECTION, SOLUTION INTRAVENOUS at 00:25

## 2019-09-28 RX ADMIN — SODIUM CHLORIDE, SODIUM GLUCONATE, SODIUM ACETATE, POTASSIUM CHLORIDE AND MAGNESIUM CHLORIDE 125 ML/HR: 526; 502; 368; 37; 30 INJECTION, SOLUTION INTRAVENOUS at 08:00

## 2019-09-28 NOTE — ASSESSMENT & PLAN NOTE
Uncertain etiology  Does not appear to be pre renal on labs  Occasional bacteria, but a large amount of blood     Continue IV fluid hydration with isolyte   Hold ACE/ARB, diuretics   Bladder scan   Renal ultrasound if warranted   Encourage adequate oral intake   Monitor intake/output   Avoid relative hypotension   Monitor serum creatinine   Avoid nephrotoxins

## 2019-09-28 NOTE — H&P
H&P- Ronnie Coleman 1995, 30 y o  female MRN: [id number]    Unit/Bed#: -01 Encounter: [id number]    Primary Care Provider: No primary care provider on file  Date and time admitted to hospital: 9/27/2019  6:32 PM        * Seizure disorder [facility])  Assessment & Plan  Patient has history of seizures and pseudoseizures  This current episode likely secondary to medication noncompliance  Cannot rule out pseudo seizure, however  When asked why she was coming to the ED in the 1st place, patient initially could not give an answer  Then she said was cause her throat was tingling"  According the patient's mother, she had started seizing in the parking lot of a grocery store and then was driven to the ED  · Patient given valproic acid and Keppra in the ED  Epileptologist recommended against video EEG at this time  · Will restart Depakote  · Neurology consult  · Seizure precautions    Asymptomatic microscopic hematuria  Assessment & Plan  Noted on urinalysis  This is not been present on previous urinalysis  Patient denies urinary symptoms  Not currently anemic  · Recommend continued follow-up  · Consider renal/bladder imaging    EVELYN (acute kidney injury) [facility])  Assessment & Plan  Uncertain etiology  Does not appear to be pre renal on labs  Occasional bacteria, but a large amount of blood   Continue IV fluid hydration with isolyte   Hold ACE/ARB, diuretics   Bladder scan   Renal ultrasound if warranted   Encourage adequate oral intake   Monitor intake/output   Avoid relative hypotension   Monitor serum creatinine   Avoid nephrotoxins      Increased anion gap metabolic acidosis  Assessment & Plan  Uncertain etiology  Possibly related to seizure  Patient not currently displaying signs of infection    · Will follow up VBG  · Continue isolyte hydration      History and Physical - [facility]    Patient Information: Ronnie Coleman 30 y o  female MRN: [id number]  Unit/Bed#: MS 210-01 Encounter: [id number]  Admitting Physician: Pal Edmonds DO  PCP: No primary care provider on file  Date of Admission:  09/27/19      VTE Prophylaxis: Heparin  / reason for no mechanical VTE prophylaxis moderate risk DVT   Code Status: FULL  POLST: There is no POLST form on file for this patient (pre-hospital)    Anticipated Length of Stay:  Patient will be admitted on an Observation basis with an anticipated length of stay of < 2 midnights  Justification for Hospital Stay: Please see HPI and assessment/plan documentation    Total Time for Visit, including Counseling / Coordination of Care: 45 minutes  Greater than 50% of this total time spent on direct patient counseling and coordination of care  Chief Complaint:   Seizure    History of Present Illness:    Dieudonne Maynard is a 30 y o  female who presents with seizures  She has known history of bipolar and seizure disorder  Patient was in the car with her mother today when she started having a seizure and was driven to the ED  Seizure activity was witnessed by staff who assisted her into the ED  In the car, she was noted to have convulsive and thrashing movements and possibly hit her head  Patient was given Ativan with resolution of her symptoms  She was not postictal   Patient admits to noncompliance with all of her medications  She has not been taking Depakote for the past month  When asked why, she states that she ran out  Did not get additional refills due to insurance reasons  In the ED, case was discussed with hepatology  She was given valproate and Keppra IV and admitted with plans for neurology consult  When asked why she was coming to the ED in the 1st place, patient initially could not give an answer  Then she said was cause her throat was tingling"  She currently complains of headache and neck pain  No tongue laceration, no bowel or bladder incontinence    Also on workup, she was noted to have EVELYN, acidosis, and hematuria  Review of Systems:    Review of Systems   Constitutional: Positive for chills and fever  HENT: Negative  Eyes: Negative for visual disturbance  Respiratory: Negative for shortness of breath  Cardiovascular: Negative for chest pain  Gastrointestinal: Positive for nausea and vomiting  Endocrine: Negative  Genitourinary: Negative  Musculoskeletal: Positive for neck pain  Skin: Negative for rash and wound  Allergic/Immunologic: Negative for immunocompromised state  Neurological: Positive for seizures and headaches  Hematological: Negative for adenopathy  Does not bruise/bleed easily  Psychiatric/Behavioral: Negative for dysphoric mood  The patient is not nervous/anxious  Past Medical and Surgical History:     Past Medical History:   Diagnosis Date    Depression     Migraine     Seizures ([facility] [address] )        Past Surgical History:   Procedure Laterality Date     SECTION       SECTION      SHOULDER SURGERY      TUBAL LIGATION      TUMOR REMOVAL      from ear       Meds/Allergies:    Prior to Admission medications    Medication Sig Start Date End Date Taking?  Authorizing Provider   divalproex sodium (DEPAKOTE) 250 mg EC tablet Take 1 tablet (250 mg total) by mouth every 12 (twelve) hours for 21 days 18  Azul Anderson DO   famotidine (PEPCID) 20 mg tablet Take 1 tablet (20 mg total) by mouth 2 (two) times a day for 30 days 18  Tangela Holcomb MD   LORazepam (ATIVAN) 1 mg tablet Take 1 tablet (1 mg total) by mouth 2 (two) times a day as needed for anxiety or seizures for up to 3 days 18  Zofia Charles DO   lurasidone (LATUDA) 40 mg tablet Take 1 tablet (40 mg total) by mouth daily with dinner for 30 days 18  Tangela Holcomb MD   ondansetron (ZOFRAN-ODT) 4 mg disintegrating tablet Take 1 tablet (4 mg total) by mouth every 6 (six) hours as needed for nausea or vomiting 19   Evan Merrill MD   prazosin (MINIPRESS) 1 mg capsule Take 1 capsule (1 mg total) by mouth daily at bedtime for 30 days 6/18/18 7/18/18  Kathryn Grant MD     I have reviewed home medications with patient personally  Allergies:    Allergies   Allergen Reactions    Erythromycin Swelling    Peanut Oil Hives       Social History:     Marital Status: Legally      Substance Use History:   Social History     Substance and Sexual Activity   Alcohol Use Yes    Frequency: Monthly or less    Drinks per session: 1 or 2    Binge frequency: Never     Social History     Tobacco Use   Smoking Status Never Smoker   Smokeless Tobacco Never Used     Social History     Substance and Sexual Activity   Drug Use No       Family History:    Family History   Problem Relation Age of Onset    No Known Problems Mother     No Known Problems Father     No Known Problems Sister     No Known Problems Brother     No Known Problems Maternal Aunt     No Known Problems Paternal Aunt     No Known Problems Maternal Uncle     No Known Problems Paternal Uncle     No Known Problems Maternal Grandfather     No Known Problems Maternal Grandmother     No Known Problems Paternal Grandfather     No Known Problems Paternal Grandmother     No Known Problems Cousin     ADD / ADHD Neg Hx     Alcohol abuse Neg Hx     Anxiety disorder Neg Hx     Bipolar disorder Neg Hx     Completed Suicide  Neg Hx     Dementia Neg Hx     Depression Neg Hx     Drug abuse Neg Hx     OCD Neg Hx     Psychiatric Illness Neg Hx     Psychosis Neg Hx     Schizoaffective Disorder  Neg Hx     Schizophrenia Neg Hx     Self-Injury Neg Hx     Suicide Attempts Neg Hx        Physical Exam:     Vitals:   Blood Pressure: 108/63 (09/27/19 2315)  Pulse: 90 (09/27/19 2315)  Temperature: (!) 96 9 °F (36 1 °C) (09/27/19 2315)  Temp Source: Oral (09/27/19 1930)  Respirations: 17 (09/27/19 2315)  SpO2: 94 % (09/27/19 2315)    Physical Exam   Constitutional: She appears well-developed and well-nourished  No distress  HENT:   Head: Normocephalic and atraumatic  Nose: Nose normal    Mouth/Throat: No lacerations  No oropharyngeal exudate  Eyes: Conjunctivae and EOM are normal  Pupils are unequal    Anisocoria present; R pupil > L pupil  Pt states is chronic   Neck: Normal range of motion  Neck supple  No JVD present  Cardiovascular: Normal rate, regular rhythm, normal heart sounds and intact distal pulses  Pulmonary/Chest: Effort normal and breath sounds normal    Abdominal: Soft  Normal appearance  There is no tenderness  Musculoskeletal: Normal range of motion  She exhibits no edema or deformity  Lymphadenopathy:     She has no cervical adenopathy  Neurological: She has normal strength  No cranial nerve deficit or sensory deficit  She exhibits normal muscle tone  Skin: Skin is warm and dry  She is not diaphoretic  Psychiatric: She has a normal mood and affect  Vitals reviewed  Additional Data:     Lab Results: I have personally reviewed pertinent reports  Results from last 7 days   Lab Units 09/27/19  1849   WBC Thousand/uL 13 85*   HEMOGLOBIN g/dL 13 9   HEMATOCRIT % 42 9   PLATELETS Thousands/uL 406*   NEUTROS PCT % 53   LYMPHS PCT % 37   MONOS PCT % 8   EOS PCT % 2     Results from last 7 days   Lab Units 09/27/19  1849 09/27/19  1837   POTASSIUM mmol/L 4 0  --    CHLORIDE mmol/L 105  --    CO2 mmol/L 20*  --    CO2, I-STAT mmol/L  --  20*   BUN mg/dL 16  --    CREATININE mg/dL 1 31*  --    CALCIUM mg/dL 9 6  --    ALK PHOS U/L 96  --    ALT U/L 35  --    AST U/L 14  --    GLUCOSE, ISTAT mg/dl  --  86           Imaging: I have personally reviewed pertinent reports  and I have personally reviewed pertinent films in PACS    Ct Head Without Contrast    Result Date: 9/27/2019  Narrative: CT BRAIN - WITHOUT CONTRAST INDICATION:   Head trauma, headache  COMPARISON:  12/21/2018 TECHNIQUE:  CT examination of the brain was performed    In addition to axial images, coronal 2D reformatted images were created and submitted for interpretation  Radiation dose length product (DLP) for this visit:  889 45 mGy-cm   This examination, like all CT scans performed in the [facility], was performed utilizing techniques to minimize radiation dose exposure, including the use of iterative  reconstruction and automated exposure control  IMAGE QUALITY:  Diagnostic  FINDINGS: PARENCHYMA:  No intracranial mass, mass effect or midline shift  No CT signs of acute infarction  No acute parenchymal hemorrhage  VENTRICLES AND EXTRA-AXIAL SPACES:  Normal for the patient's age  VISUALIZED ORBITS AND PARANASAL SINUSES:  Unremarkable  CALVARIUM AND EXTRACRANIAL SOFT TISSUES:  Normal      Impression: No acute intracranial abnormality  Workstation performed: [id number]     Ct Cervical Spine Without Contrast    Result Date: 9/27/2019  Narrative: CT CERVICAL SPINE - WITHOUT CONTRAST INDICATION:   C-spine trauma, NEXUS/CCR positive, low risk  COMPARISON:  7/30/2017 TECHNIQUE:  CT examination of the cervical spine was performed without intravenous contrast   Contiguous axial images were obtained  Sagittal and coronal reconstructions were performed  Radiation dose length product (DLP) for this visit:  561 61 mGy-cm   This examination, like all CT scans performed in the [facility], was performed utilizing techniques to minimize radiation dose exposure, including the use of iterative  reconstruction and automated exposure control  IMAGE QUALITY:  Diagnostic  FINDINGS: ALIGNMENT:  Slight dextroscoliosis of the cervical spine without subluxation  VERTEBRAL BODIES:  No fracture  DEGENERATIVE CHANGES:  No significant cervical degenerative changes are noted  PREVERTEBRAL AND PARASPINAL SOFT TISSUES:  Unremarkable  THORACIC INLET:  Normal      Impression: No cervical spine fracture or traumatic malalignment    Workstation performed: [id number]       EKG, Pathology, and Other Studies Reviewed on Admission:   · EKG: Not obtained this encounter    Allscripts / Epic Records Reviewed: Yes     Portions of the record may have been created with voice recognition software  Occasional wrong word or "sound a like" substitutions may have occurred due to the inherent limitations of voice recognition software  Read the chart carefully and recognize, using context, where substitutions have occurred

## 2019-09-28 NOTE — UTILIZATION REVIEW
Initial Clinical Review    Admission: Date/Time/Statement: OBS 9/27 2109  Orders Placed This Encounter   Procedures    Place in Observation     Standing Status:   Standing     Number of Occurrences:   1     Order Specific Question:   Admitting Physician     Answer:   Kristina Aase [[id number]]     Order Specific Question:   Level of Care     Answer:   Med Surg [[id number]]     ED Arrival Information     Expected Arrival Acuity Means of Arrival Escorted By Service Admission Type    - 9/27/2019 18:23 Immediate Wheelchair Family Member Hospitalist Emergency    Arrival Complaint    seizure        Chief Complaint   Patient presents with    Seizure - Actively Seizing on Arrival     Patient seizing on arrival     Assessment/Plan: 31 yo female to ED from home w/ seizures , was in car with mother driving ,brought right to ED   In the car, she was noted to have convulsive and thrashing movements and possibly hit her head  Patient was given Ativan with resolution of her symptoms  She was not postictal   Patient admits to noncompliance with all of her medications  She has not been taking Depakote for the past month  When asked why, she states that she ran out  Given iv keppra and Depakote in ED   admitted oBS status plan to restart depakote , neuro consult , seizure precautions  hematuria consider bladder imagining   EVELYN cont IVF , bladder scan , renal US       9/28 Neuro consult   Cont neuro checks, seizure precautions, depaote   Would like to monitor 1 more night for seizure activity        9/28 Neuro consult   ED Triage Vitals   Temperature Pulse Respirations Blood Pressure SpO2   09/27/19 1930 09/27/19 1832 09/27/19 1843 09/27/19 1843 09/27/19 1832   98 3 °F (36 8 °C) (!) 115 20 149/69 93 %      Temp Source Heart Rate Source Patient Position - Orthostatic VS BP Location FiO2 (%)   09/27/19 1930 09/27/19 1832 09/27/19 1930 09/27/19 2030 --   Oral Monitor Lying Right arm       Pain Score       09/27/19 1930       No Pain Wt Readings from Last 1 Encounters:   07/11/19 111 kg (244 lb 9 6 oz)     Additional Vital Signs:   09/28/19 07:36:18  97 5 °F (36 4 °C)  96  18  119/78  92  96 %  None (Room air)     09/27/19 23:15:16  96 9 °F (36 1 °C)Abnormal   90  17  108/63  78  94 %       09/27/19 2230              None (Room air)     09/27/19 22:15:48  98 4 °F (36 9 °C)    18  107/67  80         09/27/19 2130    92  20  120/70    94 %  None (Room air)  Lying   09/27/19 2100    92  22  116/65    92 %  None (Room air)  Lying   09/27/19 2030    92  13  127/66    92 %  None (Room air)  Lying   09/27/19 1930  98 3 °F (36 8 °C)  92  18  118/73    94 %    Lying   09/27/19 1843      20  149/69               Pertinent Labs/Diagnostic Test Results:   9/27 CT head- wnl   9/27 CT cervical spine- wnl   Results from last 7 days   Lab Units 09/28/19  0012 09/27/19  1849 09/27/19  1837   WBC Thousand/uL  --  13 85*  --    HEMOGLOBIN g/dL  --  13 9  --    I STAT HEMOGLOBIN g/dl  --   --  13 9   HEMATOCRIT %  --  42 9  --    HEMATOCRIT, ISTAT %  --   --  41   PLATELETS Thousands/uL 346 406*  --    NEUTROS ABS Thousands/µL  --  7 27  --      Results from last 7 days   Lab Units 09/28/19  0507 09/27/19  1849 09/27/19  1837   SODIUM mmol/L 137 143  --    POTASSIUM mmol/L 4 8 4 0  --    CHLORIDE mmol/L 104 105  --    CO2 mmol/L 21 20*  --    CO2, I-STAT mmol/L  --   --  20*   AGAP mmol/L  --   --  19*   ANION GAP mmol/L 12 18*  --    BUN mg/dL 12 16  --    CREATININE mg/dL 0 91 1 31*  --    EGFR ml/min/1 73sq m 87 56 69   CALCIUM mg/dL 9 4 9 6  --    CALCIUM, IONIZED, ISTAT mmol/L  --   --  1 21     Results from last 7 days   Lab Units 09/27/19  1849   AST U/L 14   ALT U/L 35   ALK PHOS U/L 96   TOTAL PROTEIN g/dL 8 1   ALBUMIN g/dL 3 9   TOTAL BILIRUBIN mg/dL 0 40     Results from last 7 days   Lab Units 09/28/19  0507 09/27/19  1849   GLUCOSE RANDOM mg/dL 149* 89      Results from last 7 days   Lab Units 09/28/19  0102   PH HO 7 372   PCO2 HO mm Hg 35 0*   PO2 HO mm Hg 65 1*   HCO3 HO mmol/L 19 9*   BASE EXC HO mmol/L -4 6   O2 CONTENT HO ml/dL 18 2   O2 HGB, VENOUS % 90 5*     Results from last 7 days   Lab Units 09/27/19 2024   CLARITY UA  Clear   COLOR UA  Yellow   SPEC GRAV UA  1 020   PH UA  6 0   GLUCOSE UA mg/dl Negative   KETONES UA mg/dl Negative   BLOOD UA  Large*   PROTEIN UA mg/dl Negative   NITRITE UA  Negative   BILIRUBIN UA  Negative   UROBILINOGEN UA E U /dl 1 0   LEUKOCYTES UA  Negative   WBC UA /hpf 0-1*   RBC UA /hpf 1-2*   BACTERIA UA /hpf Moderate*   EPITHELIAL CELLS WET PREP /hpf None Seen     Results from last 7 days   Lab Units 09/27/19 2024   AMPH/METH  Negative   BARBITURATE UR  Negative   BENZODIAZEPINE UR  Negative   COCAINE UR  Negative   METHADONE URINE  Negative   OPIATE UR  Negative   PCP UR  Negative   THC UR  Negative     Results from last 7 days   Lab Units 97/47/07  2535   SALICYLATE LVL mg/dL <3*       ED Treatment:   Medication Administration from 09/27/2019 1823 to 09/27/2019 2209       Date/Time Order Dose Route Action     09/27/2019 1828 LORazepam (ATIVAN) 2 mg/mL injection 2 mg 2 mg Intravenous Given     09/27/2019 1853 sodium chloride 0 9 % bolus 1,000 mL 1,000 mL Intravenous New Bag     09/27/2019 1847 diphenhydrAMINE (BENADRYL) injection 50 mg 50 mg Intravenous Given     09/27/2019 1847 methylPREDNISolone sodium succinate (Solu-MEDROL) injection 125 mg 125 mg Intravenous Given     09/27/2019 2146 acetaminophen (TYLENOL) tablet 650 mg 650 mg Oral Given     09/27/2019 1853 levETIRAcetam (KEPPRA) 1,000 mg in sodium chloride 0 9 % 100 mL IVPB 1,000 mg Intravenous New Bag     09/27/2019 2145 valproic acid (DEPAKENE) capsule 500 mg 500 mg Oral Given        Past Medical History:   Diagnosis Date    Depression     Migraine     Seizures (HCC)      Present on Admission:   Seizure disorder ([facility] [address] )   Increased anion gap metabolic acidosis   EVELYN (acute kidney injury) ([facility] [address] )   Asymptomatic microscopic hematuria      Admitting Diagnosis: Dehydration [E86 0]  Status epilepticus ([facility] [address] ) [G40 901]  Seizure ([facility] [address] ) [R56 9]  Age/Sex: 30 y o  female  Admission Orders:    Current Facility-Administered Medications:  acetaminophen 650 mg Oral Q6H PRN     divalproex sodium 250 mg Oral Q12H [facility]     heparin (porcine) 5,000 Units Subcutaneous Q8H [facility]     multi-electrolyte 125 mL/hr Intravenous Continuous       Reg diet   Bladder scan   I&O   Up w/ assist   Seizure precautions    IP CONSULT TO NEUROLOGY    Network Utilization Review Department  Phone: [phone]; Fax [phone]  [email or web address]  ATTENTION: Please call with any questions or concerns to [phone]  and carefully listen to the prompts so that you are directed to the right person  Send all requests for admission clinical reviews, approved or denied determinations and any other requests to fax [phone]   All voicemails are confidential

## 2019-09-28 NOTE — ASSESSMENT & PLAN NOTE
Patient has history of seizures and pseudoseizures  This current episode likely secondary to medication noncompliance  Cannot rule out pseudo seizure, however  When asked why she was coming to the ED in the 1st place, patient initially could not give an answer  Then she said was cause her throat was tingling"  According the patient's mother, she had started seizing in the parking lot of a grocery store and then was driven to the ED  · Patient given valproic acid and Keppra in the ED  Epileptologist recommended against video EEG at this time    · Will restart Depakote  · Neurology consult  · Seizure precautions

## 2019-09-28 NOTE — ASSESSMENT & PLAN NOTE
Noted on urinalysis  This is not been present on previous urinalysis  Patient denies urinary symptoms  Not currently anemic    · Recommend continued follow-up  · Consider renal/bladder imaging

## 2019-09-28 NOTE — PLAN OF CARE
Problem: Potential for Falls  Goal: Patient will remain free of falls  Description  INTERVENTIONS:  - Assess patient frequently for physical needs  -  Identify cognitive and physical deficits and behaviors that affect risk of falls  -  [place] fall precautions as indicated by assessment   - Educate patient/family on patient safety including physical limitations  - Instruct patient to call for assistance with activity based on assessment  - Modify environment to reduce risk of injury  - Consider OT/PT consult to assist with strengthening/mobility  Outcome: Progressing     Problem: Nutrition/Hydration-ADULT  Goal: Nutrient/Hydration intake appropriate for improving, restoring or maintaining nutritional needs  Description  Monitor and assess patient's nutrition/hydration status for malnutrition  Collaborate with interdisciplinary team and initiate plan and interventions as ordered  Monitor patient's weight and dietary intake as ordered or per policy  Utilize nutrition screening tool and intervene as necessary  Determine patient's food preferences and provide high-protein, high-caloric foods as appropriate       INTERVENTIONS:  - Monitor oral intake, urinary output, labs, and treatment plans  - Assess nutrition and hydration status and recommend course of action  - Evaluate amount of meals eaten  - Assist patient with eating if necessary   - Allow adequate time for meals  - Recommend/ encourage appropriate diets, oral nutritional supplements, and vitamin/mineral supplements  - Assess need for intravenous fluids  - Provide specific nutrition/hydration education as appropriate  - Include patient/family/caregiver in decisions related to nutrition   Outcome: Progressing     Problem: NEUROSENSORY - ADULT  Goal: Achieves stable or improved neurological status  Description  INTERVENTIONS  - Monitor and report changes in neurological status  - Monitor vital signs such as temperature, blood pressure, glucose, and any other labs ordered   - Initiate measures to prevent increased intracranial pressure  - Monitor for seizure activity and implement precautions if appropriate      Outcome: Progressing  Goal: Remains free of injury related to seizures activity  Description  INTERVENTIONS  - Maintain airway, patient safety  and administer oxygen as ordered  - Monitor patient for seizure activity, document and report duration and description of seizure to physician/advanced practitioner  - If seizure occurs,  ensure patient safety during seizure  - Reorient patient post seizure  - Seizure pads on all 4 side rails  - Instruct patient/family to notify RN of any seizure activity including if an aura is experienced  - Instruct patient/family to call for assistance with activity based on nursing assessment  - Administer anti-seizure medications if ordered    Outcome: Progressing  Goal: Achieves maximal functionality and self care  Description  INTERVENTIONS  - Monitor swallowing and airway patency with patient fatigue and changes in neurological status  - Encourage and assist patient to increase activity and self care     - Encourage visually impaired, hearing impaired and aphasic patients to use assistive/communication devices  Outcome: Progressing     Problem: PAIN - ADULT  Goal: Verbalizes/displays adequate comfort level or baseline comfort level  Description  Interventions:  - Encourage patient to monitor pain and request assistance  - Assess pain using appropriate pain scale  - Administer analgesics based on type and severity of pain and evaluate response  - Implement non-pharmacological measures as appropriate and evaluate response  - Consider cultural and social influences on pain and pain management  - Notify physician/advanced practitioner if interventions unsuccessful or patient reports new pain  Outcome: Progressing     Problem: SAFETY ADULT  Goal: Maintain or return to baseline ADL function  Description  INTERVENTIONS:  -  Assess patient's ability to carry out ADLs; assess patient's baseline for ADL function and identify physical deficits which impact ability to perform ADLs (bathing, care of mouth/teeth, toileting, grooming, dressing, etc )  - Assess/evaluate cause of self-care deficits   - Assess range of motion  - Assess patient's mobility; develop plan if impaired  - Assess patient's need for assistive devices and provide as appropriate  - Encourage maximum independence but intervene and supervise when necessary  - Involve family in performance of ADLs  - Assess for home care needs following discharge   - Consider OT consult to assist with ADL evaluation and planning for discharge  - Provide patient education as appropriate  Outcome: Progressing  Goal: Maintain or return mobility status to optimal level  Description  INTERVENTIONS:  - Assess patient's baseline mobility status (ambulation, transfers, stairs, etc )    - Identify cognitive and physical deficits and behaviors that affect mobility  - Identify mobility aids required to assist with transfers and/or ambulation (gait belt, sit-to-stand, lift, walker, cane, etc )  - [place] fall precautions as indicated by assessment  - Record patient progress and toleration of activity level on Mobility SBAR; progress patient to next Phase/Stage  - Instruct patient to call for assistance with activity based on assessment  - Consider rehabilitation consult to assist with strengthening/weightbearing, etc   Outcome: Progressing     Problem: DISCHARGE PLANNING  Goal: Discharge to home or other facility with appropriate resources  Description  INTERVENTIONS:  - Identify barriers to discharge w/patient and caregiver  - Arrange for needed discharge resources and transportation as appropriate  - Identify discharge learning needs (meds, wound care, etc )  - Arrange for interpretive services to assist at discharge as needed  - Refer to Case Management Department for coordinating discharge planning if the patient needs post-hospital services based on physician/advanced practitioner order or complex needs related to functional status, cognitive ability, or social support system  Outcome: Progressing     Problem: Knowledge Deficit  Goal: Patient/family/caregiver demonstrates understanding of disease process, treatment plan, medications, and discharge instructions  Description  Complete learning assessment and assess knowledge base    Interventions:  - Provide teaching at level of understanding  - Provide teaching via preferred learning methods  Outcome: Progressing

## 2019-09-28 NOTE — ED NOTES
1  CC- Seizure--actively seizing in car     2  Orientation status- A&O x4    3  Abnormal labs/ focused assessment/ vitals- see labs/see vitals    4  Medication/drips- Keppra given    5  Narcotic time/ pain medication- Ativan see MAR     6  IV lines/drain/etc  18 g LAC     7  Isolation status- none     8  Skin- intact     9  Ambulation status- ambulates, provided pt with bedside commode and assistance given circumstances       10  Phone number- [facility], RN  09/27/19 2128

## 2019-09-28 NOTE — CONSULTS
Consultation - Neurology   Yudelka Horton 30 y o  female MRN: [id number]  Unit/Bed#: -01 Encounter: [id number]      Assessment:  Breakthrough seizure in setting of non-compliance, suspect likely epileptic based on prior semiology of her epileptic and non-epileptic events  Plan:  -continue neurochecks  -seizure precautions  -continue Depakote 750mg BID (home med), no need to adjust dosing  -no need for Keppra  -no need for EEG as it will not   -pt instructed that she may not drive given her seizure; [facility]  -if she remains seizure-free/stable into tomorrow, ok for discharge; will need follow-up in our epilepsy clinic      Reason for Consult / Principal Problem: seizure  Hx and PE limited by: N/A    HPI: Yudelka Horton is a 30 y o  female with 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 and bipolar disorder/depression who presents with seizure witnessed by mother in car  She was in the car with her mother when she suddenly developed a seizure - she has no recollection of the event herself but was told she was shaking all over  She hit her head against the dashboard during the event  Staff in ED witnessed seizure-like activity as well, described as convulsing and "thrashing"  Was given Ativan, which appeared to resolve the event, and was followed by some disorientation/confusion  She was given Keppra and Depacon prior to admission  She is now back to her baseline and has no other complaints other than generalized weakness after the event  She admits to not taking Depakote that is prescribed for her after failing to follow-up as an outpatient and running out of her medications  She was last seen by Neurology in June 2018 for seizure in setting of psychiatric admission for suicidal ideations  She was on Keppra at that time (previously on Vimpat as well) and this was switched to Depakote at that time (750mg BID)   She does admit to having both epileptic and non-epileptic seizures, the latter of which she knows occurs during times of stress or significant emotions  She describes her epileptic seizures as generalized tonic-clonic with loss of memory and her non-epileptic seizures as staring off with feeling of tingling in her body and has difficulty communicating but is able to maintain her memory  She last had a non-epileptic event last month when arguing with her   Her last epileptic seizure was over a year ago  She did have vEEG monitoring performed in 2017 that did not capture any spells  She was diagnosed with epilepsy in 2016  Inpatient consult to Neurology  Consult performed by: Guera Esqueda DO  Consult ordered by: Marcus Bryan DO        Review of Systems   Neurological: Positive for seizures  All other systems reviewed and are negative        Historical Information   Past Medical History:   Diagnosis Date    Depression     Migraine     Seizures (HCC)      Past Surgical History:   Procedure Laterality Date     SECTION       SECTION      SHOULDER SURGERY      TUBAL LIGATION      TUMOR REMOVAL      from ear     Social History   Social History     Substance and Sexual Activity   Alcohol Use Yes    Frequency: Monthly or less    Drinks per session: 1 or 2    Binge frequency: Never     Social History     Substance and Sexual Activity   Drug Use No     Social History     Tobacco Use   Smoking Status Never Smoker   Smokeless Tobacco Never Used     Family History:   Family History   Problem Relation Age of Onset    No Known Problems Mother     No Known Problems Father     No Known Problems Sister     No Known Problems Brother     No Known Problems Maternal Aunt     No Known Problems Paternal Aunt     No Known Problems Maternal Uncle     No Known Problems Paternal Uncle     No Known Problems Maternal Grandfather     No Known Problems Maternal Grandmother     No Known Problems Paternal Grandfather     No Known Problems Paternal Grandmother     No Known Problems Cousin     ADD / ADHD Neg Hx     Alcohol abuse Neg Hx     Anxiety disorder Neg Hx     Bipolar disorder Neg Hx     Completed Suicide  Neg Hx     Dementia Neg Hx     Depression Neg Hx     Drug abuse Neg Hx     OCD Neg Hx     Psychiatric Illness Neg Hx     Psychosis Neg Hx     Schizoaffective Disorder  Neg Hx     Schizophrenia Neg Hx     Self-Injury Neg Hx     Suicide Attempts Neg Hx        Review of previous medical records was completed  Meds/Allergies   all current active meds have been reviewed, current meds:   Current Facility-Administered Medications   Medication Dose Route Frequency    acetaminophen (TYLENOL) tablet 650 mg  650 mg Oral Q6H PRN    divalproex sodium (DEPAKOTE) EC tablet 500 mg  500 mg Oral Once    divalproex sodium (DEPAKOTE) EC tablet 750 mg  750 mg Oral Q12H [address]    heparin (porcine) subcutaneous injection 5,000 Units  5,000 Units Subcutaneous Q8H [address]    multi-electrolyte (PLASMALYTE-A/ISOLYTE-S PH 7 4) IV solution  125 mL/hr Intravenous Continuous    and PTA meds:   Prior to Admission Medications   Prescriptions Last Dose Informant Patient Reported? Taking?    LORazepam (ATIVAN) 1 mg tablet   No No   Sig: Take 1 tablet (1 mg total) by mouth 2 (two) times a day as needed for anxiety or seizures for up to 3 days   divalproex sodium (DEPAKOTE) 250 mg EC tablet   No No   Sig: Take 1 tablet (250 mg total) by mouth every 12 (twelve) hours for 21 days   famotidine (PEPCID) 20 mg tablet   No No   Sig: Take 1 tablet (20 mg total) by mouth 2 (two) times a day for 30 days   lurasidone (LATUDA) 40 mg tablet   No No   Sig: Take 1 tablet (40 mg total) by mouth daily with dinner for 30 days   ondansetron (ZOFRAN-ODT) 4 mg disintegrating tablet Unknown at Unknown time  No No   Sig: Take 1 tablet (4 mg total) by mouth every 6 (six) hours as needed for nausea or vomiting   prazosin (MINIPRESS) 1 mg capsule   No No   Sig: Take 1 capsule (1 mg total) by mouth daily at bedtime for 30 days      Facility-Administered Medications: None       Allergies   Allergen Reactions    Erythromycin Swelling    Peanut Oil Hives       Objective   Vitals:Blood pressure 119/78, pulse 96, temperature 97 5 °F (36 4 °C), resp  rate 18, SpO2 96 %, unknown if currently breastfeeding  ,There is no height or weight on file to calculate BMI  Intake/Output Summary (Last 24 hours) at 9/28/2019 0954  Last data filed at 9/28/2019 0800  Gross per 24 hour   Intake 2537 92 ml   Output 450 ml   Net 2087 92 ml       Invasive Devices: Invasive Devices     Peripheral Intravenous Line            Peripheral IV 09/27/19 Left;Upper Arm less than 1 day                Physical Exam   Constitutional: She is oriented to person, place, and time  She appears well-developed and well-nourished  HENT:   Head: Normocephalic and atraumatic  Eyes: Pupils are equal, round, and reactive to light  EOM are normal    Neck: Normal range of motion  Neck supple  Cardiovascular: Normal rate, regular rhythm and normal heart sounds  Pulmonary/Chest: Effort normal and breath sounds normal    Abdominal: Soft  Bowel sounds are normal    Neurological: She is alert and oriented to person, place, and time  She has normal strength  She has a normal Finger-Nose-Finger Test    Psychiatric: Her speech is normal      Neurologic Exam     Mental Status   Oriented to person, place, and time  Attention: normal  Concentration: normal    Speech: speech is normal   Level of consciousness: alert  Knowledge: good  Able to name object  Able to repeat  Normal comprehension  Cranial Nerves     CN II   Visual fields full to confrontation  CN III, IV, VI   Pupils are equal, round, and reactive to light  Extraocular motions are normal      CN V   Facial sensation intact  CN VII   Facial expression full, symmetric       CN VIII   Hearing: intact    CN IX, X   Palate: symmetric    CN XI   Right sternocleidomastoid strength: normal  Left sternocleidomastoid strength: normal  Right trapezius strength: normal  Left trapezius strength: normal    CN XII   Tongue deviation: none    Motor Exam     Strength   Strength 5/5 throughout  Sensory Exam   Light touch normal      Gait, Coordination, and Reflexes     Coordination   Finger to nose coordination: normal    Reflexes   Reflexes 2+ except as noted  Lab Results:   CBC:   Results from last 7 days   Lab Units 09/28/19  0012 09/27/19 1849 09/27/19  1837   WBC Thousand/uL  --  13 85*  --    RBC Million/uL  --  5 47*  --    HEMOGLOBIN g/dL  --  13 9  --    I STAT HEMOGLOBIN g/dl  --   --  13 9   HEMATOCRIT %  --  42 9  --    HEMATOCRIT, ISTAT %  --   --  41   MCV fL  --  78*  --    PLATELETS Thousands/uL 346 406*  --    , BMP/CMP:   Results from last 7 days   Lab Units 09/28/19  0507 09/27/19 1849 09/27/19  1837   SODIUM mmol/L 137 143  --    POTASSIUM mmol/L 4 8 4 0  --    CHLORIDE mmol/L 104 105  --    CO2 mmol/L 21 20*  --    CO2, I-STAT mmol/L  --   --  20*   BUN mg/dL 12 16  --    CREATININE mg/dL 0 91 1 31*  --    GLUCOSE, ISTAT mg/dl  --   --  86   CALCIUM mg/dL 9 4 9 6  --    AST U/L  --  14  --    ALT U/L  --  35  --    ALK PHOS U/L  --  96  --    EGFR ml/min/1 73sq m 87 56 69     Imaging Studies: I have personally reviewed pertinent films in PACS  EKG, Pathology, and Other Studies: I have personally reviewed pertinent reports      VTE Prophylaxis: Heparin    Code Status: Level 1 - Full Code

## 2019-09-28 NOTE — PLAN OF CARE
Problem: Potential for Falls  Goal: Patient will remain free of falls  Description  INTERVENTIONS:  - Assess patient frequently for physical needs  -  Identify cognitive and physical deficits and behaviors that affect risk of falls  -  [place] fall precautions as indicated by assessment   - Educate patient/family on patient safety including physical limitations  - Instruct patient to call for assistance with activity based on assessment  - Modify environment to reduce risk of injury  - Consider OT/PT consult to assist with strengthening/mobility  Outcome: Progressing     Problem: Nutrition/Hydration-ADULT  Goal: Nutrient/Hydration intake appropriate for improving, restoring or maintaining nutritional needs  Description  Monitor and assess patient's nutrition/hydration status for malnutrition  Collaborate with interdisciplinary team and initiate plan and interventions as ordered  Monitor patient's weight and dietary intake as ordered or per policy  Utilize nutrition screening tool and intervene as necessary  Determine patient's food preferences and provide high-protein, high-caloric foods as appropriate       INTERVENTIONS:  - Monitor oral intake, urinary output, labs, and treatment plans  - Assess nutrition and hydration status and recommend course of action  - Evaluate amount of meals eaten  - Assist patient with eating if necessary   - Allow adequate time for meals  - Recommend/ encourage appropriate diets, oral nutritional supplements, and vitamin/mineral supplements  - Assess need for intravenous fluids  - Provide specific nutrition/hydration education as appropriate  - Include patient/family/caregiver in decisions related to nutrition   Outcome: Progressing     Problem: NEUROSENSORY - ADULT  Goal: Achieves stable or improved neurological status  Description  INTERVENTIONS  - Monitor and report changes in neurological status  - Monitor vital signs such as temperature, blood pressure, glucose, and any other labs ordered   - Initiate measures to prevent increased intracranial pressure  - Monitor for seizure activity and implement precautions if appropriate      Outcome: Progressing  Goal: Remains free of injury related to seizures activity  Description  INTERVENTIONS  - Maintain airway, patient safety  and administer oxygen as ordered  - Monitor patient for seizure activity, document and report duration and description of seizure to physician/advanced practitioner  - If seizure occurs,  ensure patient safety during seizure  - Reorient patient post seizure  - Seizure pads on all 4 side rails  - Instruct patient/family to notify RN of any seizure activity including if an aura is experienced  - Instruct patient/family to call for assistance with activity based on nursing assessment  - Administer anti-seizure medications if ordered    Outcome: Progressing  Goal: Achieves maximal functionality and self care  Description  INTERVENTIONS  - Monitor swallowing and airway patency with patient fatigue and changes in neurological status  - Encourage and assist patient to increase activity and self care     - Encourage visually impaired, hearing impaired and aphasic patients to use assistive/communication devices  Outcome: Progressing     Problem: PAIN - ADULT  Goal: Verbalizes/displays adequate comfort level or baseline comfort level  Description  Interventions:  - Encourage patient to monitor pain and request assistance  - Assess pain using appropriate pain scale  - Administer analgesics based on type and severity of pain and evaluate response  - Implement non-pharmacological measures as appropriate and evaluate response  - Consider cultural and social influences on pain and pain management  - Notify physician/advanced practitioner if interventions unsuccessful or patient reports new pain  Outcome: Progressing     Problem: SAFETY ADULT  Goal: Maintain or return to baseline ADL function  Description  INTERVENTIONS:  -  Assess patient's ability to carry out ADLs; assess patient's baseline for ADL function and identify physical deficits which impact ability to perform ADLs (bathing, care of mouth/teeth, toileting, grooming, dressing, etc )  - Assess/evaluate cause of self-care deficits   - Assess range of motion  - Assess patient's mobility; develop plan if impaired  - Assess patient's need for assistive devices and provide as appropriate  - Encourage maximum independence but intervene and supervise when necessary  - Involve family in performance of ADLs  - Assess for home care needs following discharge   - Consider OT consult to assist with ADL evaluation and planning for discharge  - Provide patient education as appropriate  Outcome: Progressing  Goal: Maintain or return mobility status to optimal level  Description  INTERVENTIONS:  - Assess patient's baseline mobility status (ambulation, transfers, stairs, etc )    - Identify cognitive and physical deficits and behaviors that affect mobility  - Identify mobility aids required to assist with transfers and/or ambulation (gait belt, sit-to-stand, lift, walker, cane, etc )  - [place] fall precautions as indicated by assessment  - Record patient progress and toleration of activity level on Mobility SBAR; progress patient to next Phase/Stage  - Instruct patient to call for assistance with activity based on assessment  - Consider rehabilitation consult to assist with strengthening/weightbearing, etc   Outcome: Progressing     Problem: DISCHARGE PLANNING  Goal: Discharge to home or other facility with appropriate resources  Description  INTERVENTIONS:  - Identify barriers to discharge w/patient and caregiver  - Arrange for needed discharge resources and transportation as appropriate  - Identify discharge learning needs (meds, wound care, etc )  - Arrange for interpretive services to assist at discharge as needed  - Refer to Case Management Department for coordinating discharge planning if the patient needs post-hospital services based on physician/advanced practitioner order or complex needs related to functional status, cognitive ability, or social support system  Outcome: Progressing     Problem: Knowledge Deficit  Goal: Patient/family/caregiver demonstrates understanding of disease process, treatment plan, medications, and discharge instructions  Description  Complete learning assessment and assess knowledge base    Interventions:  - Provide teaching at level of understanding  - Provide teaching via preferred learning methods  Outcome: Progressing

## 2019-09-28 NOTE — TREATMENT PLAN
Patient seen and examined in the presence of patient's parents mom and dad  Today's history and physical reviewed current treatment plan discussed with patient and family  As per Neurology recommendation patient to be monitored overnight with possible DC in a m  If no further seizures experience  Dr Stefanie Oshea

## 2019-09-28 NOTE — ASSESSMENT & PLAN NOTE
Uncertain etiology  Possibly related to seizure  Patient not currently displaying signs of infection    · Will follow up [facility]  · Continue isolyte hydration

## 2019-09-29 LAB
BACTERIA UR QL AUTO: ABNORMAL /HPF
BILIRUB UR QL STRIP: NEGATIVE
CLARITY UR: CLEAR
COLOR UR: YELLOW
GLUCOSE UR STRIP-MCNC: NEGATIVE MG/DL
HGB UR QL STRIP.AUTO: ABNORMAL
KETONES UR STRIP-MCNC: ABNORMAL MG/DL
LEUKOCYTE ESTERASE UR QL STRIP: NEGATIVE
NITRITE UR QL STRIP: NEGATIVE
NON-SQ EPI CELLS URNS QL MICRO: ABNORMAL /HPF
PH UR STRIP.AUTO: 7 [PH]
PROT UR STRIP-MCNC: NEGATIVE MG/DL
RBC #/AREA URNS AUTO: ABNORMAL /HPF
SP GR UR STRIP.AUTO: <=1.005 (ref 1–1.03)
UROBILINOGEN UR QL STRIP.AUTO: 0.2 E.U./DL
WBC #/AREA URNS AUTO: ABNORMAL /HPF

## 2019-09-29 PROCEDURE — 99233 SBSQ HOSP IP/OBS HIGH 50: CPT | Performed by: INTERNAL MEDICINE

## 2019-09-29 PROCEDURE — 81001 URINALYSIS AUTO W/SCOPE: CPT | Performed by: INTERNAL MEDICINE

## 2019-09-29 RX ADMIN — HEPARIN SODIUM 5000 UNITS: 5000 INJECTION INTRAVENOUS; SUBCUTANEOUS at 06:02

## 2019-09-29 RX ADMIN — HEPARIN SODIUM 5000 UNITS: 5000 INJECTION INTRAVENOUS; SUBCUTANEOUS at 21:38

## 2019-09-29 RX ADMIN — DIVALPROEX SODIUM 750 MG: 500 TABLET, DELAYED RELEASE ORAL at 21:38

## 2019-09-29 RX ADMIN — DIVALPROEX SODIUM 750 MG: 500 TABLET, DELAYED RELEASE ORAL at 09:02

## 2019-09-29 NOTE — PROGRESS NOTES
Progress Note - Ml Watt 1995, 30 y o  female MRN: [id number]    Unit/Bed#: -01 Encounter: [id number]    Primary Care Provider: No primary care provider on file  Date and time admitted to hospital: 9/27/2019  6:32 PM        Asymptomatic microscopic hematuria  Assessment & Plan  Noted on urinalysis  This is not been present on previous urinalysis  Patient denies urinary symptoms  Not currently anemic  · Recommend continued follow-up  · Consider renal/bladder imaging  EVELYN (acute kidney injury) [facility])  Assessment & Plan  Uncertain etiology  Does not appear to be pre renal on labs  Occasional bacteria, but a large amount of blood   Continue IV fluid hydration with isolyte   Hold ACE/ARB, diuretics   Bladder scan   Renal ultrasound if warranted   Encourage adequate oral intake   Monitor intake/output   Avoid relative hypotension   Monitor serum creatinine   Avoid nephrotoxins  Increased anion gap metabolic acidosis  Assessment & Plan  Uncertain etiology  Possibly related to seizure  Patient not currently displaying signs of infection  · Will follow up VBG  · Continue isolyte hydration  * Seizure disorder [facility])  Assessment & Plan  Patient has history of seizures and pseudoseizures  This current episode likely secondary to medication noncompliance  Cannot rule out pseudo seizure, however  When asked why she was coming to the ED in the 1st place, patient initially could not give an answer  Then she said was cause her throat was tingling"  According the patient's mother, she had started seizing in the parking lot of a grocery store and then was driven to the ED  · Patient given valproic acid and Keppra in the ED  Epileptologist recommended against video EEG at this time  · Will restart Depakote    Consider noncompliance expressed/Will obtain Depakote levels  · Neurology consult  · Seizure precautions        VTE Pharmacologic Prophylaxis:   Pharmacologic: Enoxaparin (Lovenox)  Mechanical VTE Prophylaxis in Place: Yes    Patient Centered Rounds: I have performed bedside rounds with nursing staff today  Discussions with Specialists or Other Care Team Provider:  Yes    Education and Discussions with Family / Patient:  Yes/pains    Time Spent for Care: 30 minutes  More than 50% of total time spent on counseling and coordination of care as described above  Current Length of Stay: 0 day(s)    Current Patient Status: Observation   Certification Statement: The patient will continue to require additional inpatient hospital stay due to Ongoing symptoms of dizziness patient reports difficulty ambulating if symptoms do not improve will consider obtaining Depakote levels an MRI in a [facility] Discharge Plan / Estimated Discharge Date:  Planned for the a m  Code Status: Level 1 - Full Code      Subjective:   I feel so fatigued just difficult to get out of bed and occasionally dizzy, but I was able to walk to restroom okay this help     Objective:     Vitals:   Temp (24hrs), Av 4 °F (36 3 °C), Min:96 6 °F (35 9 °C), Max:98 2 °F (36 8 °C)    Temp:  [96 6 °F (35 9 °C)-98 2 °F (36 8 °C)] 98 2 °F (36 8 °C)  HR:  [78-95] 78  Resp:  [17] 17  BP: ()/(55-75) 90/55  SpO2:  [98 %] 98 %  Body mass index is 38 22 kg/m²  Input and Output Summary (last 24 hours):     No intake or output data in the 24 hours ending 19 1417    Physical Exam:     Physical Exam      Constitutional:  Well developed, well nourished, no acute distress, non-toxic appearance   Eyes:  PERRL, conjunctiva normal   HENT:  Atraumatic, external ears normal, nose normal, oropharynx moist, no pharyngeal exudates     Neck- normal range of motion, no tenderness, supple   Respiratory:  No respiratory distress, normal breath sounds, no rales, no wheezing   Cardiovascular:  Normal rate, normal rhythm, no murmurs, no gallops, no rubs   GI:  Soft, nondistended, normal bowel sounds, nontender, no organomegaly, no mass, no rebound, no guarding   :  No costovertebral angle tenderness   Musculoskeletal:  No edema, no tenderness, no deformities  Back- no tenderness  Integument:  Well hydrated, no rash   Lymphatic:  No lymphadenopathy noted   Neurologic:  Alert & oriented x 3, CN 2-12 normal, normal motor function, normal sensory function, no focal deficits noted   Psychiatric:  Speech and behavior appropriate           Additional Data:     Labs:    Results from last 7 days   Lab Units 09/28/19  0012 09/27/19  1849   WBC Thousand/uL  --  13 85*   HEMOGLOBIN g/dL  --  13 9   HEMATOCRIT %  --  42 9   PLATELETS Thousands/uL 346 406*   NEUTROS PCT %  --  53   LYMPHS PCT %  --  37   MONOS PCT %  --  8   EOS PCT %  --  2     Results from last 7 days   Lab Units 09/28/19  0507 09/27/19  1849  09/27/19  1837   POTASSIUM mmol/L 4 8 4 0   < >  --    CHLORIDE mmol/L 104 105   < >  --    CO2 mmol/L 21 20*   < >  --    CO2, I-STAT mmol/L  --   --   --  20*   BUN mg/dL 12 16   < >  --    CREATININE mg/dL 0 91 1 31*   < >  --    CALCIUM mg/dL 9 4 9 6   < >  --    ALK PHOS U/L  --  96  --   --    ALT U/L  --  35  --   --    AST U/L  --  14  --   --    GLUCOSE, ISTAT mg/dl  --   --   --  86    < > = values in this interval not displayed  * I Have Reviewed All Lab Data Listed Above  * Additional Pertinent Lab Tests Reviewed: Michelle 64 Admission Reviewed    Imaging:    Imaging Reports Reviewed Today Include:  Yes  Imaging Personally Reviewed by Myself Includes:  Yes    Recent Cultures (last 7 days):           Last 24 Hours Medication List:     Current Facility-Administered Medications:  acetaminophen 650 mg Oral Q6H PRN Kai Cary DO   divalproex sodium 750 mg Oral Q12H [facility] Dai Rizo DO   heparin (porcine) 5,000 Units Subcutaneous Q8H [facility] Kai Cary DO        Today, Patient Was Seen By: Kierra Shi    ** Please Note: This note has been constructed using a voice recognition system  **

## 2019-09-29 NOTE — UTILIZATION REVIEW
Continued Stay Review    Date: 9/29                         Current Patient Class: OBS  Current Level of Care: MS    HPI:24 y o  female initially admitted on 9/27    Assessment/Plan: monitoring over night for any additional seizures   Pt c/o dizziness and difficulty ambulating  If not improved plan is to obtain depakote level and MRI in am     Pertinent Labs/Diagnostic Results:   Results from last 7 days   Lab Units 09/28/19  0012 09/27/19 1849 09/27/19  1837   WBC Thousand/uL  --  13 85*  --    HEMOGLOBIN g/dL  --  13 9  --    I STAT HEMOGLOBIN g/dl  --   --  13 9   HEMATOCRIT %  --  42 9  --    HEMATOCRIT, ISTAT %  --   --  41   PLATELETS Thousands/uL 346 406*  --    NEUTROS ABS Thousands/µL  --  7 27  --      Results from last 7 days   Lab Units 09/28/19  0507 09/27/19 1849 09/27/19  1837   SODIUM mmol/L 137 143  --    POTASSIUM mmol/L 4 8 4 0  --    CHLORIDE mmol/L 104 105  --    CO2 mmol/L 21 20*  --    CO2, I-STAT mmol/L  --   --  20*   AGAP mmol/L  --   --  19*   ANION GAP mmol/L 12 18*  --    BUN mg/dL 12 16  --    CREATININE mg/dL 0 91 1 31*  --    EGFR ml/min/1 73sq m 87 56 69   CALCIUM mg/dL 9 4 9 6  --    CALCIUM, IONIZED, ISTAT mmol/L  --   --  1 21     Results from last 7 days   Lab Units 09/27/19  1849   AST U/L 14   ALT U/L 35   ALK PHOS U/L 96   TOTAL PROTEIN g/dL 8 1   ALBUMIN g/dL 3 9   TOTAL BILIRUBIN mg/dL 0 40     Results from last 7 days   Lab Units 09/28/19  1113   POC GLUCOSE mg/dl 219*     Results from last 7 days   Lab Units 09/28/19  0507 09/27/19  1849   GLUCOSE RANDOM mg/dL 149* 89     Results from last 7 days   Lab Units 09/28/19  0102   PH HO  7 372   PCO2 HO mm Hg 35 0*   PO2 HO mm Hg 65 1*   HCO3 HO mmol/L 19 9*   BASE EXC HO mmol/L -4 6   O2 CONTENT HO ml/dL 18 2   O2 HGB, VENOUS % 90 5*     Results from last 7 days   Lab Units 09/27/19 2024   CLARITY UA  Clear   COLOR UA  Yellow   SPEC GRAV UA  1 020   PH UA  6 0   GLUCOSE UA mg/dl Negative   KETONES UA mg/dl Negative BLOOD UA  Large*   PROTEIN UA mg/dl Negative   NITRITE UA  Negative   BILIRUBIN UA  Negative   UROBILINOGEN UA E U /dl 1 0   LEUKOCYTES UA  Negative   WBC UA /hpf 0-1*   RBC UA /hpf 1-2*   BACTERIA UA /hpf Moderate*   EPITHELIAL CELLS WET PREP /hpf None Seen     Results from last 7 days   Lab Units 09/27/19 2024   AMPH/METH  Negative   BARBITURATE UR  Negative   BENZODIAZEPINE UR  Negative   COCAINE UR  Negative   METHADONE URINE  Negative   OPIATE UR  Negative   PCP UR  Negative   THC UR  Negative     Results from last 7 days   Lab Units 39/41/82  5227   SALICYLATE LVL mg/dL <3*       Vital Signs:   07:52:28  98 2 °F (36 8 °C)  78    90/55  67  98          Medications:   Scheduled Meds:   Current Facility-Administered Medications:  acetaminophen 650 mg Oral Q6H PRN    divalproex sodium 750 mg Oral Q12H [address]    heparin (porcine) 5,000 Units Subcutaneous [facility] [address]        Discharge Plan: D     Network Utilization Review Department  Phone: [phone]; Fax [phone]  [email or web address]  ATTENTION: Please call with any questions or concerns to [phone]  and carefully listen to the prompts so that you are directed to the right person  Send all requests for admission clinical reviews, approved or denied determinations and any other requests to fax [phone]   All voicemails are confidential

## 2019-09-29 NOTE — ASSESSMENT & PLAN NOTE
Patient has history of seizures and pseudoseizures  This current episode likely secondary to medication noncompliance  Cannot rule out pseudo seizure, however  When asked why she was coming to the ED in the 1st place, patient initially could not give an answer  Then she said was cause her throat was tingling"  According the patient's mother, she had started seizing in the parking lot of a grocery store and then was driven to the ED  · Patient given valproic acid and Keppra in the ED  Epileptologist recommended against video EEG at this time  · Will restart Depakote    Consider noncompliance expressed/Will obtain Depakote levels  · Neurology consult  · Seizure precautions

## 2019-09-30 VITALS
TEMPERATURE: 97.8 F | BODY MASS INDEX: 38.3 KG/M2 | SYSTOLIC BLOOD PRESSURE: 120 MMHG | DIASTOLIC BLOOD PRESSURE: 74 MMHG | OXYGEN SATURATION: 95 % | WEIGHT: 244 LBS | HEART RATE: 77 BPM | RESPIRATION RATE: 19 BRPM | HEIGHT: 67 IN

## 2019-09-30 LAB
ANION GAP SERPL CALCULATED.3IONS-SCNC: 11 MMOL/L (ref 4–13)
BASOPHILS # BLD AUTO: 0.04 THOUSANDS/ΜL (ref 0–0.1)
BASOPHILS NFR BLD AUTO: 0 % (ref 0–1)
BUN SERPL-MCNC: 11 MG/DL (ref 5–25)
CALCIUM SERPL-MCNC: 8.8 MG/DL (ref 8.3–10.1)
CHLORIDE SERPL-SCNC: 106 MMOL/L (ref 100–108)
CO2 SERPL-SCNC: 24 MMOL/L (ref 21–32)
CREAT SERPL-MCNC: 0.8 MG/DL (ref 0.6–1.3)
EOSINOPHIL # BLD AUTO: 0.2 THOUSAND/ΜL (ref 0–0.61)
EOSINOPHIL NFR BLD AUTO: 2 % (ref 0–6)
ERYTHROCYTE [DISTWIDTH] IN BLOOD BY AUTOMATED COUNT: 14.1 % (ref 11.6–15.1)
GFR SERPL CREATININE-BSD FRML MDRD: 102 ML/MIN/1.73SQ M
GLUCOSE P FAST SERPL-MCNC: 92 MG/DL (ref 65–99)
GLUCOSE SERPL-MCNC: 92 MG/DL (ref 65–140)
HCT VFR BLD AUTO: 40 % (ref 34.8–46.1)
HGB BLD-MCNC: 13 G/DL (ref 11.5–15.4)
IMM GRANULOCYTES # BLD AUTO: 0.03 THOUSAND/UL (ref 0–0.2)
IMM GRANULOCYTES NFR BLD AUTO: 0 % (ref 0–2)
LYMPHOCYTES # BLD AUTO: 6.17 THOUSANDS/ΜL (ref 0.6–4.47)
LYMPHOCYTES NFR BLD AUTO: 54 % (ref 14–44)
MCH RBC QN AUTO: 25.6 PG (ref 26.8–34.3)
MCHC RBC AUTO-ENTMCNC: 32.5 G/DL (ref 31.4–37.4)
MCV RBC AUTO: 79 FL (ref 82–98)
MONOCYTES # BLD AUTO: 0.59 THOUSAND/ΜL (ref 0.17–1.22)
MONOCYTES NFR BLD AUTO: 5 % (ref 4–12)
NEUTROPHILS # BLD AUTO: 4.47 THOUSANDS/ΜL (ref 1.85–7.62)
NEUTS SEG NFR BLD AUTO: 39 % (ref 43–75)
NRBC BLD AUTO-RTO: 0 /100 WBCS
PLATELET # BLD AUTO: 331 THOUSANDS/UL (ref 149–390)
PMV BLD AUTO: 9.9 FL (ref 8.9–12.7)
POTASSIUM SERPL-SCNC: 4.1 MMOL/L (ref 3.5–5.3)
RBC # BLD AUTO: 5.07 MILLION/UL (ref 3.81–5.12)
SODIUM SERPL-SCNC: 141 MMOL/L (ref 136–145)
WBC # BLD AUTO: 11.5 THOUSAND/UL (ref 4.31–10.16)

## 2019-09-30 PROCEDURE — 85025 COMPLETE CBC W/AUTO DIFF WBC: CPT | Performed by: INTERNAL MEDICINE

## 2019-09-30 PROCEDURE — 99239 HOSP IP/OBS DSCHRG MGMT >30: CPT | Performed by: INTERNAL MEDICINE

## 2019-09-30 PROCEDURE — 80048 BASIC METABOLIC PNL TOTAL CA: CPT | Performed by: INTERNAL MEDICINE

## 2019-09-30 RX ORDER — DIVALPROEX SODIUM 250 MG/1
250 TABLET, DELAYED RELEASE ORAL EVERY 12 HOURS SCHEDULED
Qty: 42 TABLET | Refills: 0 | Status: SHIPPED | OUTPATIENT
Start: 2019-09-30 | End: 2019-10-21 | Stop reason: ALTCHOICE

## 2019-09-30 RX ADMIN — DIVALPROEX SODIUM 750 MG: 500 TABLET, DELAYED RELEASE ORAL at 09:12

## 2019-09-30 RX ADMIN — HEPARIN SODIUM 5000 UNITS: 5000 INJECTION INTRAVENOUS; SUBCUTANEOUS at 05:00

## 2019-09-30 NOTE — PLAN OF CARE
Problem: Potential for Falls  Goal: Patient will remain free of falls  Description  INTERVENTIONS:  - Assess patient frequently for physical needs  -  Identify cognitive and physical deficits and behaviors that affect risk of falls  -  [place] fall precautions as indicated by assessment   - Educate patient/family on patient safety including physical limitations  - Instruct patient to call for assistance with activity based on assessment  - Modify environment to reduce risk of injury  - Consider OT/PT consult to assist with strengthening/mobility  Outcome: Progressing     Problem: Nutrition/Hydration-ADULT  Goal: Nutrient/Hydration intake appropriate for improving, restoring or maintaining nutritional needs  Description  Monitor and assess patient's nutrition/hydration status for malnutrition  Collaborate with interdisciplinary team and initiate plan and interventions as ordered  Monitor patient's weight and dietary intake as ordered or per policy  Utilize nutrition screening tool and intervene as necessary  Determine patient's food preferences and provide high-protein, high-caloric foods as appropriate       INTERVENTIONS:  - Monitor oral intake, urinary output, labs, and treatment plans  - Assess nutrition and hydration status and recommend course of action  - Evaluate amount of meals eaten  - Assist patient with eating if necessary   - Allow adequate time for meals  - Recommend/ encourage appropriate diets, oral nutritional supplements, and vitamin/mineral supplements  - Assess need for intravenous fluids  - Provide specific nutrition/hydration education as appropriate  - Include patient/family/caregiver in decisions related to nutrition   Outcome: Progressing     Problem: NEUROSENSORY - ADULT  Goal: Achieves stable or improved neurological status  Description  INTERVENTIONS  - Monitor and report changes in neurological status  - Monitor vital signs such as temperature, blood pressure, glucose, and any other labs ordered   - Initiate measures to prevent increased intracranial pressure  - Monitor for seizure activity and implement precautions if appropriate      Outcome: Progressing  Goal: Remains free of injury related to seizures activity  Description  INTERVENTIONS  - Maintain airway, patient safety  and administer oxygen as ordered  - Monitor patient for seizure activity, document and report duration and description of seizure to physician/advanced practitioner  - If seizure occurs,  ensure patient safety during seizure  - Reorient patient post seizure  - Seizure pads on all 4 side rails  - Instruct patient/family to notify RN of any seizure activity including if an aura is experienced  - Instruct patient/family to call for assistance with activity based on nursing assessment  - Administer anti-seizure medications if ordered    Outcome: Progressing  Goal: Achieves maximal functionality and self care  Description  INTERVENTIONS  - Monitor swallowing and airway patency with patient fatigue and changes in neurological status  - Encourage and assist patient to increase activity and self care     - Encourage visually impaired, hearing impaired and aphasic patients to use assistive/communication devices  Outcome: Progressing     Problem: PAIN - ADULT  Goal: Verbalizes/displays adequate comfort level or baseline comfort level  Description  Interventions:  - Encourage patient to monitor pain and request assistance  - Assess pain using appropriate pain scale  - Administer analgesics based on type and severity of pain and evaluate response  - Implement non-pharmacological measures as appropriate and evaluate response  - Consider cultural and social influences on pain and pain management  - Notify physician/advanced practitioner if interventions unsuccessful or patient reports new pain  Outcome: Progressing     Problem: SAFETY ADULT  Goal: Maintain or return to baseline ADL function  Description  INTERVENTIONS:  -  Assess patient's ability to carry out ADLs; assess patient's baseline for ADL function and identify physical deficits which impact ability to perform ADLs (bathing, care of mouth/teeth, toileting, grooming, dressing, etc )  - Assess/evaluate cause of self-care deficits   - Assess range of motion  - Assess patient's mobility; develop plan if impaired  - Assess patient's need for assistive devices and provide as appropriate  - Encourage maximum independence but intervene and supervise when necessary  - Involve family in performance of ADLs  - Assess for home care needs following discharge   - Consider OT consult to assist with ADL evaluation and planning for discharge  - Provide patient education as appropriate  Outcome: Progressing  Goal: Maintain or return mobility status to optimal level  Description  INTERVENTIONS:  - Assess patient's baseline mobility status (ambulation, transfers, stairs, etc )    - Identify cognitive and physical deficits and behaviors that affect mobility  - Identify mobility aids required to assist with transfers and/or ambulation (gait belt, sit-to-stand, lift, walker, cane, etc )  - [place] fall precautions as indicated by assessment  - Record patient progress and toleration of activity level on Mobility SBAR; progress patient to next Phase/Stage  - Instruct patient to call for assistance with activity based on assessment  - Consider rehabilitation consult to assist with strengthening/weightbearing, etc   Outcome: Progressing     Problem: DISCHARGE PLANNING  Goal: Discharge to home or other facility with appropriate resources  Description  INTERVENTIONS:  - Identify barriers to discharge w/patient and caregiver  - Arrange for needed discharge resources and transportation as appropriate  - Identify discharge learning needs (meds, wound care, etc )  - Arrange for interpretive services to assist at discharge as needed  - Refer to Case Management Department for coordinating discharge planning if the patient needs post-hospital services based on physician/advanced practitioner order or complex needs related to functional status, cognitive ability, or social support system  Outcome: Progressing     Problem: Knowledge Deficit  Goal: Patient/family/caregiver demonstrates understanding of disease process, treatment plan, medications, and discharge instructions  Description  Complete learning assessment and assess knowledge base    Interventions:  - Provide teaching at level of understanding  - Provide teaching via preferred learning methods  Outcome: Progressing

## 2019-09-30 NOTE — PLAN OF CARE
Problem: Potential for Falls  Goal: Patient will remain free of falls  Description  INTERVENTIONS:  - Assess patient frequently for physical needs  -  Identify cognitive and physical deficits and behaviors that affect risk of falls  -  [place] fall precautions as indicated by assessment   - Educate patient/family on patient safety including physical limitations  - Instruct patient to call for assistance with activity based on assessment  - Modify environment to reduce risk of injury  - Consider OT/PT consult to assist with strengthening/mobility  Outcome: Progressing     Problem: Nutrition/Hydration-ADULT  Goal: Nutrient/Hydration intake appropriate for improving, restoring or maintaining nutritional needs  Description  Monitor and assess patient's nutrition/hydration status for malnutrition  Collaborate with interdisciplinary team and initiate plan and interventions as ordered  Monitor patient's weight and dietary intake as ordered or per policy  Utilize nutrition screening tool and intervene as necessary  Determine patient's food preferences and provide high-protein, high-caloric foods as appropriate       INTERVENTIONS:  - Monitor oral intake, urinary output, labs, and treatment plans  - Assess nutrition and hydration status and recommend course of action  - Evaluate amount of meals eaten  - Assist patient with eating if necessary   - Allow adequate time for meals  - Recommend/ encourage appropriate diets, oral nutritional supplements, and vitamin/mineral supplements  - Assess need for intravenous fluids  - Provide specific nutrition/hydration education as appropriate  - Include patient/family/caregiver in decisions related to nutrition   Outcome: Progressing     Problem: NEUROSENSORY - ADULT  Goal: Achieves stable or improved neurological status  Description  INTERVENTIONS  - Monitor and report changes in neurological status  - Monitor vital signs such as temperature, blood pressure, glucose, and any other labs ordered   - Initiate measures to prevent increased intracranial pressure  - Monitor for seizure activity and implement precautions if appropriate      Outcome: Progressing  Goal: Remains free of injury related to seizures activity  Description  INTERVENTIONS  - Maintain airway, patient safety  and administer oxygen as ordered  - Monitor patient for seizure activity, document and report duration and description of seizure to physician/advanced practitioner  - If seizure occurs,  ensure patient safety during seizure  - Reorient patient post seizure  - Seizure pads on all 4 side rails  - Instruct patient/family to notify RN of any seizure activity including if an aura is experienced  - Instruct patient/family to call for assistance with activity based on nursing assessment  - Administer anti-seizure medications if ordered    Outcome: Progressing  Goal: Achieves maximal functionality and self care  Description  INTERVENTIONS  - Monitor swallowing and airway patency with patient fatigue and changes in neurological status  - Encourage and assist patient to increase activity and self care     - Encourage visually impaired, hearing impaired and aphasic patients to use assistive/communication devices  Outcome: Progressing     Problem: PAIN - ADULT  Goal: Verbalizes/displays adequate comfort level or baseline comfort level  Description  Interventions:  - Encourage patient to monitor pain and request assistance  - Assess pain using appropriate pain scale  - Administer analgesics based on type and severity of pain and evaluate response  - Implement non-pharmacological measures as appropriate and evaluate response  - Consider cultural and social influences on pain and pain management  - Notify physician/advanced practitioner if interventions unsuccessful or patient reports new pain  Outcome: Progressing     Problem: SAFETY ADULT  Goal: Maintain or return to baseline ADL function  Description  INTERVENTIONS:  -  Assess patient's ability to carry out ADLs; assess patient's baseline for ADL function and identify physical deficits which impact ability to perform ADLs (bathing, care of mouth/teeth, toileting, grooming, dressing, etc )  - Assess/evaluate cause of self-care deficits   - Assess range of motion  - Assess patient's mobility; develop plan if impaired  - Assess patient's need for assistive devices and provide as appropriate  - Encourage maximum independence but intervene and supervise when necessary  - Involve family in performance of ADLs  - Assess for home care needs following discharge   - Consider OT consult to assist with ADL evaluation and planning for discharge  - Provide patient education as appropriate  Outcome: Progressing  Goal: Maintain or return mobility status to optimal level  Description  INTERVENTIONS:  - Assess patient's baseline mobility status (ambulation, transfers, stairs, etc )    - Identify cognitive and physical deficits and behaviors that affect mobility  - Identify mobility aids required to assist with transfers and/or ambulation (gait belt, sit-to-stand, lift, walker, cane, etc )  - [place] fall precautions as indicated by assessment  - Record patient progress and toleration of activity level on Mobility SBAR; progress patient to next Phase/Stage  - Instruct patient to call for assistance with activity based on assessment  - Consider rehabilitation consult to assist with strengthening/weightbearing, etc   Outcome: Progressing     Problem: DISCHARGE PLANNING  Goal: Discharge to home or other facility with appropriate resources  Description  INTERVENTIONS:  - Identify barriers to discharge w/patient and caregiver  - Arrange for needed discharge resources and transportation as appropriate  - Identify discharge learning needs (meds, wound care, etc )  - Arrange for interpretive services to assist at discharge as needed  - Refer to Case Management Department for coordinating discharge planning if the patient needs post-hospital services based on physician/advanced practitioner order or complex needs related to functional status, cognitive ability, or social support system  Outcome: Progressing     Problem: Knowledge Deficit  Goal: Patient/family/caregiver demonstrates understanding of disease process, treatment plan, medications, and discharge instructions  Description  Complete learning assessment and assess knowledge base    Interventions:  - Provide teaching at level of understanding  - Provide teaching via preferred learning methods  Outcome: Progressing

## 2019-09-30 NOTE — UTILIZATION REVIEW
Continued Stay Review    Date: 09/30/2019                         Current Patient Class: Observation  Current Level of Care:Med/Surg    HPI:24 y o  female initially admitted on 09/27/2019    Assessment/Plan: DC order entered    Pertinent Labs/Diagnostic Results:   Results from last 7 days   Lab Units 09/30/19 0459 09/28/19  0012 09/27/19  1849 09/27/19  1837   WBC Thousand/uL 11 50*  --  13 85*  --    HEMOGLOBIN g/dL 13 0  --  13 9  --    I STAT HEMOGLOBIN g/dl  --   --   --  13 9   HEMATOCRIT % 40 0  --  42 9  --    HEMATOCRIT, ISTAT %  --   --   --  41   PLATELETS Thousands/uL 331 346 406*  --    NEUTROS ABS Thousands/µL 4 47  --  7 27  --      Results from last 7 days   Lab Units 09/30/19 0459 09/28/19 0507 09/27/19  1849 09/27/19  1837   SODIUM mmol/L 141 137 143  --    POTASSIUM mmol/L 4 1 4 8 4 0  --    CHLORIDE mmol/L 106 104 105  --    CO2 mmol/L 24 21 20*  --    CO2, I-STAT mmol/L  --   --   --  20*   AGAP mmol/L  --   --   --  19*   ANION GAP mmol/L 11 12 18*  --    BUN mg/dL 11 12 16  --    CREATININE mg/dL 0 80 0 91 1 31*  --    EGFR ml/min/1 73sq m 102 87 56 69   CALCIUM mg/dL 8 8 9 4 9 6  --    CALCIUM, IONIZED, ISTAT mmol/L  --   --   --  1 21     Results from last 7 days   Lab Units 09/27/19  1849   AST U/L 14   ALT U/L 35   ALK PHOS U/L 96   TOTAL PROTEIN g/dL 8 1   ALBUMIN g/dL 3 9   TOTAL BILIRUBIN mg/dL 0 40     Results from last 7 days   Lab Units 09/28/19  1113   POC GLUCOSE mg/dl 219*     Results from last 7 days   Lab Units 09/30/19 0459 09/28/19  0507 09/27/19  1849   GLUCOSE RANDOM mg/dL 92 149* 89     Results from last 7 days   Lab Units 09/28/19  0102   PH HO  7 372   PCO2 HO mm Hg 35 0*   PO2 HO mm Hg 65 1*   HCO3 HO mmol/L 19 9*   BASE EXC HO mmol/L -4 6   O2 CONTENT HO ml/dL 18 2   O2 HGB, VENOUS % 90 5*     Results from last 7 days   Lab Units 09/29/19  1738 09/27/19 2024   CLARITY UA  Clear Clear   COLOR UA  Yellow Yellow   SPEC GRAV UA  <=1 005 1 020   PH UA  7 0 6 0 GLUCOSE UA mg/dl Negative Negative   KETONES UA mg/dl Trace* Negative   BLOOD UA  Small* Large*   PROTEIN UA mg/dl Negative Negative   NITRITE UA  Negative Negative   BILIRUBIN UA  Negative Negative   UROBILINOGEN UA E U /dl 0 2 1 0   LEUKOCYTES UA  Negative Negative   WBC UA /hpf None Seen 0-1*   RBC UA /hpf 2-4* 1-2*   BACTERIA UA /hpf Occasional Moderate*   EPITHELIAL CELLS WET PREP /hpf Occasional None Seen     Results from last 7 days   Lab Units 09/27/19 2024   AMPH/METH  Negative   BARBITURATE UR  Negative   BENZODIAZEPINE UR  Negative   COCAINE UR  Negative   METHADONE URINE  Negative   OPIATE UR  Negative   PCP UR  Negative   THC UR  Negative     Results from last 7 days   Lab Units 44/95/90  1683   SALICYLATE LVL mg/dL <3*     Vital Signs: /74   Pulse 77   Temp 97 8 °F (36 6 °C)   Resp 19   Ht 5' 7" (1 702 m)   Wt 111 kg (244 lb)   SpO2 95%   BMI 38 22 kg/m²   Date and Time Eye Opening Best Verbal Response Best Motor Response Devi Coma Scale Score   09/29/19 2011 4 5 6 15       Medications:   Scheduled Meds:   Current Facility-Administered Medications:  acetaminophen 650 mg Oral Q6H PRN   divalproex sodium 750 mg Oral Q12H [address]   heparin (porcine) 5,000 Units Subcutaneous [facility] [address]       Discharge Plan: Home    Network Utilization Review Department  Phone: [phone]; Fax [phone]  [email or web address]  ATTENTION: Please call with any questions or concerns to [phone]  and carefully listen to the prompts so that you are directed to the right person  Send all requests for admission clinical reviews, approved or denied determinations and any other requests to fax [phone]   All voicemails are confidential

## 2019-09-30 NOTE — DISCHARGE SUMMARY
Discharge Summary - Alisha Buenrostro 30 y o  female MRN: [id number]    Unit/Bed#: -01 Encounter: [id number]    Admission Date:   Admission Orders (From admission, onward)     Ordered        09/27/19 2109  Place in Observation  Once                     Admitting Diagnosis: Dehydration [E86 0]  Status epilepticus ([address] ) [G40 901]  Seizure ([address] ) [R56 9]          Procedures Performed:   Orders Placed This Encounter   Procedures    Critical Care       HPI/Summary of Hospital Course:       Alisha Buenrostro is a 30 y o  female who presents with seizures  She has known history of bipolar and seizure disorder  Patient was in the car with her mother today when she started having a seizure and was driven to the ED  Seizure activity was witnessed by staff who assisted her into the ED  In the car, she was noted to have convulsive and thrashing movements and possibly hit her head  Patient was given Ativan with resolution of her symptoms  She was not postictal   Patient admits to noncompliance with all of her medications  She has not been taking Depakote for the past month  When asked why, she states that she ran out  Did not get additional refills due to insurance reasons  In the ED, case was discussed with hepatology  She was given valproate and Keppra IV and admitted with plans for neurology consult  When asked why she was coming to the ED in the 1st place, patient initially could not give an answer  Then she said was cause her throat was tingling"  She currently complains of headache and neck pain  No tongue laceration, no bowel or bladder incontinence  Also on workup, she was noted to have EVELYN, acidosis, and hematuria  Patient was found to have abnormal UA primarily blood within urinalysis we had requested straight cath however patient adamantly refused that stating she was undergoing menstruation at this time and does not want any further further testing    Patient was monitored over next 48 hours for any further seizure-like activity remained hemodynamically stable Neurology evaluation requested to discontinue Keppra at this time and continue Depakote only  Patient was told by Neurology team no driving I have also recommended this to the patient the patient reports she does not drive for the last 2 years or so  This was discussed in the presence of patient's mother  Patient discharge exam as following:      Constitutional:  Well developed, well nourished, no acute distress, non-toxic appearance   Eyes:  PERRL, conjunctiva normal   HENT:  Atraumatic, external ears normal, nose normal, oropharynx moist, no pharyngeal exudates  Neck- normal range of motion, no tenderness, supple   Respiratory:  No respiratory distress, normal breath sounds, no rales, no wheezing   Cardiovascular:  Normal rate, normal rhythm, no murmurs, no gallops, no rubs   GI:  Soft, nondistended, normal bowel sounds, nontender, no organomegaly, no mass, no rebound, no guarding   :  No costovertebral angle tenderness   Musculoskeletal:  No edema, no tenderness, no deformities  Back- no tenderness  Integument:  Well hydrated, no rash   Lymphatic:  No lymphadenopathy noted   Neurologic:  Alert & oriented x 3, CN 2-12 normal, normal motor function, normal sensory function, no focal deficits noted   Psychiatric:  Speech and behavior appropriate              Significant Findings, Care, Treatment and Services Provided:  As per description    Complications:  No known complications    Discharge Diagnosis:   Epilepsy  Breakthrough seizure    Resolved Problems  Date Reviewed: 9/27/2019    None          Condition at Discharge: good         Discharge instructions/Information to patient and family:   See after visit summary for information provided to patient and family  Provisions for Follow-Up Care:  See after visit summary for information related to follow-up care and any pertinent home health orders        PCP: No primary care provider on file     Disposition: Home    Planned Readmission: No      Discharge Statement   I spent 40 minutes discharging the patient  This time was spent on the day of discharge  I had direct contact with the patient on the day of discharge  Additional documentation is required if more than 30 minutes were spent on discharge  Discharge Medications:  See after visit summary for reconciled discharge medications provided to patient and family

## 2019-09-30 NOTE — SOCIAL WORK
LOS OBS GMLOS none  CM met with patient in her room  Patient states she is aware she is cleared for discharge  Patient states her mother-Tameka will transport her home today  Patient states she is currently working with her ex  to reinstate her insurance  Patient's  is currently not being timely with making sure patient has insurance  Patient states she does not have any needs at this moment  Patient will make follow visit to neurologist and she states she can fill her scripts    Nurse and AARON are informed

## 2019-10-06 NOTE — ED NOTES
HOSPITALIST DAILY PROGRESS NOTE      ADMISSION DATE:  10/5/2019  DATE:  10/6/2019  CURRENT HOSPITAL DAY:  Hospital Day: 2  ATTENDING PHYSICIAN:  Shayan Barone MD      Chief Complaint: Chest pain      History:  83 year old female with history of diastolic heart failure (EF 55% on 8/6/19), CAD s/p NOEMI to OM1 in 2016, NOEMI to moderate pulmonary hypertension (RVSP 67.2mmHg), recurrent right pleural effusion, HTN, CKD stage III, history of breast cancer s/p partial mastectomy and radiation therapy to the right breast, cervical spinal stenosis, failed back surgical syndrome, anemia of chronic inflammation, who presents with chest pain and shortness of breath.    SUBJECTIVE:   Still feels short of breath. Denies having any chest pain. No other complaints.    Complete review of systems done, pertinent positives and negatives are mentioned above.    MEDICATIONS:    • atorvastatin  80 mg Oral Nightly   • doxazosin  4 mg Oral Nightly   • ferrous sulfate  325 mg Oral Daily with breakfast   • fluticasone  2 spray Each Nare BID   • fluticasone-vilanterol  1 puff Inhalation Daily Resp   • folic acid  800 mcg Oral Daily   • furosemide  80 mg Oral Daily   • gabapentin  200 mg Oral QAM   • gabapentin  100 mg Oral QHS   • hydrALAZINE  100 mg Oral 4 times per day   • isosorbide mononitrate  60 mg Oral Daily   • latanoprost  1 drop Both Eyes Nightly   • metoPROLOL tartrate  25 mg Oral 2 times per day   • NIFEdipine XL  90 mg Oral Daily   • pantoprazole  40 mg Oral Nightly   • ranolazine  500 mg Oral 2 times per day   • senna  1 tablet Oral Daily   • sertraline  200 mg Oral Daily   • sodium chloride (PF)  2 mL Intracatheter 2 times per day   • aspirin  81 mg Oral Daily       OBJECTIVE:   VITAL SIGNS:     Vitals Last Value 24-Hour Range   Temperature 99.2 °F (37.3 °C) (10/06/19 0502) Temp  Min: 98.5 °F (36.9 °C)  Max: 99.2 °F (37.3 °C)   Pulse 70 (10/06/19 0831) Pulse  Min: 63  Max: 70   Respiratory 16 (10/06/19 0815) Resp  Min: 13   Pt's boyfriend left bedside  Pt's boyfriend took their belongings out of visitor locker  Pt currently laying in bed and watching the television with the lights off  No signs of distress at this time  Will continue to monitor        Jerri Merritt  06/10/18 0020 Max: 26   Non-Invasive  Blood Pressure 120/50 (10/06/19 0831) BP  Min: 120/50  Max: 201/82   Pulse Oximetry 97 % (10/06/19 0502) SpO2  Min: 95 %  Max: 98 %     INTAKE/OUTPUT:    I/O last 3 completed shifts:  In: -   Out: 700 [Urine:700]      Blood pressure 120/50, pulse 70, temperature 99.2 °F (37.3 °C), temperature source Oral, resp. rate 16, height 5' 4\" (1.626 m), weight 70 kg, SpO2 97 %.  PHYSICAL EXAM:    General Appearance:  Awake and alert, no distress.  Neck:  Supple, no jugular venous distention.  Lungs:  Diminished breath sounds on the right lung base   Heart:  Normal rate and rhythm, S1 and S2 normal, no murmurs.  Abdomen:  Soft, nondistended, nontender, normal bowel tones.  Extremities:  Extremities normal, atraumatic, no cyanosis or edema.  Neuro:  Awake and alert, no focal neurologic deficit.    LABORATORY DATA:    Recent Labs   Lab 10/06/19  0747 10/06/19  0547 10/05/19  1710   WBC  --   --  7.3   HCT  --   --  41.2   HGB  --   --  12.8   PLT  --   --  321   INR  --   --  1.3   PTT 59*  --  29   SODIUM  --  139 138   POTASSIUM  --  3.9 4.1   CHLORIDE  --  103 102   CO2  --  29 30   CALCIUM  --  8.7 8.8   GLUCOSE  --  97 98   BUN  --  34* 36*   CREATININE  --  2.60* 2.76*   AST  --   --  29   GPT  --   --  14   ALKPT  --   --  75   BILIRUBIN  --   --  0.4   ALBUMIN  --   --  2.9*   LIPA  --   --  <50*   GFRNA  --  16 15       IMAGING STUDIES:  Xr Chest Ap Or Pa - Portable    Result Date: 10/5/2019  EXAM: XR CHEST AP OR PA DATE OF EXAM: 10/5/2019 5:26 PM. CLINICAL HISTORY: chest pain.    COMPARISON: 9/19/2019 and priors FINDINGS: Overlying electrocardiogram leads. Large layering right pleural effusion. Right lower lung atelectasis. Normal cardiomediastinal silhouette.     IMPRESSION: 1. Large right pleural effusion, increased compared to prior examination.                                ASSESSMENT AND PLAN:     1.NSTEMI, known CAD  -Continue to trend troponins  -Continue aspirin, heparin drip, statin,  Imdur, Ranexa  -Cardiology has been consulted    2. Recurrent right sided transudate pleural effusion   - there was discussion of a pleurx catheter to be done as outpatient   - Pulmonology consulted     3. CAD s/p NOEMI to OM1 in 2016    4. Chronic diastolic heart failure.   -Continue her Lasix. We'll wait for further recommendations from cardiology.    5. HTN  - continue home metoprolol, nifedipine, Imdur, atorvastatin, hydralazine, lasix     6.  CKD stage III  - Cr at baseline of around 2.7  - trend     DVT/VTE Prophylaxis:  Heparin   CODE STATUS:  Selective Treatment/DNR    DISCHARGE PLAN:  TBD    Spent 35 minutes, more than half in coordination of care and counseling.       Shayan Barone MD  [facility] [place] Hospitalist  [phone]

## 2020-01-15 ENCOUNTER — APPOINTMENT (EMERGENCY)
Dept: RADIOLOGY | Facility: HOSPITAL | Age: 25
End: 2020-01-15
Payer: COMMERCIAL

## 2020-01-15 ENCOUNTER — APPOINTMENT (EMERGENCY)
Dept: CT IMAGING | Facility: HOSPITAL | Age: 25
End: 2020-01-15
Payer: COMMERCIAL

## 2020-01-15 ENCOUNTER — HOSPITAL ENCOUNTER (EMERGENCY)
Facility: HOSPITAL | Age: 25
Discharge: HOME | End: 2020-01-15
Attending: EMERGENCY MEDICINE | Admitting: EMERGENCY MEDICINE
Payer: COMMERCIAL

## 2020-01-15 VITALS
BODY MASS INDEX: 39.05 KG/M2 | SYSTOLIC BLOOD PRESSURE: 106 MMHG | OXYGEN SATURATION: 98 % | WEIGHT: 249.34 LBS | RESPIRATION RATE: 17 BRPM | DIASTOLIC BLOOD PRESSURE: 58 MMHG | HEART RATE: 87 BPM

## 2020-01-15 DIAGNOSIS — R56.9 SEIZURE (HCC): Primary | ICD-10-CM

## 2020-01-15 DIAGNOSIS — G40.909 SEIZURE DISORDER (HCC): ICD-10-CM
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
## 2020-01-15 DIAGNOSIS — S09.90XA CLOSED HEAD INJURY, INITIAL ENCOUNTER: ICD-10-CM

## 2020-01-15 LAB
ALBUMIN SERPL BCP-MCNC: 3.6 G/DL (ref 3.5–5)
ALP SERPL-CCNC: 105 U/L (ref 46–116)
ALT SERPL W P-5'-P-CCNC: 35 U/L (ref 12–78)
ANION GAP SERPL CALCULATED.3IONS-SCNC: 9 MMOL/L (ref 4–13)
AST SERPL W P-5'-P-CCNC: 15 U/L (ref 5–45)
ATRIAL RATE: 78 BPM
BASE EX.OXY STD BLDV CALC-SCNC: 80 % (ref 60–80)
BASE EXCESS BLDV CALC-SCNC: -3.1 MMOL/L
BASOPHILS # BLD AUTO: 0.03 THOUSANDS/ΜL (ref 0–0.1)
BASOPHILS NFR BLD AUTO: 0 % (ref 0–1)
BILIRUB SERPL-MCNC: 0.6 MG/DL (ref 0.2–1)
BILIRUB UR QL STRIP: NEGATIVE
BUN SERPL-MCNC: 9 MG/DL (ref 5–25)
CALCIUM SERPL-MCNC: 8.7 MG/DL (ref 8.3–10.1)
CHLORIDE SERPL-SCNC: 106 MMOL/L (ref 100–108)
CLARITY UR: CLEAR
CO2 SERPL-SCNC: 26 MMOL/L (ref 21–32)
COLOR UR: YELLOW
CREAT SERPL-MCNC: 0.76 MG/DL (ref 0.6–1.3)
EOSINOPHIL # BLD AUTO: 0.17 THOUSAND/ΜL (ref 0–0.61)
EOSINOPHIL NFR BLD AUTO: 2 % (ref 0–6)
ERYTHROCYTE [DISTWIDTH] IN BLOOD BY AUTOMATED COUNT: 14.9 % (ref 11.6–15.1)
EXT PREG TEST URINE: NEGATIVE
EXT. CONTROL ED NAV: NORMAL
GFR SERPL CREATININE-BSD FRML MDRD: 109 ML/MIN/1.73SQ M
GLUCOSE SERPL-MCNC: 91 MG/DL (ref 65–140)
GLUCOSE UR STRIP-MCNC: NEGATIVE MG/DL
HCO3 BLDV-SCNC: 23 MMOL/L (ref 24–30)
HCT VFR BLD AUTO: 39.8 % (ref 34.8–46.1)
HGB BLD-MCNC: 13 G/DL (ref 11.5–15.4)
HGB UR QL STRIP.AUTO: NEGATIVE
KETONES UR STRIP-MCNC: NEGATIVE MG/DL
LEUKOCYTE ESTERASE UR QL STRIP: NEGATIVE
LYMPHOCYTES # BLD AUTO: 2.61 THOUSANDS/ΜL (ref 0.6–4.47)
LYMPHOCYTES NFR BLD AUTO: 27 % (ref 14–44)
MCH RBC QN AUTO: 25.6 PG (ref 26.8–34.3)
MCHC RBC AUTO-ENTMCNC: 32.7 G/DL (ref 31.4–37.4)
MCV RBC AUTO: 79 FL (ref 82–98)
MONOCYTES # BLD AUTO: 0.55 THOUSAND/ΜL (ref 0.17–1.22)
MONOCYTES NFR BLD AUTO: 6 % (ref 4–12)
NEUTROPHILS # BLD AUTO: 6.25 THOUSANDS/ΜL (ref 1.85–7.62)
NEUTS SEG NFR BLD AUTO: 65 % (ref 43–75)
NITRITE UR QL STRIP: NEGATIVE
O2 CT BLDV-SCNC: 15.7 ML/DL
P AXIS: 10 DEGREES
PCO2 BLDV: 45.1 MM HG (ref 42–50)
PH BLDV: 7.33 [PH] (ref 7.3–7.4)
PH UR STRIP.AUTO: 5.5 [PH]
PLATELET # BLD AUTO: 314 THOUSANDS/UL (ref 149–390)
PMV BLD AUTO: 10.3 FL (ref 8.9–12.7)
PO2 BLDV: 48.5 MM HG (ref 35–45)
POTASSIUM SERPL-SCNC: 4.1 MMOL/L (ref 3.5–5.3)
PR INTERVAL: 166 MS
PROT SERPL-MCNC: 7.6 G/DL (ref 6.4–8.2)
PROT UR STRIP-MCNC: NEGATIVE MG/DL
QRS AXIS: 3 DEGREES
QRSD INTERVAL: 90 MS
QT INTERVAL: 368 MS
QTC INTERVAL: 419 MS
RBC # BLD AUTO: 5.07 MILLION/UL (ref 3.81–5.12)
SODIUM SERPL-SCNC: 141 MMOL/L (ref 136–145)
SP GR UR STRIP.AUTO: 1.02 (ref 1–1.03)
T WAVE AXIS: 9 DEGREES
UROBILINOGEN UR QL STRIP.AUTO: 0.2 E.U./DL
VALPROATE SERPL-MCNC: <3 UG/ML (ref 50–100)
VENTRICULAR RATE: 78 BPM
WBC # BLD AUTO: 9.61 THOUSAND/UL (ref 4.31–10.16)

## 2020-01-15 PROCEDURE — 93005 ELECTROCARDIOGRAM TRACING: CPT

## 2020-01-15 PROCEDURE — 93010 ELECTROCARDIOGRAM REPORT: CPT | Performed by: INTERNAL MEDICINE

## 2020-01-15 PROCEDURE — 36415 COLL VENOUS BLD VENIPUNCTURE: CPT | Performed by: PHYSICIAN ASSISTANT

## 2020-01-15 PROCEDURE — 82805 BLOOD GASES W/O2 SATURATION: CPT | Performed by: PHYSICIAN ASSISTANT

## 2020-01-15 PROCEDURE — 70450 CT HEAD/BRAIN W/O DYE: CPT

## 2020-01-15 PROCEDURE — 80164 ASSAY DIPROPYLACETIC ACD TOT: CPT | Performed by: PHYSICIAN ASSISTANT

## 2020-01-15 PROCEDURE — 81025 URINE PREGNANCY TEST: CPT | Performed by: PHYSICIAN ASSISTANT

## 2020-01-15 PROCEDURE — 85025 COMPLETE CBC W/AUTO DIFF WBC: CPT | Performed by: PHYSICIAN ASSISTANT

## 2020-01-15 PROCEDURE — 96365 THER/PROPH/DIAG IV INF INIT: CPT

## 2020-01-15 PROCEDURE — 72125 CT NECK SPINE W/O DYE: CPT

## 2020-01-15 PROCEDURE — 81003 URINALYSIS AUTO W/O SCOPE: CPT | Performed by: PHYSICIAN ASSISTANT

## 2020-01-15 PROCEDURE — 99285 EMERGENCY DEPT VISIT HI MDM: CPT

## 2020-01-15 PROCEDURE — 80053 COMPREHEN METABOLIC PANEL: CPT | Performed by: PHYSICIAN ASSISTANT

## 2020-01-15 PROCEDURE — 99285 EMERGENCY DEPT VISIT HI MDM: CPT | Performed by: PHYSICIAN ASSISTANT

## 2020-01-15 PROCEDURE — 96366 THER/PROPH/DIAG IV INF ADDON: CPT

## 2020-01-15 PROCEDURE — 72072 X-RAY EXAM THORAC SPINE 3VWS: CPT

## 2020-01-15 RX ORDER — LORAZEPAM 2 MG/ML
1 INJECTION INTRAMUSCULAR ONCE
Status: COMPLETED | OUTPATIENT
Start: 2020-01-15 | End: 2020-01-15

## 2020-01-15 RX ORDER — DIVALPROEX SODIUM 250 MG/1
250 TABLET, DELAYED RELEASE ORAL EVERY 12 HOURS SCHEDULED
Status: DISCONTINUED | OUTPATIENT
Start: 2020-01-15 | End: 2020-01-15

## 2020-01-15 RX ORDER — DIVALPROEX SODIUM 500 MG/1
500 TABLET, DELAYED RELEASE ORAL EVERY 12 HOURS SCHEDULED
Qty: 42 TABLET | Refills: 0 | Status: SHIPPED | OUTPATIENT
Start: 2020-01-15 | End: 2020-11-16

## 2020-01-15 RX ADMIN — VALPROATE SODIUM 250 MG: 100 INJECTION, SOLUTION INTRAVENOUS at 11:30

## 2020-01-15 RX ADMIN — VALPROATE SODIUM 1000 MG: 100 INJECTION, SOLUTION INTRAVENOUS at 15:28

## 2020-01-15 NOTE — ED PROVIDER NOTES
History  Chief Complaint   Patient presents with    Seizure Re-Evaluation     Pt reports slipping and falling, striking head and neck on top stair  Pt alert and oriented and assisted off stairs after fall, pt then had 3 subsequent seizures described by EMS as Tonic/Clonic  Given 2mg Ativan, 4mg Zofran and 100ml LR Prehospital     Fall     Patient is a 47-year-old female with known history of seizure disorder presenting for evaluation of fall precipitating seizure activity this morning  Patient relates that she woke this morning around 8:40 a m , and as she went to walk down a flight of stairs at home she slipped at the top of the staircase and fell, striking her head and neck with reported loss of consciousness  Patient currently admits to neck and back pain from the fall  Patient's father relates he was dropping his grandson off at school and returned home at 9:00 a m  When he had heard the patient screaming in pain  He states he found the patient at the top of the staircase lying and sprawled out  He notes that the patient was alert at that time  He then relates that the patient had a tonic-clonic seizure lasting 30 seconds at the top of the staircase where she was lying  He called EMS and was encouraged to bring the patient to the bottom of the staircase once the patient was stable  Upon EMS arrival the patient had two additional seizures for which she was given 2mg Ativan and 4mg Zofran prior to arrival to the ED  Patient seen in this ED in September for seizures and was admitted with status epilepticus as she was not previously compliant with her medications  She was placed on Depakote 250mg BID following discharge and states that she has been taking her medication as prescribed and has not missed any recent doses  Patient had not followed up with Neurology following that hospital admission        History provided by:  Parent, patient and EMS personnel   used: No    Seizure - Prior Hx Of   Seizure activity on arrival: no    Seizure type:  Tonic  Preceding symptoms: no sensation of an aura present    Preceding symptoms comment:  Trauma  Initial focality:  Multifocal  Episode characteristics: abnormal movements and generalized shaking    Episode characteristics: no tongue biting    Postictal symptoms: somnolence    Return to baseline: yes    Severity:  Moderate  Timing:  Clustered  Number of seizures this episode:  3  Progression:  Resolved  Context: previous head injury    Recent head injury:  Within the last 24 hours  PTA treatment:  Lorazepam  History of seizures: yes        Prior to Admission Medications   Prescriptions Last Dose Informant Patient Reported? Taking?    LORazepam (ATIVAN) 1 mg tablet   No No   Sig: Take 1 tablet (1 mg total) by mouth 2 (two) times a day as needed for anxiety or seizures for up to 3 days   divalproex sodium (DEPAKOTE) 250 mg EC tablet   No No   Sig: Take 1 tablet (250 mg total) by mouth every 12 (twelve) hours for 21 days   famotidine (PEPCID) 20 mg tablet   No No   Sig: Take 1 tablet (20 mg total) by mouth 2 (two) times a day for 30 days   lurasidone (LATUDA) 40 mg tablet   No No   Sig: Take 1 tablet (40 mg total) by mouth daily with dinner for 30 days   ondansetron (ZOFRAN-ODT) 4 mg disintegrating tablet   No No   Sig: Take 1 tablet (4 mg total) by mouth every 6 (six) hours as needed for nausea or vomiting   prazosin (MINIPRESS) 1 mg capsule   No No   Sig: Take 1 capsule (1 mg total) by mouth daily at bedtime for 30 days      Facility-Administered Medications: None       Past Medical History:   Diagnosis Date    Depression     Migraine     Seizures (HCC)        Past Surgical History:   Procedure Laterality Date     SECTION       SECTION      SHOULDER SURGERY      TUBAL LIGATION      TUMOR REMOVAL      from ear       Family History   Problem Relation Age of Onset    No Known Problems Mother     No Known Problems Father     No Known Problems Sister     No Known Problems Brother     No Known Problems Maternal Aunt     No Known Problems Paternal Aunt     No Known Problems Maternal Uncle     No Known Problems Paternal Uncle     No Known Problems Maternal Grandfather     No Known Problems Maternal Grandmother     No Known Problems Paternal Grandfather     No Known Problems Paternal Grandmother     No Known Problems Cousin     ADD / ADHD Neg Hx     Alcohol abuse Neg Hx     Anxiety disorder Neg Hx     Bipolar disorder Neg Hx     Completed Suicide  Neg Hx     Dementia Neg Hx     Depression Neg Hx     Drug abuse Neg Hx     OCD Neg Hx     Psychiatric Illness Neg Hx     Psychosis Neg Hx     Schizoaffective Disorder  Neg Hx     Schizophrenia Neg Hx     Self-Injury Neg Hx     Suicide Attempts Neg Hx      I have reviewed and agree with the history as documented  Social History     Tobacco Use    Smoking status: Never Smoker    Smokeless tobacco: Never Used   Substance Use Topics    Alcohol use: Not Currently     Frequency: Monthly or less     Drinks per session: 1 or 2     Binge frequency: Never    Drug use: No        Review of Systems   Constitutional: Negative for chills and fever  Eyes: Negative for photophobia, pain and visual disturbance  Respiratory: Negative for cough and shortness of breath  Cardiovascular: Negative for chest pain  Gastrointestinal: Negative for abdominal pain, nausea and vomiting  Musculoskeletal: Positive for back pain, neck pain and neck stiffness  Skin: Negative for wound  Neurological: Negative for dizziness, weakness, light-headedness and numbness  All other systems reviewed and are negative  Physical Exam  Physical Exam   Constitutional: She is oriented to person, place, and time  Vital signs are normal  She appears well-developed and well-nourished  Non-toxic appearance  She does not appear ill  Eyes: Pupils are equal, round, and reactive to light   Conjunctivae and EOM are normal    Pupils 5mm bilaterally and reactive  Patient's father relates this is normal for her   Neck: No tracheal deviation present  In c-collar   Cardiovascular: Normal rate, regular rhythm, normal heart sounds and intact distal pulses  No murmur heard  Pulmonary/Chest: Effort normal and breath sounds normal  No respiratory distress  She has no wheezes  Abdominal: Soft  Bowel sounds are normal  She exhibits no distension  There is no tenderness  Musculoskeletal: She exhibits tenderness (cervical and thoracic paraspinal tenderness)  Midline and paraspinal t-spine tenderness BANKS x 4, calves non-tender   Neurological: She is alert and oriented to person, place, and time  She is not disoriented  No sensory deficit  She exhibits normal muscle tone  GCS eye subscore is 4  GCS verbal subscore is 5  GCS motor subscore is 6  Patient slightly somnolent, likely post-ictal, but she is oriented, cooperative with exam and following all commands  Speech clear and comprehensible  Strength 4/5 in all extremities likely secondary to post-ictal state as well   Skin: Skin is warm and dry  Capillary refill takes less than 2 seconds  No abrasion noted  Nursing note and vitals reviewed        Vital Signs  ED Triage Vitals [01/15/20 1000]   Temp Pulse Respirations Blood Pressure SpO2   -- 80 18 125/76 95 %      Temp src Heart Rate Source Patient Position - Orthostatic VS BP Location FiO2 (%)   -- Monitor Lying Right arm --      Pain Score       8           Vitals:    01/15/20 1200 01/15/20 1300 01/15/20 1400 01/15/20 1530   BP: 115/69 111/61 116/67 106/58   Pulse: 83 80 84 87   Patient Position - Orthostatic VS: Lying Lying Lying Lying         Visual Acuity  Visual Acuity      Most Recent Value   L Pupil Size (mm)  3   R Pupil Size (mm)  4          ED Medications  Medications   LORazepam (FOR EMS ONLY) (ATIVAN) 2 mg/mL injection 2 mg (0 mg Does not apply Given to EMS 1/15/20 1002)   valproate (DEPACON) 250 mg in sodium chloride 0 9 % 50 mL IVPB (0 mg Intravenous Stopped 1/15/20 1230)   valproate (DEPACON) 1,000 mg in sodium chloride 0 9 % 50 mL IVPB (0 mg Intravenous Stopped 1/15/20 1700)       Diagnostic Studies  Results Reviewed     Procedure Component Value Units Date/Time    UA w Reflex to Microscopic w Reflex to Culture [[id number]] Collected:  01/15/20 1211    Lab Status:  Final result Specimen:  Urine, Clean Catch Updated:  01/15/20 1231     Color, UA Yellow     Clarity, UA Clear     Specific [place], UA 1 020     pH, UA 5 5     Leukocytes, UA Negative     Nitrite, UA Negative     Protein, UA Negative mg/dl      Glucose, UA Negative mg/dl      Ketones, UA Negative mg/dl      Urobilinogen, UA 0 2 E U /dl      Bilirubin, UA Negative     Blood, UA Negative    POCT pregnancy, urine [[id number]]  (Normal) Resulted:  01/15/20 1211    Lab Status:  Final result Updated:  01/15/20 1211     EXT PREG TEST UR (Ref: Negative) Negative     Control Valid    Comprehensive metabolic panel [[id number]] Collected:  01/15/20 1131    Lab Status:  Final result Specimen:  Blood from Arm, Left Updated:  01/15/20 1209     Sodium 141 mmol/L      Potassium 4 1 mmol/L      Chloride 106 mmol/L      CO2 26 mmol/L      ANION GAP 9 mmol/L      BUN 9 mg/dL      Creatinine 0 76 mg/dL      Glucose 91 mg/dL      Calcium 8 7 mg/dL      AST 15 U/L      ALT 35 U/L      Alkaline Phosphatase 105 U/L      Total Protein 7 6 g/dL      Albumin 3 6 g/dL      Total Bilirubin 0 60 mg/dL      eGFR 109 ml/min/1 73sq m     Narrative:       Monse guidelines for Chronic Kidney Disease (CKD):     Stage 1 with normal or high GFR (GFR > 90 mL/min/1 73 square meters)    Stage 2 Mild CKD (GFR = 60-89 mL/min/1 73 square meters)    Stage 3A Moderate CKD (GFR = 45-59 mL/min/1 73 square meters)    Stage 3B Moderate CKD (GFR = 30-44 mL/min/1 73 square meters)    Stage 4 Severe CKD (GFR = 15-29 mL/min/1 73 square meters)    Stage 5 End Stage CKD (GFR <15 mL/min/1 73 square meters)  Note: GFR calculation is accurate only with a steady state creatinine    Blood gas, venous [[id number]]  (Abnormal) Collected:  01/15/20 1131    Lab Status:  Final result Specimen:  Blood from Arm, Left Updated:  01/15/20 1145     pH, Hardik 7 325     pCO2, Hardik 45 1 mm Hg      pO2, Hardik 48 5 mm Hg      HCO3, Hardik 23 0 mmol/L      Base Excess, Hardik -3 1 mmol/L      O2 Content, Hardik 15 7 ml/dL      O2 HGB, VENOUS 80 0 %     CBC and differential [[id number]]  (Abnormal) Collected:  01/15/20 1131    Lab Status:  Final result Specimen:  Blood from Arm, Left Updated:  01/15/20 1141     WBC 9 61 Thousand/uL      RBC 5 07 Million/uL      Hemoglobin 13 0 g/dL      Hematocrit 39 8 %      MCV 79 fL      MCH 25 6 pg      MCHC 32 7 g/dL      RDW 14 9 %      MPV 10 3 fL      Platelets 506 Thousands/uL      Neutrophils Relative 65 %      Lymphocytes Relative 27 %      Monocytes Relative 6 %      Eosinophils Relative 2 %      Basophils Relative 0 %      Neutrophils Absolute 6 25 Thousands/µL      Lymphocytes Absolute 2 61 Thousands/µL      Monocytes Absolute 0 55 Thousand/µL      Eosinophils Absolute 0 17 Thousand/µL      Basophils Absolute 0 03 Thousands/µL     Valproic acid level, total [[id number]] Collected:  01/15/20 1131    Lab Status: In process Specimen:  Blood from Arm, Left Updated:  01/15/20 1137                 XR spine thoracic 3 views   Final Result by Jaquelin Wyatt MD (01/15 1434)      No acute osseous abnormality  Workstation performed: [id number]         CT head without contrast   Final Result by Javad Palma MD (01/15 1142)      No acute intracranial abnormality  Workstation performed: [id number]         CT spine cervical without contrast   Final Result by Briana Plunkett DO (01/15 1158)      No cervical spine fracture or traumatic malalignment                     Workstation performed: [id number]                    Procedures  ECG 12 Lead Documentation Only  Date/Time: 1/15/2020 10:03 AM  Performed by: Joni Cardenas PA-C  Authorized by: Joni Cardenas PA-C     Indications / Diagnosis:  Seizure  ECG reviewed by me, the ED Provider: yes    Patient location:  ED  Previous ECG:     Previous ECG:  Compared to current    Comparison ECG info:  Previous ECG 12/21/2018    Similarity:  Changes noted    Comparison to cardiac monitor: Yes    Interpretation:     Interpretation: normal    Rate:     ECG rate:  78    ECG rate assessment: normal    Rhythm:     Rhythm: sinus rhythm    Ectopy:     Ectopy: none    QRS:     QRS axis:  Normal  Conduction:     Conduction: normal    ST segments:     ST segments:  Normal  T waves:     T waves: normal    Comments:      ECG reviewed by myself and attending physician, normal ECG  Compared with previous ECG 12/18/2018  ED Course  ED Course as of Taco 15 1956   Wed Taco 15, 2020   1220 CT head IMPRESSION:  No acute intracranial abnormality  1221 CT C-spine IMPRESSION:   No cervical spine fracture or traumatic malalignment  1259 Patient re-examined, resting comfortably and in NAD  Updated on CT results, c-collar removed  Patient has full painless ROM in neck on exam      1300 Per, patient's father, patient's Neurologist is Dr Sunil Gerard in [place]  He relates the patient has not seen him in about a year and that she has not seen a Neurologist after her admission in September 1309 Attempted to call epileptologist, no response      1325 No seizure activity thus far while observed in ED      1500 X-ray t-spine IMPRESSION:  No acute osseous abnormality  1501 Case discussed with facility epileptologist   Recommend additional 1000 mg of Depacon IV in ED  If valproic levels within range, recommends discharge an increased maintenance dose to 500 mg b i d  Ronal Given 1522 Upon reassessment, patient resting on her side  Patient and father advised discussion with epileptologist and updated on treatment plan    They are comfortable and agreeable  Medication sent to pharmacy  Awaiting additional 1000mg of Depakote administration with plan for discharge afterward  MDM  Number of Diagnoses or Management Options  Closed head injury, initial encounter: new and requires workup  Fall, initial encounter: new and requires workup  Seizure [facility]): new and requires workup  Seizure disorder [facility]): established and worsening  Diagnosis management comments: Differential diagnosis includes but not limited to: seizure disorder, fall, closed head trauma, syncope, arrhythmia, electrolyte disturbance       Amount and/or Complexity of Data Reviewed  Clinical lab tests: ordered and reviewed  Tests in the radiology section of CPT®: ordered and reviewed  Obtain history from someone other than the patient: yes  Review and summarize past medical records: yes  Discuss the patient with other providers: yes  Independent visualization of images, tracings, or specimens: yes    Risk of Complications, Morbidity, and/or Mortality  Presenting problems: moderate  Diagnostic procedures: moderate  Management options: moderate  General comments: Patient is a 29 y/o F arriving to the ED via EMS for evaluation of breakthrough seizures  EMS relates patient had slipped at the top of her staircase and was found at the top of the stairs by family on her back where she had an approx 30-second grand mal seizure  When EMS arrived for transit the patient had an additional 2 seizures for which she was given 2mg Ativan and 4mg Zofran  Patient monitored in ED for over 6 hours and had no subsequent seizures  Given patient's head injury and reported LOC, CT head and C-spine ordered and showed no acute intracranial pathology or cervical fractures  Thoracic spine x-ray showed no acute fractures  Patient given home dose of Depakote IV in ED    Case discussed with network epileptologist who recommended additional 1000 mg of Depakote IV while in the ED, as well as increasing maintenance dose to 500 mg b i d  To prevent further breakthrough seizures  Patient and father advised of all lab and imaging findings as well as epileptologist recommendations  They are understanding and agreeable  Medication sent to pharmacy and neurology follow-up included in discharge paperwork  Return parameters discussed, understanding expressed  Patient stable for discharge  Patient Progress  Patient progress: stable        Disposition  Final diagnoses:   Seizure ([facility] [address] )   Fall, initial encounter   Closed head injury, initial encounter   Seizure disorder [facility])     Time reflects when diagnosis was documented in both MDM as applicable and the Disposition within this note     Time User Action Codes Description Comment    1/15/2020  3:07 PM Ana Laura Erp Add [R56 9] Seizure ([facility] [address] )     1/15/2020  3:07 PM Ana Laura Erp Add Caeli Ezio  ITWI] Fall, initial encounter     1/15/2020  3:07 PM Ana Laura Erp Add [S09 [id number]] Closed head injury, initial encounter     1/15/2020  3:13 PM Ana Laura Erp Add [F78 632] Seizure disorder [facility])       ED Disposition     ED Disposition Condition Date/Time Comment    Discharge Stable Wed Taco 15, 2020  3:07 PM Hansel Merlin discharge to home/self care              Follow-up Information     Follow up With Specialties Details Why Contact Info Additional [facility] Schedule an appointment as soon as possible for a visit   [address] [facility] [id number]  [address] [facility], [address], [address] [facility],[address]    Primary Care Provider   As needed      [facility] Emergency Department Emergency Medicine  If symptoms worsen [address] [facility] [address], [address] [place], [place], [address]          Discharge Medication List as of 1/15/2020  4:24 PM CONTINUE these medications which have CHANGED    Details   divalproex sodium (DEPAKOTE) 500 mg EC tablet Take 1 tablet (500 mg total) by mouth every 12 (twelve) hours for 21 days, Starting Wed 1/15/2020, Until Wed 2/5/2020, Normal         CONTINUE these medications which have NOT CHANGED    Details   famotidine (PEPCID) 20 mg tablet Take 1 tablet (20 mg total) by mouth 2 (two) times a day for 30 days, Starting Mon 6/18/2018, Until Wed 7/18/2018, Print      LORazepam (ATIVAN) 1 mg tablet Take 1 tablet (1 mg total) by mouth 2 (two) times a day as needed for anxiety or seizures for up to 3 days, Starting Fri 12/21/2018, Until Mon 12/24/2018, Print      lurasidone (LATUDA) 40 mg tablet Take 1 tablet (40 mg total) by mouth daily with dinner for 30 days, Starting Mon 6/18/2018, Until Wed 7/18/2018, Print      ondansetron (ZOFRAN-ODT) 4 mg disintegrating tablet Take 1 tablet (4 mg total) by mouth every 6 (six) hours as needed for nausea or vomiting, Starting u 7/11/2019, Print      prazosin (MINIPRESS) 1 mg capsule Take 1 capsule (1 mg total) by mouth daily at bedtime for 30 days, Starting Mon 6/18/2018, Until Wed 7/18/2018, Print           No discharge procedures on file      ED Provider  Electronically Signed by           Janet Pickens PA-C  01/15/20 2004

## 2020-02-15 ENCOUNTER — HOSPITAL ENCOUNTER (EMERGENCY)
Facility: HOSPITAL | Age: 25
Discharge: HOME | End: 2020-02-15
Attending: EMERGENCY MEDICINE | Admitting: EMERGENCY MEDICINE
Payer: COMMERCIAL

## 2020-02-15 ENCOUNTER — APPOINTMENT (EMERGENCY)
Dept: CT IMAGING | Facility: HOSPITAL | Age: 25
End: 2020-02-15
Payer: COMMERCIAL

## 2020-02-15 VITALS
DIASTOLIC BLOOD PRESSURE: 71 MMHG | TEMPERATURE: 98.3 F | OXYGEN SATURATION: 97 % | RESPIRATION RATE: 15 BRPM | WEIGHT: 243.83 LBS | HEART RATE: 98 BPM | SYSTOLIC BLOOD PRESSURE: 122 MMHG | BODY MASS INDEX: 38.19 KG/M2

## 2020-02-15 DIAGNOSIS — R56.9 SEIZURE (HCC): Primary | ICD-10-CM

## 2020-02-15 LAB
ALBUMIN SERPL BCP-MCNC: 3.2 G/DL (ref 3.5–5)
ALP SERPL-CCNC: 87 U/L (ref 46–116)
ALT SERPL W P-5'-P-CCNC: 44 U/L (ref 12–78)
ANION GAP SERPL CALCULATED.3IONS-SCNC: 12 MMOL/L (ref 4–13)
AST SERPL W P-5'-P-CCNC: 31 U/L (ref 5–45)
BASOPHILS # BLD AUTO: 0.05 THOUSANDS/ΜL (ref 0–0.1)
BASOPHILS NFR BLD AUTO: 1 % (ref 0–1)
BILIRUB SERPL-MCNC: 0.4 MG/DL (ref 0.2–1)
BUN SERPL-MCNC: 11 MG/DL (ref 5–25)
CALCIUM SERPL-MCNC: 8.6 MG/DL (ref 8.3–10.1)
CHLORIDE SERPL-SCNC: 106 MMOL/L (ref 100–108)
CO2 SERPL-SCNC: 24 MMOL/L (ref 21–32)
CREAT SERPL-MCNC: 1.1 MG/DL (ref 0.6–1.3)
EOSINOPHIL # BLD AUTO: 0.15 THOUSAND/ΜL (ref 0–0.61)
EOSINOPHIL NFR BLD AUTO: 2 % (ref 0–6)
ERYTHROCYTE [DISTWIDTH] IN BLOOD BY AUTOMATED COUNT: 14.9 % (ref 11.6–15.1)
GFR SERPL CREATININE-BSD FRML MDRD: 69 ML/MIN/1.73SQ M
GLUCOSE SERPL-MCNC: 126 MG/DL (ref 65–140)
HCG SERPL QL: NEGATIVE
HCT VFR BLD AUTO: 35.3 % (ref 34.8–46.1)
HGB BLD-MCNC: 11.5 G/DL (ref 11.5–15.4)
IMM GRANULOCYTES # BLD AUTO: 0.02 THOUSAND/UL (ref 0–0.2)
IMM GRANULOCYTES NFR BLD AUTO: 0 % (ref 0–2)
LACTATE SERPL-SCNC: 2.5 MMOL/L (ref 0.5–2)
LYMPHOCYTES # BLD AUTO: 5.62 THOUSANDS/ΜL (ref 0.6–4.47)
LYMPHOCYTES NFR BLD AUTO: 63 % (ref 14–44)
MCH RBC QN AUTO: 25.7 PG (ref 26.8–34.3)
MCHC RBC AUTO-ENTMCNC: 32.6 G/DL (ref 31.4–37.4)
MCV RBC AUTO: 79 FL (ref 82–98)
MONOCYTES # BLD AUTO: 0.4 THOUSAND/ΜL (ref 0.17–1.22)
MONOCYTES NFR BLD AUTO: 5 % (ref 4–12)
NEUTROPHILS # BLD AUTO: 2.58 THOUSANDS/ΜL (ref 1.85–7.62)
NEUTS SEG NFR BLD AUTO: 29 % (ref 43–75)
NRBC BLD AUTO-RTO: 0 /100 WBCS
PLATELET # BLD AUTO: 287 THOUSANDS/UL (ref 149–390)
PMV BLD AUTO: 9.5 FL (ref 8.9–12.7)
POTASSIUM SERPL-SCNC: 3.9 MMOL/L (ref 3.5–5.3)
PROT SERPL-MCNC: 7.4 G/DL (ref 6.4–8.2)
RBC # BLD AUTO: 4.48 MILLION/UL (ref 3.81–5.12)
SODIUM SERPL-SCNC: 142 MMOL/L (ref 136–145)
TROPONIN I SERPL-MCNC: <0.02 NG/ML
WBC # BLD AUTO: 8.82 THOUSAND/UL (ref 4.31–10.16)

## 2020-02-15 PROCEDURE — 96365 THER/PROPH/DIAG IV INF INIT: CPT

## 2020-02-15 PROCEDURE — 36415 COLL VENOUS BLD VENIPUNCTURE: CPT

## 2020-02-15 PROCEDURE — 84484 ASSAY OF TROPONIN QUANT: CPT | Performed by: EMERGENCY MEDICINE

## 2020-02-15 PROCEDURE — 83605 ASSAY OF LACTIC ACID: CPT | Performed by: EMERGENCY MEDICINE

## 2020-02-15 PROCEDURE — 84703 CHORIONIC GONADOTROPIN ASSAY: CPT | Performed by: EMERGENCY MEDICINE

## 2020-02-15 PROCEDURE — 80053 COMPREHEN METABOLIC PANEL: CPT | Performed by: EMERGENCY MEDICINE

## 2020-02-15 PROCEDURE — 99284 EMERGENCY DEPT VISIT MOD MDM: CPT

## 2020-02-15 PROCEDURE — 72125 CT NECK SPINE W/O DYE: CPT

## 2020-02-15 PROCEDURE — 85025 COMPLETE CBC W/AUTO DIFF WBC: CPT | Performed by: EMERGENCY MEDICINE

## 2020-02-15 PROCEDURE — 93005 ELECTROCARDIOGRAM TRACING: CPT

## 2020-02-15 PROCEDURE — 99283 EMERGENCY DEPT VISIT LOW MDM: CPT | Performed by: EMERGENCY MEDICINE

## 2020-02-15 RX ADMIN — SODIUM CHLORIDE 1000 ML: 0.9 INJECTION, SOLUTION INTRAVENOUS at 20:21

## 2020-02-15 RX ADMIN — VALPROATE SODIUM 500 MG: 100 INJECTION, SOLUTION INTRAVENOUS at 20:31

## 2020-02-16 NOTE — ED PROVIDER NOTES
History  Chief Complaint   Patient presents with    Seizure - Prior Hx Of     Pt  brought via EMS fror 3-4 grand mal seizures per bystanders following fire alarm with flashing lights  Per bystanders, patient fell from standing position and c-collar was applied via EMS  Pt  reprots not taking depakote this am      30year old female patient presents emergency department for evaluation of multiple seizures  According the patient she is Michelle Cooks but certain things will trigger the seizures to come to the surface and the patient did not take her Depakote for the last two days  Currently the patient is awake, alert, oriented, has stating this is normal for her seizures  History provided by:  Patient   used: No    Seizure - Prior Hx Of   Seizure activity on arrival: no    Seizure type:  Grand mal  Preceding symptoms: no sensation of an aura present, no dizziness and no hyperventilation    Initial focality:  None  Episode characteristics: no abnormal movements, no combativeness, no confusion, no incontinence and no tongue biting    Severity:  Mild  Timing:  Once  Progression:  Worsening  Context: not alcohol withdrawal, not change in medication, not developmental delay and medical compliance    Recent head injury:  No recent head injuries      Prior to Admission Medications   Prescriptions Last Dose Informant Patient Reported? Taking?    LORazepam (ATIVAN) 1 mg tablet   No No   Sig: Take 1 tablet (1 mg total) by mouth 2 (two) times a day as needed for anxiety or seizures for up to 3 days   divalproex sodium (DEPAKOTE) 250 mg EC tablet   No No   Sig: Take 1 tablet (250 mg total) by mouth every 12 (twelve) hours for 21 days   divalproex sodium (DEPAKOTE) 500 mg EC tablet   No No   Sig: Take 1 tablet (500 mg total) by mouth every 12 (twelve) hours for 21 days   famotidine (PEPCID) 20 mg tablet   No No   Sig: Take 1 tablet (20 mg total) by mouth 2 (two) times a day for 30 days   lurasidone (LATUDA) 40 mg tablet   No No   Sig: Take 1 tablet (40 mg total) by mouth daily with dinner for 30 days   ondansetron (ZOFRAN-ODT) 4 mg disintegrating tablet   No No   Sig: Take 1 tablet (4 mg total) by mouth every 6 (six) hours as needed for nausea or vomiting   prazosin (MINIPRESS) 1 mg capsule   No No   Sig: Take 1 capsule (1 mg total) by mouth daily at bedtime for 30 days      Facility-Administered Medications: None       Past Medical History:   Diagnosis Date    Depression     Migraine     Seizures (HCC)        Past Surgical History:   Procedure Laterality Date     SECTION       SECTION      SHOULDER SURGERY      TUBAL LIGATION      TUMOR REMOVAL      from ear       Family History   Problem Relation Age of Onset    No Known Problems Mother     No Known Problems Father     No Known Problems Sister     No Known Problems Brother     No Known Problems Maternal Aunt     No Known Problems Paternal Aunt     No Known Problems Maternal Uncle     No Known Problems Paternal Uncle     No Known Problems Maternal Grandfather     No Known Problems Maternal Grandmother     No Known Problems Paternal Grandfather     No Known Problems Paternal Grandmother     No Known Problems Cousin     ADD / ADHD Neg Hx     Alcohol abuse Neg Hx     Anxiety disorder Neg Hx     Bipolar disorder Neg Hx     Completed Suicide  Neg Hx     Dementia Neg Hx     Depression Neg Hx     Drug abuse Neg Hx     OCD Neg Hx     Psychiatric Illness Neg Hx     Psychosis Neg Hx     Schizoaffective Disorder  Neg Hx     Schizophrenia Neg Hx     Self-Injury Neg Hx     Suicide Attempts Neg Hx      I have reviewed and agree with the history as documented  Social History     Tobacco Use    Smoking status: Never Smoker    Smokeless tobacco: Never Used   Substance Use Topics    Alcohol use: Not Currently     Frequency: Monthly or less     Drinks per session: 1 or 2     Binge frequency: Never    Drug use:  No Review of Systems   All other systems reviewed and are negative  Physical Exam  Physical Exam   Constitutional: She is oriented to person, place, and time  She appears well-developed and well-nourished  HENT:   Head: Normocephalic and atraumatic  Right Ear: External ear normal    Left Ear: External ear normal    Eyes: Conjunctivae and EOM are normal    Neck: No JVD present  No tracheal deviation present  No thyromegaly present  Cardiovascular: Normal rate  Pulmonary/Chest: Effort normal and breath sounds normal  No stridor  Abdominal: Soft  She exhibits no distension and no mass  There is no tenderness  There is no guarding  No hernia  Musculoskeletal: Normal range of motion  She exhibits no edema, tenderness or deformity  Lymphadenopathy:     She has no cervical adenopathy  Neurological: She is alert and oriented to person, place, and time  Skin: Skin is warm  No rash noted  No erythema  No pallor  Psychiatric: She has a normal mood and affect  Her behavior is normal    Nursing note and vitals reviewed        Vital Signs  ED Triage Vitals   Temperature Pulse Respirations Blood Pressure SpO2   02/15/20 1952 02/15/20 1944 02/15/20 1944 02/15/20 1944 02/15/20 1944   98 3 °F (36 8 °C) 102 16 130/85 100 %      Temp Source Heart Rate Source Patient Position - Orthostatic VS BP Location FiO2 (%)   02/15/20 1952 02/15/20 1944 02/15/20 1944 -- --   Oral Monitor Lying        Pain Score       02/15/20 1944       9           Vitals:    02/15/20 1944   BP: 130/85   Pulse: 102   Patient Position - Orthostatic VS: Lying         Visual Acuity  Visual Acuity      Most Recent Value   L Pupil Size (mm)  3   R Pupil Size (mm)  3          ED Medications  Medications   valproate (DEPACON) 500 mg in sodium chloride 0 9 % 50 mL IVPB (has no administration in time range)   sodium chloride 0 9 % bolus 1,000 mL (has no administration in time range)       Diagnostic Studies  Results Reviewed     Procedure Component Value Units Date/Time    Lactic acid, plasma [[id number]]     Lab Status:  No result Specimen:  Blood     CBC and differential [[id number]]  (Abnormal) Collected:  02/15/20 2003    Lab Status:  Final result Specimen:  Blood from Arm, Left Updated:  02/15/20 2008     WBC 8 82 Thousand/uL      RBC 4 48 Million/uL      Hemoglobin 11 5 g/dL      Hematocrit 35 3 %      MCV 79 fL      MCH 25 7 pg      MCHC 32 6 g/dL      RDW 14 9 %      MPV 9 5 fL      Platelets 154 Thousands/uL      nRBC 0 /100 WBCs      Neutrophils Relative 29 %      Immat GRANS % 0 %      Lymphocytes Relative 63 %      Monocytes Relative 5 %      Eosinophils Relative 2 %      Basophils Relative 1 %      Neutrophils Absolute 2 58 Thousands/µL      Immature Grans Absolute 0 02 Thousand/uL      Lymphocytes Absolute 5 62 Thousands/µL      Monocytes Absolute 0 40 Thousand/µL      Eosinophils Absolute 0 15 Thousand/µL      Basophils Absolute 0 05 Thousands/µL     Troponin I [[id number]] Collected:  02/15/20 2003    Lab Status: In process Specimen:  Blood from Arm, Left Updated:  02/15/20 2005    Comprehensive metabolic panel [[id number]] Collected:  02/15/20 2003    Lab Status:   In process Specimen:  Blood from Arm, Left Updated:  02/15/20 2005                 No orders to display              Procedures  Procedures         ED Course                               MDM  Number of Diagnoses or Management Options  Seizure [facility]): new and requires workup     Amount and/or Complexity of Data Reviewed  Clinical lab tests: ordered and reviewed  Tests in the radiology section of CPT®: ordered and reviewed  Decide to obtain previous medical records or to obtain history from someone other than the patient: yes  Review and summarize past medical records: yes    Patient Progress  Patient progress: stable        Disposition  Final diagnoses:   None     ED Disposition     None      Follow-up Information    None         Patient's Medications   Discharge Prescriptions No medications on file     No discharge procedures on file      PDMP Review     None          ED Provider  Electronically Signed by           Tad Ortiz DO  02/16/20 5646

## 2020-02-18 LAB
ATRIAL RATE: 100 BPM
P AXIS: 60 DEGREES
PR INTERVAL: 146 MS
QRS AXIS: 37 DEGREES
QRSD INTERVAL: 86 MS
QT INTERVAL: 320 MS
QTC INTERVAL: 412 MS
T WAVE AXIS: 37 DEGREES
VENTRICULAR RATE: 100 BPM

## 2020-02-18 PROCEDURE — 93010 ELECTROCARDIOGRAM REPORT: CPT | Performed by: INTERNAL MEDICINE

## 2020-03-08 PROCEDURE — 99284 EMERGENCY DEPT VISIT MOD MDM: CPT

## 2020-03-09 ENCOUNTER — HOSPITAL ENCOUNTER (EMERGENCY)
Facility: HOSPITAL | Age: 25
Discharge: HOME | End: 2020-03-09
Attending: EMERGENCY MEDICINE
Payer: COMMERCIAL

## 2020-03-09 ENCOUNTER — APPOINTMENT (EMERGENCY)
Dept: CT IMAGING | Facility: HOSPITAL | Age: 25
End: 2020-03-09
Payer: COMMERCIAL

## 2020-03-09 VITALS
TEMPERATURE: 99.1 F | RESPIRATION RATE: 17 BRPM | OXYGEN SATURATION: 98 % | DIASTOLIC BLOOD PRESSURE: 80 MMHG | HEIGHT: 67 IN | BODY MASS INDEX: 38.19 KG/M2 | SYSTOLIC BLOOD PRESSURE: 121 MMHG | HEART RATE: 101 BPM

## 2020-03-09 DIAGNOSIS — N12 PYELONEPHRITIS: Primary | ICD-10-CM

## 2020-03-09 LAB
ALBUMIN SERPL BCP-MCNC: 3.7 G/DL (ref 3.5–5)
ALP SERPL-CCNC: 87 U/L (ref 46–116)
ALT SERPL W P-5'-P-CCNC: 22 U/L (ref 12–78)
ANION GAP SERPL CALCULATED.3IONS-SCNC: 11 MMOL/L (ref 4–13)
AST SERPL W P-5'-P-CCNC: 12 U/L (ref 5–45)
BACTERIA UR QL AUTO: ABNORMAL /HPF
BASOPHILS # BLD AUTO: 0.05 THOUSANDS/ΜL (ref 0–0.1)
BASOPHILS NFR BLD AUTO: 0 % (ref 0–1)
BILIRUB SERPL-MCNC: 0.6 MG/DL (ref 0.2–1)
BILIRUB UR QL STRIP: NEGATIVE
BUN SERPL-MCNC: 13 MG/DL (ref 5–25)
CALCIUM SERPL-MCNC: 8.7 MG/DL (ref 8.3–10.1)
CHLORIDE SERPL-SCNC: 103 MMOL/L (ref 100–108)
CLARITY UR: ABNORMAL
CO2 SERPL-SCNC: 22 MMOL/L (ref 21–32)
COLOR UR: YELLOW
CREAT SERPL-MCNC: 0.97 MG/DL (ref 0.6–1.3)
EOSINOPHIL # BLD AUTO: 0.06 THOUSAND/ΜL (ref 0–0.61)
EOSINOPHIL NFR BLD AUTO: 0 % (ref 0–6)
ERYTHROCYTE [DISTWIDTH] IN BLOOD BY AUTOMATED COUNT: 15.1 % (ref 11.6–15.1)
EXT PREG TEST URINE: NEGATIVE
EXT. CONTROL ED NAV: NORMAL
GFR SERPL CREATININE-BSD FRML MDRD: 81 ML/MIN/1.73SQ M
GLUCOSE SERPL-MCNC: 108 MG/DL (ref 65–140)
GLUCOSE UR STRIP-MCNC: NEGATIVE MG/DL
HCT VFR BLD AUTO: 40.7 % (ref 34.8–46.1)
HGB BLD-MCNC: 13.3 G/DL (ref 11.5–15.4)
HGB UR QL STRIP.AUTO: ABNORMAL
IMM GRANULOCYTES # BLD AUTO: 0.06 THOUSAND/UL (ref 0–0.2)
IMM GRANULOCYTES NFR BLD AUTO: 0 % (ref 0–2)
KETONES UR STRIP-MCNC: NEGATIVE MG/DL
LEUKOCYTE ESTERASE UR QL STRIP: ABNORMAL
LYMPHOCYTES # BLD AUTO: 4.86 THOUSANDS/ΜL (ref 0.6–4.47)
LYMPHOCYTES NFR BLD AUTO: 33 % (ref 14–44)
MCH RBC QN AUTO: 25.5 PG (ref 26.8–34.3)
MCHC RBC AUTO-ENTMCNC: 32.7 G/DL (ref 31.4–37.4)
MCV RBC AUTO: 78 FL (ref 82–98)
MONOCYTES # BLD AUTO: 0.91 THOUSAND/ΜL (ref 0.17–1.22)
MONOCYTES NFR BLD AUTO: 6 % (ref 4–12)
NEUTROPHILS # BLD AUTO: 8.71 THOUSANDS/ΜL (ref 1.85–7.62)
NEUTS SEG NFR BLD AUTO: 61 % (ref 43–75)
NITRITE UR QL STRIP: NEGATIVE
NON-SQ EPI CELLS URNS QL MICRO: ABNORMAL /HPF
NRBC BLD AUTO-RTO: 0 /100 WBCS
PH UR STRIP.AUTO: 5.5 [PH]
PLATELET # BLD AUTO: 304 THOUSANDS/UL (ref 149–390)
PMV BLD AUTO: 9.9 FL (ref 8.9–12.7)
POTASSIUM SERPL-SCNC: 4.3 MMOL/L (ref 3.5–5.3)
PROT SERPL-MCNC: 8.1 G/DL (ref 6.4–8.2)
PROT UR STRIP-MCNC: ABNORMAL MG/DL
RBC # BLD AUTO: 5.21 MILLION/UL (ref 3.81–5.12)
RBC #/AREA URNS AUTO: ABNORMAL /HPF
SODIUM SERPL-SCNC: 136 MMOL/L (ref 136–145)
SP GR UR STRIP.AUTO: 1.02 (ref 1–1.03)
UROBILINOGEN UR QL STRIP.AUTO: 0.2 E.U./DL
WBC # BLD AUTO: 14.65 THOUSAND/UL (ref 4.31–10.16)
WBC #/AREA URNS AUTO: ABNORMAL /HPF
WBC CLUMPS # UR AUTO: PRESENT /UL

## 2020-03-09 PROCEDURE — 96374 THER/PROPH/DIAG INJ IV PUSH: CPT

## 2020-03-09 PROCEDURE — 80053 COMPREHEN METABOLIC PANEL: CPT | Performed by: EMERGENCY MEDICINE

## 2020-03-09 PROCEDURE — 81001 URINALYSIS AUTO W/SCOPE: CPT | Performed by: EMERGENCY MEDICINE

## 2020-03-09 PROCEDURE — 36415 COLL VENOUS BLD VENIPUNCTURE: CPT | Performed by: EMERGENCY MEDICINE

## 2020-03-09 PROCEDURE — 81025 URINE PREGNANCY TEST: CPT | Performed by: EMERGENCY MEDICINE

## 2020-03-09 PROCEDURE — 96361 HYDRATE IV INFUSION ADD-ON: CPT

## 2020-03-09 PROCEDURE — 99284 EMERGENCY DEPT VISIT MOD MDM: CPT | Performed by: EMERGENCY MEDICINE

## 2020-03-09 PROCEDURE — 96375 TX/PRO/DX INJ NEW DRUG ADDON: CPT

## 2020-03-09 PROCEDURE — 87077 CULTURE AEROBIC IDENTIFY: CPT | Performed by: EMERGENCY MEDICINE

## 2020-03-09 PROCEDURE — 87086 URINE CULTURE/COLONY COUNT: CPT | Performed by: EMERGENCY MEDICINE

## 2020-03-09 PROCEDURE — 87186 SC STD MICRODIL/AGAR DIL: CPT | Performed by: EMERGENCY MEDICINE

## 2020-03-09 PROCEDURE — 74176 CT ABD & PELVIS W/O CONTRAST: CPT

## 2020-03-09 PROCEDURE — 85025 COMPLETE CBC W/AUTO DIFF WBC: CPT | Performed by: EMERGENCY MEDICINE

## 2020-03-09 RX ORDER — SODIUM CHLORIDE 9 MG/ML
500 INJECTION, SOLUTION INTRAVENOUS ONCE
Status: COMPLETED | OUTPATIENT
Start: 2020-03-09 | End: 2020-03-09

## 2020-03-09 RX ORDER — KETOROLAC TROMETHAMINE 30 MG/ML
15 INJECTION, SOLUTION INTRAMUSCULAR; INTRAVENOUS ONCE
Status: COMPLETED | OUTPATIENT
Start: 2020-03-09 | End: 2020-03-09

## 2020-03-09 RX ORDER — ONDANSETRON 2 MG/ML
4 INJECTION INTRAMUSCULAR; INTRAVENOUS ONCE
Status: COMPLETED | OUTPATIENT
Start: 2020-03-09 | End: 2020-03-09

## 2020-03-09 RX ORDER — CEPHALEXIN 500 MG/1
500 CAPSULE ORAL EVERY 8 HOURS SCHEDULED
Qty: 30 CAPSULE | Refills: 0 | Status: SHIPPED | OUTPATIENT
Start: 2020-03-09 | End: 2020-03-19

## 2020-03-09 RX ADMIN — SODIUM CHLORIDE 500 ML/HR: 0.9 INJECTION, SOLUTION INTRAVENOUS at 01:00

## 2020-03-09 RX ADMIN — KETOROLAC TROMETHAMINE 15 MG: 30 INJECTION, SOLUTION INTRAMUSCULAR at 02:10

## 2020-03-09 RX ADMIN — ONDANSETRON 4 MG: 2 INJECTION INTRAMUSCULAR; INTRAVENOUS at 00:59

## 2020-03-09 NOTE — ED NOTES
Patient transported to [address] [place] [address], [address] [place] [address]  03/09/20 9933

## 2020-03-09 NOTE — ED PROVIDER NOTES
History  Chief Complaint   Patient presents with    Flank Pain     Pt c/o right sided flank pain starting about 6-7 hrs ago  Pt has had hx of bladder infections  13-year-old female presenting to emergency department for evaluation of flank pain patient has had right-sided flank pain for the past 6-7 hours, also feels generally unwell, no dysuria, no blood in urine  Does have history of bladder infections  No nausea vomiting lightheadedness or palpitations  Prior to Admission Medications   Prescriptions Last Dose Informant Patient Reported? Taking?    LORazepam (ATIVAN) 1 mg tablet   No No   Sig: Take 1 tablet (1 mg total) by mouth 2 (two) times a day as needed for anxiety or seizures for up to 3 days   divalproex sodium (DEPAKOTE) 250 mg EC tablet   No No   Sig: Take 1 tablet (250 mg total) by mouth every 12 (twelve) hours for 21 days   divalproex sodium (DEPAKOTE) 500 mg EC tablet   No No   Sig: Take 1 tablet (500 mg total) by mouth every 12 (twelve) hours for 21 days   Patient taking differently: Take 1,000 mg by mouth every 12 (twelve) hours    famotidine (PEPCID) 20 mg tablet   No No   Sig: Take 1 tablet (20 mg total) by mouth 2 (two) times a day for 30 days   lurasidone (LATUDA) 40 mg tablet   No No   Sig: Take 1 tablet (40 mg total) by mouth daily with dinner for 30 days   ondansetron (ZOFRAN-ODT) 4 mg disintegrating tablet   No No   Sig: Take 1 tablet (4 mg total) by mouth every 6 (six) hours as needed for nausea or vomiting   prazosin (MINIPRESS) 1 mg capsule   No No   Sig: Take 1 capsule (1 mg total) by mouth daily at bedtime for 30 days      Facility-Administered Medications: None       Past Medical History:   Diagnosis Date    Depression     Migraine     Seizures (HCC)        Past Surgical History:   Procedure Laterality Date     SECTION       SECTION      SHOULDER SURGERY      TUBAL LIGATION      TUMOR REMOVAL      from ear       Family History   Problem Relation Age of Onset    No Known Problems Mother     No Known Problems Father     No Known Problems Sister     No Known Problems Brother     No Known Problems Maternal Aunt     No Known Problems Paternal Aunt     No Known Problems Maternal Uncle     No Known Problems Paternal Uncle     No Known Problems Maternal Grandfather     No Known Problems Maternal Grandmother     No Known Problems Paternal Grandfather     No Known Problems Paternal Grandmother     No Known Problems Cousin     ADD / ADHD Neg Hx     Alcohol abuse Neg Hx     Anxiety disorder Neg Hx     Bipolar disorder Neg Hx     Completed Suicide  Neg Hx     Dementia Neg Hx     Depression Neg Hx     Drug abuse Neg Hx     OCD Neg Hx     Psychiatric Illness Neg Hx     Psychosis Neg Hx     Schizoaffective Disorder  Neg Hx     Schizophrenia Neg Hx     Self-Injury Neg Hx     Suicide Attempts Neg Hx      I have reviewed and agree with the history as documented  E-Cigarette/Vaping     E-Cigarette/Vaping Substances     Social History     Tobacco Use    Smoking status: Never Smoker    Smokeless tobacco: Never Used   Substance Use Topics    Alcohol use: Not Currently     Frequency: Monthly or less     Drinks per session: 1 or 2     Binge frequency: Never    Drug use: No       Review of Systems   Constitutional: Negative for appetite change, chills, fatigue and fever  HENT: Negative for sneezing and sore throat  Eyes: Negative for visual disturbance  Respiratory: Negative for cough, choking, chest tightness, shortness of breath and wheezing  Cardiovascular: Negative for chest pain and palpitations  Gastrointestinal: Negative for abdominal pain, constipation, diarrhea, nausea and vomiting  Genitourinary: Positive for flank pain  Negative for difficulty urinating and dysuria  Neurological: Negative for dizziness, weakness, light-headedness, numbness and headaches  All other systems reviewed and are negative        Physical Exam  Physical Exam   Constitutional: She is oriented to person, place, and time  She appears well-developed and well-nourished  No distress  HENT:   Head: Normocephalic and atraumatic  Mouth/Throat: Oropharynx is clear and moist    Eyes: Pupils are equal, round, and reactive to light  EOM are normal    Neck: No JVD present  No tracheal deviation present  Cardiovascular: Normal rate, regular rhythm, normal heart sounds and intact distal pulses  Exam reveals no gallop and no friction rub  No murmur heard  Pulmonary/Chest: Effort normal and breath sounds normal  No respiratory distress  She has no wheezes  She has no rales  Abdominal: Soft  Bowel sounds are normal  She exhibits no distension  There is no tenderness  There is CVA tenderness  There is no rebound and no guarding  Neurological: She is alert and oriented to person, place, and time  No cranial nerve deficit  She exhibits normal muscle tone  Skin: Skin is warm and dry  She is not diaphoretic  No pallor  Psychiatric: She has a normal mood and affect  Her behavior is normal    Nursing note and vitals reviewed        Vital Signs  ED Triage Vitals [03/09/20 0001]   Temperature Pulse Respirations Blood Pressure SpO2   99 1 °F (37 3 °C) (!) 128 17 121/80 98 %      Temp Source Heart Rate Source Patient Position - Orthostatic VS BP Location FiO2 (%)   Oral Monitor Sitting Left arm --      Pain Score       Worst Possible Pain           Vitals:    03/09/20 0001 03/09/20 0201   BP: 121/80    Pulse: (!) 128 101   Patient Position - Orthostatic VS: Sitting          Visual Acuity      ED Medications  Medications   ketorolac (TORADOL) injection 15 mg (has no administration in time range)   sodium chloride 0 9 % infusion (500 mL/hr Intravenous New Bag 3/9/20 0100)   ondansetron (ZOFRAN) injection 4 mg (4 mg Intravenous Given 3/9/20 0059)       Diagnostic Studies  Results Reviewed     Procedure Component Value Units Date/Time    Urine Microscopic [[id number]] (Abnormal) Collected:  03/09/20 0056    Lab Status:  Final result Specimen:  Urine, Clean Catch Updated:  03/09/20 0113     RBC, UA 4-10 /hpf      WBC, UA 20-30 /hpf      Epithelial Cells Occasional /hpf      Bacteria, UA Occasional /hpf      WBC Clumps present    Urine culture [[id number]] Collected:  03/09/20 0056    Lab Status:   In process Specimen:  Urine, Clean Catch Updated:  03/09/20 0113    Comprehensive metabolic panel [[id number]] Collected:  03/09/20 0040    Lab Status:  Final result Specimen:  Blood from Arm, Left Updated:  03/09/20 0106     Sodium 136 mmol/L      Potassium 4 3 mmol/L      Chloride 103 mmol/L      CO2 22 mmol/L      ANION GAP 11 mmol/L      BUN 13 mg/dL      Creatinine 0 97 mg/dL      Glucose 108 mg/dL      Calcium 8 7 mg/dL      AST 12 U/L      ALT 22 U/L      Alkaline Phosphatase 87 U/L      Total Protein 8 1 g/dL      Albumin 3 7 g/dL      Total Bilirubin 0 60 mg/dL      eGFR 81 ml/min/1 73sq m     Narrative:       [place] guidelines for Chronic Kidney Disease (CKD):     Stage 1 with normal or high GFR (GFR > 90 mL/min/1 73 square meters)    Stage 2 Mild CKD (GFR = 60-89 mL/min/1 73 square meters)    Stage 3A Moderate CKD (GFR = 45-59 mL/min/1 73 square meters)    Stage 3B Moderate CKD (GFR = 30-44 mL/min/1 73 square meters)    Stage 4 Severe CKD (GFR = 15-29 mL/min/1 73 square meters)    Stage 5 End Stage CKD (GFR <15 mL/min/1 73 square meters)  Note: GFR calculation is accurate only with a steady state creatinine    UA w Reflex to Microscopic w Reflex to Culture [[id number]]  (Abnormal) Collected:  03/09/20 0056    Lab Status:  Final result Specimen:  Urine, Clean Catch Updated:  03/09/20 0103     Color, UA Yellow     Clarity, UA Slightly Cloudy     Specific [place], UA 1 020     pH, UA 5 5     Leukocytes, UA Moderate     Nitrite, UA Negative     Protein, UA 30 (1+) mg/dl      Glucose, UA Negative mg/dl      Ketones, UA Negative mg/dl      Urobilinogen, UA 0 2 E U /dl      Bilirubin, UA Negative     Blood, UA Moderate    POCT pregnancy, urine [[id number]]  (Normal) Resulted:  03/09/20 0102    Lab Status:  Final result Updated:  03/09/20 0103     EXT PREG TEST UR (Ref: Negative) negative     Control valid    CBC and differential [[id number]]  (Abnormal) Collected:  03/09/20 0040    Lab Status:  Final result Specimen:  Blood from Arm, Left Updated:  03/09/20 0050     WBC 14 65 Thousand/uL      RBC 5 21 Million/uL      Hemoglobin 13 3 g/dL      Hematocrit 40 7 %      MCV 78 fL      MCH 25 5 pg      MCHC 32 7 g/dL      RDW 15 1 %      MPV 9 9 fL      Platelets 775 Thousands/uL      nRBC 0 /100 WBCs      Neutrophils Relative 61 %      Immat GRANS % 0 %      Lymphocytes Relative 33 %      Monocytes Relative 6 %      Eosinophils Relative 0 %      Basophils Relative 0 %      Neutrophils Absolute 8 71 Thousands/µL      Immature Grans Absolute 0 06 Thousand/uL      Lymphocytes Absolute 4 86 Thousands/µL      Monocytes Absolute 0 91 Thousand/µL      Eosinophils Absolute 0 06 Thousand/µL      Basophils Absolute 0 05 Thousands/µL                  CT renal stone study abdomen pelvis wo contrast   Final Result by Krista Nieves MD (03/09 0153)      1  No significant renal, ureteral, or bladder calculus  No hydronephrosis  2   There is right perinephric and periureteral stranding  Findings may represent recently passed calculus versus pyelonephritis, correlate with urinalysis  Workstation performed: [id number]                    Procedures  Procedures         ED Course                               MDM  Number of Diagnoses or Management Options  Diagnosis management comments: 17-year-old female presenting to the emergency department for evaluation of right-sided flank pain, could be stone versus pyelonephritis will check labs UA and CT scan          Disposition  Final diagnoses:   Pyelonephritis     Time reflects when diagnosis was documented in both MDM as applicable and the Disposition within this note     Time User Action Codes Description Comment    3/9/2020  2:04 AM Tevin Blevins Add [N12] Pyelonephritis       ED Disposition     ED Disposition Condition Date/Time Comment    Discharge Stable Mon Mar 9, 2020  2:04 AM Ritchie Benton discharge to home/self care  Follow-up Information    None         Patient's Medications   Discharge Prescriptions    CEPHALEXIN (KEFLEX) 500 MG CAPSULE    Take 1 capsule (500 mg total) by mouth every 8 (eight) hours for 10 days       Start Date: 3/9/2020  End Date: 3/19/2020       Order Dose: 500 mg       Quantity: 30 capsule    Refills: 0     No discharge procedures on file      PDMP Review     None          ED Provider  Electronically Signed by           April Andrade MD  03/09/20 7225

## 2020-03-11 ENCOUNTER — HOSPITAL ENCOUNTER (EMERGENCY)
Facility: HOSPITAL | Age: 25
Discharge: HOME | End: 2020-03-11
Attending: EMERGENCY MEDICINE
Payer: COMMERCIAL

## 2020-03-11 VITALS
DIASTOLIC BLOOD PRESSURE: 64 MMHG | OXYGEN SATURATION: 96 % | WEIGHT: 240 LBS | HEART RATE: 88 BPM | BODY MASS INDEX: 37.59 KG/M2 | RESPIRATION RATE: 18 BRPM | TEMPERATURE: 98 F | SYSTOLIC BLOOD PRESSURE: 111 MMHG

## 2020-03-11 DIAGNOSIS — N12 PYELONEPHRITIS: Primary | ICD-10-CM

## 2020-03-11 LAB
ANION GAP SERPL CALCULATED.3IONS-SCNC: 10 MMOL/L (ref 4–13)
BACTERIA UR CULT: ABNORMAL
BACTERIA UR CULT: ABNORMAL
BASOPHILS # BLD AUTO: 0.04 THOUSANDS/ΜL (ref 0–0.1)
BASOPHILS NFR BLD AUTO: 0 % (ref 0–1)
BUN SERPL-MCNC: 17 MG/DL (ref 5–25)
CALCIUM SERPL-MCNC: 9.1 MG/DL (ref 8.3–10.1)
CHLORIDE SERPL-SCNC: 98 MMOL/L (ref 100–108)
CO2 SERPL-SCNC: 27 MMOL/L (ref 21–32)
CREAT SERPL-MCNC: 1.19 MG/DL (ref 0.6–1.3)
EOSINOPHIL # BLD AUTO: 0.07 THOUSAND/ΜL (ref 0–0.61)
EOSINOPHIL NFR BLD AUTO: 0 % (ref 0–6)
ERYTHROCYTE [DISTWIDTH] IN BLOOD BY AUTOMATED COUNT: 14.7 % (ref 11.6–15.1)
GFR SERPL CREATININE-BSD FRML MDRD: 63 ML/MIN/1.73SQ M
GLUCOSE SERPL-MCNC: 82 MG/DL (ref 65–140)
HCT VFR BLD AUTO: 40 % (ref 34.8–46.1)
HGB BLD-MCNC: 12.7 G/DL (ref 11.5–15.4)
IMM GRANULOCYTES # BLD AUTO: 0.06 THOUSAND/UL (ref 0–0.2)
IMM GRANULOCYTES NFR BLD AUTO: 0 % (ref 0–2)
LACTATE SERPL-SCNC: 0.6 MMOL/L (ref 0.5–2)
LYMPHOCYTES # BLD AUTO: 5.77 THOUSANDS/ΜL (ref 0.6–4.47)
LYMPHOCYTES NFR BLD AUTO: 37 % (ref 14–44)
MCH RBC QN AUTO: 25.1 PG (ref 26.8–34.3)
MCHC RBC AUTO-ENTMCNC: 31.8 G/DL (ref 31.4–37.4)
MCV RBC AUTO: 79 FL (ref 82–98)
MONOCYTES # BLD AUTO: 1.48 THOUSAND/ΜL (ref 0.17–1.22)
MONOCYTES NFR BLD AUTO: 10 % (ref 4–12)
NEUTROPHILS # BLD AUTO: 8.2 THOUSANDS/ΜL (ref 1.85–7.62)
NEUTS SEG NFR BLD AUTO: 53 % (ref 43–75)
NRBC BLD AUTO-RTO: 0 /100 WBCS
PLATELET # BLD AUTO: 307 THOUSANDS/UL (ref 149–390)
PMV BLD AUTO: 10.1 FL (ref 8.9–12.7)
POTASSIUM SERPL-SCNC: 3.8 MMOL/L (ref 3.5–5.3)
RBC # BLD AUTO: 5.06 MILLION/UL (ref 3.81–5.12)
SODIUM SERPL-SCNC: 135 MMOL/L (ref 136–145)
WBC # BLD AUTO: 15.62 THOUSAND/UL (ref 4.31–10.16)

## 2020-03-11 PROCEDURE — 99284 EMERGENCY DEPT VISIT MOD MDM: CPT

## 2020-03-11 PROCEDURE — 80048 BASIC METABOLIC PNL TOTAL CA: CPT | Performed by: PHYSICIAN ASSISTANT

## 2020-03-11 PROCEDURE — 96361 HYDRATE IV INFUSION ADD-ON: CPT

## 2020-03-11 PROCEDURE — 96374 THER/PROPH/DIAG INJ IV PUSH: CPT

## 2020-03-11 PROCEDURE — 36415 COLL VENOUS BLD VENIPUNCTURE: CPT | Performed by: PHYSICIAN ASSISTANT

## 2020-03-11 PROCEDURE — 99283 EMERGENCY DEPT VISIT LOW MDM: CPT | Performed by: PHYSICIAN ASSISTANT

## 2020-03-11 PROCEDURE — 85025 COMPLETE CBC W/AUTO DIFF WBC: CPT | Performed by: PHYSICIAN ASSISTANT

## 2020-03-11 PROCEDURE — 83605 ASSAY OF LACTIC ACID: CPT | Performed by: PHYSICIAN ASSISTANT

## 2020-03-11 RX ORDER — KETOROLAC TROMETHAMINE 30 MG/ML
30 INJECTION, SOLUTION INTRAMUSCULAR; INTRAVENOUS ONCE
Status: COMPLETED | OUTPATIENT
Start: 2020-03-11 | End: 2020-03-11

## 2020-03-11 RX ADMIN — KETOROLAC TROMETHAMINE 30 MG: 30 INJECTION, SOLUTION INTRAMUSCULAR at 15:23

## 2020-03-11 RX ADMIN — SODIUM CHLORIDE 1000 ML: 0.9 INJECTION, SOLUTION INTRAVENOUS at 15:23

## 2020-03-11 NOTE — ED PROVIDER NOTES
History  Chief Complaint   Patient presents with    Flank Pain     reports "juan carlos horse pain in kidney"; was dx with kidney infection x 2 days ago; is on abx     52-year-old female with history of depression and migraines presenting for evaluation of right flank pain  Patient was seen here 2 days ago for the same  She was diagnosed with pyelonephritis at that time  Urine culture grew out E coli  She was discharged with Keflex which shows good susceptibility  Patient states she has persistent pain in the area which she describes as squeezing  Patient also complains of chills but denies fevers  Patient is unsure if these pains are expected with pyelonephritis or if she has something new going on  She took ibuprofen 600mg this morning with relief  No nausea, vomiting, diarrhea, constipation  Patient continues to have urinary frequency  She has a negative pregnancy test 2 days ago  History provided by:  Patient and medical records   used: No    Flank Pain   Pain location:  R flank  Pain quality: squeezing    Pain radiates to:  Does not radiate  Pain severity:  Moderate  Onset quality:  Gradual  Timing:  Constant  Progression:  Waxing and waning  Chronicity:  New  Context: not suspicious food intake and not trauma    Relieved by:  Nothing  Worsened by:  Nothing  Ineffective treatments:  None tried  Associated symptoms: chills and dysuria    Associated symptoms: no anorexia, no belching, no chest pain, no constipation, no cough, no diarrhea, no fever, no nausea, no shortness of breath, no sore throat, no vaginal bleeding, no vaginal discharge and no vomiting        Prior to Admission Medications   Prescriptions Last Dose Informant Patient Reported? Taking?    LORazepam (ATIVAN) 1 mg tablet   No No   Sig: Take 1 tablet (1 mg total) by mouth 2 (two) times a day as needed for anxiety or seizures for up to 3 days   cephalexin (KEFLEX) 500 mg capsule   No Yes   Sig: Take 1 capsule (500 mg total) by mouth every 8 (eight) hours for 10 days   divalproex sodium (DEPAKOTE) 250 mg EC tablet   No No   Sig: Take 1 tablet (250 mg total) by mouth every 12 (twelve) hours for 21 days   divalproex sodium (DEPAKOTE) 500 mg EC tablet   No No   Sig: Take 1 tablet (500 mg total) by mouth every 12 (twelve) hours for 21 days   Patient taking differently: Take 1,000 mg by mouth every 12 (twelve) hours    famotidine (PEPCID) 20 mg tablet   No No   Sig: Take 1 tablet (20 mg total) by mouth 2 (two) times a day for 30 days   lurasidone (LATUDA) 40 mg tablet   No No   Sig: Take 1 tablet (40 mg total) by mouth daily with dinner for 30 days   ondansetron (ZOFRAN-ODT) 4 mg disintegrating tablet   No Yes   Sig: Take 1 tablet (4 mg total) by mouth every 6 (six) hours as needed for nausea or vomiting   prazosin (MINIPRESS) 1 mg capsule   No No   Sig: Take 1 capsule (1 mg total) by mouth daily at bedtime for 30 days      Facility-Administered Medications: None       Past Medical History:   Diagnosis Date    Depression     Migraine     Seizures (HCC)        Past Surgical History:   Procedure Laterality Date     SECTION       SECTION      SHOULDER SURGERY      TUBAL LIGATION      TUMOR REMOVAL      from ear       Family History   Problem Relation Age of Onset    No Known Problems Mother     No Known Problems Father     No Known Problems Sister     No Known Problems Brother     No Known Problems Maternal Aunt     No Known Problems Paternal Aunt     No Known Problems Maternal Uncle     No Known Problems Paternal Uncle     No Known Problems Maternal Grandfather     No Known Problems Maternal Grandmother     No Known Problems Paternal Grandfather     No Known Problems Paternal Grandmother     No Known Problems Cousin     ADD / ADHD Neg Hx     Alcohol abuse Neg Hx     Anxiety disorder Neg Hx     Bipolar disorder Neg Hx     Completed Suicide  Neg Hx     Dementia Neg Hx     Depression Neg Hx     Drug abuse Neg Hx     OCD Neg Hx     Psychiatric Illness Neg Hx     Psychosis Neg Hx     Schizoaffective Disorder  Neg Hx     Schizophrenia Neg Hx     Self-Injury Neg Hx     Suicide Attempts Neg Hx      I have reviewed and agree with the history as documented  E-Cigarette/Vaping     E-Cigarette/Vaping Substances     Social History     Tobacco Use    Smoking status: Never Smoker    Smokeless tobacco: Never Used   Substance Use Topics    Alcohol use: Not Currently     Frequency: Monthly or less     Drinks per session: 1 or 2     Binge frequency: Never    Drug use: No       Review of Systems   Constitutional: Positive for chills  Negative for fever  HENT: Negative for drooling and sore throat  Eyes: Negative for discharge and redness  Respiratory: Negative for cough and shortness of breath  Cardiovascular: Negative for chest pain and palpitations  Gastrointestinal: Negative for abdominal pain, anorexia, constipation, diarrhea, nausea and vomiting  Genitourinary: Positive for dysuria, flank pain and frequency  Negative for vaginal bleeding and vaginal discharge  Musculoskeletal: Negative for neck pain and neck stiffness  Skin: Negative for color change and rash  Allergic/Immunologic: Negative for immunocompromised state  Neurological: Negative for syncope and weakness  Psychiatric/Behavioral: Negative for confusion  All other systems reviewed and are negative  Physical Exam  Physical Exam   Constitutional: She appears well-developed and well-nourished  No distress  Non-toxic appearing  Texting on cell phone   HENT:   Head: Normocephalic and atraumatic  Right Ear: External ear normal    Left Ear: External ear normal    Nose: Nose normal    Mouth/Throat: Oropharynx is clear and moist    Eyes: Conjunctivae are normal  Right eye exhibits no discharge  Left eye exhibits no discharge  No scleral icterus  Neck: Normal range of motion  Neck supple     Cardiovascular: Normal rate, regular rhythm and normal heart sounds  No murmur heard  Pulmonary/Chest: Effort normal and breath sounds normal  No stridor  No respiratory distress  She has no wheezes  She has no rales  Abdominal: Soft  Bowel sounds are normal  She exhibits no distension  There is no tenderness  There is CVA tenderness  There is no rigidity, no rebound and no guarding    + CVA tenderness on right   Musculoskeletal: Normal range of motion  She exhibits no edema or deformity  Lymphadenopathy:     She has no cervical adenopathy  Neurological: She is alert  She is not disoriented  GCS eye subscore is 4  GCS verbal subscore is 5  GCS motor subscore is 6  Skin: Skin is warm and dry  She is not diaphoretic  Psychiatric: She has a normal mood and affect  Her behavior is normal    Nursing note and vitals reviewed        Vital Signs  ED Triage Vitals   Temperature Pulse Respirations Blood Pressure SpO2   03/11/20 1333 03/11/20 1333 03/11/20 1333 03/11/20 1333 03/11/20 1333   98 °F (36 7 °C) 100 18 122/78 96 %      Temp Source Heart Rate Source Patient Position - Orthostatic VS BP Location FiO2 (%)   03/11/20 1333 03/11/20 1333 03/11/20 1333 03/11/20 1333 --   Oral Monitor Sitting Left arm       Pain Score       03/11/20 1523       9           Vitals:    03/11/20 1333 03/11/20 1525   BP: 122/78 111/64   Pulse: 100 88   Patient Position - Orthostatic VS: Sitting Lying         Visual Acuity      ED Medications  Medications   sodium chloride 0 9 % bolus 1,000 mL (0 mL Intravenous Stopped 3/11/20 1615)   ketorolac (TORADOL) injection 30 mg (30 mg Intravenous Given 3/11/20 1523)       Diagnostic Studies  Results Reviewed     Procedure Component Value Units Date/Time    Lactic acid, plasma [[id number]]  (Normal) Collected:  03/11/20 1520    Lab Status:  Final result Specimen:  Blood from Arm, Left Updated:  03/11/20 1555     LACTIC ACID 0 6 mmol/L     Narrative:       Result may be elevated if tourniquet was used during collection      Basic metabolic panel [[id number]]  (Abnormal) Collected:  03/11/20 1520    Lab Status:  Final result Specimen:  Blood from Arm, Left Updated:  03/11/20 1552     Sodium 135 mmol/L      Potassium 3 8 mmol/L      Chloride 98 mmol/L      CO2 27 mmol/L      ANION GAP 10 mmol/L      BUN 17 mg/dL      Creatinine 1 19 mg/dL      Glucose 82 mg/dL      Calcium 9 1 mg/dL      eGFR 63 ml/min/1 73sq m     Narrative:       National Kid[employer] Disease Foundation guidelines for Chronic Kidney Disease (CKD):     Stage 1 with normal or high GFR (GFR > 90 mL/min/1 73 square meters)    Stage 2 Mild CKD (GFR = 60-89 mL/min/1 73 square meters)    Stage 3A Moderate CKD (GFR = 45-59 mL/min/1 73 square meters)    Stage 3B Moderate CKD (GFR = 30-44 mL/min/1 73 square meters)    Stage 4 Severe CKD (GFR = 15-29 mL/min/1 73 square meters)    Stage 5 End Stage CKD (GFR <15 mL/min/1 73 square meters)  Note: GFR calculation is accurate only with a steady state creatinine    CBC and differential [[id number]]  (Abnormal) Collected:  03/11/20 1520    Lab Status:  Final result Specimen:  Blood from Arm, Left Updated:  03/11/20 1532     WBC 15 62 Thousand/uL      RBC 5 06 Million/uL      Hemoglobin 12 7 g/dL      Hematocrit 40 0 %      MCV 79 fL      MCH 25 1 pg      MCHC 31 8 g/dL      RDW 14 7 %      MPV 10 1 fL      Platelets 825 Thousands/uL      nRBC 0 /100 WBCs      Neutrophils Relative 53 %      Immat GRANS % 0 %      Lymphocytes Relative 37 %      Monocytes Relative 10 %      Eosinophils Relative 0 %      Basophils Relative 0 %      Neutrophils Absolute 8 20 Thousands/µL      Immature Grans Absolute 0 06 Thousand/uL      Lymphocytes Absolute 5 77 Thousands/µL      Monocytes Absolute 1 48 Thousand/µL      Eosinophils Absolute 0 07 Thousand/µL      Basophils Absolute 0 04 Thousands/µL                  No orders to display              Procedures  Procedures         ED Course                 MDM  Number of Diagnoses or Management Options  Pyelonephritis: new and requires workup  Diagnosis management comments: 31yo female presenting for right flank pain  Patient was diagnosed with pyelonephritis 2 days ago  CT showed evidence of perinephric stranding  Urine culture grew out E coli and she was discharged with Keflex which shows good susceptibility  Her flank pain persists  No fevers but admits to chills  Patient is well appearing and texting on cell phone  Vital signs unremarkable  Differential diagnosis includes but is not limited to: persistent pain from pyelonephritis, musculoskeletal, kidney stone    Initial ED plan: Check labs including CBC, BMP, lactate  No indication for repeat UA at this time as it will not   No indication for repeat imaging  IV fluid bolus and Toradol for pain    Final assessment: Labs significant for leukocytosis with WBC count of 15 6  White count two days ago was 14 5 so overall unchanged  Renal function and electrolytes normal  Lactate normal  SIRS criteria are not met and no signs of sepsis at this time  On re-evaluation, pain has significantly improved after Toradol  No indications for admission at this time  Pain is expected given recent diagnosis  Advised patient to continue her current antibiotic regimen  Supportive care discussed including Tylenol, ibuprofen, and heating pads  Advised close PCP follow-up in 2 days for recheck  ED return precautions discussed including fevers  Patient expressed understanding and is agreeable to plan  Patient discharged in stable condition           Amount and/or Complexity of Data Reviewed  Clinical lab tests: ordered and reviewed  Tests in the radiology section of CPT®: reviewed  Review and summarize past medical records: yes    Risk of Complications, Morbidity, and/or Mortality  Presenting problems: moderate  Diagnostic procedures: moderate  Management options: moderate    Patient Progress  Patient progress: stable        Disposition  Final diagnoses: Pyelonephritis     Time reflects when diagnosis was documented in both MDM as applicable and the Disposition within this note     Time User Action Codes Description Comment    3/11/2020  4:16 PM [address] [place] [address] [address], [place] [[id number]] Pyelonephritis       ED Disposition     ED Disposition Condition Date/Time Comment    Discharge Stable Wed Mar 11, 2020  4:15 PM Rosibel Avery discharge to home/self care  Follow-up Information     Follow up With Specialties Details Why Contact Info Additional Information    Genie Pina MD Internal Medicine Schedule an appointment as soon as possible for a visit   [address] [address] Emergency Department Emergency Medicine  If symptoms worsen [address] [facility] [address] [phone] [place] [facility], [address], [place], [address]          Patient's Medications   Discharge Prescriptions    No medications on file     No discharge procedures on file      PDMP Review     None          ED Provider  Electronically Signed by           Felix Lim PA-C  03/12/20 2383

## 2020-03-11 NOTE — ED NOTES
Discharge instructions reviewed with pt  Pt verbalized understanding, with no further questions at this time  Pt ambulatory out of department, using steady gait, without assistance, alone        Derrick Peraza, RN  03/11/20 4141

## 2020-07-01 ENCOUNTER — APPOINTMENT (EMERGENCY)
Dept: CT IMAGING | Facility: HOSPITAL | Age: 25
End: 2020-07-01
Payer: COMMERCIAL

## 2020-07-01 ENCOUNTER — HOSPITAL ENCOUNTER (EMERGENCY)
Facility: HOSPITAL | Age: 25
Discharge: HOME | End: 2020-07-01
Attending: EMERGENCY MEDICINE | Admitting: EMERGENCY MEDICINE
Payer: COMMERCIAL

## 2020-07-01 VITALS
BODY MASS INDEX: 39.22 KG/M2 | HEART RATE: 73 BPM | SYSTOLIC BLOOD PRESSURE: 134 MMHG | OXYGEN SATURATION: 98 % | DIASTOLIC BLOOD PRESSURE: 78 MMHG | TEMPERATURE: 98.6 F | WEIGHT: 250.44 LBS | RESPIRATION RATE: 18 BRPM

## 2020-07-01 DIAGNOSIS — R10.9 RIGHT FLANK PAIN: ICD-10-CM

## 2020-07-01 DIAGNOSIS — N12 PYELONEPHRITIS: Primary | ICD-10-CM

## 2020-07-01 LAB
ALBUMIN SERPL BCP-MCNC: 3.5 G/DL (ref 3.5–5)
ALP SERPL-CCNC: 81 U/L (ref 46–116)
ALT SERPL W P-5'-P-CCNC: 23 U/L (ref 12–78)
AMORPH URATE CRY URNS QL MICRO: ABNORMAL /HPF
ANION GAP SERPL CALCULATED.3IONS-SCNC: 10 MMOL/L (ref 4–13)
AST SERPL W P-5'-P-CCNC: 12 U/L (ref 5–45)
BACTERIA UR QL AUTO: ABNORMAL /HPF
BASOPHILS # BLD AUTO: 0.04 THOUSANDS/ΜL (ref 0–0.1)
BASOPHILS NFR BLD AUTO: 0 % (ref 0–1)
BILIRUB SERPL-MCNC: 0.3 MG/DL (ref 0.2–1)
BILIRUB UR QL STRIP: NEGATIVE
BUN SERPL-MCNC: 9 MG/DL (ref 5–25)
CALCIUM SERPL-MCNC: 8.3 MG/DL (ref 8.3–10.1)
CHLORIDE SERPL-SCNC: 109 MMOL/L (ref 100–108)
CLARITY UR: ABNORMAL
CO2 SERPL-SCNC: 21 MMOL/L (ref 21–32)
COLOR UR: YELLOW
CREAT SERPL-MCNC: 0.84 MG/DL (ref 0.6–1.3)
EOSINOPHIL # BLD AUTO: 0.16 THOUSAND/ΜL (ref 0–0.61)
EOSINOPHIL NFR BLD AUTO: 2 % (ref 0–6)
ERYTHROCYTE [DISTWIDTH] IN BLOOD BY AUTOMATED COUNT: 13.8 % (ref 11.6–15.1)
EXT PREG TEST URINE: NEGATIVE
EXT. CONTROL ED NAV: NORMAL
GFR SERPL CREATININE-BSD FRML MDRD: 96 ML/MIN/1.73SQ M
GLUCOSE SERPL-MCNC: 135 MG/DL (ref 65–140)
GLUCOSE UR STRIP-MCNC: NEGATIVE MG/DL
HCT VFR BLD AUTO: 39.1 % (ref 34.8–46.1)
HGB BLD-MCNC: 12.5 G/DL (ref 11.5–15.4)
HGB UR QL STRIP.AUTO: ABNORMAL
IMM GRANULOCYTES # BLD AUTO: 0.02 THOUSAND/UL (ref 0–0.2)
IMM GRANULOCYTES NFR BLD AUTO: 0 % (ref 0–2)
KETONES UR STRIP-MCNC: NEGATIVE MG/DL
LEUKOCYTE ESTERASE UR QL STRIP: ABNORMAL
LIPASE SERPL-CCNC: 92 U/L (ref 73–393)
LYMPHOCYTES # BLD AUTO: 3.35 THOUSANDS/ΜL (ref 0.6–4.47)
LYMPHOCYTES NFR BLD AUTO: 37 % (ref 14–44)
MCH RBC QN AUTO: 25.4 PG (ref 26.8–34.3)
MCHC RBC AUTO-ENTMCNC: 32 G/DL (ref 31.4–37.4)
MCV RBC AUTO: 79 FL (ref 82–98)
MONOCYTES # BLD AUTO: 0.53 THOUSAND/ΜL (ref 0.17–1.22)
MONOCYTES NFR BLD AUTO: 6 % (ref 4–12)
NEUTROPHILS # BLD AUTO: 4.93 THOUSANDS/ΜL (ref 1.85–7.62)
NEUTS SEG NFR BLD AUTO: 55 % (ref 43–75)
NITRITE UR QL STRIP: NEGATIVE
NON-SQ EPI CELLS URNS QL MICRO: ABNORMAL /HPF
NRBC BLD AUTO-RTO: 0 /100 WBCS
PH UR STRIP.AUTO: 5.5 [PH]
PLATELET # BLD AUTO: 301 THOUSANDS/UL (ref 149–390)
PMV BLD AUTO: 10.1 FL (ref 8.9–12.7)
POTASSIUM SERPL-SCNC: 4.4 MMOL/L (ref 3.5–5.3)
PROT SERPL-MCNC: 7.4 G/DL (ref 6.4–8.2)
PROT UR STRIP-MCNC: NEGATIVE MG/DL
RBC # BLD AUTO: 4.93 MILLION/UL (ref 3.81–5.12)
RBC #/AREA URNS AUTO: ABNORMAL /HPF
SODIUM SERPL-SCNC: 140 MMOL/L (ref 136–145)
SP GR UR STRIP.AUTO: 1.02 (ref 1–1.03)
UROBILINOGEN UR QL STRIP.AUTO: 0.2 E.U./DL
WBC # BLD AUTO: 9.03 THOUSAND/UL (ref 4.31–10.16)
WBC #/AREA URNS AUTO: ABNORMAL /HPF

## 2020-07-01 PROCEDURE — 74177 CT ABD & PELVIS W/CONTRAST: CPT

## 2020-07-01 PROCEDURE — 96361 HYDRATE IV INFUSION ADD-ON: CPT

## 2020-07-01 PROCEDURE — 99284 EMERGENCY DEPT VISIT MOD MDM: CPT

## 2020-07-01 PROCEDURE — 83690 ASSAY OF LIPASE: CPT | Performed by: EMERGENCY MEDICINE

## 2020-07-01 PROCEDURE — 96374 THER/PROPH/DIAG INJ IV PUSH: CPT

## 2020-07-01 PROCEDURE — 81001 URINALYSIS AUTO W/SCOPE: CPT | Performed by: EMERGENCY MEDICINE

## 2020-07-01 PROCEDURE — 96375 TX/PRO/DX INJ NEW DRUG ADDON: CPT

## 2020-07-01 PROCEDURE — 99285 EMERGENCY DEPT VISIT HI MDM: CPT | Performed by: EMERGENCY MEDICINE

## 2020-07-01 PROCEDURE — 93005 ELECTROCARDIOGRAM TRACING: CPT

## 2020-07-01 PROCEDURE — 81025 URINE PREGNANCY TEST: CPT | Performed by: EMERGENCY MEDICINE

## 2020-07-01 PROCEDURE — 85025 COMPLETE CBC W/AUTO DIFF WBC: CPT | Performed by: EMERGENCY MEDICINE

## 2020-07-01 PROCEDURE — 80053 COMPREHEN METABOLIC PANEL: CPT | Performed by: EMERGENCY MEDICINE

## 2020-07-01 PROCEDURE — 36415 COLL VENOUS BLD VENIPUNCTURE: CPT | Performed by: EMERGENCY MEDICINE

## 2020-07-01 RX ORDER — CEPHALEXIN 500 MG/1
500 CAPSULE ORAL 4 TIMES DAILY
Qty: 40 CAPSULE | Refills: 0 | Status: SHIPPED | OUTPATIENT
Start: 2020-07-01 | End: 2020-07-11

## 2020-07-01 RX ORDER — ONDANSETRON 2 MG/ML
4 INJECTION INTRAMUSCULAR; INTRAVENOUS ONCE
Status: COMPLETED | OUTPATIENT
Start: 2020-07-01 | End: 2020-07-01

## 2020-07-01 RX ORDER — KETOROLAC TROMETHAMINE 30 MG/ML
15 INJECTION, SOLUTION INTRAMUSCULAR; INTRAVENOUS ONCE
Status: COMPLETED | OUTPATIENT
Start: 2020-07-01 | End: 2020-07-01

## 2020-07-01 RX ORDER — CEPHALEXIN 250 MG/1
500 CAPSULE ORAL ONCE
Status: COMPLETED | OUTPATIENT
Start: 2020-07-01 | End: 2020-07-01

## 2020-07-01 RX ADMIN — ONDANSETRON 4 MG: 2 INJECTION INTRAMUSCULAR; INTRAVENOUS at 08:37

## 2020-07-01 RX ADMIN — KETOROLAC TROMETHAMINE 15 MG: 30 INJECTION, SOLUTION INTRAMUSCULAR at 08:37

## 2020-07-01 RX ADMIN — CEPHALEXIN 500 MG: 250 CAPSULE ORAL at 10:31

## 2020-07-01 RX ADMIN — SODIUM CHLORIDE 1000 ML: 0.9 INJECTION, SOLUTION INTRAVENOUS at 08:37

## 2020-07-01 RX ADMIN — IOHEXOL 100 ML: 350 INJECTION, SOLUTION INTRAVENOUS at 09:10

## 2020-07-01 NOTE — ED PROVIDER NOTES
History  Chief Complaint   Patient presents with    Flank Pain     pt with dizziness and right sided flank pain since last night     Dizziness     49-year-old female presents to the emergency room with right flank pain since this morning  Patient states that she woke up this morning with intermittent sharp right flank pain that radiates to her right lower quadrant  She states that pain has now become more constant  She states that is worse with walking and improves with sitting still  She states that she has associated nausea but denies any vomiting  She also reports that yesterday she developed urinary urgency but denies any dysuria or frequency  She states that she also had nausea and vomiting x2 yesterday as well  She denies any fevers/chills, chest pain, shortness of breath, diarrhea/constipation, or vaginal complaints  She states that she has a history of kidney infections and states that symptoms feel similar  She reports that she has a history of C-sections in the past but denies any other surgeries  History provided by:  Patient  Flank Pain   Pain location:  R flank  Pain quality: sharp and stabbing    Pain radiates to:  RLQ  Pain severity:  Moderate  Onset quality:  Sudden  Timing:  Constant  Progression:  Worsening  Chronicity:  New  Context: not previous surgeries and not sick contacts    Relieved by:  Nothing  Exacerbated by: walking  Ineffective treatments:  None tried  Associated symptoms: nausea and vomiting    Associated symptoms: no chest pain, no chills, no cough, no diarrhea, no dysuria, no fever, no hematuria, no shortness of breath and no sore throat    Dizziness   Associated symptoms: nausea and vomiting    Associated symptoms: no chest pain, no diarrhea, no headaches, no shortness of breath and no weakness        Prior to Admission Medications   Prescriptions Last Dose Informant Patient Reported? Taking?    LORazepam (ATIVAN) 1 mg tablet   No No   Sig: Take 1 tablet (1 mg total) by mouth 2 (two) times a day as needed for anxiety or seizures for up to 3 days   divalproex sodium (DEPAKOTE) 250 mg EC tablet   No No   Sig: Take 1 tablet (250 mg total) by mouth every 12 (twelve) hours for 21 days   divalproex sodium (DEPAKOTE) 500 mg EC tablet   No No   Sig: Take 1 tablet (500 mg total) by mouth every 12 (twelve) hours for 21 days   Patient taking differently: Take 1,000 mg by mouth every 12 (twelve) hours    famotidine (PEPCID) 20 mg tablet   No No   Sig: Take 1 tablet (20 mg total) by mouth 2 (two) times a day for 30 days   lurasidone (LATUDA) 40 mg tablet   No No   Sig: Take 1 tablet (40 mg total) by mouth daily with dinner for 30 days   ondansetron (ZOFRAN-ODT) 4 mg disintegrating tablet   No No   Sig: Take 1 tablet (4 mg total) by mouth every 6 (six) hours as needed for nausea or vomiting   prazosin (MINIPRESS) 1 mg capsule   No No   Sig: Take 1 capsule (1 mg total) by mouth daily at bedtime for 30 days      Facility-Administered Medications: None       Past Medical History:   Diagnosis Date    Depression     Migraine     Seizures (HCC)        Past Surgical History:   Procedure Laterality Date     SECTION       SECTION      SHOULDER SURGERY      TUBAL LIGATION      TUMOR REMOVAL      from ear       Family History   Problem Relation Age of Onset    No Known Problems Mother     No Known Problems Father     No Known Problems Sister     No Known Problems Brother     No Known Problems Maternal Aunt     No Known Problems Paternal Aunt     No Known Problems Maternal Uncle     No Known Problems Paternal Uncle     No Known Problems Maternal Grandfather     No Known Problems Maternal Grandmother     No Known Problems Paternal Grandfather     No Known Problems Paternal Grandmother     No Known Problems Cousin     ADD / ADHD Neg Hx     Alcohol abuse Neg Hx     Anxiety disorder Neg Hx     Bipolar disorder Neg Hx     Completed Suicide  Neg Hx     Dementia Neg Hx     Depression Neg Hx     Drug abuse Neg Hx     OCD Neg Hx     Psychiatric Illness Neg Hx     Psychosis Neg Hx     Schizoaffective Disorder  Neg Hx     Schizophrenia Neg Hx     Self-Injury Neg Hx     Suicide Attempts Neg Hx      I have reviewed and agree with the history as documented  E-Cigarette/Vaping     E-Cigarette/Vaping Substances     Social History     Tobacco Use    Smoking status: Never Smoker    Smokeless tobacco: Never Used   Substance Use Topics    Alcohol use: Not Currently     Frequency: Monthly or less     Drinks per session: 1 or 2     Binge frequency: Never    Drug use: No       Review of Systems   Constitutional: Negative for chills and fever  HENT: Negative for congestion, ear pain and sore throat  Eyes: Negative for pain and visual disturbance  Respiratory: Negative for cough, shortness of breath and wheezing  Cardiovascular: Negative for chest pain and leg swelling  Gastrointestinal: Positive for nausea and vomiting  Negative for abdominal pain and diarrhea  Genitourinary: Positive for flank pain  Negative for dysuria, frequency, hematuria and urgency  Musculoskeletal: Negative for neck pain and neck stiffness  Skin: Negative for rash and wound  Neurological: Positive for dizziness  Negative for weakness, numbness and headaches  Psychiatric/Behavioral: Negative for agitation and confusion  All other systems reviewed and are negative  Physical Exam  Physical Exam   Constitutional: She is oriented to person, place, and time  She appears well-developed and well-nourished  HENT:   Head: Normocephalic and atraumatic  Eyes: Pupils are equal, round, and reactive to light  EOM are normal    Neck: Normal range of motion  Neck supple  Cardiovascular: Normal rate and regular rhythm  Pulmonary/Chest: Effort normal and breath sounds normal    Abdominal: Soft  Bowel sounds are normal  She exhibits no distension   There is tenderness in the right lower quadrant  There is CVA tenderness  There is no rigidity, no rebound and no guarding  Musculoskeletal: Normal range of motion  Neurological: She is alert and oriented to person, place, and time  No focal deficits   Skin: Skin is warm and dry  Nursing note and vitals reviewed        Vital Signs  ED Triage Vitals [07/01/20 0750]   Temperature Pulse Respirations Blood Pressure SpO2   98 6 °F (37 °C) 81 18 134/86 97 %      Temp Source Heart Rate Source Patient Position - Orthostatic VS BP Location FiO2 (%)   Oral Monitor Lying Right arm --      Pain Score       Worst Possible Pain           Vitals:    07/01/20 0750 07/01/20 1000   BP: 134/86 134/78   Pulse: 81 73   Patient Position - Orthostatic VS: Lying Lying         Visual Acuity      ED Medications  Medications   sodium chloride 0 9 % bolus 1,000 mL (1,000 mL Intravenous New Bag 7/1/20 0837)   ketorolac (TORADOL) injection 15 mg (15 mg Intravenous Given 7/1/20 0837)   ondansetron (ZOFRAN) injection 4 mg (4 mg Intravenous Given 7/1/20 0837)   iohexol (OMNIPAQUE) 350 MG/ML injection (MULTI-DOSE) 100 mL (100 mL Intravenous Given 7/1/20 0910)   cephalexin (KEFLEX) capsule 500 mg (500 mg Oral Given 7/1/20 1031)       Diagnostic Studies  Results Reviewed     Procedure Component Value Units Date/Time    Comprehensive metabolic panel [[id number]]  (Abnormal) Collected:  07/01/20 0834    Lab Status:  Final result Specimen:  Blood from Arm, Left Updated:  07/01/20 0859     Sodium 140 mmol/L      Potassium 4 4 mmol/L      Chloride 109 mmol/L      CO2 21 mmol/L      ANION GAP 10 mmol/L      BUN 9 mg/dL      Creatinine 0 84 mg/dL      Glucose 135 mg/dL      Calcium 8 3 mg/dL      AST 12 U/L      ALT 23 U/L      Alkaline Phosphatase 81 U/L      Total Protein 7 4 g/dL      Albumin 3 5 g/dL      Total Bilirubin 0 30 mg/dL      eGFR 96 ml/min/1 73sq m     Narrative:       [place] guidelines for Chronic Kidney Disease (CKD):     Stage 1 with normal or high GFR (GFR > 90 mL/min/1 73 square meters)    Stage 2 Mild CKD (GFR = 60-89 mL/min/1 73 square meters)    Stage 3A Moderate CKD (GFR = 45-59 mL/min/1 73 square meters)    Stage 3B Moderate CKD (GFR = 30-44 mL/min/1 73 square meters)    Stage 4 Severe CKD (GFR = 15-29 mL/min/1 73 square meters)    Stage 5 End Stage CKD (GFR <15 mL/min/1 73 square meters)  Note: GFR calculation is accurate only with a steady state creatinine    Lipase [[id number]]  (Normal) Collected:  07/01/20 0834    Lab Status:  Final result Specimen:  Blood from Arm, Left Updated:  07/01/20 0859     Lipase 92 u/L     CBC and differential [[id number]]  (Abnormal) Collected:  07/01/20 0834    Lab Status:  Final result Specimen:  Blood from Arm, Left Updated:  07/01/20 0847     WBC 9 03 Thousand/uL      RBC 4 93 Million/uL      Hemoglobin 12 5 g/dL      Hematocrit 39 1 %      MCV 79 fL      MCH 25 4 pg      MCHC 32 0 g/dL      RDW 13 8 %      MPV 10 1 fL      Platelets 620 Thousands/uL      nRBC 0 /100 WBCs      Neutrophils Relative 55 %      Immat GRANS % 0 %      Lymphocytes Relative 37 %      Monocytes Relative 6 %      Eosinophils Relative 2 %      Basophils Relative 0 %      Neutrophils Absolute 4 93 Thousands/µL      Immature Grans Absolute 0 02 Thousand/uL      Lymphocytes Absolute 3 35 Thousands/µL      Monocytes Absolute 0 53 Thousand/µL      Eosinophils Absolute 0 16 Thousand/µL      Basophils Absolute 0 04 Thousands/µL     Urine Microscopic [[id number]]  (Abnormal) Collected:  07/01/20 0815    Lab Status:  Final result Specimen:  Urine, Clean Catch Updated:  07/01/20 0832     RBC, UA 0-1 /hpf      WBC, UA 2-4 /hpf      Epithelial Cells Moderate /hpf      Bacteria, UA None Seen /hpf      AMORPH URATES Occasional /hpf     UA w Reflex to Microscopic w Reflex to Culture [[id number]]  (Abnormal) Collected:  07/01/20 0815    Lab Status:  Final result Specimen:  Urine, Clean Catch Updated:  07/01/20 0822     Color, UA Yellow Clarity, UA Slightly Cloudy     Specific Gravity, UA 1 025     pH, UA 5 5     Leukocytes, UA Large     Nitrite, UA Negative     Protein, UA Negative mg/dl      Glucose, UA Negative mg/dl      Ketones, UA Negative mg/dl      Urobilinogen, UA 0 2 E U /dl      Bilirubin, UA Negative     Blood, UA Moderate    POCT pregnancy, urine [[id number]]  (Normal) Resulted:  07/01/20 0821    Lab Status:  Final result Specimen:  Urine Updated:  07/01/20 0821     EXT PREG TEST UR (Ref: Negative) negative     Control valid                 CT abdomen pelvis with contrast   ED Interpretation by Dang Lira DO (07/01 0959)   Bilateral parapelvic renal cysts  Right adnexal cyst measuring 3 8 cm is unchanged  Stool in the ascending colon  Final Result by George Sutton MD (07/01 1336)      Bilateral parapelvic renal cysts  Right adnexal cyst measuring 3 8 cm is unchanged  Stool in the ascending colon  Workstation performed: [id number]                    Procedures  Procedures         ED Course  ED Course as of Jul 01 1033   Wed Jul 01, 2020   6300 Epithelial Cells(!): Moderate       US AUDIT      Most Recent Value   Initial Alcohol Screen: US AUDIT-C    1  How often do you have a drink containing alcohol?  0 Filed at: 07/01/2020 0758   2  How many drinks containing alcohol do you have on a typical day you are drinking? 0 Filed at: 07/01/2020 0758   3a  Male UNDER 65: How often do you have five or more drinks on one occasion? 0 Filed at: 07/01/2020 0758   3b  FEMALE Any Age, or MALE 65+: How often do you have 4 or more drinks on one occassion? 0 Filed at: 07/01/2020 0758   Audit-C Score  0 Filed at: 07/01/2020 0758                  KYREE/DAST-10      Most Recent Value   How many times in the past year have you    Used an illegal drug or used a prescription medication for non-medical reasons?   Never Filed at: 07/01/2020 0757                                MDM  Number of Diagnoses or Management Options  Pyelonephritis: new and requires workup  Right flank pain: new and requires workup  Diagnosis management comments: Patient with right flank pain that radiates to her right lower quadrant  Will get labs, UA, urine preg, and CT abdomen/pelvis  Will give Toradol and Zofran for symptom relief  Patient reevaluated and feels improved  Patient updated on results of tests  Discharge instructions given including medications, follow-up, and return precautions  Patient demonstrates verbal understanding and agrees with plan  Amount and/or Complexity of Data Reviewed  Clinical lab tests: ordered and reviewed  Tests in the radiology section of CPT®: reviewed and ordered  Tests in the medicine section of CPT®: ordered and reviewed  Discussion of test results with the performing providers: yes  Decide to obtain previous medical records or to obtain history from someone other than the patient: yes  Obtain history from someone other than the patient: yes  Review and summarize past medical records: yes  Discuss the patient with other providers: yes  Independent visualization of images, tracings, or specimens: yes    Patient Progress  Patient progress: improved        Disposition  Final diagnoses:   Pyelonephritis   Right flank pain     Time reflects when diagnosis was documented in both MDM as applicable and the Disposition within this note     Time User Action Codes Description Comment    7/1/2020 10:14 AM Abdoulaye Brito Add [N12] Pyelonephritis     7/1/2020 10:14 AM Abdoulaye Brito Add [R10 9] Right flank pain       ED Disposition     ED Disposition Condition Date/Time Comment    Discharge Stable Wed Jul 1, 2020 10:14 AM Criss Rivas discharge to home/self care              Follow-up Information     Follow up With Specialties Details Why Contact Info Additional Information    Reyes Rai MD Internal Medicine Call in 1 day for follow up within 2-3 days [address] [place] [address] Emergency Department Emergency Medicine Go to  immediately for any new or worsening symptoms  [address] [facility] Teresa Rodriguez [facility] ED, [address] [place], [place], [address]          Patient's Medications   Discharge Prescriptions    CEPHALEXIN (KEFLEX) 500 MG CAPSULE    Take 1 capsule (500 mg total) by mouth 4 (four) times a day for 10 days       Start Date: 7/1/2020  End Date: 7/11/2020       Order Dose: 500 mg       Quantity: 40 capsule    Refills: 0     No discharge procedures on file      PDMP Review     None          ED Provider  Electronically Signed by           Dang Lira DO  07/01/20 8966

## 2020-07-04 LAB
ATRIAL RATE: 83 BPM
P AXIS: 31 DEGREES
PR INTERVAL: 146 MS
QRS AXIS: 21 DEGREES
QRSD INTERVAL: 78 MS
QT INTERVAL: 354 MS
QTC INTERVAL: 415 MS
T WAVE AXIS: 12 DEGREES
VENTRICULAR RATE: 83 BPM

## 2020-07-04 PROCEDURE — 93010 ELECTROCARDIOGRAM REPORT: CPT | Performed by: INTERNAL MEDICINE

## 2020-08-25 ENCOUNTER — TELEPHONE (OUTPATIENT)
Dept: NEUROLOGY | Facility: CLINIC | Age: 25
End: 2020-08-25

## 2020-08-25 NOTE — TELEPHONE ENCOUNTER
Best contact number for patient:[phone]     Emergency Contact name and number:    Referring provider and telephone number:ED    Primary Care Provider Name and if affiliated with [facility]: Correne Half    Reason for Appointment/Dx:SZ    Neurology Location patient would like to be seen:    Order received? Yes                                                Records Received? No    Have you ever seen another Neurologist?       No    [facility]    ID/Policy #:    Secondary Insurance:    ID/Policy#: Workman's Comp/ Accident/ School  Information      Workman's Comp/Accident/School related?        No    If yes name of Insurance company:    Date of Injury:    Type of Injury:    [address] Name and Telephone Number:    Notes:Ruddy Borrego/ulises pt pack sent                    Appointment date: 11/02/20 3:00pm

## 2020-08-29 ENCOUNTER — HOSPITAL ENCOUNTER (EMERGENCY)
Facility: HOSPITAL | Age: 25
Discharge: HOME | End: 2020-08-29
Attending: EMERGENCY MEDICINE | Admitting: EMERGENCY MEDICINE
Payer: COMMERCIAL

## 2020-08-29 ENCOUNTER — APPOINTMENT (EMERGENCY)
Dept: RADIOLOGY | Facility: HOSPITAL | Age: 25
End: 2020-08-29
Payer: COMMERCIAL

## 2020-08-29 VITALS
RESPIRATION RATE: 18 BRPM | BODY MASS INDEX: 39.36 KG/M2 | DIASTOLIC BLOOD PRESSURE: 69 MMHG | HEART RATE: 84 BPM | TEMPERATURE: 98.3 F | OXYGEN SATURATION: 98 % | WEIGHT: 251.32 LBS | SYSTOLIC BLOOD PRESSURE: 114 MMHG

## 2020-08-29 DIAGNOSIS — G40.909 SEIZURE DISORDER (HCC): Primary | ICD-10-CM

## 2020-08-29 DIAGNOSIS — M54.9 BACK PAIN: ICD-10-CM

## 2020-08-29 LAB
ALBUMIN SERPL BCP-MCNC: 3.4 G/DL (ref 3.5–5)
ALP SERPL-CCNC: 68 U/L (ref 46–116)
ALT SERPL W P-5'-P-CCNC: 22 U/L (ref 12–78)
ANION GAP SERPL CALCULATED.3IONS-SCNC: 5 MMOL/L (ref 4–13)
AST SERPL W P-5'-P-CCNC: 7 U/L (ref 5–45)
ATRIAL RATE: 104 BPM
BASOPHILS # BLD AUTO: 0.03 THOUSANDS/ΜL (ref 0–0.1)
BASOPHILS NFR BLD AUTO: 0 % (ref 0–1)
BILIRUB SERPL-MCNC: 0.39 MG/DL (ref 0.2–1)
BILIRUB UR QL STRIP: NEGATIVE
BUN SERPL-MCNC: 13 MG/DL (ref 5–25)
CALCIUM SERPL-MCNC: 8.4 MG/DL (ref 8.3–10.1)
CHLORIDE SERPL-SCNC: 107 MMOL/L (ref 100–108)
CLARITY UR: CLEAR
CO2 SERPL-SCNC: 25 MMOL/L (ref 21–32)
COLOR UR: YELLOW
CREAT SERPL-MCNC: 0.75 MG/DL (ref 0.6–1.3)
D DIMER PPP FEU-MCNC: <0.27 UG/ML FEU
EOSINOPHIL # BLD AUTO: 0.12 THOUSAND/ΜL (ref 0–0.61)
EOSINOPHIL NFR BLD AUTO: 1 % (ref 0–6)
ERYTHROCYTE [DISTWIDTH] IN BLOOD BY AUTOMATED COUNT: 14.5 % (ref 11.6–15.1)
EXT PREG TEST URINE: NEGATIVE
EXT. CONTROL ED NAV: NORMAL
GFR SERPL CREATININE-BSD FRML MDRD: 110 ML/MIN/1.73SQ M
GLUCOSE SERPL-MCNC: 113 MG/DL (ref 65–140)
GLUCOSE SERPL-MCNC: 143 MG/DL (ref 65–140)
GLUCOSE UR STRIP-MCNC: NEGATIVE MG/DL
HCT VFR BLD AUTO: 38.4 % (ref 34.8–46.1)
HGB BLD-MCNC: 12.5 G/DL (ref 11.5–15.4)
HGB UR QL STRIP.AUTO: NEGATIVE
IMM GRANULOCYTES # BLD AUTO: 0.06 THOUSAND/UL (ref 0–0.2)
IMM GRANULOCYTES NFR BLD AUTO: 1 % (ref 0–2)
KETONES UR STRIP-MCNC: ABNORMAL MG/DL
LEUKOCYTE ESTERASE UR QL STRIP: NEGATIVE
LYMPHOCYTES # BLD AUTO: 3.3 THOUSANDS/ΜL (ref 0.6–4.47)
LYMPHOCYTES NFR BLD AUTO: 39 % (ref 14–44)
MAGNESIUM SERPL-MCNC: 2.1 MG/DL (ref 1.6–2.6)
MCH RBC QN AUTO: 25.8 PG (ref 26.8–34.3)
MCHC RBC AUTO-ENTMCNC: 32.6 G/DL (ref 31.4–37.4)
MCV RBC AUTO: 79 FL (ref 82–98)
MONOCYTES # BLD AUTO: 0.47 THOUSAND/ΜL (ref 0.17–1.22)
MONOCYTES NFR BLD AUTO: 6 % (ref 4–12)
NEUTROPHILS # BLD AUTO: 4.49 THOUSANDS/ΜL (ref 1.85–7.62)
NEUTS SEG NFR BLD AUTO: 53 % (ref 43–75)
NITRITE UR QL STRIP: NEGATIVE
NRBC BLD AUTO-RTO: 0 /100 WBCS
P AXIS: 50 DEGREES
PH UR STRIP.AUTO: 6.5 [PH] (ref 4.5–8)
PLATELET # BLD AUTO: 281 THOUSANDS/UL (ref 149–390)
PMV BLD AUTO: 10.2 FL (ref 8.9–12.7)
POTASSIUM SERPL-SCNC: 4.4 MMOL/L (ref 3.5–5.3)
PR INTERVAL: 124 MS
PROT SERPL-MCNC: 7.2 G/DL (ref 6.4–8.2)
PROT UR STRIP-MCNC: NEGATIVE MG/DL
QRS AXIS: 6 DEGREES
QRSD INTERVAL: 76 MS
QT INTERVAL: 308 MS
QTC INTERVAL: 405 MS
RBC # BLD AUTO: 4.84 MILLION/UL (ref 3.81–5.12)
SODIUM SERPL-SCNC: 137 MMOL/L (ref 136–145)
SP GR UR STRIP.AUTO: 1.02 (ref 1–1.03)
T WAVE AXIS: 20 DEGREES
TROPONIN I SERPL-MCNC: <0.02 NG/ML
TSH SERPL DL<=0.05 MIU/L-ACNC: 1.38 UIU/ML (ref 0.36–3.74)
UROBILINOGEN UR QL STRIP.AUTO: 0.2 E.U./DL
VALPROATE SERPL-MCNC: 97 UG/ML (ref 50–100)
VENTRICULAR RATE: 104 BPM
WBC # BLD AUTO: 8.47 THOUSAND/UL (ref 4.31–10.16)

## 2020-08-29 PROCEDURE — 84484 ASSAY OF TROPONIN QUANT: CPT | Performed by: PHYSICIAN ASSISTANT

## 2020-08-29 PROCEDURE — 81003 URINALYSIS AUTO W/O SCOPE: CPT

## 2020-08-29 PROCEDURE — 83735 ASSAY OF MAGNESIUM: CPT | Performed by: PHYSICIAN ASSISTANT

## 2020-08-29 PROCEDURE — 36415 COLL VENOUS BLD VENIPUNCTURE: CPT | Performed by: PHYSICIAN ASSISTANT

## 2020-08-29 PROCEDURE — 80053 COMPREHEN METABOLIC PANEL: CPT | Performed by: PHYSICIAN ASSISTANT

## 2020-08-29 PROCEDURE — 96361 HYDRATE IV INFUSION ADD-ON: CPT

## 2020-08-29 PROCEDURE — 81025 URINE PREGNANCY TEST: CPT | Performed by: PHYSICIAN ASSISTANT

## 2020-08-29 PROCEDURE — 99285 EMERGENCY DEPT VISIT HI MDM: CPT | Performed by: PHYSICIAN ASSISTANT

## 2020-08-29 PROCEDURE — 93005 ELECTROCARDIOGRAM TRACING: CPT

## 2020-08-29 PROCEDURE — 71046 X-RAY EXAM CHEST 2 VIEWS: CPT

## 2020-08-29 PROCEDURE — 96365 THER/PROPH/DIAG IV INF INIT: CPT

## 2020-08-29 PROCEDURE — 80164 ASSAY DIPROPYLACETIC ACD TOT: CPT | Performed by: PHYSICIAN ASSISTANT

## 2020-08-29 PROCEDURE — 84443 ASSAY THYROID STIM HORMONE: CPT | Performed by: PHYSICIAN ASSISTANT

## 2020-08-29 PROCEDURE — 85025 COMPLETE CBC W/AUTO DIFF WBC: CPT | Performed by: PHYSICIAN ASSISTANT

## 2020-08-29 PROCEDURE — 85379 FIBRIN DEGRADATION QUANT: CPT | Performed by: PHYSICIAN ASSISTANT

## 2020-08-29 PROCEDURE — 82948 REAGENT STRIP/BLOOD GLUCOSE: CPT

## 2020-08-29 PROCEDURE — 96375 TX/PRO/DX INJ NEW DRUG ADDON: CPT

## 2020-08-29 PROCEDURE — 99284 EMERGENCY DEPT VISIT MOD MDM: CPT

## 2020-08-29 PROCEDURE — 93010 ELECTROCARDIOGRAM REPORT: CPT | Performed by: INTERNAL MEDICINE

## 2020-08-29 RX ORDER — ONDANSETRON 2 MG/ML
4 INJECTION INTRAMUSCULAR; INTRAVENOUS ONCE
Status: COMPLETED | OUTPATIENT
Start: 2020-08-29 | End: 2020-08-29

## 2020-08-29 RX ORDER — DIVALPROEX SODIUM 250 MG/1
250 TABLET, DELAYED RELEASE ORAL EVERY 12 HOURS SCHEDULED
Qty: 60 TABLET | Refills: 0 | Status: SHIPPED | OUTPATIENT
Start: 2020-08-29 | End: 2020-11-16

## 2020-08-29 RX ORDER — DIVALPROEX SODIUM 500 MG/1
500 TABLET, DELAYED RELEASE ORAL EVERY 12 HOURS SCHEDULED
Qty: 60 TABLET | Refills: 0 | Status: SHIPPED | OUTPATIENT
Start: 2020-08-29 | End: 2020-11-02

## 2020-08-29 RX ADMIN — VALPROATE SODIUM 2000 MG: 100 INJECTION, SOLUTION INTRAVENOUS at 16:28

## 2020-08-29 RX ADMIN — SODIUM CHLORIDE 1000 ML: 0.9 INJECTION, SOLUTION INTRAVENOUS at 14:51

## 2020-08-29 RX ADMIN — ONDANSETRON 4 MG: 2 INJECTION INTRAMUSCULAR; INTRAVENOUS at 14:51

## 2020-08-29 NOTE — ED NOTES
BP repeated in left arm after reading of sbp 88 obtained  Patient states she remains dizzy  Provider aware of episode of hypotension and repeated bp being 114/69  No new orders        Kenny Lynch RN  08/29/20 9343

## 2020-08-29 NOTE — ED ATTENDING ATTESTATION
8/29/2020  Pati Murillo DO, saw and evaluated the patient  I have discussed the patient with the resident/non-physician practitioner and agree with the resident's/non-physician practitioner's findings, Plan of Care, and MDM as documented in the resident's/non-physician practitioner's note, except where noted  All available labs and Radiology studies were reviewed  I was present for key portions of any procedure(s) performed by the resident/non-physician practitioner and I was immediately available to provide assistance  At this point I agree with the current assessment done in the Emergency Department  I have conducted an independent evaluation of this patient a history and physical is as follows: On examination:  The patient is awake, alert and oriented  HEENT: Normocephalic/atraumatic  External examination of the ears is unremarkable  Pupils are equal round and reactive to light, there is no conjunctival injection or scleral icterus noted  Nares are patent without rhinorrhea  The oropharynx is moist without injection  The neck is supple  Lungs: Clear to auscultation bilaterally  Heart: Regular without murmurs rubs or gallops  Abdomen: Soft and nontender  There are positive bowel sounds  there is no rebound or guarding  Musculoskeletal: Normal range of motion with grossly normal strength  Neuro: Cranial nerves II through XII grossly intact  Nonfocal exam  Skin: No rash noted  Psych: Mood and affect normal      ED Course         Critical Care Time  Procedures    A/P  1) Seizures with medication non-compliance - high suspicion for non-epileptiform seizures based on the description from the boyfriend and previous medical history  Patient admits to being noncomplaint with Depakote  APC discussed with neurology who recommended depakote load, resume medications and f/u as outpatient  RTED Instructions reviewed

## 2020-08-29 NOTE — DISCHARGE INSTRUCTIONS
Please continue regimen of Depakote 750mg twice daily  Cannot emphasize the importance of Neurology follow up  Please keep your follow up appointment with Neurology as scheduled on November 2nd  The office numbers for [facility] in [facility] have been provided, you may try calling to schedule sooner follow up if possible  Please call [facility] to schedule follow up for back pain  You may continue ice/heat, gentle stretching, and prescribed muscle relaxants  Encourage activity as tolerated  Return to the ED with any new or worsening symptoms

## 2020-08-29 NOTE — ED PROVIDER NOTES
History  Chief Complaint   Patient presents with    Seizure - Prior Hx Of     Patient brought in by EMS after having grand mal seizure for approx 2min  Has prior hx of  Last seizure was this past Monday  Heriberto Kemp is a 30year-old female with history of seizure disorder on Depakote, bipolar disorder arriving to the ED via EMS for evaluation of reported seizure activity prior to arrival  Patient's boyfriend relates that the patient had 5 grand mal seizures lasting 30-45 seconds in which he describes the patient having flailing movements in all of the extremities and some drooling  He states that in between seizure episodes the patient was somnolent  Per review of Epic the patient was evaluated in the ED in January and February 2020 for breakthrough seizures and has not followed up with a Neurologist since these visits, noting that she does have an appointment with Neurology in November  She states that she has been alternating between taking Depakote 250mg and 500mg once daily, basing this decision upon whether she had felt if "it was going to be a seizure day " The patient reports that she recently injured her lower back and that the pain has been a trigger of seizures for her, stating she has been taking Depakote daily for the past week but had not been consistently taking her medication prior to that for months at a time  Patient was seen at [facility] for her back pain and was started on Robaxin which the patient relates she has been taking  She denies extremity weakness, stating she's been ambulating without difficulty, and denies saddle anesthesia and bladder/bowel changes  Patient does admit that pain and emotional stress trigger her seizure activity  She states that just prior to today's reported seizures, she and her significant other had gotten into an argument and she felt like she was having a panic attack prior to seizing   The patient was able to lower herself onto the ground prior to any seizure activity taking place, and her significant other who witnessed the events denies any head strike, tongue-biting activity, appreciable rhythmic motions, or incontinence  Patient currently denies any headache, neck pain, abdominal pain, n/v  She admits to feeling fatigued  She states that she is having pain across the anterior chest wall which developed just prior to seizure activity  Patient denies any chest wall trauma or recent injury  History provided by:  Patient  Seizure - Prior Hx Of   Seizure activity on arrival: no    Seizure type:  Grand mal  Preceding symptoms: hyperventilation and panic    Initial focality:  Unable to specify  Episode characteristics: abnormal movements and generalized shaking    Episode characteristics: no focal shaking and no incontinence    Postictal symptoms: somnolence    Return to baseline: yes    Severity:  Moderate  Progression:  Resolved  Context: medical non-compliance and stress    Recent head injury:  No recent head injuries  History of seizures: yes        Prior to Admission Medications   Prescriptions Last Dose Informant Patient Reported?  Taking?   divalproex sodium (DEPAKOTE) 250 mg EC tablet   No No   Sig: Take 1 tablet (250 mg total) by mouth every 12 (twelve) hours for 21 days   divalproex sodium (DEPAKOTE) 500 mg EC tablet   No Yes   Sig: Take 1 tablet (500 mg total) by mouth every 12 (twelve) hours for 21 days   Patient taking differently: Take 1,000 mg by mouth every 12 (twelve) hours    lurasidone (LATUDA) 40 mg tablet   No No   Sig: Take 1 tablet (40 mg total) by mouth daily with dinner for 30 days      Facility-Administered Medications: None       Past Medical History:   Diagnosis Date    Depression     Migraine     Seizures ([facility] [address] )        Past Surgical History:   Procedure Laterality Date     SECTION       SECTION      SHOULDER SURGERY      TUBAL LIGATION      TUMOR REMOVAL      from ear       Family History   Problem Relation Age of Onset    No Known Problems Mother     No Known Problems Father     No Known Problems Sister     No Known Problems Brother     No Known Problems Maternal Aunt     No Known Problems Paternal Aunt     No Known Problems Maternal Uncle     No Known Problems Paternal Uncle     No Known Problems Maternal Grandfather     No Known Problems Maternal Grandmother     No Known Problems Paternal Grandfather     No Known Problems Paternal Grandmother     No Known Problems Cousin     ADD / ADHD Neg Hx     Alcohol abuse Neg Hx     Anxiety disorder Neg Hx     Bipolar disorder Neg Hx     Completed Suicide  Neg Hx     Dementia Neg Hx     Depression Neg Hx     Drug abuse Neg Hx     OCD Neg Hx     Psychiatric Illness Neg Hx     Psychosis Neg Hx     Schizoaffective Disorder  Neg Hx     Schizophrenia Neg Hx     Self-Injury Neg Hx     Suicide Attempts Neg Hx      I have reviewed and agree with the history as documented  E-Cigarette/Vaping     E-Cigarette/Vaping Substances     Social History     Tobacco Use    Smoking status: Never Smoker    Smokeless tobacco: Never Used   Substance Use Topics    Alcohol use: Not Currently     Frequency: Monthly or less     Drinks per session: 1 or 2     Binge frequency: Never    Drug use: No       Review of Systems   Constitutional: Positive for fatigue  Negative for chills, diaphoresis and fever  Eyes: Negative for visual disturbance  Respiratory: Positive for shortness of breath (resolved)  Negative for cough  Cardiovascular: Positive for chest pain  Negative for palpitations  Gastrointestinal: Negative for abdominal pain, nausea and vomiting  Musculoskeletal: Negative for back pain and myalgias  Skin: Negative for pallor  Neurological: Positive for seizures  Negative for numbness and headaches  All other systems reviewed and are negative  Physical Exam  Physical Exam  Vitals signs and nursing note reviewed     Constitutional:       General: She is not in acute distress  Appearance: She is obese  Comments: Patient is awake and alert, lying on exam bed  Appears in no significant distress   HENT:      Head: Normocephalic and atraumatic  Eyes:      Extraocular Movements: Extraocular movements intact  Conjunctiva/sclera: Conjunctivae normal       Comments: R pupil slightly greater than L, patient states this is not a new finding   Neck:      Musculoskeletal: Normal range of motion and neck supple  Cardiovascular:      Rate and Rhythm: Regular rhythm  Tachycardia present  Pulses: Normal pulses  Heart sounds: Normal heart sounds  No murmur  Pulmonary:      Effort: Pulmonary effort is normal  No respiratory distress  Breath sounds: Normal breath sounds  No wheezing, rhonchi or rales  Chest:      Chest wall: Tenderness present  No deformity, crepitus or edema  Comments: Reproducible right and left anterior chest wall tenderness  Abdominal:      General: Bowel sounds are normal  There is no distension  Palpations: Abdomen is soft  Musculoskeletal:      Right lower leg: No edema  Left lower leg: No edema  Comments: Tenderness to palpation of the lumbar/iliac region  No bony deformities or step offs   Skin:     General: Skin is warm and dry  Capillary Refill: Capillary refill takes less than 2 seconds  Neurological:      Mental Status: She is alert and oriented to person, place, and time  Motor: Motor function is intact  No weakness, abnormal muscle tone or seizure activity  Deep Tendon Reflexes:      Reflex Scores:       Bicep reflexes are 2+ on the right side and 2+ on the left side  Patellar reflexes are 2+ on the right side and 2+ on the left side  Comments: Patient is awake, alert and oriented  Speech clear with no aphasia  Able to move all extremities with symmetric strength   Pt unable to differentiate sharp/dull sensation in the RLE which she relates is new since today's seizures Vital Signs  ED Triage Vitals   Temperature Pulse Respirations Blood Pressure SpO2   08/29/20 1341 08/29/20 1337 08/29/20 1337 08/29/20 1337 08/29/20 1337   98 3 °F (36 8 °C) (!) 106 18 128/68 97 %      Temp Source Heart Rate Source Patient Position - Orthostatic VS BP Location FiO2 (%)   08/29/20 1341 08/29/20 1337 08/29/20 1337 08/29/20 1337 --   Oral Monitor Lying Right arm       Pain Score       --                  Vitals:    08/29/20 1337 08/29/20 1530 08/29/20 1545   BP: 128/68 (!) 88/57 114/69   Pulse: (!) 106 94 84   Patient Position - Orthostatic VS: Lying Lying Lying         Visual Acuity      ED Medications  Medications   sodium chloride 0 9 % bolus 1,000 mL (0 mL Intravenous Stopped 8/29/20 1754)   ondansetron (ZOFRAN) injection 4 mg (4 mg Intravenous Given 8/29/20 1451)   valproate (DEPACON) 2,000 mg in sodium chloride 0 9 % 50 mL IVPB (0 mg Intravenous Stopped 8/29/20 1754)       Diagnostic Studies  Results Reviewed     Procedure Component Value Units Date/Time    Valproic acid level, total [[id number]]  (Normal) Collected:  08/29/20 1451    Lab Status:  Final result Specimen:  Blood from Arm, Right Updated:  08/29/20 2340     Valproic Acid, Total 97 ug/mL     TSH [[id number]]  (Normal) Collected:  08/29/20 1451    Lab Status:  Final result Specimen:  Blood from Arm, Right Updated:  08/29/20 1529     TSH 3RD GENERATON 1 377 uIU/mL     Narrative:       Patients undergoing fluorescein dye angiography may retain small amounts of fluorescein in the body for 48-72 hours post procedure  Samples containing fluorescein can produce falsely depressed TSH values  If the patient had this procedure,a specimen should be resubmitted post fluorescein clearance        Magnesium [[id number]]  (Normal) Collected:  08/29/20 1451    Lab Status:  Final result Specimen:  Blood from Arm, Right Updated:  08/29/20 1529     Magnesium 2 1 mg/dL     Troponin I [[id number]]  (Normal) Collected:  08/29/20 1451    Lab Status: Final result Specimen:  Blood from Arm, Right Updated:  08/29/20 1522     Troponin I <0 02 ng/mL     Comprehensive metabolic panel [[id number]]  (Abnormal) Collected:  08/29/20 1451    Lab Status:  Final result Specimen:  Blood from Arm, Right Updated:  08/29/20 1521     Sodium 137 mmol/L      Potassium 4 4 mmol/L      Chloride 107 mmol/L      CO2 25 mmol/L      ANION GAP 5 mmol/L      BUN 13 mg/dL      Creatinine 0 75 mg/dL      Glucose 143 mg/dL      Calcium 8 4 mg/dL      AST 7 U/L      ALT 22 U/L      Alkaline Phosphatase 68 U/L      Total Protein 7 2 g/dL      Albumin 3 4 g/dL      Total Bilirubin 0 39 mg/dL      eGFR 110 ml/min/1 73sq m     Narrative:       National Kidney Disease Foundation guidelines for Chronic Kidney Disease (CKD):     Stage 1 with normal or high GFR (GFR > 90 mL/min/1 73 square meters)    Stage 2 Mild CKD (GFR = 60-89 mL/min/1 73 square meters)    Stage 3A Moderate CKD (GFR = 45-59 mL/min/1 73 square meters)    Stage 3B Moderate CKD (GFR = 30-44 mL/min/1 73 square meters)    Stage 4 Severe CKD (GFR = 15-29 mL/min/1 73 square meters)    Stage 5 End Stage CKD (GFR <15 mL/min/1 73 square meters)  Note: GFR calculation is accurate only with a steady state creatinine    D-Dimer [[id number]]  (Normal) Collected:  08/29/20 1451    Lab Status:  Final result Specimen:  Blood from Arm, Right Updated:  08/29/20 1517     D-Dimer, Quant <0 27 ug/ml FEU     CBC and differential [[id number]]  (Abnormal) Collected:  08/29/20 1451    Lab Status:  Final result Specimen:  Blood from Arm, Right Updated:  08/29/20 1512     WBC 8 47 Thousand/uL      RBC 4 84 Million/uL      Hemoglobin 12 5 g/dL      Hematocrit 38 4 %      MCV 79 fL      MCH 25 8 pg      MCHC 32 6 g/dL      RDW 14 5 %      MPV 10 2 fL      Platelets 367 Thousands/uL      nRBC 0 /100 WBCs      Neutrophils Relative 53 %      Immat GRANS % 1 %      Lymphocytes Relative 39 %      Monocytes Relative 6 %      Eosinophils Relative 1 % Basophils Relative 0 %      Neutrophils Absolute 4 49 Thousands/µL      Immature Grans Absolute 0 06 Thousand/uL      Lymphocytes Absolute 3 30 Thousands/µL      Monocytes Absolute 0 47 Thousand/µL      Eosinophils Absolute 0 12 Thousand/µL      Basophils Absolute 0 03 Thousands/µL     POCT pregnancy, urine [[id number]]  (Normal) Resulted:  08/29/20 1505    Lab Status:  Final result Updated:  08/29/20 1506     EXT PREG TEST UR (Ref: Negative) Negative     Control Valid    Urine Macroscopic, POC [[id number]]  (Abnormal) Collected:  08/29/20 1503    Lab Status:  Final result Specimen:  Urine Updated:  08/29/20 1504     Color, UA Yellow     Clarity, UA Clear     pH, UA 6 5     Leukocytes, UA Negative     Nitrite, UA Negative     Protein, UA Negative mg/dl      Glucose, UA Negative mg/dl      Ketones, UA 15 (1+) mg/dl      Urobilinogen, UA 0 2 E U /dl      Bilirubin, UA Negative     Blood, UA Negative     Specific Gravity, UA 1 025    Narrative:       CLINITEK RESULT    Fingerstick Glucose (POCT) [[id number]]  (Normal) Collected:  08/29/20 1340    Lab Status:  Final result Updated:  08/29/20 1342     POC Glucose 113 mg/dl                  XR chest 2 views   Final Result by Bogdan Castle MD (08/29 1501)      No acute cardiopulmonary disease              Workstation performed: [id number]                    Procedures  ECG 12 Lead Documentation Only    Date/Time: 8/29/2020 1:39 PM  Performed by: Lisa Jean-Baptiste PA-C  Authorized by: Lisa Jean-Baptiste PA-C     Indications / Diagnosis:  Seizure  Patient location:  ED  Previous ECG:     Previous ECG:  Compared to current    Comparison ECG info:  7/1/2020    Similarity:  Changes noted  Interpretation:     Interpretation: abnormal    Rate:     ECG rate:  104    ECG rate assessment: tachycardic    Rhythm:     Rhythm: sinus rhythm    Ectopy:     Ectopy: none    QRS:     QRS axis:  Normal    QRS intervals:  Normal  Conduction:     Conduction: normal    ST segments:     ST segments:  Non-specific  Comments:      ECG reviewed by myself and attending provider, compared to prior             ED Course    ED Course as of Aug 29 1745   Sat Aug 29, 2020   1515 CXR IMPRESSION:     No acute cardiopulmonary disease  1542 D-Dimer, Quant: <0 27   1550 Patient and s/o advised of lab and imaging results  They have been updated of plan to discuss case with Neurology and are agreeable  1603 Discussed with Neurology on-call  He had personally reviewed patient's records and states that this patient has a history of epileptiform and non-epileptiform seizures  Additionally relates that she is to be taking Depakote 750mg bid though patient states that she has been taking 250mg-500mg once daily  Recommends Depacon IV load, and discharge with emphasis on importance of Neurology follow-up  1738 Patient observed in the ED for greater than 4 hours without reported or witnessed seizure activity  Remained hemodynamically stable during ED stay, with no new or worsening symptoms  She appeared to be resting comfortably on numerous reassessments  She admits that she feels well and has returned to baseline mental status  Stable for discharge home at this time  Depacon sent to the pharmacy for the patient, emphasized importance of neurology follow up  She is understanding and agreeable  MDM  Number of Diagnoses or Management Options  Back pain: new and does not require workup  Seizure disorder [facility]): new and requires workup  Diagnosis management comments: This is a 30year-old female with PMHx seizure disorder on Depakote, bipolar disorder presenting to the ED via EMS for evaluation after reported seizure activity  Patient's significant other describes flailing movements in all extremities, lasting 30-45 seconds at a time with somnolence between events  He states there were approximately 5 episodes in total  No tongue biting or incontinence   Patient admits to missing numerous doses of Depacon and not taking this medication as prescribed, but states that she is scheduled to follow up with Neurology in November  Patient admits that she has been suffering more seizures due to a recent flare of acute low back pain, and has been consistently taking Depakote daily for the past week or so because of this  Differential diagnosis includes but not limited to: seizure disorder, seizure-like activity, dehydration, electrolyte disturbance, stress reaction, medication non-compliance, chest wall sprain, strain, low back pain    Initial ED plan: ECG, fluids, labs    Final ED Assessment: Vitals stable on arrival but notable for tachycardia  ECG stable when compared to prior  Examination as described above  Labs notable for negative d-dimer and troponin, no electrolyte disturbances  Valproate level drawn and pending  Case was discussed with Neurologist on call who reviewed the patient's chart  He relates that the patient suffers from both epileptiform and non-epileptiform seizures, and that her dose of Depacon should be 750mg bid  Patient stated that she was unaware that this was the dose she was supposed to be taking  Patient was provided an IV depacon load per neurology recommendation  She was monitored in the ED for greater than 4 hours without any witnessed or reported seizures  Tachycardia resolved  Patient admits that she's returned to baseline mental status and feels well to go home  She was discharged with script for depakote  Return parameters discussed at length with the patient, emphasized the need for neurology follow up as scheduled or sooner if possible  She is understanding and agreeable with this  Stable for discharge home         Amount and/or Complexity of Data Reviewed  Clinical lab tests: ordered and reviewed  Tests in the radiology section of CPT®: ordered and reviewed  Review and summarize past medical records: yes  Discuss the patient with other providers: yes  Independent visualization of images, tracings, or specimens: yes    Risk of Complications, Morbidity, and/or Mortality  Presenting problems: low  Diagnostic procedures: low  Management options: low    Patient Progress  Patient progress: stable        Disposition  Final diagnoses:   Seizure disorder ([facility] [address] )   Back pain     Time reflects when diagnosis was documented in both MDM as applicable and the Disposition within this note     Time User Action Codes Description Comment    8/29/2020  5:29 PM Heather Nicholas Add [[id number] [id number]] Seizure disorder ([facility] [address] )     8/29/2020  5:30 PM Heather Nicholas Add [M54 [id number]] Back pain       ED Disposition     ED Disposition Condition Date/Time Comment    Discharge Stable Sat Aug 29, 2020  5:27 PM [address] [facility] discharge to home/self care              Follow-up Information     Follow up With Specialties Details Why Contact Info Additional Information    Your Primary Care Provider   As needed; for medication refill      [address] Emergency Department Emergency Medicine  If symptoms worsen [address] [address]  [phone] AN ED, [facility], [facility] Neurosurgery Schedule an appointment as soon as possible for a visit   [address] [facility] [address] [phone]  [facility] [address], [address] [place] [address], [address] [facility] [place] [facility] Schedule an appointment as soon as possible for a visit   [address], [address] [facility] Neurology [address] [facility], [address] [facility], [address] [place] [address], [address] [place] [address]    [address] [facility] Physical Therapy Schedule an appointment as soon as possible for a visit   [phone]          Discharge Medication List as of 8/29/2020  5:36 PM      CONTINUE these medications which have CHANGED    Details   !! divalproex sodium (DEPAKOTE) 250 mg EC tablet Take 1 tablet (250 mg total) by mouth every 12 (twelve) hours, Starting Sat 8/29/2020, Normal      !! divalproex sodium (DEPAKOTE) 500 mg EC tablet Take 1 tablet (500 mg total) by mouth every 12 (twelve) hours, Starting Sat 8/29/2020, Until Mon 9/28/2020, Normal       !! - Potential duplicate medications found  Please discuss with provider  CONTINUE these medications which have NOT CHANGED    Details   !! divalproex sodium (DEPAKOTE) 500 mg EC tablet Take 1 tablet (500 mg total) by mouth every 12 (twelve) hours for 21 days, Starting Wed 1/15/2020, Until Sat 8/29/2020, Normal      lurasidone (LATUDA) 40 mg tablet Take 1 tablet (40 mg total) by mouth daily with dinner for 30 days, Starting Mon 6/18/2018, Until Wed 7/18/2018, Print       !! - Potential duplicate medications found  Please discuss with provider  No discharge procedures on file      PDMP Review     None          ED Provider  Electronically Signed by           Birdie Andrade PA-C  08/30/20 [id number]

## 2020-09-04 ENCOUNTER — OFFICE VISIT (OUTPATIENT)
Dept: FAMILY MEDICINE CLINIC | Facility: CLINIC | Age: 25
End: 2020-09-04
Payer: COMMERCIAL

## 2020-09-04 ENCOUNTER — TELEPHONE (OUTPATIENT)
Dept: PHYSICAL THERAPY | Facility: OTHER | Age: 25
End: 2020-09-04

## 2020-09-04 VITALS
SYSTOLIC BLOOD PRESSURE: 110 MMHG | BODY MASS INDEX: 38.61 KG/M2 | TEMPERATURE: 98.2 F | HEIGHT: 67 IN | HEART RATE: 68 BPM | RESPIRATION RATE: 17 BRPM | OXYGEN SATURATION: 97 % | WEIGHT: 246 LBS | DIASTOLIC BLOOD PRESSURE: 60 MMHG

## 2020-09-04 DIAGNOSIS — G40.909 SEIZURE DISORDER (HCC): Primary | ICD-10-CM

## 2020-09-04 DIAGNOSIS — M54.50 ACUTE BILATERAL LOW BACK PAIN WITHOUT SCIATICA: ICD-10-CM

## 2020-09-04 DIAGNOSIS — Z00.00 HEALTH CARE MAINTENANCE: ICD-10-CM

## 2020-09-04 DIAGNOSIS — E66.09 CLASS 2 OBESITY DUE TO EXCESS CALORIES WITHOUT SERIOUS COMORBIDITY WITH BODY MASS INDEX (BMI) OF 38.0 TO 38.9 IN ADULT: ICD-10-CM

## 2020-09-04 DIAGNOSIS — F31.4 BIPOLAR I DISORDER, MOST RECENT EPISODE DEPRESSED, SEVERE WITHOUT PSYCHOTIC FEATURES (HCC): ICD-10-CM

## 2020-09-04 PROBLEM — E66.812 CLASS 2 OBESITY DUE TO EXCESS CALORIES WITHOUT SERIOUS COMORBIDITY WITH BODY MASS INDEX (BMI) OF 38.0 TO 38.9 IN ADULT: Status: ACTIVE | Noted: 2020-09-04

## 2020-09-04 PROCEDURE — 1036F TOBACCO NON-USER: CPT | Performed by: NURSE PRACTITIONER

## 2020-09-04 PROCEDURE — 3725F SCREEN DEPRESSION PERFORMED: CPT | Performed by: NURSE PRACTITIONER

## 2020-09-04 PROCEDURE — 99204 OFFICE O/P NEW MOD 45 MIN: CPT | Performed by: NURSE PRACTITIONER

## 2020-09-04 NOTE — ASSESSMENT & PLAN NOTE
ED notes reviewed   Depakote dosage now at a total of 750 mg BID  Neuro appt in November but our office will reach out to Neuro to see if they have any sooner availability  There is some concern over medication compliance which we did discuss today    She does have Depakote at home and knows how to take this

## 2020-09-04 NOTE — ASSESSMENT & PLAN NOTE
Pt was referred to both [address] [place] [address] Psychiatry and [address] [place] [address] for therapy

## 2020-09-04 NOTE — PROGRESS NOTES
Assessment/Plan:    Referred to Gyn for a routine exam/ PAP    Seizure disorder [facility])  ED notes reviewed   Depakote dosage now at a total of 750 mg BID  Neuro appt in November but our office will reach out to Neuro to see if they have any sooner availability  There is some concern over medication compliance which we did discuss today  She does have Depakote at home and knows how to take this      Bipolar I disorder, most recent episode depressed, severe without psychotic features ([facility] [address] )  Pt was referred to both  Psychiatry and Hali Brown for therapy     Class 2 obesity due to excess calories without serious comorbidity with body mass index (BMI) of 38 0 to 38 9 in adult  Referred to Nutritional Services  Encouraged to follow a healthy diet, exercise 30 min 5 x per week     Acute bilateral low back pain without sciatica  Referred to our Comprehensive Spine Program  No red flags on exam today  Recommended moist heat to back, may take OTC tylenol prn, max of 3g/24 hours        Diagnoses and all orders for this visit:    Seizure disorder ([facility] [address] )    Bipolar I disorder, most recent episode depressed, severe without psychotic features ([facility] [address] )  -     Ambulatory referral to behavioral health therapists; Future  -     Ambulatory referral to Psychiatry; Future    Health care maintenance  -     Ambulatory referral to Gynecology; Future    Acute bilateral low back pain without sciatica  -     Ambulatory Referral to Comprehensive Spine Program; Future    Class 2 obesity due to excess calories without serious comorbidity with body mass index (BMI) of 38 0 to 38 9 in adult  -     Ambulatory referral to Nutrition Services; Future          Subjective:      Patient ID: Daphne Gaston is a 30 y o  female      HPI  Pt presents by herself today to establish care with our office  Transferring from: no previous PCP office, moved to the area recently from [place]    Bipolar I disorder- she had a previous Psychiatric hospitalization June of 2018 when she reports being diagnosed with bipolar disorder  She notes she was on Latuda at some point but this was stopped by her Neurologist (?)  Notes she takes Depakote for both her seizures and mood stability  She feels her mood has been stable but would like to establish care with both a therapist and psychiatrist   She does have insomnia on and off depending on how her anxiety is  She is currently living with her parents, has 2 children (son age 9, daughter age 1) and is planning to move in with her boyfriend soon     Seizure disorder- pt was recently evaluated in [address] [place] [address] ED on 8/29/2020 with seizure activity  She does have a Neurologist, Dr Keenan Perez with Neurology in [place]  She has a H/O epileptiform and non-epileptiform seizures, was on Depakote 250-500 mg once daily  In the ED, Neurology recommended Depacon IV load and increasing Depaoke to 750 mg BID  She is scheduled for a Neurology evaluation with Dr Susanne Hernández in November  She is requesting our office reach out to Neurology for a sooner appointment  She reports being complaint with the new dosage of Depakote 750 mg BID since her ED visit with no seizure activity     C/o back pain for the past 2 weeks, pain is the "entire lower back"  Pain radiates to the right lateral and anterior thigh  Some tingling into the right thigh as well   No injuries or falls  Pain is described as a constant, deep, dull ache  Worse with movement such as bending forward  She was prescribed Robaxin at a recent ED visit which did not provide much relief   No leg weakness, no bowel/ bladder incontinence or urinary retention, no saddle anesthesia   She feels her back pain is a trigger for her seizures     Lastly, Wilbur Vanessa would like a referral to see a Nutritionist   She feels she has struggled with weight loss following the birth of her children  No success with dieting on her own    BMi at 38 53    The following portions of the patient's history were reviewed and updated as appropriate: allergies, current medications, past family history, past medical history, past social history, past surgical history and problem list     Review of Systems   Constitutional: Negative for chills, fatigue and fever  Respiratory: Negative for cough, shortness of breath and wheezing  Cardiovascular: Negative for chest pain, palpitations and leg swelling  Gastrointestinal: Negative for abdominal pain, blood in stool, constipation, diarrhea and nausea  Genitourinary: Negative for dysuria  Musculoskeletal: Positive for back pain  Negative for arthralgias and myalgias  Skin: Negative for rash and wound  Neurological: Positive for seizures  Negative for dizziness, weakness, numbness and headaches  Hematological: Negative for adenopathy  Psychiatric/Behavioral: Positive for dysphoric mood and sleep disturbance  Negative for suicidal ideas  The patient is nervous/anxious  Objective:      /60 (BP Location: Left arm, Patient Position: Sitting, Cuff Size: Standard)   Pulse 68   Temp 98 2 °F (36 8 °C) (Oral)   Resp 17   Ht 5' 7" (1 702 m)   Wt 112 kg (246 lb)   LMP 07/24/2020   SpO2 97%   BMI 38 53 kg/m²          Physical Exam  Constitutional:       General: She is not in acute distress  Appearance: She is well-developed  She is not diaphoretic  HENT:      Head: Normocephalic and atraumatic  Eyes:      Conjunctiva/sclera: Conjunctivae normal       Pupils: Pupils are equal, round, and reactive to light  Neck:      Musculoskeletal: Normal range of motion and neck supple  Thyroid: No thyromegaly  Cardiovascular:      Rate and Rhythm: Normal rate and regular rhythm  Heart sounds: Normal heart sounds  No murmur  Pulmonary:      Effort: Pulmonary effort is normal  No respiratory distress  Breath sounds: Normal breath sounds  No wheezing  Abdominal:      General: Bowel sounds are normal  There is no distension  Palpations: Abdomen is soft  Tenderness: There is no abdominal tenderness  Musculoskeletal:      Lumbar back: She exhibits decreased range of motion (with hyperflexion) and tenderness  She exhibits no bony tenderness, no swelling, no edema, no deformity and no laceration  Comments: Negative SLR test   Normal sensation to light touch, strength 5/5 LEs   Lymphadenopathy:      Cervical: No cervical adenopathy  Skin:     General: Skin is warm and dry  Neurological:      Mental Status: She is alert and oriented to person, place, and time  Cranial Nerves: Cranial nerves are intact  Sensory: Sensation is intact  Coordination: Coordination is intact  Gait: Gait is intact  Psychiatric:         Attention and Perception: Attention and perception normal          Mood and Affect: Mood normal          Speech: Speech normal          Behavior: Behavior normal          Cognition and Memory: Cognition normal            BMI Counseling: Body mass index is 38 53 kg/m²  The BMI is above normal  Nutrition recommendations include reducing portion sizes, decreasing overall calorie intake, 3-5 servings of fruits/vegetables daily and reducing fast food intake  Exercise recommendations include moderate aerobic physical activity for 150 minutes/week

## 2020-09-04 NOTE — TELEPHONE ENCOUNTER
Nurse reached out using preferred contact # to discuss recent referral entered for  Comprehensive Spine program and offerings  Unable to [facility] for patient as her voice MB is not set up yet  Will defer for f/u attempt and possibly use alternate number if necessary

## 2020-09-04 NOTE — ASSESSMENT & PLAN NOTE
Referred to our Comprehensive Spine Program  No red flags on exam today  Recommended moist heat to back, may take OTC tylenol prn, max of 3g/24 hours

## 2020-09-10 ENCOUNTER — NURSE TRIAGE (OUTPATIENT)
Dept: PHYSICAL THERAPY | Facility: OTHER | Age: 25
End: 2020-09-10

## 2020-09-10 ENCOUNTER — TELEPHONE (OUTPATIENT)
Dept: PSYCHIATRY | Facility: CLINIC | Age: 25
End: 2020-09-10

## 2020-09-10 DIAGNOSIS — M54.50 ACUTE BILATERAL LOW BACK PAIN, UNSPECIFIED WHETHER SCIATICA PRESENT: Primary | ICD-10-CM

## 2020-09-10 NOTE — TELEPHONE ENCOUNTER
Additional Information   Negative: Is this related to an MVA?  Negative: Is this related to a work injury?  Negative: Are you currently recieving homecare services?  Negative: Does the patient have a fever?  Negative: Has the patient had unexplained weight loss?  Negative: Is the patient experiencing blood in sputum?  Negative: Is the patient experiencing urine retention?  Negative: Has the patient experienced major trauma? (fall from height, high speed collision, direct blow to spine) and is also experiencing nausea, light-headedness, or loss of consciousness?  Negative: Is the patient experiencing acute drop foot or paralysis?  Negative: Is this a chronic condition? Background - Initial Assessment  Clinical complaint: acute bilateral low back pain  Date of onset 3 weeks ago- NKI  Frequency of pain: constant  Quality of pain: sharp and shooting    Protocols used: [facility] COMPREHENSIVE SPINE PROGRAM PROTOCOL    Referral entered 9/4/20 for acute bilateral low back pain  Nurse triaged for above complaints-NO RF s/s present  Referral to [facility] site entered and contact information given to her as well  Patient stated her s/s have improved, but have not resolved  She is worried that it will cause issues with her other dx such as seizure disorder  Nurse also informed her that she would be receiving a call from the behavioral office d/t score    agreed and understood  Patient very appreciative of Call  Nurse wished her well and referral closed per protocol

## 2020-09-11 ENCOUNTER — TELEPHONE (OUTPATIENT)
Dept: BEHAVIORAL/MENTAL HEALTH CLINIC | Facility: CLINIC | Age: 25
End: 2020-09-11

## 2020-09-11 NOTE — TELEPHONE ENCOUNTER
This writer contacted patient to advise her that her insurance is not in network with the integration program  Pt referred to [facility] in [place]

## 2020-09-16 ENCOUNTER — TELEPHONE (OUTPATIENT)
Dept: BARIATRICS | Facility: CLINIC | Age: 25
End: 2020-09-16

## 2020-09-16 NOTE — TELEPHONE ENCOUNTER
Pt no show'd for appt    Tried to call her but her vm isn't set up, therefore couldn't leave a message

## 2020-10-30 ENCOUNTER — TELEPHONE (OUTPATIENT)
Dept: FAMILY MEDICINE CLINIC | Facility: CLINIC | Age: 25
End: 2020-10-30

## 2020-10-30 DIAGNOSIS — G40.909 SEIZURE DISORDER (HCC): Primary | ICD-10-CM

## 2020-11-02 ENCOUNTER — HOSPITAL ENCOUNTER (EMERGENCY)
Facility: HOSPITAL | Age: 25
End: 2020-11-03
Attending: EMERGENCY MEDICINE | Admitting: EMERGENCY MEDICINE
Payer: COMMERCIAL

## 2020-11-02 ENCOUNTER — TELEPHONE (OUTPATIENT)
Dept: NEUROLOGY | Facility: CLINIC | Age: 25
End: 2020-11-02

## 2020-11-02 ENCOUNTER — CONSULT (OUTPATIENT)
Dept: NEUROLOGY | Facility: CLINIC | Age: 25
End: 2020-11-02
Payer: COMMERCIAL

## 2020-11-02 VITALS
SYSTOLIC BLOOD PRESSURE: 116 MMHG | HEART RATE: 88 BPM | WEIGHT: 253 LBS | TEMPERATURE: 97.8 F | HEIGHT: 67 IN | DIASTOLIC BLOOD PRESSURE: 78 MMHG | BODY MASS INDEX: 39.71 KG/M2

## 2020-11-02 DIAGNOSIS — F31.81 BIPOLAR 2 DISORDER (HCC): ICD-10-CM

## 2020-11-02 DIAGNOSIS — G40.909 SEIZURE DISORDER (HCC): Primary | ICD-10-CM

## 2020-11-02 DIAGNOSIS — R45.851 SUICIDAL IDEATION: Primary | ICD-10-CM

## 2020-11-02 DIAGNOSIS — R45.851 SUICIDAL THOUGHTS: ICD-10-CM

## 2020-11-02 LAB
AMPHETAMINES SERPL QL SCN: NEGATIVE
BARBITURATES UR QL: NEGATIVE
BENZODIAZ UR QL: NEGATIVE
COCAINE UR QL: NEGATIVE
EXT PREG TEST URINE: NEGATIVE
EXT. CONTROL ED NAV: NORMAL
METHADONE UR QL: NEGATIVE
OPIATES UR QL SCN: NEGATIVE
OXYCODONE+OXYMORPHONE UR QL SCN: NEGATIVE
PCP UR QL: NEGATIVE
SARS-COV-2 RNA RESP QL NAA+PROBE: NEGATIVE
THC UR QL: NEGATIVE

## 2020-11-02 PROCEDURE — 82075 ASSAY OF BREATH ETHANOL: CPT | Performed by: EMERGENCY MEDICINE

## 2020-11-02 PROCEDURE — 80307 DRUG TEST PRSMV CHEM ANLYZR: CPT | Performed by: EMERGENCY MEDICINE

## 2020-11-02 PROCEDURE — U0003 INFECTIOUS AGENT DETECTION BY NUCLEIC ACID (DNA OR RNA); SEVERE ACUTE RESPIRATORY SYNDROME CORONAVIRUS 2 (SARS-COV-2) (CORONAVIRUS DISEASE [COVID-19]), AMPLIFIED PROBE TECHNIQUE, MAKING USE OF HIGH THROUGHPUT TECHNOLOGIES AS DESCRIBED BY CMS-2020-01-R: HCPCS | Performed by: EMERGENCY MEDICINE

## 2020-11-02 PROCEDURE — 99245 OFF/OP CONSLTJ NEW/EST HI 55: CPT | Performed by: PSYCHIATRY & NEUROLOGY

## 2020-11-02 PROCEDURE — 81025 URINE PREGNANCY TEST: CPT | Performed by: EMERGENCY MEDICINE

## 2020-11-02 PROCEDURE — 99285 EMERGENCY DEPT VISIT HI MDM: CPT

## 2020-11-02 PROCEDURE — 99285 EMERGENCY DEPT VISIT HI MDM: CPT | Performed by: EMERGENCY MEDICINE

## 2020-11-03 VITALS
OXYGEN SATURATION: 97 % | WEIGHT: 253 LBS | SYSTOLIC BLOOD PRESSURE: 110 MMHG | BODY MASS INDEX: 39.71 KG/M2 | RESPIRATION RATE: 16 BRPM | HEIGHT: 67 IN | TEMPERATURE: 98.1 F | HEART RATE: 77 BPM | DIASTOLIC BLOOD PRESSURE: 61 MMHG

## 2020-11-06 ENCOUNTER — TELEPHONE (OUTPATIENT)
Dept: FAMILY MEDICINE CLINIC | Facility: CLINIC | Age: 25
End: 2020-11-06

## 2020-11-16 ENCOUNTER — TELEMEDICINE (OUTPATIENT)
Dept: PSYCHIATRY | Facility: CLINIC | Age: 25
End: 2020-11-16
Payer: COMMERCIAL

## 2020-11-16 ENCOUNTER — OFFICE VISIT (OUTPATIENT)
Dept: PSYCHOLOGY | Facility: CLINIC | Age: 25
End: 2020-11-16
Payer: COMMERCIAL

## 2020-11-16 ENCOUNTER — HOSPITAL ENCOUNTER (OUTPATIENT)
Dept: RADIOLOGY | Age: 25
Discharge: HOME | End: 2020-11-16
Payer: COMMERCIAL

## 2020-11-16 DIAGNOSIS — F31.81 BIPOLAR 2 DISORDER (HCC): Primary | ICD-10-CM

## 2020-11-16 DIAGNOSIS — G40.909 SEIZURE DISORDER (HCC): ICD-10-CM

## 2020-11-16 PROCEDURE — 70551 MRI BRAIN STEM W/O DYE: CPT

## 2020-11-16 PROCEDURE — 90791 PSYCH DIAGNOSTIC EVALUATION: CPT | Performed by: HOSPITALIST

## 2020-11-16 PROCEDURE — G1004 CDSM NDSC: HCPCS

## 2020-11-16 RX ORDER — DIVALPROEX SODIUM 500 MG/1
1500 TABLET, DELAYED RELEASE ORAL EVERY 8 HOURS SCHEDULED
Start: 2020-11-16 | End: 2020-11-25 | Stop reason: SDUPTHER

## 2020-11-16 RX ORDER — ARIPIPRAZOLE 15 MG/1
7.5 TABLET ORAL DAILY
Start: 2020-11-16 | End: 2020-11-25 | Stop reason: SDUPTHER

## 2020-11-17 ENCOUNTER — OFFICE VISIT (OUTPATIENT)
Dept: PSYCHOLOGY | Facility: CLINIC | Age: 25
End: 2020-11-17
Payer: COMMERCIAL

## 2020-11-17 DIAGNOSIS — F31.81 BIPOLAR 2 DISORDER (HCC): Primary | ICD-10-CM

## 2020-11-17 PROCEDURE — H0035 MH PARTIAL HOSP TX UNDER 24H: HCPCS

## 2020-11-18 ENCOUNTER — OFFICE VISIT (OUTPATIENT)
Dept: PSYCHOLOGY | Facility: CLINIC | Age: 25
End: 2020-11-18
Payer: COMMERCIAL

## 2020-11-18 DIAGNOSIS — F31.81 BIPOLAR 2 DISORDER (HCC): ICD-10-CM

## 2020-11-18 PROCEDURE — 90832 PSYTX W PT 30 MINUTES: CPT

## 2020-11-18 PROCEDURE — H0035 MH PARTIAL HOSP TX UNDER 24H: HCPCS

## 2020-11-19 ENCOUNTER — OFFICE VISIT (OUTPATIENT)
Dept: PSYCHOLOGY | Facility: CLINIC | Age: 25
End: 2020-11-19
Payer: COMMERCIAL

## 2020-11-19 ENCOUNTER — HOSPITAL ENCOUNTER (OUTPATIENT)
Dept: NEUROLOGY | Facility: HOSPITAL | Age: 25
Discharge: HOME | End: 2020-11-19
Attending: PSYCHIATRY & NEUROLOGY
Payer: COMMERCIAL

## 2020-11-19 DIAGNOSIS — F31.81 BIPOLAR 2 DISORDER (HCC): Primary | ICD-10-CM

## 2020-11-19 DIAGNOSIS — G40.909 SEIZURE DISORDER (HCC): ICD-10-CM

## 2020-11-19 PROCEDURE — 95819 EEG AWAKE AND ASLEEP: CPT | Performed by: PSYCHIATRY & NEUROLOGY

## 2020-11-19 PROCEDURE — 90832 PSYTX W PT 30 MINUTES: CPT

## 2020-11-19 PROCEDURE — H0035 MH PARTIAL HOSP TX UNDER 24H: HCPCS

## 2020-11-19 PROCEDURE — 95819 EEG AWAKE AND ASLEEP: CPT

## 2020-11-20 ENCOUNTER — OFFICE VISIT (OUTPATIENT)
Dept: PSYCHOLOGY | Facility: CLINIC | Age: 25
End: 2020-11-20
Payer: COMMERCIAL

## 2020-11-20 DIAGNOSIS — F31.81 BIPOLAR 2 DISORDER (HCC): Primary | ICD-10-CM

## 2020-11-20 PROCEDURE — H0035 MH PARTIAL HOSP TX UNDER 24H: HCPCS

## 2020-11-23 ENCOUNTER — OFFICE VISIT (OUTPATIENT)
Dept: PSYCHOLOGY | Facility: CLINIC | Age: 25
End: 2020-11-23
Payer: COMMERCIAL

## 2020-11-23 DIAGNOSIS — F31.81 BIPOLAR 2 DISORDER (HCC): Primary | ICD-10-CM

## 2020-11-23 PROCEDURE — H0035 MH PARTIAL HOSP TX UNDER 24H: HCPCS

## 2020-11-24 ENCOUNTER — OFFICE VISIT (OUTPATIENT)
Dept: PSYCHOLOGY | Facility: CLINIC | Age: 25
End: 2020-11-24
Payer: COMMERCIAL

## 2020-11-24 DIAGNOSIS — F31.81 BIPOLAR 2 DISORDER (HCC): Primary | ICD-10-CM

## 2020-11-24 PROCEDURE — 90832 PSYTX W PT 30 MINUTES: CPT

## 2020-11-24 PROCEDURE — H0035 MH PARTIAL HOSP TX UNDER 24H: HCPCS

## 2020-11-25 ENCOUNTER — TELEMEDICINE (OUTPATIENT)
Dept: PSYCHIATRY | Facility: CLINIC | Age: 25
End: 2020-11-25
Payer: COMMERCIAL

## 2020-11-25 ENCOUNTER — OFFICE VISIT (OUTPATIENT)
Dept: PSYCHOLOGY | Facility: CLINIC | Age: 25
End: 2020-11-25
Payer: COMMERCIAL

## 2020-11-25 DIAGNOSIS — F31.81 BIPOLAR 2 DISORDER (HCC): Primary | ICD-10-CM

## 2020-11-25 DIAGNOSIS — F31.81 BIPOLAR 2 DISORDER (HCC): ICD-10-CM

## 2020-11-25 PROCEDURE — H0035 MH PARTIAL HOSP TX UNDER 24H: HCPCS

## 2020-11-25 PROCEDURE — 99213 OFFICE O/P EST LOW 20 MIN: CPT | Performed by: PHYSICIAN ASSISTANT

## 2020-11-25 RX ORDER — DIVALPROEX SODIUM 500 MG/1
1500 TABLET, DELAYED RELEASE ORAL
Qty: 90 TABLET | Refills: 1 | Status: SHIPPED | OUTPATIENT
Start: 2020-11-25 | End: 2021-07-14 | Stop reason: ALTCHOICE

## 2020-11-25 RX ORDER — ARIPIPRAZOLE 15 MG/1
7.5 TABLET ORAL DAILY
Qty: 15 TABLET | Refills: 1 | Status: SHIPPED | OUTPATIENT
Start: 2020-11-25 | End: 2020-12-02 | Stop reason: DRUGHIGH

## 2020-11-27 ENCOUNTER — OFFICE VISIT (OUTPATIENT)
Dept: PSYCHOLOGY | Facility: CLINIC | Age: 25
End: 2020-11-27
Payer: COMMERCIAL

## 2020-11-27 DIAGNOSIS — F31.81 BIPOLAR 2 DISORDER (HCC): Primary | ICD-10-CM

## 2020-11-27 PROCEDURE — H0035 MH PARTIAL HOSP TX UNDER 24H: HCPCS

## 2020-11-30 ENCOUNTER — OFFICE VISIT (OUTPATIENT)
Dept: PSYCHOLOGY | Facility: CLINIC | Age: 25
End: 2020-11-30
Payer: COMMERCIAL

## 2020-11-30 DIAGNOSIS — F31.81 BIPOLAR 2 DISORDER (HCC): Primary | ICD-10-CM

## 2020-11-30 PROCEDURE — H0035 MH PARTIAL HOSP TX UNDER 24H: HCPCS

## 2020-11-30 PROCEDURE — 90832 PSYTX W PT 30 MINUTES: CPT

## 2020-12-01 ENCOUNTER — APPOINTMENT (OUTPATIENT)
Dept: LAB | Facility: CLINIC | Age: 25
End: 2020-12-01
Payer: COMMERCIAL

## 2020-12-01 ENCOUNTER — DOCUMENTATION (OUTPATIENT)
Dept: PSYCHOLOGY | Facility: CLINIC | Age: 25
End: 2020-12-01

## 2020-12-01 ENCOUNTER — TELEPHONE (OUTPATIENT)
Dept: PSYCHIATRY | Facility: CLINIC | Age: 25
End: 2020-12-01

## 2020-12-01 ENCOUNTER — APPOINTMENT (OUTPATIENT)
Dept: PSYCHOLOGY | Facility: CLINIC | Age: 25
End: 2020-12-01
Payer: COMMERCIAL

## 2020-12-01 ENCOUNTER — OFFICE VISIT (OUTPATIENT)
Dept: INTERNAL MEDICINE CLINIC | Facility: CLINIC | Age: 25
End: 2020-12-01
Payer: COMMERCIAL

## 2020-12-01 VITALS
HEART RATE: 82 BPM | WEIGHT: 256.8 LBS | HEIGHT: 67 IN | SYSTOLIC BLOOD PRESSURE: 118 MMHG | TEMPERATURE: 97.9 F | DIASTOLIC BLOOD PRESSURE: 82 MMHG | BODY MASS INDEX: 40.3 KG/M2

## 2020-12-01 DIAGNOSIS — Z00.00 ANNUAL PHYSICAL EXAM: Primary | ICD-10-CM

## 2020-12-01 DIAGNOSIS — F31.63 SEVERE MIXED BIPOLAR 1 DISORDER WITHOUT PSYCHOSIS (HCC): ICD-10-CM

## 2020-12-01 DIAGNOSIS — Z91.89 AT RISK FOR SEXUALLY TRANSMITTED DISEASE DUE TO UNPROTECTED SEX: ICD-10-CM

## 2020-12-01 DIAGNOSIS — R53.83 FATIGUE, UNSPECIFIED TYPE: ICD-10-CM

## 2020-12-01 DIAGNOSIS — Z23 ENCOUNTER FOR IMMUNIZATION: ICD-10-CM

## 2020-12-01 DIAGNOSIS — G40.909 SEIZURE DISORDER (HCC): ICD-10-CM

## 2020-12-01 DIAGNOSIS — L98.9 SKIN LESION OF CHEST WALL: ICD-10-CM

## 2020-12-01 LAB
ALBUMIN SERPL BCP-MCNC: 3.7 G/DL (ref 3.5–5)
ALP SERPL-CCNC: 73 U/L (ref 46–116)
ALT SERPL W P-5'-P-CCNC: 21 U/L (ref 12–78)
ANION GAP SERPL CALCULATED.3IONS-SCNC: 6 MMOL/L (ref 4–13)
AST SERPL W P-5'-P-CCNC: 6 U/L (ref 5–45)
BASOPHILS # BLD AUTO: 0.06 THOUSANDS/ΜL (ref 0–0.1)
BASOPHILS NFR BLD AUTO: 1 % (ref 0–1)
BILIRUB SERPL-MCNC: 0.38 MG/DL (ref 0.2–1)
BUN SERPL-MCNC: 12 MG/DL (ref 5–25)
CALCIUM SERPL-MCNC: 9.4 MG/DL (ref 8.3–10.1)
CHLORIDE SERPL-SCNC: 111 MMOL/L (ref 100–108)
CO2 SERPL-SCNC: 23 MMOL/L (ref 21–32)
CREAT SERPL-MCNC: 0.72 MG/DL (ref 0.6–1.3)
EOSINOPHIL # BLD AUTO: 0.21 THOUSAND/ΜL (ref 0–0.61)
EOSINOPHIL NFR BLD AUTO: 2 % (ref 0–6)
ERYTHROCYTE [DISTWIDTH] IN BLOOD BY AUTOMATED COUNT: 14.1 % (ref 11.6–15.1)
GFR SERPL CREATININE-BSD FRML MDRD: 115 ML/MIN/1.73SQ M
GLUCOSE SERPL-MCNC: 93 MG/DL (ref 65–140)
HCT VFR BLD AUTO: 43.7 % (ref 34.8–46.1)
HGB BLD-MCNC: 13.6 G/DL (ref 11.5–15.4)
IMM GRANULOCYTES # BLD AUTO: 0.04 THOUSAND/UL (ref 0–0.2)
IMM GRANULOCYTES NFR BLD AUTO: 0 % (ref 0–2)
LYMPHOCYTES # BLD AUTO: 3.87 THOUSANDS/ΜL (ref 0.6–4.47)
LYMPHOCYTES NFR BLD AUTO: 38 % (ref 14–44)
MCH RBC QN AUTO: 25.6 PG (ref 26.8–34.3)
MCHC RBC AUTO-ENTMCNC: 31.1 G/DL (ref 31.4–37.4)
MCV RBC AUTO: 82 FL (ref 82–98)
MONOCYTES # BLD AUTO: 0.67 THOUSAND/ΜL (ref 0.17–1.22)
MONOCYTES NFR BLD AUTO: 7 % (ref 4–12)
NEUTROPHILS # BLD AUTO: 5.46 THOUSANDS/ΜL (ref 1.85–7.62)
NEUTS SEG NFR BLD AUTO: 52 % (ref 43–75)
NRBC BLD AUTO-RTO: 0 /100 WBCS
PLATELET # BLD AUTO: 363 THOUSANDS/UL (ref 149–390)
PMV BLD AUTO: 10.9 FL (ref 8.9–12.7)
POTASSIUM SERPL-SCNC: 4.9 MMOL/L (ref 3.5–5.3)
PROT SERPL-MCNC: 7.9 G/DL (ref 6.4–8.2)
RBC # BLD AUTO: 5.32 MILLION/UL (ref 3.81–5.12)
SODIUM SERPL-SCNC: 140 MMOL/L (ref 136–145)
TSH SERPL DL<=0.05 MIU/L-ACNC: 2.34 UIU/ML (ref 0.36–3.74)
VALPROATE SERPL-MCNC: 58 UG/ML (ref 50–100)
WBC # BLD AUTO: 10.31 THOUSAND/UL (ref 4.31–10.16)

## 2020-12-01 PROCEDURE — 87491 CHLMYD TRACH DNA AMP PROBE: CPT

## 2020-12-01 PROCEDURE — 1036F TOBACCO NON-USER: CPT | Performed by: FAMILY MEDICINE

## 2020-12-01 PROCEDURE — 80053 COMPREHEN METABOLIC PANEL: CPT

## 2020-12-01 PROCEDURE — 84443 ASSAY THYROID STIM HORMONE: CPT

## 2020-12-01 PROCEDURE — 90686 IIV4 VACC NO PRSV 0.5 ML IM: CPT | Performed by: FAMILY MEDICINE

## 2020-12-01 PROCEDURE — 99213 OFFICE O/P EST LOW 20 MIN: CPT | Performed by: FAMILY MEDICINE

## 2020-12-01 PROCEDURE — 85025 COMPLETE CBC W/AUTO DIFF WBC: CPT

## 2020-12-01 PROCEDURE — 36415 COLL VENOUS BLD VENIPUNCTURE: CPT

## 2020-12-01 PROCEDURE — 87661 TRICHOMONAS VAGINALIS AMPLIF: CPT

## 2020-12-01 PROCEDURE — 87591 N.GONORRHOEAE DNA AMP PROB: CPT

## 2020-12-01 PROCEDURE — 80164 ASSAY DIPROPYLACETIC ACD TOT: CPT

## 2020-12-01 PROCEDURE — 3008F BODY MASS INDEX DOCD: CPT | Performed by: FAMILY MEDICINE

## 2020-12-01 PROCEDURE — 90471 IMMUNIZATION ADMIN: CPT | Performed by: FAMILY MEDICINE

## 2020-12-01 NOTE — TELEPHONE ENCOUNTER
Patient called and would like to set up an apt with a doctor in our office she is an established patient at [facility] and sees Elsy Meyer  Can you please reach out to the patient to schedule thank you

## 2020-12-02 ENCOUNTER — OFFICE VISIT (OUTPATIENT)
Dept: PSYCHOLOGY | Facility: CLINIC | Age: 25
End: 2020-12-02
Payer: COMMERCIAL

## 2020-12-02 ENCOUNTER — TELEMEDICINE (OUTPATIENT)
Dept: PSYCHIATRY | Facility: CLINIC | Age: 25
End: 2020-12-02
Payer: COMMERCIAL

## 2020-12-02 DIAGNOSIS — F31.81 BIPOLAR 2 DISORDER (HCC): Primary | ICD-10-CM

## 2020-12-02 PROCEDURE — H0035 MH PARTIAL HOSP TX UNDER 24H: HCPCS

## 2020-12-02 PROCEDURE — 99213 OFFICE O/P EST LOW 20 MIN: CPT | Performed by: PHYSICIAN ASSISTANT

## 2020-12-02 PROCEDURE — 90832 PSYTX W PT 30 MINUTES: CPT

## 2020-12-02 RX ORDER — ARIPIPRAZOLE 5 MG/1
5 TABLET ORAL DAILY
Qty: 30 TABLET | Refills: 0 | Status: SHIPPED | OUTPATIENT
Start: 2020-12-02 | End: 2020-12-08 | Stop reason: SDUPTHER

## 2020-12-03 ENCOUNTER — OFFICE VISIT (OUTPATIENT)
Dept: PSYCHOLOGY | Facility: CLINIC | Age: 25
End: 2020-12-03
Payer: COMMERCIAL

## 2020-12-03 DIAGNOSIS — F31.81 BIPOLAR 2 DISORDER (HCC): Primary | ICD-10-CM

## 2020-12-03 LAB
C TRACH DNA SPEC QL NAA+PROBE: NEGATIVE
N GONORRHOEA DNA SPEC QL NAA+PROBE: NEGATIVE
T VAGINALIS RRNA SPEC QL NAA+PROBE: NEGATIVE

## 2020-12-03 PROCEDURE — H0035 MH PARTIAL HOSP TX UNDER 24H: HCPCS

## 2020-12-04 ENCOUNTER — OFFICE VISIT (OUTPATIENT)
Dept: PSYCHOLOGY | Facility: CLINIC | Age: 25
End: 2020-12-04
Payer: COMMERCIAL

## 2020-12-04 DIAGNOSIS — F31.81 BIPOLAR 2 DISORDER (HCC): Primary | ICD-10-CM

## 2020-12-04 PROCEDURE — 90832 PSYTX W PT 30 MINUTES: CPT

## 2020-12-04 PROCEDURE — H0035 MH PARTIAL HOSP TX UNDER 24H: HCPCS

## 2020-12-07 ENCOUNTER — TELEPHONE (OUTPATIENT)
Dept: INTERNAL MEDICINE CLINIC | Facility: CLINIC | Age: 25
End: 2020-12-07

## 2020-12-07 ENCOUNTER — OFFICE VISIT (OUTPATIENT)
Dept: PSYCHOLOGY | Facility: CLINIC | Age: 25
End: 2020-12-07
Payer: COMMERCIAL

## 2020-12-07 DIAGNOSIS — F31.81 BIPOLAR 2 DISORDER (HCC): Primary | ICD-10-CM

## 2020-12-07 PROCEDURE — H0035 MH PARTIAL HOSP TX UNDER 24H: HCPCS

## 2020-12-07 PROCEDURE — 90832 PSYTX W PT 30 MINUTES: CPT

## 2020-12-08 ENCOUNTER — TELEMEDICINE (OUTPATIENT)
Dept: PSYCHIATRY | Facility: CLINIC | Age: 25
End: 2020-12-08
Payer: COMMERCIAL

## 2020-12-08 ENCOUNTER — OFFICE VISIT (OUTPATIENT)
Dept: PSYCHOLOGY | Facility: CLINIC | Age: 25
End: 2020-12-08
Payer: COMMERCIAL

## 2020-12-08 DIAGNOSIS — F31.81 BIPOLAR 2 DISORDER (HCC): Primary | ICD-10-CM

## 2020-12-08 PROCEDURE — 99213 OFFICE O/P EST LOW 20 MIN: CPT | Performed by: PHYSICIAN ASSISTANT

## 2020-12-08 PROCEDURE — H0035 MH PARTIAL HOSP TX UNDER 24H: HCPCS

## 2020-12-08 RX ORDER — ARIPIPRAZOLE 5 MG/1
5 TABLET ORAL DAILY
Qty: 30 TABLET | Refills: 0 | Status: SHIPPED | OUTPATIENT
Start: 2020-12-08 | End: 2021-07-19

## 2020-12-09 ENCOUNTER — OFFICE VISIT (OUTPATIENT)
Dept: PSYCHOLOGY | Facility: CLINIC | Age: 25
End: 2020-12-09
Payer: COMMERCIAL

## 2020-12-09 DIAGNOSIS — F31.81 BIPOLAR 2 DISORDER (HCC): Primary | ICD-10-CM

## 2020-12-09 PROCEDURE — 90832 PSYTX W PT 30 MINUTES: CPT

## 2020-12-09 PROCEDURE — H0035 MH PARTIAL HOSP TX UNDER 24H: HCPCS

## 2020-12-10 ENCOUNTER — OFFICE VISIT (OUTPATIENT)
Dept: PSYCHOLOGY | Facility: CLINIC | Age: 25
End: 2020-12-10
Payer: COMMERCIAL

## 2020-12-10 DIAGNOSIS — F31.81 BIPOLAR 2 DISORDER (HCC): Primary | ICD-10-CM

## 2020-12-10 PROCEDURE — H0035 MH PARTIAL HOSP TX UNDER 24H: HCPCS

## 2020-12-11 ENCOUNTER — OFFICE VISIT (OUTPATIENT)
Dept: PSYCHOLOGY | Facility: CLINIC | Age: 25
End: 2020-12-11
Payer: COMMERCIAL

## 2020-12-11 DIAGNOSIS — F31.81 BIPOLAR 2 DISORDER (HCC): Primary | ICD-10-CM

## 2020-12-11 PROCEDURE — 90832 PSYTX W PT 30 MINUTES: CPT

## 2020-12-11 PROCEDURE — H0035 MH PARTIAL HOSP TX UNDER 24H: HCPCS

## 2021-01-11 ENCOUNTER — OFFICE VISIT (OUTPATIENT)
Dept: NEUROLOGY | Facility: CLINIC | Age: 26
End: 2021-01-11
Payer: COMMERCIAL

## 2021-01-11 VITALS
HEART RATE: 82 BPM | WEIGHT: 258.5 LBS | SYSTOLIC BLOOD PRESSURE: 131 MMHG | DIASTOLIC BLOOD PRESSURE: 67 MMHG | BODY MASS INDEX: 40.57 KG/M2 | HEIGHT: 67 IN

## 2021-01-11 DIAGNOSIS — G40.909 SEIZURE DISORDER (HCC): Primary | ICD-10-CM

## 2021-01-11 DIAGNOSIS — F31.81 BIPOLAR 2 DISORDER (HCC): ICD-10-CM

## 2021-01-11 PROCEDURE — 3008F BODY MASS INDEX DOCD: CPT | Performed by: NURSE PRACTITIONER

## 2021-01-11 PROCEDURE — 99214 OFFICE O/P EST MOD 30 MIN: CPT | Performed by: NURSE PRACTITIONER

## 2021-01-11 PROCEDURE — 1036F TOBACCO NON-USER: CPT | Performed by: NURSE PRACTITIONER

## 2021-01-11 NOTE — ASSESSMENT & PLAN NOTE
Patient's diagnosis of bipolar disorder likely places her at increased risk for non-epileptic events  With recent inpatient treatment of her bipolar disorder and suicidal ideation she has been doing much better emotionally and from a seizure standpoint  No further suicidal thoughts    She is following closely with psychiatry and her therapist

## 2021-01-11 NOTE — ASSESSMENT & PLAN NOTE
Since recent psyciatric admission, patient notes that she has been doing much better from a seizure standpoint  She has not had any larger events (pressure in right occipital region with left sided numbness and LOC, clustering and urinary incontinence) or morning myoclonus since her last visit  She does mention that she did have an event with staring which is described below  Her neurological exam is normal at today's visit  She notes that she has events with staring when she is really stressed out or upset  There is a possibility that these are behavioral which was discussed with the patient  Will have patient obtain 24 hour ambulatory EEG to assess for seizure risk  She has had 2 routine EEGs which were normal and 72 hours of continuous video EEG in 2017 which showed mild slowing of the posteirorly dominant alpha rhythm suggests mild, diffuse cerebral dysfunction of non-specific etiology, though no events were captured  Recent MRI brain was normal      Medication changes were deferred at this time as she is already on depakote 1500 mg HS per psychiatry  This was changed to night time dosing due to day time sedation  Consider transition to extended release in the future if events continue  We discussed that if she continues to have events and ambulatory EEG is unrevealing, it would be best to admit her to the EMU to capture and characterize events for further treatment which she is agreeable with  She is interested in starting to drive again, noting that she has not been able to drive since 7782 because of these events  We discussed that because she continues to have episodes of altered awareness, it would be best to refrain from driving which she is agreeable with for her safety  The patient will call the office with more frequent staring spells or recurrence of larger events  She will follow up in 2 months or sooner if needed with Dr Cindy Nielsen

## 2021-01-11 NOTE — PROGRESS NOTES
Review of Systems   Constitutional: Negative  Negative for appetite change and fever  HENT: Negative  Negative for hearing loss, tinnitus, trouble swallowing and voice change  Eyes: Negative  Negative for photophobia and pain  Respiratory: Negative  Negative for shortness of breath  Cardiovascular: Negative  Negative for palpitations  Gastrointestinal: Negative  Negative for nausea and vomiting  Endocrine: Negative  Negative for cold intolerance  Genitourinary: Negative  Negative for dysuria, frequency and urgency  Musculoskeletal: Negative  Negative for myalgias and neck pain  Skin: Negative  Negative for rash  Neurological: Positive for seizures and headaches  Negative for dizziness, tremors, syncope, facial asymmetry, speech difficulty, weakness, light-headedness and numbness  Hematological: Bruises/bleeds easily  Psychiatric/Behavioral: Negative  Negative for confusion, hallucinations and sleep disturbance

## 2021-01-11 NOTE — PATIENT INSTRUCTIONS
1  Continue your current dose of depakote  2  Have your EEG done  3  If episodes continue we may have to consider Epilepsy Monitoring Unit for continuous video EEG to try to capture and characterize events  4  Continue following with psychiatry and therapist    5  Follow up with Dr Adebayo Williamson in about 2 months or sooner if needed  6  No driving    Call the office with further event concerning for seizures

## 2021-01-11 NOTE — PROGRESS NOTES
Patient ID: Milly Loyola is a 30 y o  female with events concerning for seizures and/or non-epileptic spells, who is returning to Neurology office for follow up of her events  Assessment/Plan:    Seizure disorder [facility])  Since recent psyciatric admission, patient notes that she has been doing much better from a seizure standpoint  She has not had any larger events (pressure in right occipital region with left sided numbness and LOC, clustering and urinary incontinence) or morning myoclonus since her last visit  She does mention that she did have an event with staring which is described below  Her neurological exam is normal at today's visit  She notes that she has events with staring when she is really stressed out or upset  There is a possibility that these are behavioral which was discussed with the patient  Will have patient obtain 24 hour ambulatory EEG to assess for seizure risk  She has had 2 routine EEGs which were normal and 72 hours of continuous video EEG in 2017 which showed mild slowing of the posteirorly dominant alpha rhythm suggests mild, diffuse cerebral dysfunction of non-specific etiology, though no events were captured  Recent MRI brain was normal      Medication changes were deferred at this time as she is already on depakote 1500 mg HS per psychiatry  This was changed to night time dosing due to day time sedation  Consider transition to extended release in the future if events continue  We discussed that if she continues to have events and ambulatory EEG is unrevealing, it would be best to admit her to the EMU to capture and characterize events for further treatment which she is agreeable with  She is interested in starting to drive again, noting that she has not been able to drive since 1707 because of these events   We discussed that because she continues to have episodes of altered awareness, it would be best to refrain from driving which she is agreeable with for her safety  The patient will call the office with more frequent staring spells or recurrence of larger events  She will follow up in 2 months or sooner if needed with Dr Maye Ely  [facility]  Patient's diagnosis of bipolar disorder likely places her at increased risk for non-epileptic events  With recent inpatient treatment of her bipolar disorder and suicidal ideation she has been doing much better emotionally and from a seizure standpoint  No further suicidal thoughts  She is following closely with psychiatry and her therapist          Subjective:  Irving Castaneda is a 30 y o  female with events concerning for seizures and/or non-epileptic spells, who is returning to Neurology office for follow up of her events  She was most recently seen by Dr Maye Ely on 11/02/2020  At that time it was noted that her neurological exam was normal and that her bipolar disorder would place her at increased risk for nonepileptic spells  MRi brain and EEG were ordered at that time  There was noted recent worsening of depression, SI, and a recent aborted suicide attempt  She denied any active plan, social work was able to reach out to patient and encouraged her to be seen and evaluated int he ER  Evaluated by psychiatry and [facility] and was admitted to inpatient psychiatry  Since her last visit, she was hospitalized for psychiatric concerns  Notes that she has been doing a lot better  She did have a staring spell ("spaced out") on Saturday but otherwise she has been doing well  Denies having "full seizures "    She was stressed out and spaced out  Someone mentioned it  Friend mentioned that she was staring and her eye was twitching  She tried to get her attention multiple times and was unable to  Unsure how long it lasted  Felt tired but normal after  No confusion  She notes that she has had episodes where she spaces out before  Staring spells only happen when she get very stressed and cant handle it  Usually when she gets really mad about something  Towards the end she recalled her friend calling her name but did not remember her friend trying to get her attention  Her depakote was changed to night time by psychiatry and she is no longer having daytime drowsiness  Mood is much better  No longer having suicidal thoughts  Sees a therapist and psychiatry  Has appointment Wednesday with psychiatrist      She notes she put her two weeks notice in at work and she is feeling better  Patient has had a tubal ligation, no need for folic acid at this time  Denies any morning myoclonus  No new medical issues or concerns  Not currently driving  Current seizure medications:  - divalproex EC 1500 mg at bedtime  Other medications as per [facility]  Results for Arely Kim (MRN [id number])    Ref   Range 12/1/2020 12:20   Sodium Latest Ref Range: 136 - 145 mmol/L 140   Potassium Latest Ref Range: 3 5 - 5 3 mmol/L 4 9   Chloride Latest Ref Range: 100 - 108 mmol/L 111 (H)   CO2 Latest Ref Range: 21 - 32 mmol/L 23   Anion Gap Latest Ref Range: 4 - 13 mmol/L 6   BUN Latest Ref Range: 5 - 25 mg/dL 12   Creatinine Latest Ref Range: 0 60 - 1 30 mg/dL 0 72   Glucose, Random Latest Ref Range: 65 - 140 mg/dL 93   Calcium Latest Ref Range: 8 3 - 10 1 mg/dL 9 4   AST Latest Ref Range: 5 - 45 U/L 6   ALT Latest Ref Range: 12 - 78 U/L 21   Alkaline Phosphatase Latest Ref Range: 46 - 116 U/L 73   Total Protein Latest Ref Range: 6 4 - 8 2 g/dL 7 9   Albumin Latest Ref Range: 3 5 - 5 0 g/dL 3 7   TOTAL BILIRUBIN Latest Ref Range: 0 20 - 1 00 mg/dL 0 38   eGFR Latest Units: ml/min/1 73sq m 115   WBC Latest Ref Range: 4 31 - 10 16 Thousand/uL 10 31 (H)   Red Blood Cell Count Latest Ref Range: 3 81 - 5 12 Million/uL 5 32 (H)   Hemoglobin Latest Ref Range: 11 5 - 15 4 g/dL 13 6   HCT Latest Ref Range: 34 8 - 46 1 % 43 7   MCV Latest Ref Range: 82 - 98 fL 82   MCH Latest Ref Range: 26 8 - 34 3 pg 25 6 (L)   MCHC Latest Ref Range: 31 4 - 37 4 g/dL 31 1 (L)   RDW Latest Ref Range: 11 6 - 15 1 % 14 1   Platelet Count Latest Ref Range: 149 - 390 Thousands/uL 363   MPV Latest Ref Range: 8 9 - 12 7 fL 10 9   nRBC Latest Units: /100 WBCs 0   Neutrophils % Latest Ref Range: 43 - 75 % 52   Immat GRANS % Latest Ref Range: 0 - 2 % 0   Lymphocytes Relative Latest Ref Range: 14 - 44 % 38   Monocytes Relative Latest Ref Range: 4 - 12 % 7   Eosinophils Latest Ref Range: 0 - 6 % 2   Basophils Relative Latest Ref Range: 0 - 1 % 1   Immature Grans Absolute Latest Ref Range: 0 00 - 0 20 Thousand/uL 0 04   Absolute Neutrophils Latest Ref Range: 1 85 - 7 62 Thousands/µL 5 46   Lymphocytes Absolute Latest Ref Range: 0 60 - 4 47 Thousands/µL 3 87   Absolute Monocytes Latest Ref Range: 0 17 - 1 22 Thousand/µL 0 67   Absolute Eosinophils Latest Ref Range: 0 00 - 0 61 Thousand/µL 0 21   Basophils Absolute Latest Ref Range: 0 00 - 0 10 Thousands/µL 0 06   VALPROIC ACID LEVEL, TOTAL Unknown Rpt   VALPROIC ACID TOTAL Latest Ref Range: 50 - 100 ug/mL 58   TSH 3RD GENERATON Latest Ref Range: 0 358 - 3 740 uIU/mL 2 340       Event/Seizure semiology:  · Pressure in right occipital region, left sided (arm/face/leg) numbness, loss of consciousness, diffuse body movements for 30-60 seconds, drooling  May cluster with partial recovery in between  Tired afterward  Some with urinary incontinence  · She feels lightheaded, sits down  Feels eyes twitching, like eyes back and forth  Mother has seen her eyes going back and forth  Right pupil gets big  No LOC  May space out a little  Lasts a few seconds  Back to normal in a minute to a couple minutes  No tired afterward and go back to activities     · Morning myoclonus     Special Features  Status epilepticus: yes - repeated events without return to baseline  Self Injury Seizures: no  Precipitating Factors: Pain, stress, tiredness, and flashing lights trigger events     Epilepsy Risk Factors:  Paternal grandmother likely had epilepsy and  when the patient's father was 15  Details uncertain  Brother with Aspergers  Paternal aunt had seizures after TBI     Prior AEDs:  Lacosamide (started early , stopped by Dr Bipin Morrissey)  Levetiracetam     Prior Evaluation:  - MRI brain seizure wo contrast:   IMPRESSION:  1  No acute infarction, intracranial hemorrhage or mass effect  2  No MR evidence of mesial temporal sclerosis  - Routine EEG 2020:  Normal    About 72 hours of continuous VEEG in 2017: No events  No epileptiform discharges  Mild   slowing of the posteirorly dominant alpha rhythm suggests mild, diffuse cerebral dysfunction of non-specific etiology    - Extended routine EEG ([facility]) 2017:  Normal     Psychiatric History:  Bipolar disorder  Following closely with psychiatry now  2 prior inpatient admissions     Social History:   Driving: No  Lives Alone: No  Lives with parents  Rarely alone  Cares for her 2 young children  Occupation: works at hotel   Works days, unless she does a double shift to cover for someone  I reviewed prior neurology notes, as documented in Epic/Tourj[employer], and summarized above  The following portions of the patient's history were reviewed and updated as appropriate: allergies, current medications, past family history, past medical history, past social history, past surgical history and problem list      Objective:    Blood pressure 131/67, pulse 82, height 5' 7" (1 702 m), weight 117 kg (258 lb 8 oz), unknown if currently breastfeeding  Physical Exam  No apparent distress  Appears well nourished  Mood appropriate for situation     Neurologic Exam  Mental status- alert and oriented to person, place, and time  Speech appropriate for conversation  Good attention and knowledge       Cranial Nerves- PERRL, VFFTC, EOMS normal, facial sensation and muscles symmetric, hearing intact bilaterally to finger rubs, tongue midline, palate rise symmetrical, shoulder shrug symmetrical     Motor- No pronator drift  5/5 strength all extremities  Moves all extremities freely  No tremor  Sensory-  Intact distally in all extremities to light touch  DTRs- 2+ and symmetric in all extremities  Gait- normal casual gait  Coordination- FNF intact  ROS:  Review of Systems   Constitutional: Negative  Negative for appetite change and fever  HENT: Negative  Negative for hearing loss, tinnitus, trouble swallowing and voice change  Eyes: Negative  Negative for photophobia and pain  Respiratory: Negative  Negative for shortness of breath  Cardiovascular: Negative  Negative for palpitations  Gastrointestinal: Negative  Negative for nausea and vomiting  Endocrine: Negative  Negative for cold intolerance  Genitourinary: Negative  Negative for dysuria, frequency and urgency  Musculoskeletal: Negative  Negative for myalgias and neck pain  Skin: Negative  Negative for rash  Neurological: Positive for seizures and headaches  Negative for dizziness, tremors, syncope, facial asymmetry, speech difficulty, weakness, light-headedness and numbness  Hematological: Bruises/bleeds easily  Psychiatric/Behavioral: Negative  Negative for confusion, hallucinations and sleep disturbance  ROS obtained by MA and reviewed by myself

## 2021-03-01 ENCOUNTER — OFFICE VISIT (OUTPATIENT)
Dept: INTERNAL MEDICINE CLINIC | Facility: CLINIC | Age: 26
End: 2021-03-01
Payer: COMMERCIAL

## 2021-03-01 VITALS
HEIGHT: 67 IN | WEIGHT: 259.6 LBS | DIASTOLIC BLOOD PRESSURE: 82 MMHG | TEMPERATURE: 97.7 F | HEART RATE: 70 BPM | BODY MASS INDEX: 40.74 KG/M2 | SYSTOLIC BLOOD PRESSURE: 120 MMHG

## 2021-03-01 DIAGNOSIS — E66.09 CLASS 2 OBESITY DUE TO EXCESS CALORIES WITHOUT SERIOUS COMORBIDITY WITH BODY MASS INDEX (BMI) OF 38.0 TO 38.9 IN ADULT: ICD-10-CM

## 2021-03-01 DIAGNOSIS — R53.83 FATIGUE, UNSPECIFIED TYPE: ICD-10-CM

## 2021-03-01 DIAGNOSIS — Z23 ENCOUNTER FOR IMMUNIZATION: ICD-10-CM

## 2021-03-01 DIAGNOSIS — Z01.419 ENCOUNTER FOR ANNUAL ROUTINE GYNECOLOGICAL EXAMINATION: Primary | ICD-10-CM

## 2021-03-01 PROCEDURE — 99395 PREV VISIT EST AGE 18-39: CPT | Performed by: FAMILY MEDICINE

## 2021-03-01 PROCEDURE — G0145 SCR C/V CYTO,THINLAYER,RESCR: HCPCS | Performed by: FAMILY MEDICINE

## 2021-03-01 NOTE — PATIENT INSTRUCTIONS
Make sure you follow up with weight loss clinic     Breast Self Exam for Women   WHAT YOU NEED TO KNOW:   A BSE is a way to check your breasts for lumps and other changes  Regular BSEs can help you know how your breasts normally look and feel  Most breast lumps or changes are not cancer, but you should always have them checked by a healthcare provider  Your healthcare provider can also watch you do a BSE and can tell you if you are doing your BSE correctly  DISCHARGE INSTRUCTIONS:   Call your doctor if:   · You find any lumps or changes in your breasts  · You have breast pain or fluid coming from your nipples  · You have questions or concerns about your condition or care  Why you should do a BSE:  Breast cancer is the most common type of cancer in women  Even if you have mammograms, you may still want to do a BSE regularly  If you know how your breasts normally feel and look, it may help you know when to contact your healthcare provider  Mammograms can miss some cancers  You may find a lump during a BSE that did not show up on a mammogram   When you should do a BSE:  If you have periods, you may want to do your BSE 1 week after your period ends  This is the time when your breasts may be the least swollen, lumpy, or tender  You can do regular BSEs even if you are breastfeeding or have breast implants  How to do a BSE:       · Look at your breasts in a mirror  Look at the size and shape of each breast and nipple  Check for swelling, lumps, dimpling, scaly skin, or other skin changes  Look for nipple changes, such as a nipple that is painful or beginning to pull inward  Gently squeeze both nipples and check to see if fluid (that is not breast milk) comes out of them  If you find any of these or other breast changes, contact your healthcare provider  Check your breasts while you sit or  the following 3 positions:    ? Hang your arms down at your sides      ? Raise your hands and join them behind your head     ? Put firm pressure with your hands on your hips  Bend slightly forward while you look at your breasts in the mirror  · Lie down and feel your breasts  When you lie down, your breast tissue spreads out evenly over your chest  This makes it easier for you to feel for lumps and anything that may not be normal for your breasts  Do a BSE on one breast at a time  ? Place a small pillow or towel under your left shoulder  Put your left arm behind your head  ? Use the 3 middle fingers of your right hand  Use your fingertip pads, on the top of your fingers  Your fingertip pad is the most sensitive part of your finger  ? Use small circles to feel your breast tissue  Use your fingertip pads to make dime-sized, overlapping circles on your breast and armpits  Use light, medium, and firm pressure  First, press lightly  Second, press with medium pressure to feel a little deeper into the breast  Last, use firm pressure to feel deep within your breast     ? Examine your entire breast area  Examine the breast area from above the breast to below the breast where you feel only ribs  Make small circles with your fingertips, starting in the middle of your armpit  Make circles going up and down the breast area  Continue toward your breast and all the way across it  Examine the area from your armpit all the way over to the middle of your chest (breastbone)  Stop at the middle of your chest     ? Move the pillow or towel to your right shoulder, and put your right arm behind your head  Use the 3 fingertip pads of your left hand, and repeat the above steps to do a BSE on your right breast   What else you can do to check for breast problems or cancer:  Talk to your healthcare provider about mammograms  A mammogram is an x-ray of your breasts to screen for breast cancer or other problems  Your provider can tell you the benefits and risks of mammograms  The first mammogram is usually at age 39 or 48   Your provider may recommend you start at 36 or younger if your risk for breast cancer is high  Mammograms usually continue every 1 to 2 years until age 76  Follow up with your doctor as directed:  Write down your questions so you remember to ask them during your visits  © Copyright [address] Information is for End User's use only and may not be sold, redistributed or otherwise used for commercial purposes  All illustrations and images included in CareNotes® are the copyrighted property of [employer]  or [facility]   The above information is an  only  It is not intended as medical advice for individual conditions or treatments  Talk to your doctor, nurse or pharmacist before following any medical regimen to see if it is safe and effective for you

## 2021-03-01 NOTE — PROGRESS NOTES
Subjective  Rosibel Avery is a 30 y o   female who presents today for her Annual GYN exam  She is without complaints  She is sexually active  The patient has had a new sexual partner(s)  Contraceptive Method: tubal ligation  FDLMP 2021  Menses are irregular  Not heavy  No dysmenorrea  Denies abdominal, pelvic pain, problems with her bladder or bowels, vaginal bleeding, vaginal discharge  Screenings / HCM  Vaccines: Indicated today: Tdap  Immunization History   Administered Date(s) Administered    Influenza, injectable, quadrivalent, preservative free 0 5 mL 2020         Cervical Cancer screening:    History of Abnormal Pap: never   Last Pap:Over 3 years       Family history of colon cancer: none  Family history of breast cancer: none  STOP-BANG Questionnaire (CLARA)     Snoring Loud enough to hear through a door  no    Tired Falling asleep while driving or talking  no    Observed Gasping or choking while sleeping  no    Pressure Hypertension?  no    BMI BMI > 35?  yes    Age Older than 48?  no    Neck Size >17 inches Male or 16 inches Female?   yes   Male  no         Risk Low Risk (0-2)  Intermediate Risk (3-4)  High Risk (5-8) Low         Social History     Socioeconomic History    Marital status: Legally      Spouse name: None    Number of children: None    Years of education: None    Highest education level: None   Occupational History    None   Social Needs    Financial resource strain: Not hard at all   [place] insecurity     Worry: Never true     Inability: Never true    Transportation needs     Medical: No     Non-medical: No   Tobacco Use    Smoking status: Never Smoker    Smokeless tobacco: Never Used   Substance and Sexual Activity    Alcohol use: Not Currently     Frequency: Monthly or less     Drinks per session: 1 or 2     Binge frequency: Never    Drug use: No    Sexual activity: Yes     Partners: Male   Lifestyle    Physical activity     Days per week: Patient refused     Minutes per session: Patient refused    Stress: Not at all   Relationships    Social connections     Talks on phone: Patient refused     Gets together: Patient refused     Attends Holiness service: Patient refused     Active member of club or organization: Patient refused     Attends meetings of clubs or organizations: Patient refused     Relationship status: Patient refused    Intimate partner violence     Fear of current or ex partner: Patient refused     Emotionally abused: Patient refused     Physically abused: Patient refused     Forced sexual activity: Patient refused   Other Topics Concern    None   Social History Narrative    None       Objective  /82 (BP Location: Left arm, Patient Position: Sitting) Comment: gdf  Pulse 70   Temp 97 7 °F (36 5 °C) (Temporal) Comment: ufy  Ht 5' 7" (1 702 m)   Wt 118 kg (259 lb 9 6 oz)   BMI 40 66 kg/m²   General  Well developed, well nourished,    Neck  Supple, no thyromegaly  Cardio  Regular rate and rhythm  Lungs  Clear to auscultation bilaterally  Abd  Soft, non-tender, non-distended, no hepatosplenomegaly, no hernia  Breast  Not indicated (USPSTF Guidelines)    External genitalia is normal without lesions  Vagina is pink and vault without discharge  Non-tender cervix, without lesion or discharge  Uterus is mobile, non-tender, and normal in size / shape  Adnexa: Without masses or tenderness B/L  The following portions of the patient's history were reviewed and updated as appropriate: allergies, current medications, past family history, past medical history, past social history, past surgical history and problem list         ASSESSMENT & PLAN:     30 y o   female for annual GYN exam   - Pap smear was performed  - Pt educated on self breast exams    - Contraception: s/p tubal ligation   BMI >40: weight loss clinic referral provided  Fatigue: improved         Patient Instructions     Make sure you follow up with weight loss clinic     Breast Self Exam for Women   WHAT YOU NEED TO KNOW:   A BSE is a way to check your breasts for lumps and other changes  Regular BSEs can help you know how your breasts normally look and feel  Most breast lumps or changes are not cancer, but you should always have them checked by a healthcare provider  Your healthcare provider can also watch you do a BSE and can tell you if you are doing your BSE correctly  DISCHARGE INSTRUCTIONS:   Call your doctor if:   · You find any lumps or changes in your breasts  · You have breast pain or fluid coming from your nipples  · You have questions or concerns about your condition or care  Why you should do a BSE:  Breast cancer is the most common type of cancer in women  Even if you have mammograms, you may still want to do a BSE regularly  If you know how your breasts normally feel and look, it may help you know when to contact your healthcare provider  Mammograms can miss some cancers  You may find a lump during a BSE that did not show up on a mammogram   When you should do a BSE:  If you have periods, you may want to do your BSE 1 week after your period ends  This is the time when your breasts may be the least swollen, lumpy, or tender  You can do regular BSEs even if you are breastfeeding or have breast implants  How to do a BSE:       · Look at your breasts in a mirror  Look at the size and shape of each breast and nipple  Check for swelling, lumps, dimpling, scaly skin, or other skin changes  Look for nipple changes, such as a nipple that is painful or beginning to pull inward  Gently squeeze both nipples and check to see if fluid (that is not breast milk) comes out of them  If you find any of these or other breast changes, contact your healthcare provider  Check your breasts while you sit or  the following 3 positions:    ? Hang your arms down at your sides      ? Raise your hands and join them behind your head     ? Put firm pressure with your hands on your hips  Bend slightly forward while you look at your breasts in the mirror  · Lie down and feel your breasts  When you lie down, your breast tissue spreads out evenly over your chest  This makes it easier for you to feel for lumps and anything that may not be normal for your breasts  Do a BSE on one breast at a time  ? Place a small pillow or towel under your left shoulder  Put your left arm behind your head  ? Use the 3 middle fingers of your right hand  Use your fingertip pads, on the top of your fingers  Your fingertip pad is the most sensitive part of your finger  ? Use small circles to feel your breast tissue  Use your fingertip pads to make dime-sized, overlapping circles on your breast and armpits  Use light, medium, and firm pressure  First, press lightly  Second, press with medium pressure to feel a little deeper into the breast  Last, use firm pressure to feel deep within your breast     ? Examine your entire breast area  Examine the breast area from above the breast to below the breast where you feel only ribs  Make small circles with your fingertips, starting in the middle of your armpit  Make circles going up and down the breast area  Continue toward your breast and all the way across it  Examine the area from your armpit all the way over to the middle of your chest (breastbone)  Stop at the middle of your chest     ? Move the pillow or towel to your right shoulder, and put your right arm behind your head  Use the 3 fingertip pads of your left hand, and repeat the above steps to do a BSE on your right breast   What else you can do to check for breast problems or cancer:  Talk to your healthcare provider about mammograms  A mammogram is an x-ray of your breasts to screen for breast cancer or other problems  Your provider can tell you the benefits and risks of mammograms  The first mammogram is usually at age 39 or 48   Your provider may recommend you start at 36 or younger if your risk for breast cancer is high  Mammograms usually continue every 1 to 2 years until age 76  Follow up with your doctor as directed:  Write down your questions so you remember to ask them during your visits  © Copyright [address] Information is for End User's use only and may not be sold, redistributed or otherwise used for commercial purposes  All illustrations and images included in CareNotes® are the copyrighted property of [employer]  or [facility]   The above information is an  only  It is not intended as medical advice for individual conditions or treatments  Talk to your doctor, nurse or pharmacist before following any medical regimen to see if it is safe and effective for you

## 2021-03-03 ENCOUNTER — CONSULT (OUTPATIENT)
Dept: MULTI SPECIALTY CLINIC | Facility: CLINIC | Age: 26
End: 2021-03-03

## 2021-03-03 VITALS — HEIGHT: 67 IN | TEMPERATURE: 98.1 F | BODY MASS INDEX: 41.44 KG/M2 | WEIGHT: 264 LBS

## 2021-03-03 DIAGNOSIS — L91.0 KELOID SCAR: ICD-10-CM

## 2021-03-03 DIAGNOSIS — D22.9 MULTIPLE MELANOCYTIC NEVI: ICD-10-CM

## 2021-03-03 DIAGNOSIS — L73.2 HIDRADENITIS SUPPURATIVA: Primary | ICD-10-CM

## 2021-03-03 PROCEDURE — 99244 OFF/OP CNSLTJ NEW/EST MOD 40: CPT | Performed by: DERMATOLOGY

## 2021-03-03 PROCEDURE — 11900 INJECT SKIN LESIONS </W 7: CPT | Performed by: DERMATOLOGY

## 2021-03-03 RX ORDER — DOXYCYCLINE HYCLATE 100 MG/1
100 CAPSULE ORAL EVERY 12 HOURS SCHEDULED
Qty: 60 CAPSULE | Refills: 2 | Status: SHIPPED | OUTPATIENT
Start: 2021-03-03 | End: 2021-04-15

## 2021-03-03 RX ORDER — CLINDAMYCIN PHOSPHATE 11.9 MG/ML
SOLUTION TOPICAL 2 TIMES DAILY
Qty: 60 ML | Refills: 11 | Status: SHIPPED | OUTPATIENT
Start: 2021-03-03 | End: 2021-04-15

## 2021-03-03 RX ORDER — TRIAMCINOLONE ACETONIDE 40 MG/ML
10 INJECTION, SUSPENSION INTRA-ARTICULAR; INTRAMUSCULAR ONCE
Status: COMPLETED | OUTPATIENT
Start: 2021-03-03 | End: 2021-03-03

## 2021-03-03 RX ADMIN — TRIAMCINOLONE ACETONIDE 10 MG: 40 INJECTION, SUSPENSION INTRA-ARTICULAR; INTRAMUSCULAR at 11:05

## 2021-03-03 NOTE — PATIENT INSTRUCTIONS
MELANOCYTIC NEVI ("Moles")    Assessment and Plan:  Based on a thorough discussion of this condition and the management approach to it (including a comprehensive discussion of the known risks, side effects and potential benefits of treatment), the patient (family) agrees to implement the following specific plan:   When outside we recommend using a wide brim hat, sunglasses, long sleeve and pants, sunscreen with SPF 85+ with reapplication every 2 hours, or SPF specific clothing   Monitor for changes      Melanocytic Nevi  Melanocytic nevi ("moles") are tan or brown, raised or flat areas of the skin which have an increased number of melanocytes  Melanocytes are the cells in our body which make pigment and account for skin color  Some moles are present at birth (I e , "congenital nevi"), while others come up later in life (i e , "acquired nevi")  The sun can stimulate the body to make more moles  Sunburns are not the only thing that triggers more moles  Chronic sun exposure can do it too  Clinically distinguishing a healthy mole from melanoma may be difficult, even for experienced dermatologists  The "ABCDE's" of moles have been suggested as a means of helping to alert a person to a suspicious mole and the possible increased risk of melanoma  The suggestions for raising alert are as follows:    Asymmetry: Healthy moles tend to be symmetric, while melanomas are often asymmetric  Asymmetry means if you draw a line through the mole, the two halves do not match in color, size, shape, or surface texture  Asymmetry can be a result of rapid enlargement of a mole, the development of a raised area on a previously flat lesion, scaling, ulceration, bleeding or scabbing within the mole  Any mole that starts to demonstrate "asymmetry" should be examined promptly by a board certified dermatologist      Border: Healthy moles tend to have discrete, even borders    The border of a melanoma often blends into the normal skin and does not sharply delineate the mole from normal skin  Any mole that starts to demonstrate "uneven borders" should be examined promptly by a board certified dermatologist      Color: Healthy moles tend to be one color throughout  Melanomas tend to be made up of different colors ranging from dark black, blue, white, or red  Any mole that demonstrates a color change should be examined promptly by a board certified dermatologist      Diameter: Healthy moles tend to be smaller than 0 6 cm in size; an exception are "congenital nevi" that can be larger  Melanomas tend to grow and can often be greater than 0 6 cm (1/4 of an inch, or the size of a pencil eraser)  This is only a guideline, and many normal moles may be larger than 0 6 cm without being unhealthy  Any mole that starts to change in size (small to bigger or bigger to smaller) should be examined promptly by a board certified dermatologist      Evolving: Healthy moles tend to "stay the same "  Melanomas may often show signs of change or evolution such as a change in size, shape, color, or elevation  Any mole that starts to itch, bleed, crust, burn, hurt, or ulcerate or demonstrate a change or evolution should be examined promptly by a board certified dermatologist       Dysplastic Nevi  Dysplastic moles are moles that fit the ABCDE rules of melanoma but are not identified as melanomas when examined under the microscope  They may indicate an increased risk of melanoma in that person  If there is a family history of melanoma, most experts agree that the person may be at an increased risk for developing a melanoma  Experts still do not agree on what dysplastic moles mean in patients without a personal or family history of melanoma  Dysplastic moles are usually larger than common moles and have different colors within it with irregular borders  The appearance can be very similar to a melanoma   Biopsies of dysplastic moles may show abnormalities which are different from a regular mole  Melanoma  Malignant melanoma is a type of skin cancer that can be deadly if it spreads throughout the body  The incidence of melanoma in the [place] is growing faster than any other cancer  Melanoma usually grows near the surface of the skin for a period of time, and then begins to grow deeper into the skin  Once it grows deeper into the skin, the risk of spread to other organs greatly increases  Therefore, early detection and removal of a malignant melanoma may result in a better chance at a complete cure; removal after the tumor has spread may not be as effective, leading to worse clinical outcomes such as death  The true rate of nevus transformation into a melanoma is unknown  It has been estimated that the lifetime risk for any acquired melanocytic nevus on any 21year-old individual transforming into melanoma by age [de-identified] is 0 03% (1 in 3,164) for men and 0 009% (1 in 10,800) for women  The appearance of a "new mole" remains one of the most reliable methods for identifying a malignant melanoma  Occasionally, melanomas appear as rapidly growing, blue-black, dome-shaped bumps within a previous mole or previous area of normal skin  Other times, melanomas are suspected when a mole suddenly appears or changes  Itching, burning, or pain in a pigmented lesion should increase suspicion, but most patients with early melanoma have no skin discomfort whatsoever  Melanoma can occur anywhere on the skin, including areas that are difficult for self-examination  Many melanomas are first noticed by other family members  Suspicious-looking moles may be removed for microscopic examination  You may be able to prevent death from melanoma by doing two simple things:    1  Try to avoid unnecessary sun exposure and protect your skin when it is exposed to the sun    People who live near the equator, people who have intermittent exposures to large amounts of sun, and people who have had sunburns in childhood or adolescence have an increased risk for melanoma  Sun sense and vigilant sun protection may be keys to helping to prevent melanoma  We recommend wearing UPF-rated sun protective clothing and sunglasses whenever possible and applying a moisturizer-sunscreen combination product (SPF 50+) such as Neutrogena Daily Defense to sun exposed areas of skin at least three times a day  2  Have your moles regularly examined by a board certified dermatologist AND by yourself or a family member/friend at home  We recommend that you have your moles examined at least once a year by a board certified dermatologist   Use your birthday as an annual reminder to have your "Birthday Suit" (I e , your skin) examined; it is a nice birthday gift to yourself to know that your skin is healthy appearing! Additionally, at-home self examinations may be helpful for detecting a possible melanoma  Use the ABCDEs we discussed and check your moles once a month at home  KELOID SCAR    Assessment and Plan:  Based on a thorough discussion of this condition and the management approach to it (including a comprehensive discussion of the known risks, side effects and potential benefits of treatment), the patient (family) agrees to implement the following specific plan:   Discussed it is a possible for her to develop another keloid    Steroid injection to help to flatten it   Excision and to inject steroid to prevent it from coming back   Discussed Laser treatments   Recommended Intralesional kenalog injection today  Consent signed    Follow up in 4-5 weeks         A keloid scar is a firm, smooth, hard growth due to spontaneous scar formation  It can arise soon after an injury, or develop months later  Keloids may be uncomfortable or itchy and extend well beyond the original wound  They may form on any part of the body, although the upper chest and shoulders are especially prone to them      The precise reason that wound healing sometimes leads to keloid formation is under investigation but is not yet clear  While most people never form keloids, others develop them after minor injuries, burns, insect bites and acne spots  Dark skinned people form keloids more easily than Caucasians  A keloid is harmless to general health and does not change into skin cancer  The following measures are helpful in at least some patients   Emollients (creams and oils)   Polyurethane or silicone scar reduction patches   Silicone gel   Oral or topical tranilast (an inhibitor of collagen synthesis)   Pressure dressings   Surgical excision (but in keloids, excision may result in a new keloid even larger than the original one)   Intralesional corticosteroid injection, repeated every few weeks   Intralesional 5-fluorouracil   Cryotherapy   Superficial X-ray treatment soon after surgery   Pulsed dye laser    Skin needling   Subcision    Scar dressings should be worn for 12-24 hours per day, for at least 8 to 12 weeks, and perhaps for much longer  PROCEDURE:  INTRALESIONAL STEROID INJECTION (KENALOG INJECTION)    Purpose: Triamcinolone is a synthetic glucocorticoid corticosteroid that has marked anti-inflammatory action  It is prepared in sterile aqueous suspension suitable for injecting directly into a lesion on or immediately below the skin to treat a dermal inflammatory process  Indications: It is indicated for alopecia areata; inflammatory acne cysts; discoid lupus erythematosus; keloids and hypertrophic scars; inflammatory lesions of granuloma annulare, lichen planus, lichen simplex chronicus (neurodermatitis), psoriatic plaques, and other localized inflammatory skin conditions       Potential Side Effects: I understand that triamcinolone injection can potentially cause early and/or delayed adverse effects such as:    Pain    Impaired wound healing    Increased hair growth    Bleeding    White or brown marks  Steroid acne    Infection    Telangiectasia    Skin thinning    Cutaneous and subcutaneous lipoatrophy (most common) appearing as skin indentations or dimples around the injection sites a few weeks after treatment       After verbal and written consent were obtained, the to-be-treated area was wiped and cleaned with rubbing alcohol 70%  There was less than 1 mL of blood loss and little to no discomfort  The area was bandaged with a Band-aid  The patient tolerated the procedure well and remained in the office for observation  With no signs of an adverse reaction, the patient was eventually discharged from clinic  Ami Burroughs HIDRADENITIS SUPPURATIVA    Assessment and Plan:  Based on a thorough discussion of this condition and the management approach to it (including a comprehensive discussion of the known risks, side effects and potential benefits of treatment), the patient (family) agrees to implement the following specific plan:   Dicussed this is common, These are recurrence cyst   Discussed biologic injection once a week at home  We will check blood work before prescribing it  humira   Oral antibiotics for flare ups for 10 days as needed     Start over the counter Hibiclens wash on under arms, leg area let it sit for a little bit    Topical Clindamycin solution twice a day     What is hidradenitis suppurativa? Hidradenitis suppurativa is an inflammatory skin disease that affects apocrine gland-bearing skin in the axillae, in the groin, and under the breasts  It is characterised by recurrent boil-like nodules and abscesses that culminate in pus-like discharge, difficult-to-heal open wounds (sinuses) and scarring  Hidradenitis suppurativa also has significant psychological impact and many patients suffer from impairment of body image, depression and anxiety  The term hidradenitis implies it starts as an inflammatory disorder of sweat glands, which is now known to be incorrect   Hidradenitis suppurativa is also known as acne inversa  Who gets hidradenitis suppurativa? Hidradenitis often starts at puberty, and is most active between the ages of 21 and 36 years, and in women, can resolve at menopause  It is three times more common in females than in males  Risk factors include:   Other family members with hidradenitis suppurativa   Obesity and insulin resistance/metabolic syndrome   Cigarette smoking   Follicular occlusion disorders: acne conglobata, dissecting cellulitis, pilonidal sinus   Inflammatory bowel disease (Crohn disease)   Rare autoinflammatory syndromes associated with abnormalities of PSTPIP1 gene  *    * PAPA syndrome (pyogenic arthritis, pyoderma gangrenosum and acne), PASH syndrome (pyoderma gangrenosum, acne, suppurative hidradenitis) and PAPASH syndrome (pyogenic arthritis, pyoderma gangrenosum, acne, suppurative hidradenitis)  What causes hidradenitis suppurativa? Hidradenitis suppurativa is an autoinflammatory disorder  Although the exact cause is not yet understood, contributing factors include:   Friction from clothing and body folds   Aberrant immune response to commensal bacteria   Abnormal cutaneous or follicular microbiome   Follicular occlusion   Release of pro-inflammatory cytokines   Inflammation causing rupture of the follicular wall and destroying apocrine glands and ducts   Secondary bacterial infection   Certain drugs  What are the clinical features of hidradenitis suppurativa? Hidradenitis can affect a single or multiple areas in the armpits, neck, sub mammary area, and inner thighs  Anogenital involvement most commonly affects the groin, mons pubis, vulva (in females), sides of the scrotum (in males), perineum, buttocks and perianal folds    Signs include:   Open and closed comedones   Painful firm papules, larger nodules and pleated ridges   Pustules, fluctuant pseudocysts and abscesses   Pyogenic granulomas   Draining sinuses linking inflammatory lesions   Hypertrophic and atrophic scars  Many patients with hidradenitis suppurativa also suffer from other skin disorders, including acne, hirsutism and psoriasis  The severity and extent of hidradenitis suppurativa is recorded at assessment and when determining the impact of a treatment  The [place] system describes three distinct clinical stages:  1  Solitary or multiple, isolated abscess formation without scarring or sinus tracts  2  Recurrent abscesses, single or multiple widely  lesions, with sinus tract formation  3  Diffuse or broad involvement, with multiple interconnected sinus tracts and abscesses  Severe hidradenitis ([place] Stage 3) has been associated with:   Male sex   Axillary and perianal involvement   Obesity   Smoking   Higher risk of stroke, coronary artery disease, heart failure, and peripheral artery disease   Disease duration  What is the treatment for hidradenitis suppurativa? General measures   Weight loss; follow an anti-inflammatory, low-sugar, low-grain, low-dairy diet (mainly plants)   Smoking cessation: this can lead to improvement within a few months   Loose fitting clothing   Daily unfragranced antiperspirants   If prone to secondary infection, wash with antiseptics or take bleach baths   Apply hydrogen peroxide solution or medical grade honey to reduce malodour   Use peeling agents such as resorcinol 15% cream to de-roof nodules   Apply simple dressings to draining sinuses   Analgesics, such as paracetamol (acetaminophen), for pain control   Seek help to manage anxiety and depression  Medical management of hidradenitis suppurativa  Medical management of hidradenitis suppurativa is difficult  Treatment is required long term  Effective options are listed below      Antibiotics   Topical clindamycin, with benzoyl peroxide to reduce bacterial resistance   Short course of oral antibiotics for acute staphylococcal abscesses, eg flucloxacillin   Prolonged courses (minimum 3 months) of tetracycline, metronidazole, trimethoprim + sulphamethoxazole, fluoroquinolones, ertapenem or dapsone for their anti-inflammatory action   6-12 week courses of the combination of clindamycin (or doxycycline) and rifampicin for severe disease  Antiandrogens   Long-term oral contraceptive pill; antiandrogenic progesterones drospirenone or cyproterone acetate may be more effective than standard combined pills  These are more suitable than progesterone-only pills or devices   Spironolactone and finasteride   Response takes 6 months or longer  Immunomodulatory treatments for severe disease   Intralesional corticosteroids into nodules   Systemic corticosteroids short-term for flares   Methotrexate, ciclosporin, and azathioprine   TNF-? inhibitors adalimumab and infliximab, used in higher dose than required for psoriasis, are the most successful treatments to date  Note that paradoxically, they may sometimes induce new-onset hidradenitis suppurativa   Other biologics are under investigation, such as the IL-1?  antagonist, canakinumab    Other medical treatments   Metformin in patients with insulin resistance   Acitretin (unsuitable for females of childbearing potential)   Isotretinoin -- effective for acne but appears unhelpful for most cases of hidradenitis   Colchicine   Medical management of anxiety and depression    Surgical management of hidradenitis suppurativa   Incision and drainage of acute abscesses   Curettage and deroofing of nodules, abscesses and sinuses   Laser ablation of nodules, abscesses and sinuses   Wide local excision of persistent nodules   Radical excisional surgery of entire affected area   Nd:YAG laser hair removal

## 2021-03-03 NOTE — PROGRESS NOTES
[facility] Note     Patient Name: Yuly Cardoza  Encounter Date: 3/3/2021     Have you been cared for by a [facility] in the last 3 years and, if so, which one? No    · Have you traveled outside of the [address] [place] [address] in the past 3 months or outside of the [facility] area in the last 2 weeks? No     May we call your Preferred Phone number to discuss your specific medical information? Yes     May we leave a detailed message that includes your specific medical information? Yes      Today's Chief Concerns:   Concern #1:  Skin lesion on chest wall    Concern #2:  Partial body check - upper portion of body    Past Medical History:  Have you personally ever had or currently have any of the following? · Skin cancer (such as Melanoma, Basal Cell Carcinoma, Squamous Cell Carcinoma? (If Yes, please provide more detail)- YES, unsure, possible right ear  Basal Cell Carcinoma approximally 5-6 years  Diagnosed  by a dermatologist in [place]   · Eczema: No  · Psoriasis: No  · HIV/AIDS: No  · Hepatitis B or C: No  · Tuberculosis: No  · Systemic Immunosuppression such as Diabetes, Biologic or Immunotherapy, Chemotherapy, Organ Transplantation, Bone Marrow Transplantation (If YES, please provide more detail): No  · Radiation Treatment (If YES, please provide more detail): No  · Any other major medical conditions/concerns? 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 Spend time with kids     Family History:  Have any of your "first degree relatives" (parent, brother, sister, or child) had any of the following       · Skin cancer such as Melanoma or Merkel Cell Carcinoma or Pancreatic Cancer?  No  · Eczema, Asthma, Hay Fever or Seasonal Allergies: YES, Mother has hx of asthma   · Psoriasis or Psoriatic Arthritis: No  · Do any other medical conditions seem to run in your family? If Yes, what condition and which relatives? No    Current Medications:         Current Outpatient Medications:     ARIPiprazole (ABILIFY) 5 mg tablet, Take 1 tablet (5 mg total) by mouth daily, Disp: 30 tablet, Rfl: 0    divalproex sodium (DEPAKOTE) 500 mg EC tablet, Take 3 tablets (1,500 mg total) by mouth daily at bedtime, Disp: 90 tablet, Rfl: 1      Review of Systems:  Have you recently had or currently have any of the following? If YES, what are you doing for the problem? · Fever, chills or unintended weight loss: No  · Sudden loss or change in your vision: No  · Nausea, vomiting or blood in your stool: No  · Painful or swollen joints: No  · Wheezing or cough: No  · Changing mole or non-healing wound: YES, Abscess that will pop up all times under the arms, posterior legs   · Nosebleeds: No  · Excessive sweating: No  · Easy or prolonged bleeding? No  · Over the last 2 weeks, how often have you been bothered by the following problems? · Taking little interest or pleasure in doing things: 1 - Not at All  · Feeling down, depressed, or hopeless: 1 - Not at All  · Rapid heartbeat with epinephrine:  No    · FEMALES ONLY:    · Are you pregnant or planning to become pregnant? No  · Are you currently or planning to be nursing or breast feeding? No    · Any known allergies? Allergies   Allergen Reactions    Erythromycin Swelling    Peanut Oil Hives         Physical Exam:     Was a chaperone (Derm Clinical Assistant) present throughout the entire Physical Exam? Yes     Did the Dermatology Team specifically  the patient on the importance of a Full Skin Exam to be sure that nothing is missed clinically?  Yes}  o Did the patient ultimately request or accept a Full Skin Exam?  NO    CONSTITUTIONAL:   Vitals:    03/03/21 1004   Temp: 98 1 °F (36 7 °C)   TempSrc: Oral   Weight: 120 kg (264 lb)   Height: 5' 7" (1 702 m)       PSYCH: Normal mood and affect  EYES: Normal conjunctiva  ENT: Normal lips and oral mucosa  CARDIOVASCULAR: No edema  RESPIRATORY: Normal respirations  HEME/LYMPH/IMMUNO:  No regional lymphadenopathy except as noted below in "ASSESSMENT AND PLAN BY DIAGNOSIS"    SKIN:  FOCUSED ORGAN SYSTEM EXAM   Hair, Scalp, Ears, Face Normal except as noted below in Assessment   Neck, Cervical Chain Nodes Normal except as noted below in Assessment   Right Arm/Hand/Fingers Normal except as noted below in Assessment   Left Arm/Hand/Fingers Normal except as noted below in Assessment   Chest/Breasts/Axillae Viewed areas Normal except as noted below in Assessment   Abdomen, Umbilicus Normal except as noted below in Assessment   Back/Spine Normal except as noted below in Assessment       Assessment and Plan by Diagnosis:    History of Present Condition:     Duration:  How long has this been an issue for you?    o  Couple years    Location Affected:  Where on the body is this affecting you?    o  left upper chest    Quality:  Is there any bleeding, pain, itch, burning/irritation, or redness associated with the skin lesion?    o  Itch, pins and needles, redness, changing in size    Severity:  Describe any bleeding, pain, itch, burning/irritation, or redness on a scale of 1 to 10 (with 10 being the worst)  o  10   Timing:  Does this condition seem to be there pretty constantly or do you notice it more at specific times throughout the day?    o  Constantly    Context:  Have you ever noticed that this condition seems to be associated with specific activities you do?    o  Denies    Modifying Factors:    o Anything that seems to make the condition worse?    -  Denies   o What have you tried to do to make the condition better? -  Denies    Associated Signs and Symptoms:  Does this skin lesion seem to be associated with any of the following:  o  SL AMB DERM SIGNS AND SYMPTOMS: Redness, Itching and Scratching and Skin color changes     1  MELANOCYTIC NEVI ("Moles")    Physical Exam:   Anatomic Location Affected:  Trunk   Morphological Description:  Scattered, 1-4mm round to ovoid, symmetrical-appearing, even bordered, skin colored to dark brown macules/papules, mostly in sun-exposed areas   Pertinent Positives:   Pertinent Negatives:      Assessment and Plan:  Based on a thorough discussion of this condition and the management approach to it (including a comprehensive discussion of the known risks, side effects and potential benefits of treatment), the patient (family) agrees to implement the following specific plan:   When outside we recommend using a wide brim hat, sunglasses, long sleeve and pants, sunscreen with SPF 03+ with reapplication every 2 hours, or SPF specific clothing   Monitor for changes      Melanocytic Nevi  Melanocytic nevi ("moles") are tan or brown, raised or flat areas of the skin which have an increased number of melanocytes  Melanocytes are the cells in our body which make pigment and account for skin color  Some moles are present at birth (I e , "congenital nevi"), while others come up later in life (i e , "acquired nevi")  The sun can stimulate the body to make more moles  Sunburns are not the only thing that triggers more moles  Chronic sun exposure can do it too  Clinically distinguishing a healthy mole from melanoma may be difficult, even for experienced dermatologists  The "ABCDE's" of moles have been suggested as a means of helping to alert a person to a suspicious mole and the possible increased risk of melanoma  The suggestions for raising alert are as follows:    Asymmetry: Healthy moles tend to be symmetric, while melanomas are often asymmetric  Asymmetry means if you draw a line through the mole, the two halves do not match in color, size, shape, or surface texture   Asymmetry can be a result of rapid enlargement of a mole, the development of a raised area on a previously flat lesion, scaling, ulceration, bleeding or scabbing within the mole  Any mole that starts to demonstrate "asymmetry" should be examined promptly by a board certified dermatologist      Border: Healthy moles tend to have discrete, even borders  The border of a melanoma often blends into the normal skin and does not sharply delineate the mole from normal skin  Any mole that starts to demonstrate "uneven borders" should be examined promptly by a board certified dermatologist      Color: Healthy moles tend to be one color throughout  Melanomas tend to be made up of different colors ranging from dark black, blue, white, or red  Any mole that demonstrates a color change should be examined promptly by a board certified dermatologist      Diameter: Healthy moles tend to be smaller than 0 6 cm in size; an exception are "congenital nevi" that can be larger  Melanomas tend to grow and can often be greater than 0 6 cm (1/4 of an inch, or the size of a pencil eraser)  This is only a guideline, and many normal moles may be larger than 0 6 cm without being unhealthy  Any mole that starts to change in size (small to bigger or bigger to smaller) should be examined promptly by a board certified dermatologist      Evolving: Healthy moles tend to "stay the same "  Melanomas may often show signs of change or evolution such as a change in size, shape, color, or elevation  Any mole that starts to itch, bleed, crust, burn, hurt, or ulcerate or demonstrate a change or evolution should be examined promptly by a board certified dermatologist       Dysplastic Nevi  Dysplastic moles are moles that fit the ABCDE rules of melanoma but are not identified as melanomas when examined under the microscope  They may indicate an increased risk of melanoma in that person  If there is a family history of melanoma, most experts agree that the person may be at an increased risk for developing a melanoma    Experts still do not agree on what dysplastic moles mean in patients without a personal or family history of melanoma  Dysplastic moles are usually larger than common moles and have different colors within it with irregular borders  The appearance can be very similar to a melanoma  Biopsies of dysplastic moles may show abnormalities which are different from a regular mole  Melanoma  Malignant melanoma is a type of skin cancer that can be deadly if it spreads throughout the body  The incidence of melanoma in the [place] is growing faster than any other cancer  Melanoma usually grows near the surface of the skin for a period of time, and then begins to grow deeper into the skin  Once it grows deeper into the skin, the risk of spread to other organs greatly increases  Therefore, early detection and removal of a malignant melanoma may result in a better chance at a complete cure; removal after the tumor has spread may not be as effective, leading to worse clinical outcomes such as death  The true rate of nevus transformation into a melanoma is unknown  It has been estimated that the lifetime risk for any acquired melanocytic nevus on any 21year-old individual transforming into melanoma by age [de-identified] is 0 03% (1 in 3,164) for men and 0 009% (1 in 10,800) for women  The appearance of a "new mole" remains one of the most reliable methods for identifying a malignant melanoma  Occasionally, melanomas appear as rapidly growing, blue-black, dome-shaped bumps within a previous mole or previous area of normal skin  Other times, melanomas are suspected when a mole suddenly appears or changes  Itching, burning, or pain in a pigmented lesion should increase suspicion, but most patients with early melanoma have no skin discomfort whatsoever  Melanoma can occur anywhere on the skin, including areas that are difficult for self-examination  Many melanomas are first noticed by other family members  Suspicious-looking moles may be removed for microscopic examination         You may be able to prevent death from melanoma by doing two simple things:    1  Try to avoid unnecessary sun exposure and protect your skin when it is exposed to the sun  People who live near the equator, people who have intermittent exposures to large amounts of sun, and people who have had sunburns in childhood or adolescence have an increased risk for melanoma  Sun sense and vigilant sun protection may be keys to helping to prevent melanoma  We recommend wearing UPF-rated sun protective clothing and sunglasses whenever possible and applying a moisturizer-sunscreen combination product (SPF 50+) such as Neutrogena Daily Defense to sun exposed areas of skin at least three times a day  2  Have your moles regularly examined by a board certified dermatologist AND by yourself or a family member/friend at home  We recommend that you have your moles examined at least once a year by a board certified dermatologist   Use your birthday as an annual reminder to have your "Birthday Suit" (I e , your skin) examined; it is a nice birthday gift to yourself to know that your skin is healthy appearing! Additionally, at-home self examinations may be helpful for detecting a possible melanoma  Use the ABCDEs we discussed and check your moles once a month at home  KELOID SCAR    Physical Exam:   Anatomic Location Affected:  Right and left upper back, left chest    Morphological Description:  Firm pink linear plaques   Pertinent Positives:   Pertinent Negatives: Additional History of Present Condition:  She states she has it for years   No injury     Assessment and Plan:  Based on a thorough discussion of this condition and the management approach to it (including a comprehensive discussion of the known risks, side effects and potential benefits of treatment), the patient (family) agrees to implement the following specific plan:   Discussed it is a possible for her to develop another keloid    Steroid injection to help to flatten the area   Discussed Excision procedure and to inject steroid to prevent it from coming back   Discussed Laser treatments   Recommended Intralesional kenalog injection today  Consent signed   Follow up in 5 weeks         A keloid scar is a firm, smooth, hard growth due to spontaneous scar formation  It can arise soon after an injury, or develop months later  Keloids may be uncomfortable or itchy and extend well beyond the original wound  They may form on any part of the body, although the upper chest and shoulders are especially prone to them  The precise reason that wound healing sometimes leads to keloid formation is under investigation but is not yet clear  While most people never form keloids, others develop them after minor injuries, burns, insect bites and acne spots  Dark skinned people form keloids more easily than Caucasians  A keloid is harmless to general health and does not change into skin cancer  The following measures are helpful in at least some patients   Emollients (creams and oils)   Polyurethane or silicone scar reduction patches   Silicone gel   Oral or topical tranilast (an inhibitor of collagen synthesis)   Pressure dressings   Surgical excision (but in keloids, excision may result in a new keloid even larger than the original one)   Intralesional corticosteroid injection, repeated every few weeks   Intralesional 5-fluorouracil   Cryotherapy   Superficial X-ray treatment soon after surgery   Pulsed dye laser    Skin needling   Subcision    Scar dressings should be worn for 12-24 hours per day, for at least 8 to 12 weeks, and perhaps for much longer  PROCEDURE:  INTRALESIONAL STEROID INJECTION (KENALOG INJECTION)    Purpose: Triamcinolone is a synthetic glucocorticoid corticosteroid that has marked anti-inflammatory action   It is prepared in sterile aqueous suspension suitable for injecting directly into a lesion on or immediately below the skin to treat a dermal inflammatory process  Indications: It is indicated for alopecia areata; inflammatory acne cysts; discoid lupus erythematosus; keloids and hypertrophic scars; inflammatory lesions of granuloma annulare, lichen planus, lichen simplex chronicus (neurodermatitis), psoriatic plaques, and other localized inflammatory skin conditions  Potential Side Effects: I understand that triamcinolone injection can potentially cause early and/or delayed adverse effects such as:    Pain    Impaired wound healing    Increased hair growth    Bleeding    White or brown marks    Steroid acne    Infection    Telangiectasia    Skin thinning    Cutaneous and subcutaneous lipoatrophy (most common) appearing as skin indentations or dimples around the injection sites a few weeks after treatment     PROCEDURE NOTE:  After verbal and written consent were obtained, the to-be-treated area was wiped and cleaned with rubbing alcohol 70%  Then, a total of 1 mL of Kenalog CONCENTRATION:  10 mg/mL   (Lot# [id number]; Expiration MAR 2021, NDC#: 0335-3337-48) was injected intralesionally into a total of 1 lesion/s on the following anatomic areas:  Left upper chest  using a 1-mL syringe and a 30-gauge needle  There was less than 1 mL of blood loss and little to no discomfort  The area was bandaged with a Band-aid  The patient tolerated the procedure well and remained in the office for observation  With no signs of an adverse reaction, the patient was eventually discharged from clinic  3  HIDRADENITIS SUPPURATIVA    Physical Exam:   Anatomic Location Affected:  bilateral axilla    Morphological Description:  Violaceous papules and macules    Pertinent Positives:   Pertinent Negatives: Additional History of Present Condition:  She complains of abscess at times under her arms and under her legs       Assessment and Plan:  Based on a thorough discussion of this condition and the management approach to it (including a comprehensive discussion of the known risks, side effects and potential benefits of treatment), the patient (family) agrees to implement the following specific plan:   Dicussed this is common, These are recurrence cyst   Discussed biologic injection once a week at home  We will check blood work before prescribing it  humira   Oral antibiotics for flare ups for 10 days as needed     Start over the counter Hibiclens wash on under arms, leg area let it sit for a little bit    Topical Clindamycin solution twice a day     What is hidradenitis suppurativa? Hidradenitis suppurativa is an inflammatory skin disease that affects apocrine gland-bearing skin in the axillae, in the groin, and under the breasts  It is characterised by recurrent boil-like nodules and abscesses that culminate in pus-like discharge, difficult-to-heal open wounds (sinuses) and scarring  Hidradenitis suppurativa also has significant psychological impact and many patients suffer from impairment of body image, depression and anxiety  The term hidradenitis implies it starts as an inflammatory disorder of sweat glands, which is now known to be incorrect  Hidradenitis suppurativa is also known as acne inversa  Who gets hidradenitis suppurativa? Hidradenitis often starts at puberty, and is most active between the ages of 21 and 36 years, and in women, can resolve at menopause  It is three times more common in females than in males  Risk factors include:   Other family members with hidradenitis suppurativa   Obesity and insulin resistance/metabolic syndrome   Cigarette smoking   Follicular occlusion disorders: acne conglobata, dissecting cellulitis, pilonidal sinus   Inflammatory bowel disease (Crohn disease)   Rare autoinflammatory syndromes associated with abnormalities of PSTPIP1 gene  *    * PAPA syndrome (pyogenic arthritis, pyoderma gangrenosum and acne), PASH syndrome (pyoderma gangrenosum, acne, suppurative hidradenitis) and PAPASH syndrome (pyogenic arthritis, pyoderma gangrenosum, acne, suppurative hidradenitis)  What causes hidradenitis suppurativa? Hidradenitis suppurativa is an autoinflammatory disorder  Although the exact cause is not yet understood, contributing factors include:   Friction from clothing and body folds   Aberrant immune response to commensal bacteria   Abnormal cutaneous or follicular microbiome   Follicular occlusion   Release of pro-inflammatory cytokines   Inflammation causing rupture of the follicular wall and destroying apocrine glands and ducts   Secondary bacterial infection   Certain drugs  What are the clinical features of hidradenitis suppurativa? Hidradenitis can affect a single or multiple areas in the armpits, neck, sub mammary area, and inner thighs  Anogenital involvement most commonly affects the groin, mons pubis, vulva (in females), sides of the scrotum (in males), perineum, buttocks and perianal folds  Signs include:   Open and closed comedones   Painful firm papules, larger nodules and pleated ridges   Pustules, fluctuant pseudocysts and abscesses   Pyogenic granulomas   Draining sinuses linking inflammatory lesions   Hypertrophic and atrophic scars  Many patients with hidradenitis suppurativa also suffer from other skin disorders, including acne, hirsutism and psoriasis  The severity and extent of hidradenitis suppurativa is recorded at assessment and when determining the impact of a treatment  The [place] system describes three distinct clinical stages:  1  Solitary or multiple, isolated abscess formation without scarring or sinus tracts  2  Recurrent abscesses, single or multiple widely  lesions, with sinus tract formation  3  Diffuse or broad involvement, with multiple interconnected sinus tracts and abscesses      Severe hidradenitis (Harp Stage 3) has been associated with:   Male sex   Axillary and perianal involvement   Obesity   Smoking   Higher risk of stroke, coronary artery disease, heart failure, and peripheral artery disease   Disease duration  What is the treatment for hidradenitis suppurativa? General measures   Weight loss; follow an anti-inflammatory, low-sugar, low-grain, low-dairy diet (mainly plants)   Smoking cessation: this can lead to improvement within a few months   Loose fitting clothing   Daily unfragranced antiperspirants   If prone to secondary infection, wash with antiseptics or take bleach baths   Apply hydrogen peroxide solution or medical grade honey to reduce malodour   Use peeling agents such as resorcinol 15% cream to de-roof nodules   Apply simple dressings to draining sinuses   Analgesics, such as paracetamol (acetaminophen), for pain control   Seek help to manage anxiety and depression  Medical management of hidradenitis suppurativa  Medical management of hidradenitis suppurativa is difficult  Treatment is required long term  Effective options are listed below  Antibiotics   Topical clindamycin, with benzoyl peroxide to reduce bacterial resistance   Short course of oral antibiotics for acute staphylococcal abscesses, eg flucloxacillin   Prolonged courses (minimum 3 months) of tetracycline, metronidazole, trimethoprim + sulphamethoxazole, fluoroquinolones, ertapenem or dapsone for their anti-inflammatory action   6-12 week courses of the combination of clindamycin (or doxycycline) and rifampicin for severe disease  Antiandrogens   Long-term oral contraceptive pill; antiandrogenic progesterones drospirenone or cyproterone acetate may be more effective than standard combined pills  These are more suitable than progesterone-only pills or devices   Spironolactone and finasteride   Response takes 6 months or longer      Immunomodulatory treatments for severe disease   Intralesional corticosteroids into nodules   Systemic corticosteroids short-term for flares   Methotrexate, ciclosporin, and azathioprine   TNF-? inhibitors adalimumab and infliximab, used in higher dose than required for psoriasis, are the most successful treatments to date  Note that paradoxically, they may sometimes induce new-onset hidradenitis suppurativa   Other biologics are under investigation, such as the IL-1?  antagonist, canakinumab    Other medical treatments   Metformin in patients with insulin resistance   Acitretin (unsuitable for females of childbearing potential)   Isotretinoin -- effective for acne but appears unhelpful for most cases of hidradenitis   Colchicine   Medical management of anxiety and depression    Surgical management of hidradenitis suppurativa   Incision and drainage of acute abscesses   Curettage and deroofing of nodules, abscesses and sinuses   Laser ablation of nodules, abscesses and sinuses   Wide local excision of persistent nodules   Radical excisional surgery of entire affected area   Nd:YAG laser hair removal      Scribe Attestation    I,:  Amy Alonso am acting as a scribe while in the presence of the attending physician :       I,:  Talia Daniel MD personally performed the services described in this documentation    as scribed in my presence :

## 2021-03-04 ENCOUNTER — TELEPHONE (OUTPATIENT)
Dept: INTERNAL MEDICINE CLINIC | Facility: CLINIC | Age: 26
End: 2021-03-04

## 2021-03-04 LAB
LAB AP GYN PRIMARY INTERPRETATION: NORMAL
LAB AP LMP: NORMAL
Lab: NORMAL

## 2021-03-04 NOTE — TELEPHONE ENCOUNTER
----- Message from Everette Daigle DO sent at 3/4/2021  4:29 PM EST -----  Negative PAP   Next should be scheduled in 2-3 years

## 2021-03-05 ENCOUNTER — TELEPHONE (OUTPATIENT)
Dept: NEUROLOGY | Facility: CLINIC | Age: 26
End: 2021-03-05

## 2021-03-05 NOTE — TELEPHONE ENCOUNTER
I called and left a voicemail message about patients upcoming appointment with Rosa Maria on 3/11/21  I requested a call back to our office if the patient cannot keep this appointment

## 2021-03-08 ENCOUNTER — HOSPITAL ENCOUNTER (OUTPATIENT)
Dept: NEUROLOGY | Facility: CLINIC | Age: 26
Discharge: HOME | End: 2021-03-08

## 2021-03-08 ENCOUNTER — TELEPHONE (OUTPATIENT)
Dept: INTERNAL MEDICINE CLINIC | Facility: CLINIC | Age: 26
End: 2021-03-08

## 2021-03-08 DIAGNOSIS — G40.909 SEIZURE DISORDER (HCC): ICD-10-CM

## 2021-03-08 DIAGNOSIS — Z23 ENCOUNTER FOR IMMUNIZATION: Primary | ICD-10-CM

## 2021-03-08 NOTE — TELEPHONE ENCOUNTER
Pt canceled her appt for today because she does not have a ride and she did get covid vaccine 03/02     When can we kurt her tdap appt?

## 2021-03-09 ENCOUNTER — HOSPITAL ENCOUNTER (OUTPATIENT)
Dept: NEUROLOGY | Facility: CLINIC | Age: 26
Discharge: HOME | End: 2021-03-09
Payer: COMMERCIAL

## 2021-03-09 DIAGNOSIS — G40.909 SEIZURE DISORDER (HCC): ICD-10-CM

## 2021-03-09 PROCEDURE — 95708 EEG WO VID EA 12-26HR UNMNTR: CPT

## 2021-03-09 PROCEDURE — 95719 EEG PHYS/QHP EA INCR W/O VID: CPT | Performed by: STUDENT IN AN ORGANIZED HEALTH CARE EDUCATION/TRAINING PROGRAM

## 2021-03-11 ENCOUNTER — TELEMEDICINE (OUTPATIENT)
Dept: NEUROLOGY | Facility: CLINIC | Age: 26
End: 2021-03-11
Payer: COMMERCIAL

## 2021-03-11 ENCOUNTER — TELEPHONE (OUTPATIENT)
Dept: NEUROLOGY | Facility: CLINIC | Age: 26
End: 2021-03-11

## 2021-03-11 VITALS — BODY MASS INDEX: 40.18 KG/M2 | HEIGHT: 67 IN | WEIGHT: 256 LBS

## 2021-03-11 DIAGNOSIS — F31.81 BIPOLAR 2 DISORDER (HCC): ICD-10-CM

## 2021-03-11 DIAGNOSIS — G40.909 SEIZURE DISORDER (HCC): Primary | ICD-10-CM

## 2021-03-11 PROCEDURE — 3008F BODY MASS INDEX DOCD: CPT | Performed by: NURSE PRACTITIONER

## 2021-03-11 PROCEDURE — 1036F TOBACCO NON-USER: CPT | Performed by: NURSE PRACTITIONER

## 2021-03-11 PROCEDURE — 99212 OFFICE O/P EST SF 10 MIN: CPT | Performed by: NURSE PRACTITIONER

## 2021-03-11 NOTE — TELEPHONE ENCOUNTER
----- Message from [facility], RN sent at 3/11/2021  3:06 PM EST -----    ----- Message -----  From: JESSICA Storey  Sent: 3/11/2021   2:53 PM EST  To: [facility], RN    Please help patient get scheduled for EMU admission for spell characterization       Thank you

## 2021-03-11 NOTE — PROGRESS NOTES
Virtual Regular Visit      Assessment/Plan:    Problem List Items Addressed This Visit        Nervous and Auditory    Seizure disorder [facility]) - Primary     Patient denies any of her prior episodes concerning for seizure but does continue to have episodes of spacing out that are observed by others and not recognized by the patient  Episodes are occurring about once per week or more, may be provoked by migraines, stress, and lack of sleep  She admits to occasionally missing pieces of time and getting confused  She had a recent 24 hour ambulatory EEG for these episodes which was normal but patient denies having any episodes during recording  Prior routine EEGs were normal and 72 hour cveeg in 2017 showedmild slowing of the posteirorly dominant alpha rhythm suggests mild, diffuse cerebral dysfunction of non-specific etiology, though no events were captured  Prior MRI brain was normal     Given frequency of events, recommend that patient is admitted to the EMU to capture and characterize events to guide further treatment  Consider weaning depakote during admission but must use caution due to possibility of mood instability  We did discuss the possibility of the events not being seizures  It is important to note that her history of bipolar disorder, depression, and anxiety may place her at increased risk for psychogenic non epileptic events  Risk factors for epilepsy include family history of possible epilepsy in paternal grandmother  She is currently on depakote, as prescribed by psychiatry 1500 mg at night  She continues to follow with psychiatry and her therapist   She will call the office with more frequent events or concerns  Follow up in 3 months with Dr Angela Goodman, after [facility] admission is complete               Other    Bipolar 2 disorder [facility])     Patient continues to follow with psychiatry and therapist  Denies any SI                      Reason for visit is   Chief Complaint   Patient presents with   [facility] Virtual Regular Visit    Seizures        Encounter provider Stevphen Brunner, CRNP    Provider located at [address] [id number]      Recent Visits  No visits were found meeting these conditions  Showing recent visits within past 7 days and meeting all other requirements     Today's Visits  Date Type Provider Dept   03/11/21 Telephone Dalia Hernandez RN [facility]   03/11/21 Telemedicine Alyssa Timmothy Hamman, CRNP [facility]   Showing today's visits and meeting all other requirements     Future Appointments  No visits were found meeting these conditions  Showing future appointments within next 150 days and meeting all other requirements        The patient was identified by name and date of birth  Vannessa Krishnan was informed that this is a telemedicine visit and that the visit is being conducted through [employer] and patient was informed that this is a secure, HIPAA-compliant platform  She agrees to proceed     My office door was closed  No one else was in the room  She acknowledged consent and understanding of privacy and security of the video platform  The patient has agreed to participate and understands they can discontinue the visit at any time  Patient is aware this is a billable service  Subjective  Vannessa Krishnan is a 30 y o  female With seizures in the setting of insomnia, depression/anxiety, bipolar disorder, and low back pain who was last seen in the office on 1/11/2021  At that time, she was doing much better after a recent psychiatric admission  There were reported episodes of staring when she was really upset or stressed out  24 huors ambulatory EEG was ordered  Prior routine EEGs were normal and 72 hour cveeg in 2017 showedmild slowing of the posteirorly dominant alpha rhythm suggests mild, diffuse cerebral dysfunction of non-specific etiology, though no events were captured   Prior MRI brain was normal  Noted that if ambulatory EEG was normal, condier EMU admission to characterize events for further treatment  Since her last visit, she has been doing better  She got herself out of the stressful situations she was in and has been doing a lot better  She recently started another job which she is enjoying  She denies having any seizures  She does note that she has "spaced out  "She may have moments where her boyfriend will notice that he has to talk to her a few times before she is aware that he is talking to her  She does not recall him talking to her until he has said something a few times  These episodes of spacing out are occurring at least once per week or maybe even more  When she gets a migraine they tend to happen more  Occasionally feels like she misses pieces of time or she gets confused  Lack of sleep or stress may bring them on as well  She has been trying to get better sleep  There have been occasions when she wakes up really sore  Denies any morning twitching  She has not noticed anything else that if out of the ordinary or different recently  Reviewed EEG results  Denies having any episodes of spacing out during EEG  Depakote was originally started for seizures in the past (was on keppra first) by a prior neurologist  Then psychiatry continued to increase it  Recently her mood has been fluctuating related to her intermittently not taking her medication  She has been fairly well  She continues to follow with psychiatry and therapy  No suicidal thoughts  She would like to start driving again in the future  Current seizure medications:  1  depakote EC 1500 mg at bedtime (psychiatry)  Other medications as per Epic  Event/Seizure semiology:  · Pressure in right occipital region, left sided (arm/face/leg) numbness, loss of consciousness, diffuse body movements for 30-60 seconds, drooling  May cluster with partial recovery in between  Tired afterward   Some with urinary incontinence    · She feels lightheaded, sits down  Feels eyes twitching, like eyes back and forth  Mother has seen her eyes going back and forth  Right pupil gets big  No LOC  May space out a little  Lasts a few seconds  Back to normal in a minute to a couple minutes  No tired afterward and go back to activities    · Morning myoclonus     Special Features  Status epilepticus: yes - repeated events without return to baseline  Self Injury Seizures: no  Precipitating Factors: Pain, stress, tiredness, and flashing lights trigger events     Epilepsy Risk Factors:  Paternal grandmother likely had epilepsy and  when the patient's father was 15  Details uncertain  Brother with Aspergers  Paternal aunt had seizures after TBI     Prior AEDs:  Lacosamide (started early , stopped by Dr Yousuf Mullins)  Levetiracetam     Prior Evaluation:  - MRI brain seizure wo contrast:   IMPRESSION:  1   No acute infarction, intracranial hemorrhage or mass effect  2   No MR evidence of mesial temporal sclerosis      -2021 24 hour ambulatory EEG;  EEG impression: This was a normal awake, drowsy and sleep ambulatory EEG recording  Clinical Interpretation: This was a normal awake, drowsy and sleep ambulatory EEG study  No electrographic seizures, interictal epileptiform discharges (seizure tendency) or focal slowing were seen  If the goal to capture the patients paroxysmal events was not meet during this study then longer term monitoring via Epilepsy Monitoring Unit (EMU) admission should be considered      - Routine EEG 2020:  Normal     About 72 hours of continuous VEEG in 2017: No events  No epileptiform discharges   Mild   slowing of the posteirorly dominant alpha rhythm suggests mild, diffuse cerebral dysfunction of non-specific etiology     - Extended routine EEG ([facility]) 2017:  Normal     Psychiatric History:  Bipolar disorder  Following closely with psychiatry now  2 prior inpatient admissions     Social History:   Driving: No but would like to in the future  Lives Alone: No  Lives with parents  Rarely alone  Cares for her 2 young children    Occupation: currently working at [employer]     I reviewed prior neurology notes, recent labs, and recent EEG report, as documented in Epic/[facility], and summarized above  The following portions of the patient's history were reviewed and updated as appropriate: allergies, current medications, past family history, past medical history, past social history, past surgical history and problem list         Past Medical History:   Diagnosis Date    Depression     Migraine     Obesity (BMI 30-39  9)     Seizures ([address] )        Past Surgical History:   Procedure Laterality Date     SECTION       SECTION      SHOULDER SURGERY      TUBAL LIGATION      TUMOR REMOVAL      from ear       Current Outpatient Medications   Medication Sig Dispense Refill    ARIPiprazole (ABILIFY) 5 mg tablet Take 1 tablet (5 mg total) by mouth daily 30 tablet 0    clindamycin (CLEOCIN T) 1 % external solution Apply topically 2 (two) times a day Underarms, groin 60 mL 11    divalproex sodium (DEPAKOTE) 500 mg EC tablet Take 3 tablets (1,500 mg total) by mouth daily at bedtime 90 tablet 1    doxycycline hyclate (VIBRAMYCIN) 100 mg capsule Take 1 capsule (100 mg total) by mouth every 12 (twelve) hours For 10 days as needed for flares  Take it with meals and a full glass of water 60 capsule 2     No current facility-administered medications for this visit  Allergies   Allergen Reactions    Erythromycin Swelling    Peanut Oil Hives       Review of Systems   Constitutional: Negative for appetite change, fatigue and fever  HENT: Negative for drooling, ear pain, tinnitus, trouble swallowing and voice change  Eyes: Negative for photophobia, pain and visual disturbance  Respiratory: Negative for chest tightness and shortness of breath  Cardiovascular: Negative for chest pain, palpitations and leg swelling  Gastrointestinal: Negative for abdominal pain, constipation, diarrhea, nausea and vomiting  Endocrine: Negative for cold intolerance and heat intolerance  Genitourinary: Negative for difficulty urinating, frequency and urgency  Musculoskeletal: Positive for back pain (Low back) and neck pain  Negative for gait problem, joint swelling and myalgias  Skin: Negative for rash  Neurological: Negative for dizziness, tremors, seizures, syncope, facial asymmetry, speech difficulty, weakness, light-headedness, numbness and headaches  Psychiatric/Behavioral: Negative for agitation, behavioral problems, confusion, decreased concentration, dysphoric mood and sleep disturbance  The patient is not nervous/anxious and is not hyperactive  Video Exam    Vitals:    03/11/21 1412   Weight: 116 kg (256 lb)   Height: 5' 7" (1 702 m)       Physical Exam     Physical Exam - limited as this is a video visit  No apparent distress  Appears well nourished  Neurologic Exam- limited as this is a video visit  Mental status- alert and oriented to person, place, and time  Speech appropriate for conversation  Good attention and knowledge  Cranial Nerves- EOMS normal, facial muscles symmetric, hearing intact to conversation, tongue midline, palate rise symmetrical, shoulder shrug symmetrical     Motor- Moves all extremities freely  Sensory-  unable to assess  DTRs- unable to assess  Gait- ambulating while holding video device without issue  Coordination- unable to assess      I spent 17 minutes directly with the patient during this visit      VIRTUAL VISIT DISCLAIMER    Heike Sharma acknowledges that she has consented to an online visit or consultation   She understands that the online visit is based solely on information provided by her, and that, in the absence of a face-to-face physical evaluation by the physician, the diagnosis she receives is both limited and provisional in terms of accuracy and completeness  This is not intended to replace a full medical face-to-face evaluation by the physician  Fay Ty understands and accepts these terms

## 2021-03-11 NOTE — ASSESSMENT & PLAN NOTE
Patient denies any of her prior episodes concerning for seizure but does continue to have episodes of spacing out that are observed by others and not recognized by the patient  Episodes are occurring about once per week or more, may be provoked by migraines, stress, and lack of sleep  She admits to occasionally missing pieces of time and getting confused  She had a recent 24 hour ambulatory EEG for these episodes which was normal but patient denies having any episodes during recording  Prior routine EEGs were normal and 72 hour cveeg in 2017 showedmild slowing of the posteirorly dominant alpha rhythm suggests mild, diffuse cerebral dysfunction of non-specific etiology, though no events were captured  Prior MRI brain was normal     Given frequency of events, recommend that patient is admitted to the EMU to capture and characterize events to guide further treatment  Consider weaning depakote during admission but must use caution due to possibility of mood instability  We did discuss the possibility of the events not being seizures  It is important to note that her history of bipolar disorder, depression, and anxiety may place her at increased risk for psychogenic non epileptic events  Risk factors for epilepsy include family history of possible epilepsy in paternal grandmother  She is currently on depakote, as prescribed by psychiatry 1500 mg at night  She continues to follow with psychiatry and her therapist   She will call the office with more frequent events or concerns  Follow up in 3 months with Dr Talya Childress, after [facility] admission is complete

## 2021-03-11 NOTE — PATIENT INSTRUCTIONS
1  Continue your current medications  2  Continue following with psychiatry  3  Call the office with more frequent events or concerns  4  Plan EMU admission  5   Follow up in 3 months with Dr Tony Mitchell

## 2021-03-22 ENCOUNTER — CLINICAL SUPPORT (OUTPATIENT)
Dept: INTERNAL MEDICINE CLINIC | Facility: CLINIC | Age: 26
End: 2021-03-22
Payer: COMMERCIAL

## 2021-03-22 DIAGNOSIS — Z23 ENCOUNTER FOR IMMUNIZATION: Primary | ICD-10-CM

## 2021-03-22 PROCEDURE — 90715 TDAP VACCINE 7 YRS/> IM: CPT

## 2021-03-22 PROCEDURE — 90471 IMMUNIZATION ADMIN: CPT

## 2021-04-14 ENCOUNTER — OFFICE VISIT (OUTPATIENT)
Dept: MULTI SPECIALTY CLINIC | Facility: CLINIC | Age: 26
End: 2021-04-14

## 2021-04-14 VITALS — WEIGHT: 263.2 LBS | HEIGHT: 67 IN | TEMPERATURE: 98.1 F | BODY MASS INDEX: 41.31 KG/M2

## 2021-04-14 DIAGNOSIS — L91.0 KELOID SCAR: Primary | ICD-10-CM

## 2021-04-14 DIAGNOSIS — L73.2 HIDRADENITIS SUPPURATIVA: ICD-10-CM

## 2021-04-14 PROCEDURE — 11900 INJECT SKIN LESIONS </W 7: CPT | Performed by: DERMATOLOGY

## 2021-04-14 PROCEDURE — 99213 OFFICE O/P EST LOW 20 MIN: CPT | Performed by: DERMATOLOGY

## 2021-04-14 NOTE — PATIENT INSTRUCTIONS
HIDRADENITIS SUPPURATIVA    Assessment and Plan:  Based on a thorough discussion of this condition and the management approach to it (including a comprehensive discussion of the known risks, side effects and potential benefits of treatment), the patient (family) agrees to implement the following specific plan:   Continue Hibiclens and clindamycin, Doxycyline as needed   Will not recommend Humira at his point  No blood work need it     What is hidradenitis suppurativa? Hidradenitis suppurativa is an inflammatory skin disease that affects apocrine gland-bearing skin in the axillae, in the groin, and under the breasts  It is characterised by recurrent boil-like nodules and abscesses that culminate in pus-like discharge, difficult-to-heal open wounds (sinuses) and scarring  Hidradenitis suppurativa also has significant psychological impact and many patients suffer from impairment of body image, depression and anxiety  The term hidradenitis implies it starts as an inflammatory disorder of sweat glands, which is now known to be incorrect  Hidradenitis suppurativa is also known as acne inversa  Who gets hidradenitis suppurativa? Hidradenitis often starts at puberty, and is most active between the ages of 21 and 36 years, and in women, can resolve at menopause  It is three times more common in females than in males  Risk factors include:   Other family members with hidradenitis suppurativa   Obesity and insulin resistance/metabolic syndrome   Cigarette smoking   Follicular occlusion disorders: acne conglobata, dissecting cellulitis, pilonidal sinus   Inflammatory bowel disease (Crohn disease)   Rare autoinflammatory syndromes associated with abnormalities of PSTPIP1 gene  *    * PAPA syndrome (pyogenic arthritis, pyoderma gangrenosum and acne), PASH syndrome (pyoderma gangrenosum, acne, suppurative hidradenitis) and PAPASH syndrome (pyogenic arthritis, pyoderma gangrenosum, acne, suppurative hidradenitis)  What causes hidradenitis suppurativa? Hidradenitis suppurativa is an autoinflammatory disorder  Although the exact cause is not yet understood, contributing factors include:   Friction from clothing and body folds   Aberrant immune response to commensal bacteria   Abnormal cutaneous or follicular microbiome   Follicular occlusion   Release of pro-inflammatory cytokines   Inflammation causing rupture of the follicular wall and destroying apocrine glands and ducts   Secondary bacterial infection   Certain drugs  What are the clinical features of hidradenitis suppurativa? Hidradenitis can affect a single or multiple areas in the armpits, neck, sub mammary area, and inner thighs  Anogenital involvement most commonly affects the groin, mons pubis, vulva (in females), sides of the scrotum (in males), perineum, buttocks and perianal folds  Signs include:   Open and closed comedones   Painful firm papules, larger nodules and pleated ridges   Pustules, fluctuant pseudocysts and abscesses   Pyogenic granulomas   Draining sinuses linking inflammatory lesions   Hypertrophic and atrophic scars  Many patients with hidradenitis suppurativa also suffer from other skin disorders, including acne, hirsutism and psoriasis  The severity and extent of hidradenitis suppurativa is recorded at assessment and when determining the impact of a treatment  The [place] system describes three distinct clinical stages:  1  Solitary or multiple, isolated abscess formation without scarring or sinus tracts  2  Recurrent abscesses, single or multiple widely  lesions, with sinus tract formation  3  Diffuse or broad involvement, with multiple interconnected sinus tracts and abscesses      Severe hidradenitis (Harp Stage 3) has been associated with:   Male sex   Axillary and perianal involvement   Obesity   Smoking   Higher risk of stroke, coronary artery disease, heart failure, and peripheral artery disease   Disease duration  What is the treatment for hidradenitis suppurativa? General measures   Weight loss; follow an anti-inflammatory, low-sugar, low-grain, low-dairy diet (mainly plants)   Smoking cessation: this can lead to improvement within a few months   Loose fitting clothing   Daily unfragranced antiperspirants   If prone to secondary infection, wash with antiseptics or take bleach baths   Apply hydrogen peroxide solution or medical grade honey to reduce malodour   Use peeling agents such as resorcinol 15% cream to de-roof nodules   Apply simple dressings to draining sinuses   Analgesics, such as paracetamol (acetaminophen), for pain control   Seek help to manage anxiety and depression  Medical management of hidradenitis suppurativa  Medical management of hidradenitis suppurativa is difficult  Treatment is required long term  Effective options are listed below  Antibiotics   Topical clindamycin, with benzoyl peroxide to reduce bacterial resistance   Short course of oral antibiotics for acute staphylococcal abscesses, eg flucloxacillin   Prolonged courses (minimum 3 months) of tetracycline, metronidazole, trimethoprim + sulphamethoxazole, fluoroquinolones, ertapenem or dapsone for their anti-inflammatory action   6-12 week courses of the combination of clindamycin (or doxycycline) and rifampicin for severe disease  Antiandrogens   Long-term oral contraceptive pill; antiandrogenic progesterones drospirenone or cyproterone acetate may be more effective than standard combined pills  These are more suitable than progesterone-only pills or devices   Spironolactone and finasteride   Response takes 6 months or longer  Immunomodulatory treatments for severe disease   Intralesional corticosteroids into nodules   Systemic corticosteroids short-term for flares   Methotrexate, ciclosporin, and azathioprine   TNF-?  inhibitors adalimumab and infliximab, used in higher dose than required for psoriasis, are the most successful treatments to date  Note that paradoxically, they may sometimes induce new-onset hidradenitis suppurativa   Other biologics are under investigation, such as the IL-1? antagonist, canakinumab    Other medical treatments   Metformin in patients with insulin resistance   Acitretin (unsuitable for females of childbearing potential)   Isotretinoin -- effective for acne but appears unhelpful for most cases of hidradenitis   Colchicine   Medical management of anxiety and depression    Surgical management of hidradenitis suppurativa   Incision and drainage of acute abscesses   Curettage and deroofing of nodules, abscesses and sinuses   Laser ablation of nodules, abscesses and sinuses   Wide local excision of persistent nodules   Radical excisional surgery of entire affected area   Nd:YAG laser hair removal    2  KELOID SCAR    Assessment and Plan:  Based on a thorough discussion of this condition and the management approach to it (including a comprehensive discussion of the known risks, side effects and potential benefits of treatment), the patient (family) agrees to implement the following specific plan:   Kenalog 5mg/ml intralesional injection performed in office   Consent obtained   Follow up in 4 weeks  May schedule 5/26/21 at 11 am per Dr Donna Granados  A keloid scar is a firm, smooth, hard growth due to spontaneous scar formation  It can arise soon after an injury, or develop months later  Keloids may be uncomfortable or itchy and extend well beyond the original wound  They may form on any part of the body, although the upper chest and shoulders are especially prone to them  The precise reason that wound healing sometimes leads to keloid formation is under investigation but is not yet clear  While most people never form keloids, others develop them after minor injuries, burns, insect bites and acne spots   Dark skinned people form keloids more easily than Caucasians  A keloid is harmless to general health and does not change into skin cancer  The following measures are helpful in at least some patients   Emollients (creams and oils)   Polyurethane or silicone scar reduction patches   Silicone gel   Oral or topical tranilast (an inhibitor of collagen synthesis)   Pressure dressings   Surgical excision (but in keloids, excision may result in a new keloid even larger than the original one)   Intralesional corticosteroid injection, repeated every few weeks   Intralesional 5-fluorouracil   Cryotherapy   Superficial X-ray treatment soon after surgery   Pulsed dye laser    Skin needling   Subcision    Scar dressings should be worn for 12-24 hours per day, for at least 8 to 12 weeks, and perhaps for much longer

## 2021-04-14 NOTE — PROGRESS NOTES
[facility] Follow Up Note    Patient Name: Jarvis Briones  Encounter Date: 4/14/21    Today's Chief Concerns:  [place] Concern #1:  Follow up for keloid scar and HS      Current Medications:    Current Outpatient Medications:     ARIPiprazole (ABILIFY) 5 mg tablet, Take 1 tablet (5 mg total) by mouth daily, Disp: 30 tablet, Rfl: 0    clindamycin (CLEOCIN T) 1 % external solution, Apply topically 2 (two) times a day Underarms, groin, Disp: 60 mL, Rfl: 11    divalproex sodium (DEPAKOTE) 500 mg EC tablet, Take 3 tablets (1,500 mg total) by mouth daily at bedtime, Disp: 90 tablet, Rfl: 1    doxycycline hyclate (VIBRAMYCIN) 100 mg capsule, Take 1 capsule (100 mg total) by mouth every 12 (twelve) hours For 10 days as needed for flares  Take it with meals and a full glass of water, Disp: 60 capsule, Rfl: 2    CONSTITUTIONAL:   Vitals:    04/14/21 1055   Temp: 98 1 °F (36 7 °C)   TempSrc: Oral   Weight: 119 kg (263 lb 3 2 oz)   Height: 5' 7" (1 702 m)         Specific Alerts:    Have you been seen by a [facility]atologist in the last 3 years? YES    Are you pregnant or planning to become pregnant? No    Are you currently or planning to be nursing or breast feeding? No    Allergies   Allergen Reactions    Erythromycin Swelling    Peanut Oil - Food Allergy Hives       May we call your Preferred Phone number to discuss your specific medical information? YES    May we leave a detailed message that includes your specific medical information? YES    Have you traveled outside of the [facility] in the past 3 months? No    Do you currently have a pacemaker or defibrillator? No    Do you have any artificial heart valves, joints, plates, screws, rods, stents, pins, etc? No   - If Yes, were any placed within the last 2 years? Do you require any medications prior to a surgical procedure?  No      Are you taking any medications that cause you to bleed more easily ("blood thinners") No    Have you ever experienced a rapid heartbeat with epinephrine? No    Have you ever been treated with "gold" (gold sodium thiomalate) therapy? No    [facility] can help with wrinkles, "laugh lines," facial volume loss, "double chin," "love handles," age spots, and more  Are you interested in learning today about some of the skin enhancement procedures that we offer? (If Yes, please provide more detail) No    Review of Systems:  Have you recently had or currently have any of the following? · Fever or chills: No  · Night Sweats: No  · Headaches: YES, She has hx of migraines, says this is normal for her   · Weight Gain: No  · Weight Loss: No  · Blurry Vision: No  · Nausea: No  · Vomiting: No  · Diarrhea: No  · Blood in Stool: No  · Abdominal Pain: No  · Itchy Skin: No  · Painful Joints: No  · Swollen Joints: No  · Muscle Pain: No  · Irregular Mole: No  · Sun Burn: No  · Dry Skin: No  · Skin Color Changes: No  · Scar or Keloid: No  · Cold Sores/Fever Blisters: No  · Bacterial Infections/MRSA: No  · Anxiety: No  · Depression: No  · Suicidal or Homicidal Thoughts: No      PSYCH: Normal mood and affect  EYES: Normal conjunctiva  ENT: Normal lips and oral mucosa  CARDIOVASCULAR: No edema  RESPIRATORY: Normal respirations  HEME/LYMPH/IMMUNO:  No regional lymphadenopathy except as noted below in ASSESSMENT AND PLAN BY DIAGNOSIS    FOCUSED ORGAN SYSTEM SKIN EXAM (SKIN)   Scalp, Ears, Face Normal except as noted below in Assessment   Neck, Cervical Chain Nodes Normal except as noted below in Assessment           Chest/Axillae Viewed areas Normal except as noted below in Assessment       1  HIDRADENITIS SUPPURATIVA    Physical Exam:   Anatomic Location Affected:  Bilateral axilla   Morphological Description:  Pink macules, no active lesions    Pertinent Positives:   Pertinent Negatives: Additional History of Present Condition:  Patient feels the Hibiclens a has been helping       Assessment and Plan:  Based on a thorough discussion of this condition and the management approach to it (including a comprehensive discussion of the known risks, side effects and potential benefits of treatment), the patient (family) agrees to implement the following specific plan:   Continue Hibiclens and clindamycin, Doxycyline as needed   Will not recommend Humira at his point  No blood work need it     What is hidradenitis suppurativa? Hidradenitis suppurativa is an inflammatory skin disease that affects apocrine gland-bearing skin in the axillae, in the groin, and under the breasts  It is characterised by recurrent boil-like nodules and abscesses that culminate in pus-like discharge, difficult-to-heal open wounds (sinuses) and scarring  Hidradenitis suppurativa also has significant psychological impact and many patients suffer from impairment of body image, depression and anxiety  The term hidradenitis implies it starts as an inflammatory disorder of sweat glands, which is now known to be incorrect  Hidradenitis suppurativa is also known as acne inversa  Who gets hidradenitis suppurativa? Hidradenitis often starts at puberty, and is most active between the ages of 21 and 36 years, and in women, can resolve at menopause  It is three times more common in females than in males  Risk factors include:   Other family members with hidradenitis suppurativa   Obesity and insulin resistance/metabolic syndrome   Cigarette smoking   Follicular occlusion disorders: acne conglobata, dissecting cellulitis, pilonidal sinus   Inflammatory bowel disease (Crohn disease)   Rare autoinflammatory syndromes associated with abnormalities of PSTPIP1 gene  *    * PAPA syndrome (pyogenic arthritis, pyoderma gangrenosum and acne), PASH syndrome (pyoderma gangrenosum, acne, suppurative hidradenitis) and PAPASH syndrome (pyogenic arthritis, pyoderma gangrenosum, acne, suppurative hidradenitis)  What causes hidradenitis suppurativa?   Hidradenitis suppurativa is an autoinflammatory disorder  Although the exact cause is not yet understood, contributing factors include:   Friction from clothing and body folds   Aberrant immune response to commensal bacteria   Abnormal cutaneous or follicular microbiome   Follicular occlusion   Release of pro-inflammatory cytokines   Inflammation causing rupture of the follicular wall and destroying apocrine glands and ducts   Secondary bacterial infection   Certain drugs  What are the clinical features of hidradenitis suppurativa? Hidradenitis can affect a single or multiple areas in the armpits, neck, sub mammary area, and inner thighs  Anogenital involvement most commonly affects the groin, mons pubis, vulva (in females), sides of the scrotum (in males), perineum, buttocks and perianal folds  Signs include:   Open and closed comedones   Painful firm papules, larger nodules and pleated ridges   Pustules, fluctuant pseudocysts and abscesses   Pyogenic granulomas   Draining sinuses linking inflammatory lesions   Hypertrophic and atrophic scars  Many patients with hidradenitis suppurativa also suffer from other skin disorders, including acne, hirsutism and psoriasis  The severity and extent of hidradenitis suppurativa is recorded at assessment and when determining the impact of a treatment  The [place] system describes three distinct clinical stages:  1  Solitary or multiple, isolated abscess formation without scarring or sinus tracts  2  Recurrent abscesses, single or multiple widely  lesions, with sinus tract formation  3  Diffuse or broad involvement, with multiple interconnected sinus tracts and abscesses  Severe hidradenitis ([place] Stage 3) has been associated with:   Male sex   Axillary and perianal involvement   Obesity   Smoking   Higher risk of stroke, coronary artery disease, heart failure, and peripheral artery disease   Disease duration      What is the treatment for hidradenitis suppurativa? General measures   Weight loss; follow an anti-inflammatory, low-sugar, low-grain, low-dairy diet (mainly plants)   Smoking cessation: this can lead to improvement within a few months   Loose fitting clothing   Daily unfragranced antiperspirants   If prone to secondary infection, wash with antiseptics or take bleach baths   Apply hydrogen peroxide solution or medical grade honey to reduce malodour   Use peeling agents such as resorcinol 15% cream to de-roof nodules   Apply simple dressings to draining sinuses   Analgesics, such as paracetamol (acetaminophen), for pain control   Seek help to manage anxiety and depression  Medical management of hidradenitis suppurativa  Medical management of hidradenitis suppurativa is difficult  Treatment is required long term  Effective options are listed below  Antibiotics   Topical clindamycin, with benzoyl peroxide to reduce bacterial resistance   Short course of oral antibiotics for acute staphylococcal abscesses, eg flucloxacillin   Prolonged courses (minimum 3 months) of tetracycline, metronidazole, trimethoprim + sulphamethoxazole, fluoroquinolones, ertapenem or dapsone for their anti-inflammatory action   6-12 week courses of the combination of clindamycin (or doxycycline) and rifampicin for severe disease  Antiandrogens   Long-term oral contraceptive pill; antiandrogenic progesterones drospirenone or cyproterone acetate may be more effective than standard combined pills  These are more suitable than progesterone-only pills or devices   Spironolactone and finasteride   Response takes 6 months or longer  Immunomodulatory treatments for severe disease   Intralesional corticosteroids into nodules   Systemic corticosteroids short-term for flares   Methotrexate, ciclosporin, and azathioprine   TNF-?  inhibitors adalimumab and infliximab, used in higher dose than required for psoriasis, are the most successful treatments to date  Note that paradoxically, they may sometimes induce new-onset hidradenitis suppurativa   Other biologics are under investigation, such as the IL-1? antagonist, canakinumab    Other medical treatments   Metformin in patients with insulin resistance   Acitretin (unsuitable for females of childbearing potential)   Isotretinoin -- effective for acne but appears unhelpful for most cases of hidradenitis   Colchicine   Medical management of anxiety and depression    Surgical management of hidradenitis suppurativa   Incision and drainage of acute abscesses   Curettage and deroofing of nodules, abscesses and sinuses   Laser ablation of nodules, abscesses and sinuses   Wide local excision of persistent nodules   Radical excisional surgery of entire affected area   Nd:YAG laser hair removal    2  KELOID SCAR    Physical Exam:   Anatomic Location Affected:  Left chest    Morphological Description:  Firm pink nodule, flatter than prior    Pertinent Positives:   Pertinent Negatives: Additional History of Present Condition:  Patient already had one kenalog intralesional injection  She noticed lesion has reduced in size  Assessment and Plan:  Based on a thorough discussion of this condition and the management approach to it (including a comprehensive discussion of the known risks, side effects and potential benefits of treatment), the patient (family) agrees to implement the following specific plan:   Kenalog 5mg/ml intralesional injection performed in office   Consent obtained   Follow up in 4 weeks  May schedule patient on 5/26/21 at 11 am per Dr Camron Wynne  A keloid scar is a firm, smooth, hard growth due to spontaneous scar formation  It can arise soon after an injury, or develop months later  Keloids may be uncomfortable or itchy and extend well beyond the original wound  They may form on any part of the body, although the upper chest and shoulders are especially prone to them      The precise reason that wound healing sometimes leads to keloid formation is under investigation but is not yet clear  While most people never form keloids, others develop them after minor injuries, burns, insect bites and acne spots  Dark skinned people form keloids more easily than Caucasians  A keloid is harmless to general health and does not change into skin cancer  The following measures are helpful in at least some patients   Emollients (creams and oils)   Polyurethane or silicone scar reduction patches   Silicone gel   Oral or topical tranilast (an inhibitor of collagen synthesis)   Pressure dressings   Surgical excision (but in keloids, excision may result in a new keloid even larger than the original one)   Intralesional corticosteroid injection, repeated every few weeks   Intralesional 5-fluorouracil   Cryotherapy   Superficial X-ray treatment soon after surgery   Pulsed dye laser    Skin needling   Subcision    Scar dressings should be worn for 12-24 hours per day, for at least 8 to 12 weeks, and perhaps for much longer  PROCEDURE:  INTRALESIONAL STEROID INJECTION (KENALOG INJECTION)    Purpose: Triamcinolone is a synthetic glucocorticoid corticosteroid that has marked anti-inflammatory action  It is prepared in sterile aqueous suspension suitable for injecting directly into a lesion on or immediately below the skin to treat a dermal inflammatory process  Indications: It is indicated for alopecia areata; inflammatory acne cysts; discoid lupus erythematosus; keloids and hypertrophic scars; inflammatory lesions of granuloma annulare, lichen planus, lichen simplex chronicus (neurodermatitis), psoriatic plaques, and other localized inflammatory skin conditions       Potential Side Effects: I understand that triamcinolone injection can potentially cause early and/or delayed adverse effects such as:    Pain    Impaired wound healing    Increased hair growth    Bleeding    White or Sharyne Labrum marks    Steroid acne    Infection    Telangiectasia    Skin thinning    Cutaneous and subcutaneous lipoatrophy (most common) appearing as skin indentations or dimples around the injection sites a few weeks after treatment     PROCEDURE NOTE:  After verbal and written consent were obtained, the to-be-treated area was wiped and cleaned with rubbing alcohol 70%  Then, a total of 0 5 mL of Kenalog CONCENTRATION:  10 mg/mL  was injected intralesionally into a total of 1 lesion/s on the following anatomic areas:  left chest using a 1-mL syringe and a 30-gauge needle  There was less than 1 mL of blood loss and little to no discomfort  The area was bandaged with a Band-aid  The patient tolerated the procedure well and remained in the office for observation  With no signs of an adverse reaction, the patient was eventually discharged from clinic            Scribe Attestation    I,:  Concepcion Chavarria am acting as a scribe while in the presence of the attending physician :       I,:  Yandel Caputo MD personally performed the services described in this documentation    as scribed in my presence :

## 2021-04-15 ENCOUNTER — CONSULT (OUTPATIENT)
Dept: BARIATRICS | Facility: CLINIC | Age: 26
End: 2021-04-15
Payer: COMMERCIAL

## 2021-04-15 VITALS
HEART RATE: 93 BPM | HEIGHT: 67 IN | TEMPERATURE: 97.6 F | SYSTOLIC BLOOD PRESSURE: 118 MMHG | DIASTOLIC BLOOD PRESSURE: 78 MMHG | BODY MASS INDEX: 40.87 KG/M2 | WEIGHT: 260.4 LBS

## 2021-04-15 DIAGNOSIS — E66.01 MORBID OBESITY WITH BMI OF 40.0-44.9, ADULT (HCC): Primary | ICD-10-CM

## 2021-04-15 DIAGNOSIS — E78.5 DYSLIPIDEMIA: ICD-10-CM

## 2021-04-15 DIAGNOSIS — G43.909 MIGRAINE: ICD-10-CM

## 2021-04-15 DIAGNOSIS — G40.909 SEIZURE DISORDER (HCC): ICD-10-CM

## 2021-04-15 DIAGNOSIS — F31.81 BIPOLAR 2 DISORDER (HCC): ICD-10-CM

## 2021-04-15 PROCEDURE — 3008F BODY MASS INDEX DOCD: CPT | Performed by: INTERNAL MEDICINE

## 2021-04-15 PROCEDURE — 99244 OFF/OP CNSLTJ NEW/EST MOD 40: CPT | Performed by: INTERNAL MEDICINE

## 2021-04-15 PROCEDURE — 1036F TOBACCO NON-USER: CPT | Performed by: INTERNAL MEDICINE

## 2021-04-15 RX ORDER — BENZONATATE 200 MG/1
CAPSULE ORAL
COMMUNITY
Start: 2021-03-31 | End: 2021-07-19

## 2021-04-15 RX ORDER — NAPROXEN 500 MG/1
TABLET ORAL
COMMUNITY
Start: 2021-03-31 | End: 2021-07-19

## 2021-04-15 NOTE — PROGRESS NOTES
Assessment/Plan:  Nancy Quiñonez was seen today for consult  Diagnoses and all orders for this visit:    Morbid obesity with BMI of 40 0-44 9, adult ([facility] )  -     Ambulatory referral to Weight Management    Bipolar 2 disorder ([facility] )  Stable, on abilify and depakote  Follows psych    Seizure disorder ([facility] )  Stable on Depakote  Follows neuro  Avoid Wellbutrin    Dyslipidemia  Increase activity level to 150 minutes of moderate to intense exercise per week  Weight loss should help improve the lipid levels  Avoid foods with trans fat, limit saturated fats and refined carbohydrates  Increase fish/omega 3 consumption    Migraine  Does not take anything  Never tried topiramate  topamax can be an option for weight loss/migraine     - Discussed options of HealthyCORE-Intensive Lifestyle Intervention Program, Very Low Calorie Diet-VLCD and Conservative Program and the role of weight loss medications  - Explained the importance of making lifestyle changes first before starting any anti-obesity medications  Patient should demonstrate lifestyle changes first before anti-obesity medication can be initiated  - Patient is interested in pursuing Healthy[facility]-Intensive Lifestyle Intervention Program  - Initial weight loss goal of 5-10% weight loss for improved health  - Screening labs reviewed    Goals:  Do not skip any meals! Food log (ie ) www myfitnesspal com,sparkpeople  com,loseit com,calorieking  com,etc  baritastic (use skinnytaste  com or smartphone gurvinder Arkadin for recipes)  No sugary beverages  At least 64oz of water daily  Increase physical activity by 10 minutes daily  Gradually increase physical activity to a goal of 5 days per week for 30 minutes of MODERATE intensity PLUS 2 days per week of FULL BODY resistance training (use smartphone apps Nivela, Home Workout, etc )    45 minute visit, >50% face-to-face time spent counseling patient on surgical and nonsurgical interventions for the treatment of excess weight    Discussed the advantages and long-term outcomes with regards to bariatric surgery  Discussed in detail nonsurgical options including intensive lifestyle intervention program, very low-calorie diet program and conservative program   Discussed the role of weight loss medications  Counseled patient on diet behavior and exercise modification for weight loss  Follow up in approximately 2 weeks with Non-Surgical Dietician  Subjective:   Chief Complaint   Patient presents with    Consult     Patient is here for initial MWM consult with Dr Ronaldo Walsh  BMI 40 66  Negative stop bang ()  Patient ID: Joey Pool  is a 27 y o  female with excess weight/obesity here to pursue weight management  Past Medical History:   Diagnosis Date    Depression     Migraine     Morbid obesity with BMI of 40 0-44 9, adult (HCC)     Obesity (BMI 30-39  9)     Seizures ([address] )      Past Surgical History:   Procedure Laterality Date     SECTION       SECTION      SHOULDER SURGERY      TUBAL LIGATION      TUMOR REMOVAL      from ear       HPI:  Wt Readings from Last 20 Encounters:   04/15/21 118 kg (260 lb 6 4 oz)   21 119 kg (263 lb 3 2 oz)   21 116 kg (256 lb)   21 120 kg (264 lb)   21 118 kg (259 lb 9 6 oz)   21 117 kg (258 lb 8 oz)   20 116 kg (256 lb 12 8 oz)   20 115 kg (253 lb)   20 115 kg (253 lb)   20 115 kg (253 lb)   20 112 kg (246 lb)   20 114 kg (251 lb 5 2 oz)   20 114 kg (250 lb 7 1 oz)   20 109 kg (240 lb)   02/15/20 111 kg (243 lb 13 3 oz)   01/15/20 113 kg (249 lb 5 4 oz)   19 111 kg (244 lb)   19 111 kg (244 lb 9 6 oz)   18 111 kg (245 lb)   17 98 9 kg (218 lb)       Severity: Severe  Onset: since her pregnancy , then - gained 40 after first, 100 lbs after second   On Abilify since 2020, Depakote on and off 2 years ago- didn't notice much weight gain  Modifiers: Diet and Exercise, keto diet, low carbs,  Contributing factors: Poor Food Choices and Insufficient Physical Activity  Associated symptoms: comorbid conditions  Goal weight: 180 lbs   Lives with her parents, her brother and her kids  Works at [employer] on her feet a lot   B- sausage sandwich   S-  L- tuna sub   S-  D- beef taco   S-    Hydration: 48 oz water, diet coke 2 cans a day   Alcohol: rare   Smoking: no  Exercise: walking 3000 steps a day; has an elliptical at home  Sleep: 8 hours  STOP ban    The following portions of the patient's history were reviewed and updated as appropriate: allergies, current medications, past family history, past medical history, past social history, past surgical history, and problem list     Review of Systems   Constitutional: Negative for appetite change, chills and fever  HENT: Negative for rhinorrhea and sore throat  Respiratory: Negative for cough, chest tightness and shortness of breath  Cardiovascular: Negative for chest pain and leg swelling  Gastrointestinal: Negative for abdominal pain, constipation, diarrhea, nausea and vomiting  Endocrine: Negative for cold intolerance and heat intolerance  Genitourinary: Negative for difficulty urinating  Musculoskeletal: Negative for arthralgias  Skin: Negative for color change  Neurological: Positive for headaches (chronic migraine)  Negative for dizziness and numbness  Psychiatric/Behavioral: Positive for sleep disturbance  The patient is not nervous/anxious  All other systems reviewed and are negative  Objective:  /78 (BP Location: Right arm, Patient Position: Sitting, Cuff Size: Large)   Pulse 93   Temp 97 6 °F (36 4 °C) (Temporal)   Ht 5' 7 1" (1 704 m)   Wt 118 kg (260 lb 6 4 oz)   BMI 40 66 kg/m²   Constitutional: Well-developed, well-nourished and obese Body mass index is 40 66 kg/m²  Lazenyan Cassette HEENT: No conjunctival pallor or jaundice    Pulmonary: No increased work of breathing or signs of respiratory distress  CV: Regular rate  Well perfused  GI: Obese  Non-distended  MSK: No edema  Neuro: Oriented to person, place and time  Normal Speech  Normal gait  Psych: Normal affect and mood         Labs and Imaging  Lab Results   Component Value Date    WBC 10 31 (H) 12/01/2020    HGB 13 6 12/01/2020    HCT 43 7 12/01/2020    MCV 82 12/01/2020     12/01/2020     Lab Results   Component Value Date    SODIUM 140 12/01/2020    K 4 9 12/01/2020     (H) 12/01/2020    CO2 23 12/01/2020    AGAP 6 12/01/2020    BUN 12 12/01/2020    CREATININE 0 72 12/01/2020    GLUC 93 12/01/2020    GLUF 92 09/30/2019    CALCIUM 9 4 12/01/2020    AST 6 12/01/2020    ALT 21 12/01/2020    ALKPHOS 73 12/01/2020    TP 7 9 12/01/2020    TBILI 0 38 12/01/2020    EGFR 115 12/01/2020     No results found for: HGBA1C  Lab Results   Component Value Date    IBC0NBXTXKVR 2 340 12/01/2020     Lab Results   Component Value Date    CHOLESTEROL 148 06/13/2018     Lab Results   Component Value Date    HDL 28 (L) 06/13/2018     Lab Results   Component Value Date    TRIG 157 (H) 06/13/2018     No results found for: LDLDIRECT        Colonoscopy-Not applicable

## 2021-04-19 ENCOUNTER — TELEPHONE (OUTPATIENT)
Dept: BARIATRICS | Facility: CLINIC | Age: 26
End: 2021-04-19

## 2021-04-28 ENCOUNTER — HOSPITAL ENCOUNTER (EMERGENCY)
Facility: HOSPITAL | Age: 26
Discharge: HOME | End: 2021-04-28
Attending: EMERGENCY MEDICINE | Admitting: EMERGENCY MEDICINE
Payer: COMMERCIAL

## 2021-04-28 ENCOUNTER — APPOINTMENT (EMERGENCY)
Dept: CT IMAGING | Facility: HOSPITAL | Age: 26
End: 2021-04-28
Payer: COMMERCIAL

## 2021-04-28 VITALS
HEART RATE: 74 BPM | RESPIRATION RATE: 20 BRPM | SYSTOLIC BLOOD PRESSURE: 112 MMHG | DIASTOLIC BLOOD PRESSURE: 75 MMHG | BODY MASS INDEX: 40.6 KG/M2 | TEMPERATURE: 98.4 F | WEIGHT: 260 LBS | OXYGEN SATURATION: 95 %

## 2021-04-28 DIAGNOSIS — R56.9 SEIZURES (HCC): Primary | ICD-10-CM

## 2021-04-28 LAB
ALBUMIN SERPL BCP-MCNC: 3.6 G/DL (ref 3.5–5)
ALP SERPL-CCNC: 92 U/L (ref 46–116)
ALT SERPL W P-5'-P-CCNC: 36 U/L (ref 12–78)
ANION GAP SERPL CALCULATED.3IONS-SCNC: 16 MMOL/L (ref 4–13)
AST SERPL W P-5'-P-CCNC: 14 U/L (ref 5–45)
ATRIAL RATE: 91 BPM
BASOPHILS # BLD AUTO: 0.03 THOUSANDS/ΜL (ref 0–0.1)
BASOPHILS NFR BLD AUTO: 0 % (ref 0–1)
BILIRUB DIRECT SERPL-MCNC: 0.15 MG/DL (ref 0–0.2)
BILIRUB SERPL-MCNC: 0.47 MG/DL (ref 0.2–1)
BUN SERPL-MCNC: 13 MG/DL (ref 5–25)
CALCIUM SERPL-MCNC: 8 MG/DL (ref 8.3–10.1)
CHLORIDE SERPL-SCNC: 104 MMOL/L (ref 100–108)
CO2 SERPL-SCNC: 19 MMOL/L (ref 21–32)
CREAT SERPL-MCNC: 0.91 MG/DL (ref 0.6–1.3)
EOSINOPHIL # BLD AUTO: 0.19 THOUSAND/ΜL (ref 0–0.61)
EOSINOPHIL NFR BLD AUTO: 2 % (ref 0–6)
ERYTHROCYTE [DISTWIDTH] IN BLOOD BY AUTOMATED COUNT: 13.8 % (ref 11.6–15.1)
GFR SERPL CREATININE-BSD FRML MDRD: 86 ML/MIN/1.73SQ M
GLUCOSE SERPL-MCNC: 126 MG/DL (ref 65–140)
HCG SERPL QL: NEGATIVE
HCT VFR BLD AUTO: 38.3 % (ref 34.8–46.1)
HGB BLD-MCNC: 12.6 G/DL (ref 11.5–15.4)
IMM GRANULOCYTES # BLD AUTO: 0.02 THOUSAND/UL (ref 0–0.2)
IMM GRANULOCYTES NFR BLD AUTO: 0 % (ref 0–2)
LYMPHOCYTES # BLD AUTO: 3.68 THOUSANDS/ΜL (ref 0.6–4.47)
LYMPHOCYTES NFR BLD AUTO: 41 % (ref 14–44)
MAGNESIUM SERPL-MCNC: 2 MG/DL (ref 1.6–2.6)
MCH RBC QN AUTO: 25.7 PG (ref 26.8–34.3)
MCHC RBC AUTO-ENTMCNC: 32.9 G/DL (ref 31.4–37.4)
MCV RBC AUTO: 78 FL (ref 82–98)
MONOCYTES # BLD AUTO: 0.5 THOUSAND/ΜL (ref 0.17–1.22)
MONOCYTES NFR BLD AUTO: 6 % (ref 4–12)
NEUTROPHILS # BLD AUTO: 4.55 THOUSANDS/ΜL (ref 1.85–7.62)
NEUTS SEG NFR BLD AUTO: 51 % (ref 43–75)
NRBC BLD AUTO-RTO: 0 /100 WBCS
P AXIS: 37 DEGREES
PLATELET # BLD AUTO: 307 THOUSANDS/UL (ref 149–390)
PMV BLD AUTO: 9.7 FL (ref 8.9–12.7)
POTASSIUM SERPL-SCNC: 3.8 MMOL/L (ref 3.5–5.3)
PR INTERVAL: 146 MS
PROT SERPL-MCNC: 7.5 G/DL (ref 6.4–8.2)
QRS AXIS: 7 DEGREES
QRSD INTERVAL: 86 MS
QT INTERVAL: 358 MS
QTC INTERVAL: 440 MS
RBC # BLD AUTO: 4.9 MILLION/UL (ref 3.81–5.12)
SODIUM SERPL-SCNC: 139 MMOL/L (ref 136–145)
T WAVE AXIS: 18 DEGREES
VALPROATE SERPL-MCNC: 71 UG/ML (ref 50–100)
VENTRICULAR RATE: 91 BPM
WBC # BLD AUTO: 8.97 THOUSAND/UL (ref 4.31–10.16)

## 2021-04-28 PROCEDURE — 70450 CT HEAD/BRAIN W/O DYE: CPT

## 2021-04-28 PROCEDURE — 84703 CHORIONIC GONADOTROPIN ASSAY: CPT | Performed by: PHYSICIAN ASSISTANT

## 2021-04-28 PROCEDURE — 96365 THER/PROPH/DIAG IV INF INIT: CPT

## 2021-04-28 PROCEDURE — 93005 ELECTROCARDIOGRAM TRACING: CPT

## 2021-04-28 PROCEDURE — G1004 CDSM NDSC: HCPCS

## 2021-04-28 PROCEDURE — 93010 ELECTROCARDIOGRAM REPORT: CPT | Performed by: INTERNAL MEDICINE

## 2021-04-28 PROCEDURE — 36415 COLL VENOUS BLD VENIPUNCTURE: CPT | Performed by: PHYSICIAN ASSISTANT

## 2021-04-28 PROCEDURE — 99285 EMERGENCY DEPT VISIT HI MDM: CPT | Performed by: PHYSICIAN ASSISTANT

## 2021-04-28 PROCEDURE — 85025 COMPLETE CBC W/AUTO DIFF WBC: CPT | Performed by: PHYSICIAN ASSISTANT

## 2021-04-28 PROCEDURE — 80076 HEPATIC FUNCTION PANEL: CPT | Performed by: PHYSICIAN ASSISTANT

## 2021-04-28 PROCEDURE — 99285 EMERGENCY DEPT VISIT HI MDM: CPT

## 2021-04-28 PROCEDURE — 80048 BASIC METABOLIC PNL TOTAL CA: CPT | Performed by: PHYSICIAN ASSISTANT

## 2021-04-28 PROCEDURE — 72125 CT NECK SPINE W/O DYE: CPT

## 2021-04-28 PROCEDURE — 83735 ASSAY OF MAGNESIUM: CPT | Performed by: PHYSICIAN ASSISTANT

## 2021-04-28 PROCEDURE — 80164 ASSAY DIPROPYLACETIC ACD TOT: CPT | Performed by: PHYSICIAN ASSISTANT

## 2021-04-28 RX ORDER — ACETAMINOPHEN 325 MG/1
975 TABLET ORAL ONCE
Status: COMPLETED | OUTPATIENT
Start: 2021-04-28 | End: 2021-04-28

## 2021-04-28 RX ADMIN — ACETAMINOPHEN 975 MG: 325 TABLET, FILM COATED ORAL at 10:00

## 2021-04-28 RX ADMIN — SODIUM CHLORIDE 1000 ML: 0.9 INJECTION, SOLUTION INTRAVENOUS at 09:07

## 2021-04-28 RX ADMIN — SODIUM CHLORIDE 1000 MG: 9 INJECTION, SOLUTION INTRAVENOUS at 09:28

## 2021-04-28 NOTE — ED NOTES
Patient transported to [facility], [address] [place] [address]  04/28/21 2580

## 2021-04-28 NOTE — ED PROVIDER NOTES
History  Chief Complaint   Patient presents with    Seizure - Actively Seizing on Arrival     pt has had a total of 4 seizures today  The firt two happened at work and were wittnessed by coworkers  They report that pt fell to the ground but did not see any head strike  Pt had 1 Seizure for EMS and then another one on Arrival     29yo female with a history of seizure disorder on Depakote, bipolar disorder, migraines, and obesity presenting via EMS for evaluation after multiple seizures this morning  Patient reportedly had 2 generalized seizures while at work this morning, each lasting about 10 seconds  Patient reports having an aura of a headache and dizziness prior to her seizures  EMS was activated by her co-workers  Patient had two additional seizures that were witnessed by EMS  EMS states that these seizures lasted about 5 seconds before resolving spontaneously  Patient did not require any antiepileptic medications prior to arrival  She currently complains of fatigue, a headache, and neck pain  Patient was placed in a cervical collar prior to arrival  Patient reports that she stopped taking her Depakote several weeks ago "for a test " Last neurology note reviewed  It was recommended that patient undergo EMU with consideration of weaning her Depakote dosing while hospitalized  Patient is otherwise asymptomatic and states she was feeling well yesterday  Patient's last seizure prior to today was several months ago         History provided by:  Patient   used: No    Seizure - Actively Seizing on Arrival  Seizure activity on arrival: no    Seizure type:  Tonic  Preceding symptoms: aura and headache    Initial focality:  None  Episode characteristics: abnormal movements and generalized shaking    Postictal symptoms: somnolence    Postictal symptoms: no confusion    Return to baseline: yes    Severity:  Moderate  Timing:  Clustered  Number of seizures this episode:  4  Progression: Unchanged  Context: medical non-compliance    Context: not fever    Recent head injury:  No recent head injuries  PTA treatment:  None  History of seizures: yes        Prior to Admission Medications   Prescriptions Last Dose Informant Patient Reported? Taking? ARIPiprazole (ABILIFY) 5 mg tablet   No No   Sig: Take 1 tablet (5 mg total) by mouth daily   benzonatate (TESSALON) 200 MG capsule   Yes No   divalproex sodium (DEPAKOTE) 500 mg EC tablet   No No   Sig: Take 3 tablets (1,500 mg total) by mouth daily at bedtime   naproxen (NAPROSYN) 500 mg tablet   Yes No      Facility-Administered Medications: None       Past Medical History:   Diagnosis Date    Depression     Migraine     Morbid obesity with BMI of 40 0-44 9, adult (HCC)     Obesity (BMI 30-39  9)     Seizures (HCC)        Past Surgical History:   Procedure Laterality Date     SECTION       SECTION      SHOULDER SURGERY      TUBAL LIGATION      TUMOR REMOVAL      from ear       Family History   Problem Relation Age of Onset    No Known Problems Mother     No Known Problems Father     No Known Problems Sister     No Known Problems Brother     No Known Problems Maternal Aunt     No Known Problems Paternal Aunt     No Known Problems Maternal Uncle     No Known Problems Paternal Uncle     No Known Problems Maternal Grandfather     No Known Problems Maternal Grandmother     No Known Problems Paternal Grandfather     No Known Problems Paternal Grandmother     No Known Problems Cousin     ADD / ADHD Neg Hx     Alcohol abuse Neg Hx     Anxiety disorder Neg Hx     Bipolar disorder Neg Hx     Completed Suicide  Neg Hx     Dementia Neg Hx     Depression Neg Hx     Drug abuse Neg Hx     OCD Neg Hx     Psychiatric Illness Neg Hx     Psychosis Neg Hx     Schizoaffective Disorder  Neg Hx     Schizophrenia Neg Hx     Self-Injury Neg Hx     Suicide Attempts Neg Hx      I have reviewed and agree with the history as documented  E-Cigarette/Vaping    E-Cigarette Use Never User      E-Cigarette/Vaping Substances    Nicotine No     THC No     CBD No     Flavoring No      Social History     Tobacco Use    Smoking status: Never Smoker    Smokeless tobacco: Never Used   Substance Use Topics    Alcohol use: Yes     Frequency: Monthly or less     Drinks per session: 1 or 2     Binge frequency: Never     Comment: occasionally    Drug use: No       Review of Systems   Constitutional: Negative for chills and fever  HENT: Negative for drooling and ear pain  Eyes: Negative for discharge and redness  Respiratory: Negative for shortness of breath and stridor  Cardiovascular: Negative for chest pain and palpitations  Gastrointestinal: Negative for abdominal pain and vomiting  Musculoskeletal: Positive for neck pain  Negative for neck stiffness  Skin: Negative for color change and rash  Neurological: Positive for seizures and headaches  Negative for dizziness  Psychiatric/Behavioral: Negative for confusion  The patient is not nervous/anxious  All other systems reviewed and are negative  Physical Exam  Physical Exam  Vitals signs and nursing note reviewed  Constitutional:       General: She is not in acute distress  Appearance: She is well-developed  She is not diaphoretic  Comments: Patient fatigued although easily arousable   HENT:      Head: Normocephalic and atraumatic  Right Ear: External ear normal       Left Ear: External ear normal       Nose: Nose normal    Eyes:      General: No scleral icterus  Right eye: No discharge  Left eye: No discharge  Conjunctiva/sclera: Conjunctivae normal    Neck:      Musculoskeletal: Normal range of motion and neck supple  Comments: Cervical collar in place  Cardiovascular:      Rate and Rhythm: Normal rate and regular rhythm  Heart sounds: Normal heart sounds  No murmur     Pulmonary:      Effort: Pulmonary effort is normal  No respiratory distress  Breath sounds: Normal breath sounds  No stridor  No wheezing or rales  Abdominal:      General: Bowel sounds are normal  There is no distension  Palpations: Abdomen is soft  Tenderness: There is no abdominal tenderness  There is no guarding  Musculoskeletal: Normal range of motion  General: No deformity  Lymphadenopathy:      Cervical: No cervical adenopathy  Skin:     General: Skin is warm and dry  Neurological:      General: No focal deficit present  Mental Status: She is alert  She is not disoriented  GCS: GCS eye subscore is 4  GCS verbal subscore is 5  GCS motor subscore is 6        Comments: No focal neurologic deficits   Psychiatric:         Behavior: Behavior normal          Vital Signs  ED Triage Vitals   Temperature Pulse Respirations Blood Pressure SpO2   04/28/21 0858 04/28/21 0858 04/28/21 0858 04/28/21 0858 04/28/21 0858   98 4 °F (36 9 °C) 89 20 148/76 97 %      Temp Source Heart Rate Source Patient Position - Orthostatic VS BP Location FiO2 (%)   04/28/21 0858 04/28/21 0858 04/28/21 0858 04/28/21 0858 --   Oral Monitor Lying Right arm       Pain Score       04/28/21 1000       Med Not Given for Pain - for MAR use only           Vitals:    04/28/21 0858 04/28/21 1000 04/28/21 1130   BP: 148/76 121/81 112/75   Pulse: 89 75 74   Patient Position - Orthostatic VS: Lying Lying Lying         Visual Acuity      ED Medications  Medications   sodium chloride 0 9 % bolus 1,000 mL (0 mL Intravenous Stopped 4/28/21 1020)   valproate (DEPACON) 1,000 mg in sodium chloride 0 9 % 50 mL IVPB (0 mg Intravenous Stopped 4/28/21 1039)   acetaminophen (TYLENOL) tablet 975 mg (975 mg Oral Given 4/28/21 1000)       Diagnostic Studies  Results Reviewed     Procedure Component Value Units Date/Time    Valproic acid level, total [[id number]]  (Normal) Collected: 04/28/21 1122    Lab Status: Final result Specimen: Blood from Arm, Left Updated: 04/28/21 1143     Valproic Acid, Total 71 0 ug/mL     Hepatic function panel [[id number]]  (Normal) Collected: 04/28/21 0906    Lab Status: Final result Specimen: Blood from Arm, Left Updated: 04/28/21 0938     Total Bilirubin 0 47 mg/dL      Bilirubin, Direct 0 15 mg/dL      Alkaline Phosphatase 92 U/L      AST 14 U/L      ALT 36 U/L      Total Protein 7 5 g/dL      Albumin 3 6 g/dL     hCG, qualitative pregnancy [[id number]]  (Normal) Collected: 04/28/21 0906    Lab Status: Final result Specimen: Blood from Arm, Left Updated: 04/28/21 0938     Preg, Serum Negative    Magnesium [[id number]]  (Normal) Collected: 04/28/21 0906    Lab Status: Final result Specimen: Blood from Arm, Left Updated: 04/28/21 0938     Magnesium 2 0 mg/dL     Basic metabolic panel [[id number]]  (Abnormal) Collected: 04/28/21 0906    Lab Status: Final result Specimen: Blood from Arm, Left Updated: 04/28/21 0931     Sodium 139 mmol/L      Potassium 3 8 mmol/L      Chloride 104 mmol/L      CO2 19 mmol/L      ANION GAP 16 mmol/L      BUN 13 mg/dL      Creatinine 0 91 mg/dL      Glucose 126 mg/dL      Calcium 8 0 mg/dL      eGFR 86 ml/min/1 73sq m     Narrative:      [place] guidelines for Chronic Kidney Disease (CKD):     Stage 1 with normal or high GFR (GFR > 90 mL/min/1 73 square meters)    Stage 2 Mild CKD (GFR = 60-89 mL/min/1 73 square meters)    Stage 3A Moderate CKD (GFR = 45-59 mL/min/1 73 square meters)    Stage 3B Moderate CKD (GFR = 30-44 mL/min/1 73 square meters)    Stage 4 Severe CKD (GFR = 15-29 mL/min/1 73 square meters)    Stage 5 End Stage CKD (GFR <15 mL/min/1 73 square meters)  Note: GFR calculation is accurate only with a steady state creatinine    CBC and differential [[id number]]  (Abnormal) Collected: 04/28/21 0905    Lab Status: Final result Specimen: Blood from Arm, Left Updated: 04/28/21 0913     WBC 8 97 Thousand/uL      RBC 4 90 Million/uL      Hemoglobin 12 6 g/dL      Hematocrit 38 3 %      MCV 78 fL      MCH 25 7 pg      MCHC 32 9 g/dL      RDW 13 8 %      MPV 9 7 fL      Platelets 814 Thousands/uL      nRBC 0 /100 WBCs      Neutrophils Relative 51 %      Immat GRANS % 0 %      Lymphocytes Relative 41 %      Monocytes Relative 6 %      Eosinophils Relative 2 %      Basophils Relative 0 %      Neutrophils Absolute 4 55 Thousands/µL      Immature Grans Absolute 0 02 Thousand/uL      Lymphocytes Absolute 3 68 Thousands/µL      Monocytes Absolute 0 50 Thousand/µL      Eosinophils Absolute 0 19 Thousand/µL      Basophils Absolute 0 03 Thousands/µL                  CT head without contrast   Final Result by Tamika Vickers MD (04/28 7522)      No acute intracranial abnormality  Workstation performed: [id number]         CT spine cervical without contrast   Final Result by Tamika Vickers MD (04/28 0951)      No cervical spine fracture or traumatic malalignment  Workstation performed: [id number]                    Procedures  Procedures         ED Course  ED Course as of Apr 28 1903   Wed Apr 28, 2021   1019 Discussed case with Dr Arabella Briceno, the on call epileptologist  Dr Arabella Briceno recommends checking total valproate level 30 minutes after Depacon load is finished  If <50 at that time, he recommends additional dose of Depacon here  Per Dr Arabella Briceno, since patient was doing well on her Depakote previously, she should restart this and f/u outpatient  1147 VALPROIC ACID TOTAL: 71 0   1150 Valproate level within therapeutic range  Patient currently back to baseline and ambulating without issue  SBIRT 22yo+      Most Recent Value   SBIRT (22 yo +)   In order to provide better care to our patients, we are screening all of our patients for alcohol and drug use  Would it be okay to ask you these screening questions? Yes Filed at: 04/28/2021 0900   Initial Alcohol Screen: US AUDIT-C    1  How often do you have a drink containing alcohol?  0 Filed at: 04/28/2021 0900   2  How many drinks containing alcohol do you have on a typical day you are drinking? 0 Filed at: 04/28/2021 0900   3a  Male UNDER 65: How often do you have five or more drinks on one occasion? 0 Filed at: 04/28/2021 0900   3b  FEMALE Any Age, or MALE 65+: How often do you have 4 or more drinks on one occassion? 0 Filed at: 04/28/2021 0900   Audit-C Score  0 Filed at: 04/28/2021 0900   KYREE: How many times in the past year have you    Used an illegal drug or used a prescription medication for non-medical reasons? Never Filed at: 04/28/2021 0900                    MDM  Number of Diagnoses or Management Options  Seizures [facility]): new and requires workup  Diagnosis management comments: 31yo female with a history of seizures presenting for evaluation of multiple seizures that occurred this morning  Patient had four separate seizures today each lasting a few seconds  Patient admits to noncompliance with her Depakote and has not had a dose in several weeks  Differential diagnosis includes but is not limited to: seizure due to noncompliance, stress, pregnancy, electrolyte abnormality, doubt meningitis, doubt intracranial bleed    Initial ED plan: Check CBC, CMP, Mg, pregnancy test  Will check CT head and CT cervical spine to r/o traumatic injuries  Will load with IV Depacon  Final assessment: Labs unremarkable  Blood counts, renal function, electrolytes are normal  CT head and cervical spine are negative for traumatic injuries  Case was discussed with on call epileptologist, Dr Víctor Hill recommends checking total valproic acid level 30 minutes after load to ensure that patient is in therapeutic range  After therapeutic range is achieved, Dr Víctor Hill recommends discharging with outpatient f/u  Valproic acid level was 71 which is in therapeutic range  Patient is back to baseline and is ambulatory here  Patient stable for discharge  Patient instructed to restart her home medications   Advised close outpatient f/u with her neurologist  ED return precautions discussed  Patient expressed understanding and is agreeable to plan  Patient discharged in stable condition  Amount and/or Complexity of Data Reviewed  Clinical lab tests: ordered and reviewed  Tests in the radiology section of CPT®: ordered and reviewed  Review and summarize past medical records: yes  Discuss the patient with other providers: yes  Independent visualization of images, tracings, or specimens: yes    Risk of Complications, Morbidity, and/or Mortality  Presenting problems: moderate  Diagnostic procedures: moderate  Management options: moderate    Patient Progress  Patient progress: stable      Disposition  Final diagnoses:   Seizures ([address] )     Time reflects when diagnosis was documented in both MDM as applicable and the Disposition within this note     Time User Action Codes Description Comment    4/28/2021 11:51 AM Clara Faulkner Add [R56 [id number]] Seizures [facility])       ED Disposition     ED Disposition Condition Date/Time Comment    Discharge Stable Wed Apr 28, 2021 11:51 AM Leonel Edouard discharge to home/self care              Follow-up Information     Follow up With Specialties Details Why Contact Info Additional [address]onseca MD Neurology Schedule an appointment as soon as possible for a visit   [address] [place] [address] [phone]       [facility] Emergency Department Emergency Medicine  If symptoms worsen [address] Emergency Department, [address] [place] [address], [id number]          Discharge Medication List as of 4/28/2021 11:52 AM      CONTINUE these medications which have NOT CHANGED    Details   ARIPiprazole (ABILIFY) 5 mg tablet Take 1 tablet (5 mg total) by mouth daily, Starting Tue 12/8/2020, Normal      benzonatate (TESSALON) 200 MG capsule Starting Wed 3/31/2021, Historical Med      divalproex sodium (DEPAKOTE) 500 mg EC tablet Take 3 tablets (1,500 mg total) by mouth daily at bedtime, Starting Wed 11/25/2020, Normal      naproxen (NAPROSYN) 500 mg tablet Starting Wed 3/31/2021, Historical Med           No discharge procedures on file      PDMP Review     None          ED Provider  Electronically Signed by           Bonny Singh PA-C  04/28/21 [address] [place], [place] [address]IRAIDA  04/28/21 2001

## 2021-04-28 NOTE — DISCHARGE INSTRUCTIONS
Start taking your Depakote every night as previously prescribed  Please call your neurologist today to schedule a follow-up appointment  Return to the ER with any worsening symptoms

## 2021-06-10 ENCOUNTER — HOSPITAL ENCOUNTER (EMERGENCY)
Facility: HOSPITAL | Age: 26
Discharge: HOME | End: 2021-06-10
Attending: EMERGENCY MEDICINE | Admitting: EMERGENCY MEDICINE
Payer: COMMERCIAL

## 2021-06-10 VITALS
RESPIRATION RATE: 27 BRPM | OXYGEN SATURATION: 96 % | TEMPERATURE: 98 F | WEIGHT: 264.33 LBS | DIASTOLIC BLOOD PRESSURE: 67 MMHG | BODY MASS INDEX: 41.49 KG/M2 | HEIGHT: 67 IN | SYSTOLIC BLOOD PRESSURE: 130 MMHG | HEART RATE: 89 BPM

## 2021-06-10 DIAGNOSIS — R53.83 FATIGUE: Primary | ICD-10-CM

## 2021-06-10 LAB
ALBUMIN SERPL BCP-MCNC: 3.5 G/DL (ref 3.5–5)
ALP SERPL-CCNC: 95 U/L (ref 46–116)
ALT SERPL W P-5'-P-CCNC: 38 U/L (ref 12–78)
ANION GAP SERPL CALCULATED.3IONS-SCNC: 12 MMOL/L (ref 4–13)
AST SERPL W P-5'-P-CCNC: 21 U/L (ref 5–45)
B-HCG SERPL-ACNC: <2 MIU/ML
BASOPHILS # BLD AUTO: 0.05 THOUSANDS/ΜL (ref 0–0.1)
BASOPHILS NFR BLD AUTO: 0 % (ref 0–1)
BILIRUB SERPL-MCNC: 0.36 MG/DL (ref 0.2–1)
BUN SERPL-MCNC: 16 MG/DL (ref 5–25)
CALCIUM SERPL-MCNC: 8.7 MG/DL (ref 8.3–10.1)
CHLORIDE SERPL-SCNC: 105 MMOL/L (ref 100–108)
CO2 SERPL-SCNC: 25 MMOL/L (ref 21–32)
CREAT SERPL-MCNC: 1.04 MG/DL (ref 0.6–1.3)
EOSINOPHIL # BLD AUTO: 0.18 THOUSAND/ΜL (ref 0–0.61)
EOSINOPHIL NFR BLD AUTO: 2 % (ref 0–6)
ERYTHROCYTE [DISTWIDTH] IN BLOOD BY AUTOMATED COUNT: 14.1 % (ref 11.6–15.1)
GFR SERPL CREATININE-BSD FRML MDRD: 73 ML/MIN/1.73SQ M
GLUCOSE SERPL-MCNC: 126 MG/DL (ref 65–140)
HCT VFR BLD AUTO: 37.9 % (ref 34.8–46.1)
HGB BLD-MCNC: 12.3 G/DL (ref 11.5–15.4)
IMM GRANULOCYTES # BLD AUTO: 0.05 THOUSAND/UL (ref 0–0.2)
IMM GRANULOCYTES NFR BLD AUTO: 0 % (ref 0–2)
LYMPHOCYTES # BLD AUTO: 3.44 THOUSANDS/ΜL (ref 0.6–4.47)
LYMPHOCYTES NFR BLD AUTO: 30 % (ref 14–44)
MCH RBC QN AUTO: 25.5 PG (ref 26.8–34.3)
MCHC RBC AUTO-ENTMCNC: 32.5 G/DL (ref 31.4–37.4)
MCV RBC AUTO: 79 FL (ref 82–98)
MONOCYTES # BLD AUTO: 0.5 THOUSAND/ΜL (ref 0.17–1.22)
MONOCYTES NFR BLD AUTO: 4 % (ref 4–12)
NEUTROPHILS # BLD AUTO: 7.38 THOUSANDS/ΜL (ref 1.85–7.62)
NEUTS SEG NFR BLD AUTO: 64 % (ref 43–75)
NRBC BLD AUTO-RTO: 0 /100 WBCS
PLATELET # BLD AUTO: 326 THOUSANDS/UL (ref 149–390)
PMV BLD AUTO: 9.7 FL (ref 8.9–12.7)
POTASSIUM SERPL-SCNC: 4 MMOL/L (ref 3.5–5.3)
PROT SERPL-MCNC: 7.3 G/DL (ref 6.4–8.2)
RBC # BLD AUTO: 4.83 MILLION/UL (ref 3.81–5.12)
SARS-COV-2 RNA RESP QL NAA+PROBE: NEGATIVE
SODIUM SERPL-SCNC: 142 MMOL/L (ref 136–145)
TSH SERPL DL<=0.05 MIU/L-ACNC: 1.21 UIU/ML (ref 0.36–3.74)
VALPROATE SERPL-MCNC: <3 UG/ML (ref 50–100)
WBC # BLD AUTO: 11.6 THOUSAND/UL (ref 4.31–10.16)

## 2021-06-10 PROCEDURE — 80164 ASSAY DIPROPYLACETIC ACD TOT: CPT | Performed by: EMERGENCY MEDICINE

## 2021-06-10 PROCEDURE — 84443 ASSAY THYROID STIM HORMONE: CPT | Performed by: EMERGENCY MEDICINE

## 2021-06-10 PROCEDURE — U0005 INFEC AGEN DETEC AMPLI PROBE: HCPCS | Performed by: EMERGENCY MEDICINE

## 2021-06-10 PROCEDURE — 84702 CHORIONIC GONADOTROPIN TEST: CPT | Performed by: EMERGENCY MEDICINE

## 2021-06-10 PROCEDURE — 85025 COMPLETE CBC W/AUTO DIFF WBC: CPT | Performed by: EMERGENCY MEDICINE

## 2021-06-10 PROCEDURE — U0003 INFECTIOUS AGENT DETECTION BY NUCLEIC ACID (DNA OR RNA); SEVERE ACUTE RESPIRATORY SYNDROME CORONAVIRUS 2 (SARS-COV-2) (CORONAVIRUS DISEASE [COVID-19]), AMPLIFIED PROBE TECHNIQUE, MAKING USE OF HIGH THROUGHPUT TECHNOLOGIES AS DESCRIBED BY CMS-2020-01-R: HCPCS | Performed by: EMERGENCY MEDICINE

## 2021-06-10 PROCEDURE — 36415 COLL VENOUS BLD VENIPUNCTURE: CPT | Performed by: EMERGENCY MEDICINE

## 2021-06-10 PROCEDURE — 99283 EMERGENCY DEPT VISIT LOW MDM: CPT

## 2021-06-10 PROCEDURE — 99284 EMERGENCY DEPT VISIT MOD MDM: CPT | Performed by: EMERGENCY MEDICINE

## 2021-06-10 PROCEDURE — 80053 COMPREHEN METABOLIC PANEL: CPT | Performed by: EMERGENCY MEDICINE

## 2021-06-14 ENCOUNTER — HOSPITAL ENCOUNTER (EMERGENCY)
Facility: HOSPITAL | Age: 26
Discharge: HOME | End: 2021-06-14
Attending: EMERGENCY MEDICINE | Admitting: EMERGENCY MEDICINE
Payer: COMMERCIAL

## 2021-06-14 VITALS
RESPIRATION RATE: 20 BRPM | HEART RATE: 78 BPM | TEMPERATURE: 98.1 F | DIASTOLIC BLOOD PRESSURE: 66 MMHG | OXYGEN SATURATION: 97 % | SYSTOLIC BLOOD PRESSURE: 136 MMHG

## 2021-06-14 DIAGNOSIS — M54.12 RIGHT CERVICAL RADICULOPATHY: Primary | ICD-10-CM

## 2021-06-14 LAB
ATRIAL RATE: 87 BPM
P AXIS: 33 DEGREES
PR INTERVAL: 152 MS
QRS AXIS: 20 DEGREES
QRSD INTERVAL: 80 MS
QT INTERVAL: 360 MS
QTC INTERVAL: 433 MS
T WAVE AXIS: 16 DEGREES
VENTRICULAR RATE: 87 BPM

## 2021-06-14 PROCEDURE — 93010 ELECTROCARDIOGRAM REPORT: CPT | Performed by: INTERNAL MEDICINE

## 2021-06-14 PROCEDURE — 99284 EMERGENCY DEPT VISIT MOD MDM: CPT

## 2021-06-14 PROCEDURE — 93005 ELECTROCARDIOGRAM TRACING: CPT

## 2021-06-14 PROCEDURE — 99284 EMERGENCY DEPT VISIT MOD MDM: CPT | Performed by: PHYSICIAN ASSISTANT

## 2021-06-14 PROCEDURE — 96372 THER/PROPH/DIAG INJ SC/IM: CPT

## 2021-06-14 RX ORDER — PREDNISONE 20 MG/1
50 TABLET ORAL DAILY
Qty: 10 TABLET | Refills: 0 | Status: SHIPPED | OUTPATIENT
Start: 2021-06-14 | End: 2021-06-18

## 2021-06-14 RX ORDER — NAPROXEN 500 MG/1
500 TABLET ORAL 2 TIMES DAILY WITH MEALS
Qty: 30 TABLET | Refills: 0 | Status: SHIPPED | OUTPATIENT
Start: 2021-06-14 | End: 2021-07-19

## 2021-06-14 RX ORDER — KETOROLAC TROMETHAMINE 30 MG/ML
15 INJECTION, SOLUTION INTRAMUSCULAR; INTRAVENOUS ONCE
Status: COMPLETED | OUTPATIENT
Start: 2021-06-14 | End: 2021-06-14

## 2021-06-14 RX ORDER — METHOCARBAMOL 500 MG/1
500 TABLET, FILM COATED ORAL 2 TIMES DAILY
Qty: 20 TABLET | Refills: 0 | Status: SHIPPED | OUTPATIENT
Start: 2021-06-14 | End: 2021-07-19

## 2021-06-14 RX ORDER — METHOCARBAMOL 500 MG/1
500 TABLET, FILM COATED ORAL ONCE
Status: COMPLETED | OUTPATIENT
Start: 2021-06-14 | End: 2021-06-14

## 2021-06-14 RX ADMIN — PREDNISONE 50 MG: 20 TABLET ORAL at 02:51

## 2021-06-14 RX ADMIN — METHOCARBAMOL 500 MG: 500 TABLET ORAL at 02:51

## 2021-06-14 RX ADMIN — KETOROLAC TROMETHAMINE 15 MG: 30 INJECTION, SOLUTION INTRAMUSCULAR at 02:51

## 2021-06-14 NOTE — ED PROVIDER NOTES
History  Chief Complaint   Patient presents with    Chest Pain     Pt reports right shoulder pain x 2 days, states tonight it is spreading into her chest     Patient is a 78-year-old female with a past medical history significant for depression, migraines, seizures who presents to the emergency department for evaluation of right neck pain that radiates to her right arm for the past 2 days  Patient describes the pain as sharp and shooting  She has taken aspirin without relief  She has not had any trauma to the area  Patient states that she woke up from sleep with severe pain to her right neck, right shoulder right arm  She is not having any chest pain, difficulty breathing, fevers, chills, cough, congestion, sore throat, abdominal pain, nausea, vomiting, diarrhea  Patient denies all other symptoms at this time  Prior to Admission Medications   Prescriptions Last Dose Informant Patient Reported? Taking? ARIPiprazole (ABILIFY) 5 mg tablet   No No   Sig: Take 1 tablet (5 mg total) by mouth daily   benzonatate (TESSALON) 200 MG capsule   Yes No   divalproex sodium (DEPAKOTE) 500 mg EC tablet   No No   Sig: Take 3 tablets (1,500 mg total) by mouth daily at bedtime   naproxen (NAPROSYN) 500 mg tablet   Yes No      Facility-Administered Medications: None       Past Medical History:   Diagnosis Date    Depression     Migraine     Morbid obesity with BMI of 40 0-44 9, adult (HCC)     Obesity (BMI 30-39  9)     Seizures ([address] )        Past Surgical History:   Procedure Laterality Date     SECTION       SECTION      SHOULDER SURGERY      TUBAL LIGATION      TUMOR REMOVAL      from ear       Family History   Problem Relation Age of Onset    No Known Problems Mother     No Known Problems Father     No Known Problems Sister     No Known Problems Brother     No Known Problems Maternal Aunt     No Known Problems Paternal Aunt     No Known Problems Maternal Uncle     No Known Problems Paternal Uncle     No Known Problems Maternal Grandfather     No Known Problems Maternal Grandmother     No Known Problems Paternal Grandfather     No Known Problems Paternal Grandmother     No Known Problems Cousin     ADD / ADHD Neg Hx     Alcohol abuse Neg Hx     Anxiety disorder Neg Hx     Bipolar disorder Neg Hx     Completed Suicide  Neg Hx     Dementia Neg Hx     Depression Neg Hx     Drug abuse Neg Hx     OCD Neg Hx     Psychiatric Illness Neg Hx     Psychosis Neg Hx     Schizoaffective Disorder  Neg Hx     Schizophrenia Neg Hx     Self-Injury Neg Hx     Suicide Attempts Neg Hx      I have reviewed and agree with the history as documented  E-Cigarette/Vaping    E-Cigarette Use Never User      E-Cigarette/Vaping Substances    Nicotine No     THC No     CBD No     Flavoring No      Social History     Tobacco Use    Smoking status: Never Smoker    Smokeless tobacco: Never Used   Vaping Use    Vaping Use: Never used   Substance Use Topics    Alcohol use: Yes     Comment: occasionally    Drug use: No       Review of Systems   Constitutional: Negative for chills, diaphoresis and fever  HENT: Negative for congestion, facial swelling, nosebleeds, sore throat and voice change  Eyes: Negative for pain and redness  Respiratory: Negative for cough, choking, chest tightness, shortness of breath and stridor  Cardiovascular: Negative for chest pain and palpitations  Gastrointestinal: Negative for abdominal pain, diarrhea, nausea and vomiting  Genitourinary: Negative for difficulty urinating, dysuria, flank pain, hematuria, vaginal bleeding and vaginal discharge  Musculoskeletal: Positive for arthralgias ( right shoulder), myalgias ( right arm) and neck pain (Radiates to her right arm)  Negative for back pain and neck stiffness  Skin: Negative for color change and rash     Neurological: Negative for dizziness, syncope, facial asymmetry, weakness, light-headedness, numbness and headaches  Psychiatric/Behavioral: Negative for confusion and suicidal ideas  All other systems reviewed and are negative  Physical Exam  Physical Exam  Vitals reviewed  Constitutional:       General: She is not in acute distress  Appearance: Normal appearance  She is not ill-appearing, toxic-appearing or diaphoretic  HENT:      Head: Normocephalic and atraumatic  Right Ear: External ear normal       Left Ear: External ear normal       Nose: Nose normal  No congestion or rhinorrhea  Mouth/Throat:      Mouth: Mucous membranes are moist       Pharynx: Oropharynx is clear  No oropharyngeal exudate or posterior oropharyngeal erythema  Eyes:      General: No scleral icterus  Right eye: No discharge  Left eye: No discharge  Extraocular Movements: Extraocular movements intact  Conjunctiva/sclera: Conjunctivae normal       Pupils: Pupils are equal, round, and reactive to light  Cardiovascular:      Rate and Rhythm: Normal rate and regular rhythm  Pulses: Normal pulses  Heart sounds: Normal heart sounds  No murmur heard  No friction rub  No gallop  Pulmonary:      Effort: Pulmonary effort is normal  No respiratory distress  Breath sounds: Normal breath sounds  No stridor  No wheezing, rhonchi or rales  Abdominal:      General: Abdomen is flat  Palpations: Abdomen is soft  Tenderness: There is no abdominal tenderness  There is no guarding or rebound  Musculoskeletal:      Right shoulder: Tenderness present  Normal range of motion (Pain with movement)  Right upper arm: Tenderness present  Cervical back: Normal range of motion and neck supple  Tenderness ( right paraspinal muscles) present  Normal range of motion ( pain with movement)  Right lower leg: No edema  Left lower leg: No edema  Skin:     General: Skin is warm and dry  Capillary Refill: Capillary refill takes less than 2 seconds     Neurological: General: No focal deficit present  Mental Status: She is alert and oriented to person, place, and time  Psychiatric:         Mood and Affect: Mood normal          Behavior: Behavior normal          Vital Signs  ED Triage Vitals   Temperature Pulse Respirations Blood Pressure SpO2   06/14/21 0219 06/14/21 0212 06/14/21 0212 06/14/21 0212 06/14/21 0212   98 1 °F (36 7 °C) 82 16 129/79 98 %      Temp src Heart Rate Source Patient Position - Orthostatic VS BP Location FiO2 (%)   -- 06/14/21 0212 06/14/21 0212 06/14/21 0212 --    Monitor Lying Left arm       Pain Score       06/14/21 0212       Worst Possible Pain           Vitals:    06/14/21 0212 06/14/21 0300   BP: 129/79 136/66   Pulse: 82 78   Patient Position - Orthostatic VS: Lying          Visual Acuity      ED Medications  Medications   ketorolac (TORADOL) injection 15 mg (15 mg Intramuscular Given 6/14/21 0251)   methocarbamol (ROBAXIN) tablet 500 mg (500 mg Oral Given 6/14/21 0251)   predniSONE tablet 50 mg (50 mg Oral Given 6/14/21 0251)       Diagnostic Studies  Results Reviewed     None                 No orders to display              Procedures  Procedures         ED Course  ED Course as of Jun 14 0353 Mon Jun 14, 2021   0316 Patient feeling much better after Toradol, Robaxin, prednisone  Will discharge home  MDM  Number of Diagnoses or Management Options  Right cervical radiculopathy  Diagnosis management comments: Patient is a 56-year-old female with a past medical history significant for depression, migraines, seizures who presents to the emergency department for evaluation of right neck pain that radiates to her right arm for the past 2 days  Patient describes the pain as sharp and shooting  She has taken aspirin without relief  She has not had any trauma to the area  Patient states that she woke up from sleep with severe pain to her right neck, right shoulder right arm    She is not having any chest pain, difficulty breathing, fevers, chills, cough, congestion, sore throat, abdominal pain, nausea, vomiting, diarrhea  Patient denies all other symptoms at this time  Patient appears comfortable in bed, she is not in any acute distress  Her vital signs are normal   She does have tenderness to her right cervical paraspinal muscles, right shoulder, right upper arm  Patient does have full range of motion, however does report pain with movement  Patient was given Toradol, prednisone, Robaxin in the emergency department with significant relief of her pain  Patient was discharged home with a prescription for Robaxin, naproxen, prednisone  Patient was given ambulatory referral to our [facility] program   Patient was given instructions to follow-up with her primary care provider  Patient was given very strict instructions on when she should return to the emergency department  Patient Progress  Patient progress: stable      Disposition  Final diagnoses:   Right cervical radiculopathy     Time reflects when diagnosis was documented in both MDM as applicable and the Disposition within this note     Time User Action Codes Description Comment    6/14/2021  3:15 AM Sherif Bran [M54 12] Right cervical radiculopathy       ED Disposition     ED Disposition Condition Date/Time Comment    Discharge Stable Mon Jun 14, 2021  3:15 AM Mohit Kirk discharge to home/self care              Follow-up Information     Follow up With Specialties Details Why Contact Info Additional [address] [facility] Emergency Department Emergency Medicine Go to  If symptoms worsen [address] [facility] [id number] [facility] Emergency Department, [address] [facility] [address], DO Family Medicine Schedule an appointment as soon as possible for a visit  for follow up Aaliyah Peraza [facility] [place] [address]  [phone]             Discharge Medication List as of 6/14/2021  3:19 AM      START taking these medications    Details   methocarbamol (ROBAXIN) 500 mg tablet Take 1 tablet (500 mg total) by mouth 2 (two) times a day, Starting Mon 6/14/2021, Normal      !! naproxen (NAPROSYN) 500 mg tablet Take 1 tablet (500 mg total) by mouth 2 (two) times a day with meals, Starting Mon 6/14/2021, Normal      predniSONE 20 mg tablet Take 2 5 tablets (50 mg total) by mouth daily for 4 days, Starting Mon 6/14/2021, Until Fri 6/18/2021, Normal       !! - Potential duplicate medications found  Please discuss with provider  CONTINUE these medications which have NOT CHANGED    Details   ARIPiprazole (ABILIFY) 5 mg tablet Take 1 tablet (5 mg total) by mouth daily, Starting Tue 12/8/2020, Normal      benzonatate (TESSALON) 200 MG capsule Starting Wed 3/31/2021, Historical Med      divalproex sodium (DEPAKOTE) 500 mg EC tablet Take 3 tablets (1,500 mg total) by mouth daily at bedtime, Starting Wed 11/25/2020, Normal      !! naproxen (NAPROSYN) 500 mg tablet Starting Wed 3/31/2021, Historical Med       !! - Potential duplicate medications found  Please discuss with provider              PDMP Review     None          ED Provider  Electronically Signed by           Jean Pierre Rosas PA-C  06/14/21 8401

## 2021-06-18 NOTE — ED PROVIDER NOTES
History  Chief Complaint   Patient presents with    Fatigue     weight gain, pregnancy check     77-year-old female presenting to the emergency department for evaluation of generalized fatigue  Patient has had several months of progressive fatigue, weight gain, generalized body aches, patient is particularly concerned that she could either be pregnant or have COVID  She has had no fevers or chills, cough shortness of breath  No change in bowel or bladder habits  No sweats  No urinary symptoms  Prior to Admission Medications   Prescriptions Last Dose Informant Patient Reported? Taking? ARIPiprazole (ABILIFY) 5 mg tablet   No No   Sig: Take 1 tablet (5 mg total) by mouth daily   benzonatate (TESSALON) 200 MG capsule   Yes No   divalproex sodium (DEPAKOTE) 500 mg EC tablet   No No   Sig: Take 3 tablets (1,500 mg total) by mouth daily at bedtime   naproxen (NAPROSYN) 500 mg tablet   Yes No      Facility-Administered Medications: None       Past Medical History:   Diagnosis Date    Depression     Migraine     Morbid obesity with BMI of 40 0-44 9, adult (HCC)     Obesity (BMI 30-39  9)     Seizures (HCC)        Past Surgical History:   Procedure Laterality Date     SECTION       SECTION      SHOULDER SURGERY      TUBAL LIGATION      TUMOR REMOVAL      from ear       Family History   Problem Relation Age of Onset    No Known Problems Mother     No Known Problems Father     No Known Problems Sister     No Known Problems Brother     No Known Problems Maternal Aunt     No Known Problems Paternal Aunt     No Known Problems Maternal Uncle     No Known Problems Paternal Uncle     No Known Problems Maternal Grandfather     No Known Problems Maternal Grandmother     No Known Problems Paternal Grandfather     No Known Problems Paternal Grandmother     No Known Problems Cousin     ADD / ADHD Neg Hx     Alcohol abuse Neg Hx     Anxiety disorder Neg Hx     Bipolar disorder Neg Hx     Completed Suicide  Neg Hx     Dementia Neg Hx     Depression Neg Hx     Drug abuse Neg Hx     OCD Neg Hx     Psychiatric Illness Neg Hx     Psychosis Neg Hx     Schizoaffective Disorder  Neg Hx     Schizophrenia Neg Hx     Self-Injury Neg Hx     Suicide Attempts Neg Hx      I have reviewed and agree with the history as documented  E-Cigarette/Vaping    E-Cigarette Use Never User      E-Cigarette/Vaping Substances    Nicotine No     THC No     CBD No     Flavoring No      Social History     Tobacco Use    Smoking status: Never Smoker    Smokeless tobacco: Never Used   Vaping Use    Vaping Use: Never used   Substance Use Topics    Alcohol use: Yes     Comment: occasionally    Drug use: No       Review of Systems   Constitutional: Positive for fatigue and unexpected weight change  Negative for appetite change, chills and fever  HENT: Negative for sneezing and sore throat  Eyes: Negative for visual disturbance  Respiratory: Negative for cough, choking, chest tightness, shortness of breath and wheezing  Cardiovascular: Negative for chest pain and palpitations  Gastrointestinal: Negative for abdominal pain, constipation, diarrhea, nausea and vomiting  Genitourinary: Negative for difficulty urinating and dysuria  Musculoskeletal: Positive for myalgias  Neurological: Negative for dizziness, weakness, light-headedness, numbness and headaches  All other systems reviewed and are negative  Physical Exam  Physical Exam  Vitals and nursing note reviewed  Constitutional:       General: She is not in acute distress  Appearance: She is well-developed  She is not diaphoretic  HENT:      Head: Normocephalic and atraumatic  Eyes:      Pupils: Pupils are equal, round, and reactive to light  Neck:      Vascular: No JVD  Trachea: No tracheal deviation  Cardiovascular:      Rate and Rhythm: Normal rate and regular rhythm  Heart sounds: Normal heart sounds   No murmur heard  No friction rub  No gallop  Pulmonary:      Effort: Pulmonary effort is normal  No respiratory distress  Breath sounds: Normal breath sounds  No wheezing or rales  Abdominal:      General: Bowel sounds are normal  There is no distension  Palpations: Abdomen is soft  Tenderness: There is no abdominal tenderness  There is no guarding or rebound  Skin:     General: Skin is warm and dry  Coloration: Skin is not pale  Neurological:      Mental Status: She is alert and oriented to person, place, and time  Cranial Nerves: No cranial nerve deficit  Motor: No abnormal muscle tone  Psychiatric:         Behavior: Behavior normal          Vital Signs  ED Triage Vitals   Temperature Pulse Respirations Blood Pressure SpO2   06/10/21 1944 06/10/21 1942 06/10/21 1942 06/10/21 1942 06/10/21 1942   98 °F (36 7 °C) 90 18 130/73 99 %      Temp Source Heart Rate Source Patient Position - Orthostatic VS BP Location FiO2 (%)   06/10/21 1944 06/10/21 1942 06/10/21 1942 06/10/21 1942 --   Temporal Monitor Lying Right arm       Pain Score       06/10/21 2130       No Pain           Vitals:    06/10/21 1942 06/10/21 2130   BP: 130/73 130/67   Pulse: 90 89   Patient Position - Orthostatic VS: Lying Lying         Visual Acuity      ED Medications  Medications - No data to display    Diagnostic Studies  Results Reviewed     Procedure Component Value Units Date/Time    Novel Coronavirus [facility] [[id number]]  (Normal) Collected: 06/10/21 2012    Lab Status: Final result Specimen: Nares from Nasopharyngeal Swab Updated: 06/10/21 2115     SARS-CoV-2 Negative    Narrative: The specimen collection materials, transport medium, and/or testing methodology utilized in the production of these test results have been proven to be reliable in a limited validation with an abbreviated program under the Emergency Utilization Authorization provided by the FDA    Testing reported as "Presumptive positive" will be confirmed with secondary testing to ensure result accuracy  Clinical caution and judgement should be used with the interpretation of these results with consideration of the clinical impression and other laboratory testing  Testing reported as "Positive" or "Negative" has been proven to be accurate according to standard laboratory validation requirements  All testing is performed with control materials showing appropriate reactivity at standard intervals  Valproic acid level, total [[id number]]  (Abnormal) Collected: 06/10/21 2010    Lab Status: Final result Specimen: Blood from Arm, Left Updated: 06/10/21 2059     Valproic Acid, Total <3 ug/mL     TSH [[id number]]  (Normal) Collected: 06/10/21 2010    Lab Status: Final result Specimen: Blood from Arm, Left Updated: 06/10/21 2059     TSH 3RD GENERATON 1 209 uIU/mL     Narrative:      Patients undergoing fluorescein dye angiography may retain small amounts of fluorescein in the body for 48-72 hours post procedure  Samples containing fluorescein can produce falsely depressed TSH values  If the patient had this procedure,a specimen should be resubmitted post fluorescein clearance        hCG, quantitative [[id number]]  (Normal) Collected: 06/10/21 2010    Lab Status: Final result Specimen: Blood from Arm, Left Updated: 06/10/21 2059     HCG, Quant <2 mIU/mL     Narrative:       Expected Ranges:     Approximate               Approximate HCG  Gestation age          Concentration ( mIU/mL)  _____________          ______________________   Annell Griggs                      HCG values  0 2-1                       5-50  1-2                           2-3                         100-5000  3-4                         500-50756  4-5                         1000-83844  5-6                         [id number]  6-8                         [id number]  8-12                        [id number]      Comprehensive metabolic panel [[id number]] Collected: 06/10/21 2010    Lab Status: Final result Specimen: Blood from Arm, Left Updated: 06/10/21 2054     Sodium 142 mmol/L      Potassium 4 0 mmol/L      Chloride 105 mmol/L      CO2 25 mmol/L      ANION GAP 12 mmol/L      BUN 16 mg/dL      Creatinine 1 04 mg/dL      Glucose 126 mg/dL      Calcium 8 7 mg/dL      AST 21 U/L      ALT 38 U/L      Alkaline Phosphatase 95 U/L      Total Protein 7 3 g/dL      Albumin 3 5 g/dL      Total Bilirubin 0 36 mg/dL      eGFR 73 ml/min/1 73sq m     Narrative:      [place] guidelines for Chronic Kidney Disease (CKD):     Stage 1 with normal or high GFR (GFR > 90 mL/min/1 73 square meters)    Stage 2 Mild CKD (GFR = 60-89 mL/min/1 73 square meters)    Stage 3A Moderate CKD (GFR = 45-59 mL/min/1 73 square meters)    Stage 3B Moderate CKD (GFR = 30-44 mL/min/1 73 square meters)    Stage 4 Severe CKD (GFR = 15-29 mL/min/1 73 square meters)    Stage 5 End Stage CKD (GFR <15 mL/min/1 73 square meters)  Note: GFR calculation is accurate only with a steady state creatinine    CBC and differential [[id number]]  (Abnormal) Collected: 06/10/21 2010    Lab Status: Final result Specimen: Blood from Arm, Left Updated: 06/10/21 2020     WBC 11 60 Thousand/uL      RBC 4 83 Million/uL      Hemoglobin 12 3 g/dL      Hematocrit 37 9 %      MCV 79 fL      MCH 25 5 pg      MCHC 32 5 g/dL      RDW 14 1 %      MPV 9 7 fL      Platelets 849 Thousands/uL      nRBC 0 /100 WBCs      Neutrophils Relative 64 %      Immat GRANS % 0 %      Lymphocytes Relative 30 %      Monocytes Relative 4 %      Eosinophils Relative 2 %      Basophils Relative 0 %      Neutrophils Absolute 7 38 Thousands/µL      Immature Grans Absolute 0 05 Thousand/uL      Lymphocytes Absolute 3 44 Thousands/µL      Monocytes Absolute 0 50 Thousand/µL      Eosinophils Absolute 0 18 Thousand/µL      Basophils Absolute 0 05 Thousands/µL                  No orders to display              Procedures  Procedures ED Course                             SBIRT 22yo+      Most Recent Value   SBIRT (22 yo +)   In order to provide better care to our patients, we are screening all of our patients for alcohol and drug use  Would it be okay to ask you these screening questions? Yes Filed at: 06/10/2021 2136   Initial Alcohol Screen: US AUDIT-C    1  How often do you have a drink containing alcohol? 1 Filed at: 06/10/2021 2136   2  How many drinks containing alcohol do you have on a typical day you are drinking? 0 Filed at: 06/10/2021 2136   3b  FEMALE Any Age, or MALE 65+: How often do you have 4 or more drinks on one occassion? 0 Filed at: 06/10/2021 2136   Audit-C Score  1 Filed at: 06/10/2021 2136   KYREE: How many times in the past year have you    Used an illegal drug or used a prescription medication for non-medical reasons? Never Filed at: 06/10/2021 2136                    MDM  Number of Diagnoses or Management Options  Fatigue  Diagnosis management comments: 75-year-old female with generalized fatigue, will check labs, thyroid, COVID, pregnancy, reassess  Workup reassuring, patient is reassured, she is generally well-appearing here, recommend that she follow-up for these persistent symptoms with her PCP  Patient understands and is amenable to the plan  Disposition  Final diagnoses:   Fatigue     Time reflects when diagnosis was documented in both MDM as applicable and the Disposition within this note     Time User Action Codes Description Comment    6/10/2021  9:31 PM Ariannaflorentino Morgan Add [R53 83] Fatigue       ED Disposition     ED Disposition Condition Date/Time Comment    Discharge Stable Thu Arm[place] 10, 2021  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              Follow-up Information     Follow up With Specialties Details Why Contact Info    Kike Samano DO Family Medicine   [address] [address]  [phone]            Discharge Medication List as of 6/10/2021 9:31 PM      CONTINUE these medications which have NOT CHANGED    Details   ARIPiprazole (ABILIFY) 5 mg tablet Take 1 tablet (5 mg total) by mouth daily, Starting Tue 12/8/2020, Normal      benzonatate (TESSALON) 200 MG capsule Starting Wed 3/31/2021, Historical Med      divalproex sodium (DEPAKOTE) 500 mg EC tablet Take 3 tablets (1,500 mg total) by mouth daily at bedtime, Starting Wed 11/25/2020, Normal      naproxen (NAPROSYN) 500 mg tablet Starting Wed 3/31/2021, Historical Med           No discharge procedures on file      PDMP Review     None          ED Provider  Electronically Signed by           Elo Munson MD  06/18/21 5643

## 2021-06-21 ENCOUNTER — TELEPHONE (OUTPATIENT)
Dept: PHYSICAL THERAPY | Facility: OTHER | Age: 26
End: 2021-06-21

## 2021-07-06 ENCOUNTER — APPOINTMENT (EMERGENCY)
Dept: RADIOLOGY | Facility: HOSPITAL | Age: 26
DRG: 243 | End: 2021-07-06
Payer: OTHER MISCELLANEOUS

## 2021-07-06 ENCOUNTER — HOSPITAL ENCOUNTER (INPATIENT)
Facility: HOSPITAL | Age: 26
LOS: 2 days | Discharge: HOME | DRG: 243 | End: 2021-07-08
Attending: SURGERY | Admitting: SURGERY
Payer: OTHER MISCELLANEOUS

## 2021-07-06 DIAGNOSIS — R56.9 SEIZURE (HCC): ICD-10-CM
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
## 2021-07-06 DIAGNOSIS — R29.898 FOCAL MOTOR DEFICIT: ICD-10-CM

## 2021-07-06 DIAGNOSIS — S09.90XA HEAD INJURIES, INITIAL ENCOUNTER: ICD-10-CM

## 2021-07-06 DIAGNOSIS — S39.92XA BACK INJURIES, INITIAL ENCOUNTER: ICD-10-CM

## 2021-07-06 PROBLEM — R51.9 HEADACHE: Status: ACTIVE | Noted: 2021-07-06

## 2021-07-06 LAB
ANION GAP SERPL CALCULATED.3IONS-SCNC: 3 MMOL/L (ref 4–13)
APAP SERPL-MCNC: <2 UG/ML (ref 10–20)
APTT PPP: 32 SECONDS (ref 23–37)
BACTERIA UR QL AUTO: ABNORMAL /HPF
BASE EXCESS BLDA CALC-SCNC: 0 MMOL/L (ref -2–3)
BASOPHILS # BLD AUTO: 0.06 THOUSANDS/ΜL (ref 0–0.1)
BASOPHILS NFR BLD AUTO: 1 % (ref 0–1)
BILIRUB UR QL STRIP: NEGATIVE
BUN SERPL-MCNC: 12 MG/DL (ref 5–25)
CALCIUM SERPL-MCNC: 9.5 MG/DL (ref 8.3–10.1)
CHLORIDE SERPL-SCNC: 109 MMOL/L (ref 100–108)
CLARITY UR: CLEAR
CO2 SERPL-SCNC: 26 MMOL/L (ref 21–32)
COLOR UR: YELLOW
CREAT SERPL-MCNC: 0.78 MG/DL (ref 0.6–1.3)
EOSINOPHIL # BLD AUTO: 0.16 THOUSAND/ΜL (ref 0–0.61)
EOSINOPHIL NFR BLD AUTO: 1 % (ref 0–6)
ERYTHROCYTE [DISTWIDTH] IN BLOOD BY AUTOMATED COUNT: 13.9 % (ref 11.6–15.1)
ETHANOL SERPL-MCNC: <3 MG/DL (ref 0–3)
GFR SERPL CREATININE-BSD FRML MDRD: 104 ML/MIN/1.73SQ M
GLUCOSE SERPL-MCNC: 88 MG/DL (ref 65–140)
GLUCOSE SERPL-MCNC: 96 MG/DL (ref 65–140)
GLUCOSE UR STRIP-MCNC: NEGATIVE MG/DL
HCG SERPL QL: NEGATIVE
HCO3 BLDA-SCNC: 24.4 MMOL/L (ref 24–30)
HCT VFR BLD AUTO: 39.3 % (ref 34.8–46.1)
HCT VFR BLD CALC: 38 % (ref 34.8–46.1)
HGB BLD-MCNC: 12.9 G/DL (ref 11.5–15.4)
HGB BLDA-MCNC: 12.9 G/DL (ref 11.5–15.4)
HGB UR QL STRIP.AUTO: NEGATIVE
HOLD SPECIMEN: NORMAL
HYALINE CASTS #/AREA URNS LPF: ABNORMAL /LPF
IMM GRANULOCYTES # BLD AUTO: 0.04 THOUSAND/UL (ref 0–0.2)
IMM GRANULOCYTES NFR BLD AUTO: 0 % (ref 0–2)
INR PPP: 0.99 (ref 0.84–1.19)
KETONES UR STRIP-MCNC: NEGATIVE MG/DL
LEUKOCYTE ESTERASE UR QL STRIP: NEGATIVE
LYMPHOCYTES # BLD AUTO: 4.42 THOUSANDS/ΜL (ref 0.6–4.47)
LYMPHOCYTES NFR BLD AUTO: 36 % (ref 14–44)
MCH RBC QN AUTO: 26.2 PG (ref 26.8–34.3)
MCHC RBC AUTO-ENTMCNC: 32.8 G/DL (ref 31.4–37.4)
MCV RBC AUTO: 80 FL (ref 82–98)
MONOCYTES # BLD AUTO: 0.68 THOUSAND/ΜL (ref 0.17–1.22)
MONOCYTES NFR BLD AUTO: 6 % (ref 4–12)
NEUTROPHILS # BLD AUTO: 6.97 THOUSANDS/ΜL (ref 1.85–7.62)
NEUTS SEG NFR BLD AUTO: 56 % (ref 43–75)
NITRITE UR QL STRIP: NEGATIVE
NON-SQ EPI CELLS URNS QL MICRO: ABNORMAL /HPF
NRBC BLD AUTO-RTO: 0 /100 WBCS
PCO2 BLD: 25 MMOL/L (ref 21–32)
PCO2 BLD: 36.4 MM HG (ref 42–50)
PH BLD: 7.43 [PH] (ref 7.3–7.4)
PH UR STRIP.AUTO: 6 [PH]
PLATELET # BLD AUTO: 342 THOUSANDS/UL (ref 149–390)
PMV BLD AUTO: 10.6 FL (ref 8.9–12.7)
PO2 BLD: 50 MM HG (ref 35–45)
POTASSIUM BLD-SCNC: 4.1 MMOL/L (ref 3.5–5.3)
POTASSIUM SERPL-SCNC: 4 MMOL/L (ref 3.5–5.3)
PROT UR STRIP-MCNC: NEGATIVE MG/DL
PROTHROMBIN TIME: 13.1 SECONDS (ref 11.6–14.5)
RBC # BLD AUTO: 4.93 MILLION/UL (ref 3.81–5.12)
RBC #/AREA URNS AUTO: ABNORMAL /HPF
SALICYLATES SERPL-MCNC: <3 MG/DL (ref 3–20)
SAO2 % BLD FROM PO2: 86 % (ref 60–85)
SODIUM BLD-SCNC: 144 MMOL/L (ref 136–145)
SODIUM SERPL-SCNC: 138 MMOL/L (ref 136–145)
SP GR UR STRIP.AUTO: 1.04 (ref 1–1.03)
SPECIMEN SOURCE: ABNORMAL
UROBILINOGEN UR QL STRIP.AUTO: 0.2 E.U./DL
VALPROATE SERPL-MCNC: <3 UG/ML (ref 50–100)
WBC # BLD AUTO: 12.33 THOUSAND/UL (ref 4.31–10.16)
WBC #/AREA URNS AUTO: ABNORMAL /HPF

## 2021-07-06 PROCEDURE — 85025 COMPLETE CBC W/AUTO DIFF WBC: CPT | Performed by: SURGERY

## 2021-07-06 PROCEDURE — 72125 CT NECK SPINE W/O DYE: CPT

## 2021-07-06 PROCEDURE — 85730 THROMBOPLASTIN TIME PARTIAL: CPT | Performed by: SURGERY

## 2021-07-06 PROCEDURE — 99253 IP/OBS CNSLTJ NEW/EST LOW 45: CPT | Performed by: NEUROLOGICAL SURGERY

## 2021-07-06 PROCEDURE — 99222 1ST HOSP IP/OBS MODERATE 55: CPT | Performed by: SURGERY

## 2021-07-06 PROCEDURE — 36415 COLL VENOUS BLD VENIPUNCTURE: CPT

## 2021-07-06 PROCEDURE — 99255 IP/OBS CONSLTJ NEW/EST HI 80: CPT | Performed by: PSYCHIATRY & NEUROLOGY

## 2021-07-06 PROCEDURE — 74177 CT ABD & PELVIS W/CONTRAST: CPT

## 2021-07-06 PROCEDURE — 71045 X-RAY EXAM CHEST 1 VIEW: CPT

## 2021-07-06 PROCEDURE — 99285 EMERGENCY DEPT VISIT HI MDM: CPT

## 2021-07-06 PROCEDURE — 72141 MRI NECK SPINE W/O DYE: CPT

## 2021-07-06 PROCEDURE — NC001 PR NO CHARGE: Performed by: EMERGENCY MEDICINE

## 2021-07-06 PROCEDURE — 84703 CHORIONIC GONADOTROPIN ASSAY: CPT | Performed by: EMERGENCY MEDICINE

## 2021-07-06 PROCEDURE — 70450 CT HEAD/BRAIN W/O DYE: CPT

## 2021-07-06 PROCEDURE — 85014 HEMATOCRIT: CPT

## 2021-07-06 PROCEDURE — 82803 BLOOD GASES ANY COMBINATION: CPT

## 2021-07-06 PROCEDURE — 84295 ASSAY OF SERUM SODIUM: CPT

## 2021-07-06 PROCEDURE — 80179 DRUG ASSAY SALICYLATE: CPT | Performed by: EMERGENCY MEDICINE

## 2021-07-06 PROCEDURE — 76705 ECHO EXAM OF ABDOMEN: CPT | Performed by: SURGERY

## 2021-07-06 PROCEDURE — 80048 BASIC METABOLIC PNL TOTAL CA: CPT | Performed by: SURGERY

## 2021-07-06 PROCEDURE — 93308 TTE F-UP OR LMTD: CPT | Performed by: SURGERY

## 2021-07-06 PROCEDURE — 84132 ASSAY OF SERUM POTASSIUM: CPT

## 2021-07-06 PROCEDURE — 80165 DIPROPYLACETIC ACID FREE: CPT | Performed by: SURGERY

## 2021-07-06 PROCEDURE — 85610 PROTHROMBIN TIME: CPT | Performed by: SURGERY

## 2021-07-06 PROCEDURE — 72148 MRI LUMBAR SPINE W/O DYE: CPT

## 2021-07-06 PROCEDURE — 81001 URINALYSIS AUTO W/SCOPE: CPT | Performed by: EMERGENCY MEDICINE

## 2021-07-06 PROCEDURE — NC001 PR NO CHARGE: Performed by: SURGERY

## 2021-07-06 PROCEDURE — 72146 MRI CHEST SPINE W/O DYE: CPT

## 2021-07-06 PROCEDURE — 73564 X-RAY EXAM KNEE 4 OR MORE: CPT

## 2021-07-06 PROCEDURE — 80143 DRUG ASSAY ACETAMINOPHEN: CPT | Performed by: EMERGENCY MEDICINE

## 2021-07-06 PROCEDURE — G1004 CDSM NDSC: HCPCS

## 2021-07-06 PROCEDURE — 71260 CT THORAX DX C+: CPT

## 2021-07-06 PROCEDURE — 80164 ASSAY DIPROPYLACETIC ACD TOT: CPT | Performed by: PSYCHIATRY & NEUROLOGY

## 2021-07-06 PROCEDURE — 96374 THER/PROPH/DIAG INJ IV PUSH: CPT

## 2021-07-06 PROCEDURE — 82947 ASSAY GLUCOSE BLOOD QUANT: CPT

## 2021-07-06 PROCEDURE — 82077 ASSAY SPEC XCP UR&BREATH IA: CPT | Performed by: EMERGENCY MEDICINE

## 2021-07-06 RX ORDER — HYDROMORPHONE HCL/PF 1 MG/ML
0.5 SYRINGE (ML) INJECTION ONCE
Status: COMPLETED | OUTPATIENT
Start: 2021-07-06 | End: 2021-07-06

## 2021-07-06 RX ORDER — MAGNESIUM SULFATE HEPTAHYDRATE 40 MG/ML
2 INJECTION, SOLUTION INTRAVENOUS ONCE
Status: COMPLETED | OUTPATIENT
Start: 2021-07-07 | End: 2021-07-07

## 2021-07-06 RX ORDER — ONDANSETRON 2 MG/ML
4 INJECTION INTRAMUSCULAR; INTRAVENOUS EVERY 4 HOURS PRN
Status: DISCONTINUED | OUTPATIENT
Start: 2021-07-06 | End: 2021-07-07

## 2021-07-06 RX ORDER — MAGNESIUM SULFATE HEPTAHYDRATE 40 MG/ML
2 INJECTION, SOLUTION INTRAVENOUS ONCE
Status: COMPLETED | OUTPATIENT
Start: 2021-07-06 | End: 2021-07-06

## 2021-07-06 RX ORDER — ONDANSETRON 2 MG/ML
4 INJECTION INTRAMUSCULAR; INTRAVENOUS ONCE
Status: COMPLETED | OUTPATIENT
Start: 2021-07-06 | End: 2021-07-06

## 2021-07-06 RX ORDER — ONDANSETRON 2 MG/ML
INJECTION INTRAMUSCULAR; INTRAVENOUS
Status: COMPLETED
Start: 2021-07-06 | End: 2021-07-06

## 2021-07-06 RX ORDER — ARIPIPRAZOLE 5 MG/1
5 TABLET ORAL DAILY
Status: DISCONTINUED | OUTPATIENT
Start: 2021-07-06 | End: 2021-07-08 | Stop reason: HOSPADM

## 2021-07-06 RX ORDER — ACETAMINOPHEN 325 MG/1
975 TABLET ORAL EVERY 8 HOURS SCHEDULED
Status: DISCONTINUED | OUTPATIENT
Start: 2021-07-06 | End: 2021-07-08 | Stop reason: HOSPADM

## 2021-07-06 RX ORDER — METHOCARBAMOL 500 MG/1
500 TABLET, FILM COATED ORAL EVERY 6 HOURS SCHEDULED
Status: DISCONTINUED | OUTPATIENT
Start: 2021-07-06 | End: 2021-07-08 | Stop reason: HOSPADM

## 2021-07-06 RX ORDER — DIVALPROEX SODIUM 500 MG/1
1500 TABLET, DELAYED RELEASE ORAL
Status: DISCONTINUED | OUTPATIENT
Start: 2021-07-06 | End: 2021-07-07

## 2021-07-06 RX ORDER — SENNOSIDES 8.6 MG
2 TABLET ORAL DAILY
Status: DISCONTINUED | OUTPATIENT
Start: 2021-07-06 | End: 2021-07-08 | Stop reason: HOSPADM

## 2021-07-06 RX ORDER — LORAZEPAM 2 MG/ML
2 INJECTION INTRAMUSCULAR ONCE AS NEEDED
Status: DISCONTINUED | OUTPATIENT
Start: 2021-07-06 | End: 2021-07-08 | Stop reason: HOSPADM

## 2021-07-06 RX ORDER — OXYCODONE HYDROCHLORIDE 5 MG/1
5 TABLET ORAL EVERY 4 HOURS PRN
Status: DISCONTINUED | OUTPATIENT
Start: 2021-07-06 | End: 2021-07-08 | Stop reason: HOSPADM

## 2021-07-06 RX ORDER — OXYCODONE HYDROCHLORIDE 10 MG/1
10 TABLET ORAL EVERY 4 HOURS PRN
Status: DISCONTINUED | OUTPATIENT
Start: 2021-07-06 | End: 2021-07-08

## 2021-07-06 RX ORDER — DIVALPROEX SODIUM 500 MG/1
1500 TABLET, EXTENDED RELEASE ORAL
COMMUNITY
End: 2021-07-14 | Stop reason: SDUPTHER

## 2021-07-06 RX ORDER — LORAZEPAM 2 MG/ML
0.5 INJECTION INTRAMUSCULAR ONCE
Status: DISCONTINUED | OUTPATIENT
Start: 2021-07-06 | End: 2021-07-08 | Stop reason: HOSPADM

## 2021-07-06 RX ORDER — GABAPENTIN 300 MG/1
300 CAPSULE ORAL
Status: DISCONTINUED | OUTPATIENT
Start: 2021-07-06 | End: 2021-07-08 | Stop reason: HOSPADM

## 2021-07-06 RX ORDER — HYDROMORPHONE HCL/PF 1 MG/ML
0.5 SYRINGE (ML) INJECTION
Status: DISCONTINUED | OUTPATIENT
Start: 2021-07-06 | End: 2021-07-08 | Stop reason: HOSPADM

## 2021-07-06 RX ADMIN — ENOXAPARIN SODIUM 40 MG: 40 INJECTION SUBCUTANEOUS at 14:07

## 2021-07-06 RX ADMIN — DIVALPROEX SODIUM 1500 MG: 500 TABLET, DELAYED RELEASE ORAL at 21:28

## 2021-07-06 RX ADMIN — METHOCARBAMOL 500 MG: 500 TABLET, FILM COATED ORAL at 03:24

## 2021-07-06 RX ADMIN — IOHEXOL 100 ML: 350 INJECTION, SOLUTION INTRAVENOUS at 01:07

## 2021-07-06 RX ADMIN — ARIPIPRAZOLE 5 MG: 5 TABLET ORAL at 14:09

## 2021-07-06 RX ADMIN — OXYCODONE HYDROCHLORIDE 10 MG: 10 TABLET ORAL at 14:08

## 2021-07-06 RX ADMIN — ONDANSETRON 4 MG: 2 INJECTION INTRAMUSCULAR; INTRAVENOUS at 01:56

## 2021-07-06 RX ADMIN — METHOCARBAMOL 500 MG: 500 TABLET, FILM COATED ORAL at 11:53

## 2021-07-06 RX ADMIN — OXYCODONE HYDROCHLORIDE 10 MG: 10 TABLET ORAL at 21:29

## 2021-07-06 RX ADMIN — ACETAMINOPHEN 975 MG: 325 TABLET, FILM COATED ORAL at 21:28

## 2021-07-06 RX ADMIN — ENOXAPARIN SODIUM 40 MG: 40 INJECTION SUBCUTANEOUS at 21:28

## 2021-07-06 RX ADMIN — METHOCARBAMOL 500 MG: 500 TABLET, FILM COATED ORAL at 23:45

## 2021-07-06 RX ADMIN — ACETAMINOPHEN 975 MG: 325 TABLET, FILM COATED ORAL at 14:07

## 2021-07-06 RX ADMIN — MAGNESIUM SULFATE HEPTAHYDRATE 2 G: 40 INJECTION, SOLUTION INTRAVENOUS at 17:48

## 2021-07-06 RX ADMIN — SENNOSIDES 17.2 MG: 8.6 TABLET ORAL at 11:53

## 2021-07-06 RX ADMIN — METHOCARBAMOL 500 MG: 500 TABLET, FILM COATED ORAL at 17:37

## 2021-07-06 RX ADMIN — HYDROMORPHONE HYDROCHLORIDE 0.5 MG: 1 INJECTION, SOLUTION INTRAMUSCULAR; INTRAVENOUS; SUBCUTANEOUS at 01:52

## 2021-07-06 RX ADMIN — ONDANSETRON 4 MG: 2 INJECTION INTRAMUSCULAR; INTRAVENOUS at 07:58

## 2021-07-06 RX ADMIN — HYDROMORPHONE HYDROCHLORIDE 0.5 MG: 1 INJECTION, SOLUTION INTRAMUSCULAR; INTRAVENOUS; SUBCUTANEOUS at 06:02

## 2021-07-06 NOTE — PROCEDURES
POC FAST US    Date/Time: 7/6/2021 1:30 AM  Performed by: Eber Lehman MD  Authorized by: Eber Lehman MD     Patient location:  Trauma  Other Assisting Provider: Yes (comment) (To)    Procedure details:     Exam Type:  Diagnostic    Indications: blunt abdominal trauma, blunt chest trauma and abdominal pain      Assess for:  Intra-abdominal fluid and pericardial effusion    Technique: FAST      Views obtained:  Heart - Pericardial sac, LUQ - Splenorenal space, Suprapubic - Pouch of Toribio and RUQ - Damian's Pouch    Image quality: limited diagnostic      Image availability:  Images available in PACS, still images obtained and video obtained  FAST Findings:     RUQ (Hepatorenal) free fluid: absent      LUQ (Splenorenal) free fluid: absent      Suprapubic free fluid: absent      Cardiac wall motion: identified      Pericardial effusion: absent    Interpretation:     Impressions: negative

## 2021-07-06 NOTE — PROGRESS NOTES
Pastoral Care Progress Note    2021  Patient: Sheron Galvan : 1995  Admission Date & Time: 20217  MRN: [id number] CSN: [id number]       responded to trauma alert  As per [facility], patient's significant other contacted patient's parents   provided emotional support to patient until significant other arrived         21 0143   Clinical Encounter Type   Visited With Patient;Patient and family together   Routine Visit Introduction   Crisis Visit Code;ED

## 2021-07-06 NOTE — ASSESSMENT & PLAN NOTE
Bilateral lower extremity weakness s/p fall down stairs/seizure  · Witnessed seizure and fall down a flight of steps  · On presentation complaining of bilateral LE numbness  · On Depakote maintenance  Imaging:  · MRI thoracic spine wo 7/6/21: No cord signal abnormality  2   Multilevel disc herniation, pronounced at levels T6 through T9 indenting ventral thecal sac with mild canal narrowing  · MRI lumbar spine wo 7/6/21: Central disc protrusion at level L5-S1 contacting bilateral S1 nerve roots with suggested impingement of left S1 nerve root  Correlate for S1 radiculopathy  2   Disc bulge at levels L3-4 and L4-5 resulting in mild canal stenosis  · CT cervical spine 7/6/21: No cervical spine fracture or traumatic malalignment  Plan:  · Frequent neuro checks  · Maintain in [place] collar  · MRI cervical spine pending  · Maintain on bedrest   · Pain control per trauma team   · Would hold PT/OT  · DVT ppx: SCDs, Lovenox  Neurosurgery will continue to follow  Please call with any questions or concerns

## 2021-07-06 NOTE — ASSESSMENT & PLAN NOTE
Patient w/ migrainous type HA c/w her prior migraines   Continues to have HA/pain behind the eye   Notes that this is similar to her chronic HA which she does not usually treat at home    Plan:   S/p IVF - 1L NS @ 80 cc/hr   Continue depakote 1500 mg qhs   S/p Mg 2 g x 2 doses   Gabapentin 300 mg qhs prn for sleep    Zofran vs promethazine (can make this scheduled for max benefit)   S/p migraine cocktail - sumatriptan and benadryl w/ toradol (can repeat if incompletely effective)

## 2021-07-06 NOTE — PROGRESS NOTES
Attempted to visit patient was not available for visit  She was in the MRI  Her boyfriend was sitting the chair  Talked with him about patient's condition and his last night  Boyfriend thanked for stopping by patient's room  07/06/21 1400   Clinical Encounter Type   Visited With Family   Routine Visit Introduction; Follow-up   Referral From    Referral To

## 2021-07-06 NOTE — CONSULTS
[address] [facility] 1995, 27 y o  female MRN: [id number]  Unit/Bed#: [facility] 605-01 Encounter: [id number]  Primary Care Provider: Deanna Estrada DO   Date and time admitted to hospital: 7/6/2021 12:37 AM    Inpatient consult to Neurosurgery  Consult performed by: JESSICA Villatoro  Consult ordered by: Ellen Granados MD          Focal motor deficit  Assessment & Plan  Bilateral lower extremity weakness s/p fall down stairs/seizure  · Witnessed seizure and fall down a flight of steps  · On presentation complaining of bilateral LE numbness  · On Depakote maintenance  Imaging:  · MRI thoracic spine wo 7/6/21: No cord signal abnormality  2   Multilevel disc herniation, pronounced at levels T6 through T9 indenting ventral thecal sac with mild canal narrowing  · MRI lumbar spine wo 7/6/21: Central disc protrusion at level L5-S1 contacting bilateral S1 nerve roots with suggested impingement of left S1 nerve root  Correlate for S1 radiculopathy  2   Disc bulge at levels L3-4 and L4-5 resulting in mild canal stenosis  · CT cervical spine 7/6/21: No cervical spine fracture or traumatic malalignment  Plan:  · Frequent neuro checks  · Maintain in [place] collar  · MRI cervical spine pending  · Maintain on bedrest   · Pain control per trauma team   · Would hold PT/OT  · DVT ppx: SCDs, Lovenox  Neurosurgery will continue to follow  Please call with any questions or concerns  Seizure [facility])  Assessment & Plan  On depakote at baseline  Last seizure several months ago prior to seizure yesterday  History of Present Illness     HPI: Alejandrina Li is a 27 y o  female with PMH including seizure disorder on Depakote, bipolar disorder, who presented to the emergency department at around midnight with complaints of headache, back pain, bilateral knee pain    She last remembers being at the top of the stairs and witnesses states she had 2 generalized seizures  She next remembers waking up at the bottom the stairs and experiencing the above-mentioned pain  She states she does not recall having a seizure  She continues to endorse pain to these areas  During her evaluation she was noted with decreased motor strength and weakness and decreased sensation to her bilateral lower extremities below the knees  She states that her sensation to the area slowly improving  She does not know why she cannot move her legs but is aware that she can barely move them  She states she feels the need to urinate but cannot go  She was able to void in the emergency department  She denies any neck pain  She has not ambulated since she was hospitalized  She states she has never experienced any similar symptoms although she has fallen before  Review of Systems   Constitutional: Positive for activity change and appetite change  HENT: Negative  Respiratory: Negative  Cardiovascular: Negative  Gastrointestinal: Negative  Genitourinary: Positive for difficulty urinating  Musculoskeletal: Positive for back pain and gait problem  Negative for neck pain and neck stiffness  Neurological: Positive for seizures, weakness, numbness and headaches  Negative for dizziness, tremors, syncope, facial asymmetry, speech difficulty and light-headedness  Historical Information   Past Medical History:   Diagnosis Date    Seizure [facility])      History reviewed  No pertinent surgical history  Social History     Substance and Sexual Activity   Alcohol Use Yes    Comment: socially     Social History     Substance and Sexual Activity   Drug Use Never     Social History     Tobacco Use   Smoking Status Never Smoker   Smokeless Tobacco Never Used     History reviewed  No pertinent family history      Meds/Allergies   all current active meds have been reviewed and current meds:   Current Facility-Administered Medications   Medication Dose Route Frequency    acetaminophen (TYLENOL) tablet 975 mg  975 mg Oral Q8H [address]    ARIPiprazole (ABILIFY) tablet 5 mg  5 mg Oral Daily    divalproex sodium (DEPAKOTE) EC tablet 1,500 mg  1,500 mg Oral HS    enoxaparin (LOVENOX) subcutaneous injection 40 mg  40 mg Subcutaneous Q12H [address]    HYDROmorphone (DILAUDID) injection 0 5 mg  0 5 mg Intravenous Q3H PRN    LORazepam (ATIVAN) injection 0 5 mg  0 5 mg Intravenous Once    methocarbamol (ROBAXIN) tablet 500 mg  500 mg Oral Q6H [address]    ondansetron (ZOFRAN) injection 4 mg  4 mg Intravenous Q4H PRN    oxyCODONE (ROXICODONE) immediate release tablet 10 mg  10 mg Oral Q4H PRN    oxyCODONE (ROXICODONE) IR tablet 5 mg  5 mg Oral Q4H PRN    senna (SENOKOT) tablet 17 2 mg  2 tablet Oral Daily     Allergies   Allergen Reactions    Azithromycin Other (See Comments)       Objective   I/O       07/04 0701 - 07/05 0700 07/05 0701 - 07/06 0700 07/06 0701 - 07/07 0700    P  O    0    Total Intake(mL/kg)   0 (0)    Net   0                 Physical Exam  Constitutional:       Appearance: She is well-developed  HENT:      Head: Normocephalic and atraumatic  Eyes:      Extraocular Movements: EOM normal       Pupils: Pupils are equal, round, and reactive to light  Pulmonary:      Effort: Pulmonary effort is normal    Abdominal:      Palpations: Abdomen is soft  Musculoskeletal:         General: Tenderness present  Skin:     General: Skin is warm and dry  Neurological:      Mental Status: She is alert and oriented to person, place, and time  Cranial Nerves: No cranial nerve deficit  Sensory: No sensory deficit  Motor: Weakness present  Coordination: Coordination normal  Finger-Nose-Finger Test normal       Deep Tendon Reflexes: Reflexes normal       Reflex Scores:       Tricep reflexes are 1+ on the right side and 1+ on the left side  Bicep reflexes are 1+ on the right side and 1+ on the left side         Brachioradialis reflexes are 1+ on the right side and 1+ on the left side  Patellar reflexes are 1+ on the right side and 1+ on the left side  Achilles reflexes are 1+ on the right side and 1+ on the left side  Psychiatric:         Speech: Speech normal        Neurologic Exam     Mental Status   Oriented to person, place, and time  Oriented to person  Oriented to place  Oriented to time  Oriented to year, month and date  Registration: recalls 3 of 3 objects  Attention: normal  Concentration: normal    Speech: speech is normal   Level of consciousness: alert  Knowledge: good and consistent with education  Able to name object  Cranial Nerves     CN III, IV, VI   Pupils are equal, round, and reactive to light  Extraocular motions are normal    Right pupil: Size: 3 mm  Shape: regular  Reactivity: brisk  Consensual response: intact  Accommodation: intact  Left pupil: Size: 3 mm  Shape: regular  Reactivity: brisk  Consensual response: intact  Accommodation: intact  Nystagmus: none   Diplopia: none  Conjugate gaze: present    CN V   Right facial sensation deficit: none  Left facial sensation deficit: none    CN VII   Facial expression full, symmetric       CN VIII   Hearing: intact    CN IX, X   Palate: symmetric    CN XI   Right sternocleidomastoid strength: normal  Left sternocleidomastoid strength: normal  Right trapezius strength: normal  Left trapezius strength: normal    CN XII   Tongue: not atrophic  Fasciculations: absent  Tongue deviation: none    Motor Exam   Muscle bulk: normal  Overall muscle tone: normal  Right arm pronator drift: absent  Left arm pronator drift: absent    Strength   Right deltoid: 4/5  Left deltoid: 4/5  Right biceps: 4/5  Left biceps: 4/5  Right triceps: 4/5  Left triceps: 4/5  Right wrist flexion: 4/5  Left wrist flexion: 4/5  Right wrist extension: 4/5  Left wrist extension: 4/5  Right interossei: 4/5  Left interossei: 4/5  Right quadriceps: 3/5  Left quadriceps: 3/5  Right hamstring: 3/5  Left hamstring: 3/5  Right glutei: 3/5  Left glutei: 3/5  Right anterior tibial: 1/5  Left anterior tibial: 1/5  Right posterior tibial: 1/5  Left posterior tibial: 1/5  Right peroneal: 1/5  Left peroneal: 1/5  Right gastroc: 1/5  Left gastroc: 1/5    Sensory Exam   Light touch normal    Proprioception normal    JPS/DST intact  Gait, Coordination, and Reflexes     Coordination   Finger to nose coordination: normal    Tremor   Resting tremor: absent  Intention tremor: absent  Action tremor: absent    Reflexes   Right brachioradialis: 1+  Left brachioradialis: 1+  Right biceps: 1+  Left biceps: 1+  Right triceps: 1+  Left triceps: 1+  Right patellar: 1+  Left patellar: 1+  Right achilles: 1+  Left achilles: 1+  Right : 1+  Left : 1+  Right Yee: absent  Left Yee: absent  Right ankle clonus: absent  Left ankle clonus: absent      Vitals:Blood pressure 131/70, pulse 84, temperature 98 1 °F (36 7 °C), resp  rate 17, height 5' 7" (1 702 m), weight 115 kg (254 lb 10 1 oz), last menstrual period 06/19/2021, SpO2 96 %  ,Body mass index is 39 88 kg/m²  Lab Results:   Results from last 7 days   Lab Units 07/06/21 0100 07/06/21  0055   WBC Thousand/uL  --  12 33*   HEMOGLOBIN g/dL  --  12 9   I STAT HEMOGLOBIN g/dl 12 9  --    HEMATOCRIT %  --  39 3   HEMATOCRIT, ISTAT % 38  --    PLATELETS Thousands/uL  --  342   NEUTROS PCT %  --  56   MONOS PCT %  --  6     Results from last 7 days   Lab Units 07/06/21  0100 07/06/21  0055   POTASSIUM mmol/L  --  4 0   CHLORIDE mmol/L  --  109*   CO2 mmol/L  --  26   CO2, I-STAT mmol/L 25  --    BUN mg/dL  --  12   CREATININE mg/dL  --  0 78   CALCIUM mg/dL  --  9 5   GLUCOSE, ISTAT mg/dl 96  --              Results from last 7 days   Lab Units 07/06/21  0055   INR  0 99   PTT seconds 32     No results found for: TROPONINT  ABG:No results found for: PHART, QRZ5UUX, PO2ART, GSK1PTR, E3WBOUUK, BEART, SOURCE    Imaging Studies: I have personally reviewed pertinent reports     and I have personally reviewed pertinent films in PACS    XR knee 4+ vw left injury    Result Date: 7/6/2021  Impression: No acute osseous abnormality  Workstation performed: [id number]     XR knee 4+ vw right injury    Result Date: 7/6/2021  Impression: No acute osseous abnormality  Workstation performed: [id number]     TRAUMA - CT head wo contrast    Result Date: 7/6/2021  Impression: No acute intracranial abnormality  Workstation performed: [id number]     TRAUMA - CT spine cervical wo contrast    Result Date: 7/6/2021  Impression: No cervical spine fracture or traumatic malalignment  Workstation performed: [id number]     MRI thoracic spine wo contrast    Result Date: 7/6/2021  Impression: 1  No cord signal abnormality  2   Multilevel disc herniation, pronounced at levels T6 through T9 indenting ventral thecal sac with mild canal narrowing  Workstation performed: [id number]     MRI lumbar spine wo contrast    Result Date: 7/6/2021  Impression: 1  Central disc protrusion at level L5-S1 contacting bilateral S1 nerve roots with suggested impingement of left S1 nerve root  Correlate for S1 radiculopathy  2   Disc bulge at levels L3-4 and L4-5 resulting in mild canal stenosis  Workstation performed: [id number]     TRAUMA - CT chest abdomen pelvis w contrast    Result Date: 7/6/2021  Impression: No acute traumatic injury identified within the chest, abdomen, or pelvis  Workstation performed: [id number]       EKG, Pathology, and Other Studies: I have personally reviewed pertinent reports  and I have personally reviewed pertinent films in PACS    VTE Prophylaxis: Sequential compression device (Venodyne)  and Enoxaparin (Lovenox)    Code Status: Level 1 - Full Code  Advance Directive and Living Will:      Power of :    POLST:      Counseling / Coordination of Care  I spent 20 minutes with the patient

## 2021-07-06 NOTE — PROGRESS NOTES
Pastoral Care Progress Note    2021  Patient: Daniel Rape : 1995  Admission Date & Time: 2021 0037  MRN: [id number] CSN: [id number]       visited with patient's Significant Other as follow up to previous visits to provide support

## 2021-07-06 NOTE — ASSESSMENT & PLAN NOTE
BLE numbness which had resolved by consult exam - currently LLE > RLE, subtle and patchy   Plan as above

## 2021-07-06 NOTE — ASSESSMENT & PLAN NOTE
- Improving   - Some numbness and tingling in her toes but return of sensation in her legs  Plan as above

## 2021-07-06 NOTE — CONSULTS
NEUROLOGY RESIDENCY CONSULT NOTE     Name: Christopher Forde   Age & Sex: 27 y o  female   MRN: [id number]  Unit/Bed#: [facility] 605-01   Encounter: [id number]  Length of Stay: 0    ASSESSMENT & PLAN     * Seizure [facility])  Assessment & Plan  Patient presents s/p fall w/ witnessed seizure activity   PMH seizures for 4 years, 2 types of episodes as detailed below   On depakote 750 mg bid for prevention but on admission level < 3 - on further discussion patient admits she has been noncompliant for a few weeks   Unclear if seizure precipitated fall down stairs but she was witnessed to have 2 episodes of generalized shaking, each lasting less than 30 sec after the fall   Post-ictal period after   MRI spine clear of fracture or acute injury but does show multilevel disc disease, including L5-S1 protrusion possibly impinging slightly   Patient AOx4 but w/ BLE severe weakness, left-sided non-sustained clonus, pain-limited exam    Plan:   Continue depakote (can consider transitioning to lamictal or topamax but would keep as depakote for now)   2 mg ativan prn for seizures > 1 minute   Seizure precautions   Absolutely no driving for at least 6 mo   Will need [employer] notification form completed    Headache  Assessment & Plan  Patient w/ migrainous type HA c/w her prior migraines   ddx includes concussion vs migraine vs multifactorial    Plan:   Encourage fluids   Continue depakote   Gabapentin 300 mg qhs prn for sleep    zofran prn - can switch to reglan, phenerganm, compazine for additiona HA sx management    Sensory deficit, bilateral  Assessment & Plan  BLE numbness which had resolved by consult exam    Focal motor deficit  Assessment & Plan  Patient w/ severe BLE weakness s/p fall and seizure   ddx includes Talat's or post-ictal paralysis (which can sometimes last up to 24-36 hrs), lower suspicion for spinal injury at this time, some pain limitation   Reported urinary retention as well   Imaging w/o clear explanation for motor findings though L5-S1 protrusion w/ possible consequent radiculopathy could account for nonsustained unilateral clonus    Plan:   Neuro checks   Urinary retention protocol   Consider reimaging for worsening sx    Qing Schneider will need follow up in in 2 weeks with epilepsy    She will not require outpatient neurological testing from IP standpoint    SUBJECTIVE     Reason for Consult / Principal Problem: breakthrough seizure  Hx and PE limited by: nothing    HPI: Cherri Mistry is a 27 y o   female w/ PMH seizures on depakote, bipolar disorder, migraine who presents s/p fall down stairs w/ subsequent witnessed seizure activity (2 episodes of generalized shaking lasting less than 30 sec each)  Post-ictal confusion and memory loss noted  Patient's trauma series negative for acute bleed or bony abnormality, but patient remained significantly weak BLE (0-1/5), reportedly waxing and waning throughout the day  Endorses significant pain and HA similar to her chronic migraines  Unclear etiology of original fall but patient endorses increased falls over last few months w/o prodrome or LOC or clear etiology  Of note, patients depakote level undetectable on admission and on further discussion patient admits to having been largely non-compliant over the last few weeks  Patient had a single episode of small volume hematemesis during exam this morning consistent w/ swallowing blood from what appears to be traumatic tongue and cheek injury  Endorses urinary retention since the fall  Denies F/C, recent illnesses or changes in medications, abdominal pain, endorses HA, N/V, BLE pain, knee pain  Inpatient consult to Neurology     Performed by  Lis Traore MD     Authorized by Shona Florian MD            Historical Information   Past Medical History:   Diagnosis Date    Seizure [facility])      History reviewed  No pertinent surgical history    Social History   Social History     Substance and Sexual Activity   Alcohol Use Yes    Comment: socially     Social History     Substance and Sexual Activity   Drug Use Never     E-Cigarette/Vaping     E-Cigarette/Vaping Substances    Nicotine No      Social History     Tobacco Use   Smoking Status Never Smoker   Smokeless Tobacco Never Used     Family History: History reviewed  No pertinent family history  Meds/Allergies   all current active meds have been reviewed, current meds:   Current Facility-Administered Medications   Medication Dose Route Frequency    acetaminophen (TYLENOL) tablet 975 mg  975 mg Oral Q8H [address]    ARIPiprazole (ABILIFY) tablet 5 mg  5 mg Oral Daily    divalproex sodium (DEPAKOTE) EC tablet 1,500 mg  1,500 mg Oral HS    enoxaparin (LOVENOX) subcutaneous injection 40 mg  40 mg Subcutaneous Q12H [address]    gabapentin (NEURONTIN) capsule 300 mg  300 mg Oral HS PRN    HYDROmorphone (DILAUDID) injection 0 5 mg  0 5 mg Intravenous Q3H PRN    LORazepam (ATIVAN) injection 0 5 mg  0 5 mg Intravenous Once    LORazepam (ATIVAN) injection 2 mg  2 mg Intravenous Once PRN    magnesium sulfate 2 g/50 mL IVPB (premix) 2 g  2 g Intravenous Once    [START ON 7/7/2021] magnesium sulfate 2 g/50 mL IVPB (premix) 2 g  2 g Intravenous Once    methocarbamol (ROBAXIN) tablet 500 mg  500 mg Oral Q6H [address]    ondansetron (ZOFRAN) injection 4 mg  4 mg Intravenous Q4H PRN    oxyCODONE (ROXICODONE) immediate release tablet 10 mg  10 mg Oral Q4H PRN    oxyCODONE (ROXICODONE) IR tablet 5 mg  5 mg Oral Q4H PRN    senna (SENOKOT) tablet 17 2 mg  2 tablet Oral Daily    and PTA meds:   Prior to Admission Medications   Prescriptions Last Dose Informant Patient Reported? Taking?   divalproex sodium (DEPAKOTE ER) 500 mg 24 hr tablet  Self Yes Yes   Sig: Take 1,500 mg by mouth daily at bedtime      Facility-Administered Medications: None     Allergies   Allergen Reactions    Azithromycin Other (See Comments)            Previous medical records under review    Review of Systems   Constitutional: Positive for fatigue  Negative for chills, fever and unexpected weight change  Eyes: Positive for visual disturbance  Respiratory: Negative for shortness of breath and wheezing  Cardiovascular: Negative for chest pain  Gastrointestinal: Positive for nausea and vomiting  Negative for abdominal distention, abdominal pain, blood in stool, constipation and diarrhea  Endocrine: Negative for polyuria  Genitourinary: Positive for decreased urine volume  Negative for dysuria and hematuria  Neurological: Positive for seizures, weakness and headaches  Negative for dizziness, tremors and numbness  Psychiatric/Behavioral: Negative for sleep disturbance  OBJECTIVE     Patient ID: Riky Hudson is a 27 y o  female  Vitals:   Vitals:    21 0915 21 1055 21 1320 21 1441   BP: 135/69 131/70  131/79   BP Location:       Pulse: 81 84  89   Resp: 17 17  17   Temp: 98 3 °F (36 8 °C) 98 1 °F (36 7 °C)  98 1 °F (36 7 °C)   TempSrc:       SpO2: 96% 96% 96% 96%   Weight: 115 kg (254 lb 10 1 oz)      Height: 5' 7" (1 702 m)         Body mass index is 39 88 kg/m²  Intake/Output Summary (Last 24 hours) at 2021 1801  Last data filed at 2021 1755  Gross per 24 hour   Intake 360 ml   Output 800 ml   Net -440 ml     Temperature:   Temp (24hrs), Av 1 °F (36 7 °C), Min:97 9 °F (36 6 °C), Max:98 3 °F (36 8 °C)    Temperature: 98 1 °F (36 7 °C)    Invasive Devices: Invasive Devices     Peripheral Intravenous Line            Peripheral IV 21 Left Antecubital <1 day    Peripheral IV 21 Left Hand <1 day              Physical Exam  Vitals reviewed  Constitutional:       General: She is not in acute distress  Appearance: Normal appearance  She is ill-appearing  She is not toxic-appearing or diaphoretic  HENT:      Head: Normocephalic and atraumatic        Right Ear: External ear normal       Left Ear: External ear normal       Nose: Nose normal  No congestion or rhinorrhea  Mouth/Throat:      Mouth: Mucous membranes are moist       Pharynx: Oropharynx is clear  No oropharyngeal exudate or posterior oropharyngeal erythema  Comments: Dried blood  Eyes:      General: No scleral icterus  Right eye: No discharge  Left eye: No discharge  Extraocular Movements: Extraocular movements intact and EOM normal       Conjunctiva/sclera: Conjunctivae normal       Pupils: Pupils are equal, round, and reactive to light  Comments: Right periorbital ecchymosis   Pulmonary:      Effort: Pulmonary effort is normal  No respiratory distress  Musculoskeletal:         General: No swelling or deformity  Right lower leg: No edema  Left lower leg: No edema  Skin:     Coloration: Skin is not pale  Findings: No erythema or rash  Neurological:      Mental Status: She is alert and oriented to person, place, and time  Cranial Nerves: No cranial nerve deficit  Sensory: No sensory deficit  Motor: Weakness present  Coordination: Coordination normal  Finger-Nose-Finger Test and Heel to Pearla Most Test normal       Deep Tendon Reflexes: Reflexes normal       Reflex Scores:       Bicep reflexes are 2+ on the right side and 2+ on the left side  Brachioradialis reflexes are 2+ on the right side and 2+ on the left side  Psychiatric:         Mood and Affect: Mood normal          Speech: Speech normal          Behavior: Behavior normal        Neurologic Exam     Mental Status   Oriented to person, place, and time  Follows 2 step commands  Attention: normal    Speech: speech is normal (No dysarthria)  Level of consciousness: alert  Knowledge: good  Normal comprehension  Cranial Nerves     CN II   Visual acuity: (patient reports bilateral, whole-field blurring)  Right visual field deficit: none  Left visual field deficit: none     CN III, IV, VI   Pupils are equal, round, and reactive to light    Extraocular motions are normal    Right pupil: Shape: regular  Reactivity: brisk  Left pupil: Shape: regular  Reactivity: brisk  Nystagmus: none   Diplopia: none  Ophthalmoparesis: none    CN V   Facial sensation intact  CN VII   Facial expression full, symmetric  CN VIII   Hearing: intact (grossly intact and symmetric)    CN IX, X   Palate: symmetric    CN XI   CN XI normal      CN XII   CN XII normal      Motor Exam   Muscle bulk: normal  Overall muscle tone: normal  Right arm pronator drift: absent  Left arm pronator drift: absent    Strength   Right deltoid: 4/5  Left deltoid: 5/5  Right biceps: 4/5  Left biceps: 5/5  Right triceps: 4/5  Left triceps: 5/5  Right iliopsoas:   Left iliopsoas:   Right quadriceps:   Left quadriceps:   Right hamstrin/5  Left hamstrin/5  Right anterior tibial:   Left anterior tibial:   Right gastroc:   Left gastroc: LE 0- throughout; LLE 2-3 beat non-sustained clonus     Sensory Exam   Light touch normal    Temperature grossly intact and symmetric     Gait, Coordination, and Reflexes     Coordination   Finger to nose coordination: normal  Heel to shin coordination: normal    Tremor   Resting tremor: absent  Intention tremor: absent  Action tremor: absent    Reflexes   Right brachioradialis: 2+  Left brachioradialis: 2+  Right biceps: 2+  Left biceps: 2+  Right plantar: normal  Left plantar: normal  Right ankle clonus: absent  Left ankle clonus: present (non-sustained, 2-3 beat)     LABORATORY DATA     Labs: I have personally reviewed pertinent reports      Results from last 7 days   Lab Units 21  0055   WBC Thousand/uL  --  12 33*   HEMOGLOBIN g/dL  --  12 9   I STAT HEMOGLOBIN g/dl 12 9  --    HEMATOCRIT %  --  39 3   HEMATOCRIT, ISTAT % 38  --    PLATELETS Thousands/uL  --  342   NEUTROS PCT %  --  56   MONOS PCT %  --  6      Results from last 7 days   Lab Units 21  0055   POTASSIUM mmol/L  --  4 0 CHLORIDE mmol/L  --  109*   CO2 mmol/L  --  26   CO2, I-STAT mmol/L 25  --    BUN mg/dL  --  12   CREATININE mg/dL  --  0 78   CALCIUM mg/dL  --  9 5   GLUCOSE, ISTAT mg/dl 96  --               Results from last 7 days   Lab Units 07/06/21  0055   INR  0 99   PTT seconds 32     IMAGING & DIAGNOSTIC TESTING     Radiology Results: I have personally reviewed pertinent reports  and I have personally reviewed pertinent films in PACS  MRI cervical spine wo contrast   Final Result by Francisco Cooper DO (07/06 1408)      Normal MRI of the cervical spine  Workstation performed: [id number]         MRI lumbar spine wo contrast   Final Result by Jose Eddy MD (07/06 0804)      1  Central disc protrusion at level L5-S1 contacting bilateral S1 nerve roots with suggested impingement of left S1 nerve root  Correlate for S1 radiculopathy  2   Disc bulge at levels L3-4 and L4-5 resulting in mild canal stenosis  Workstation performed: [id number]         MRI thoracic spine wo contrast   Final Result by Jose Eddy MD (07/06 0802)      1  No cord signal abnormality  2   Multilevel disc herniation, pronounced at levels T6 through T9 indenting ventral thecal sac with mild canal narrowing  Workstation performed: [id number]         XR knee 4+ vw left injury   Final Result by Dacia Sweet MD (07/06 0640)      No acute osseous abnormality  Workstation performed: [id number]         XR knee 4+ vw right injury   Final Result by Dacia Sweet MD (07/06 7726)      No acute osseous abnormality  Workstation performed: [id number]         TRAUMA - CT head wo contrast   Final Result by Paco Miller MD (07/06 0133)      No acute intracranial abnormality  Workstation performed: [id number]         TRAUMA - CT spine cervical wo contrast   Final Result by Paco Miller MD (07/06 0132)      No cervical spine fracture or traumatic malalignment        Workstation performed: [id number]         TRAUMA - CT chest abdomen pelvis w contrast   Final Result by Britt Baxter MD (07/06 0131)      No acute traumatic injury identified within the chest, abdomen, or pelvis  Workstation performed: [id number]         XR Trauma multiple (SLB/SLRA trauma bay ONLY)   Final Result by Brittney Nieves MD (07/06 1206)      No acute cardiopulmonary disease within limitations of supine imaging  Workstation performed: [id number]         XR chest 1 view    (Results Pending)       Other Diagnostic Testing: I have personally reviewed pertinent reports  ACTIVE MEDICATIONS     Current Facility-Administered Medications   Medication Dose Route Frequency    acetaminophen (TYLENOL) tablet 975 mg  975 mg Oral Q8H [address]    ARIPiprazole (ABILIFY) tablet 5 mg  5 mg Oral Daily    divalproex sodium (DEPAKOTE) EC tablet 1,500 mg  1,500 mg Oral HS    enoxaparin (LOVENOX) subcutaneous injection 40 mg  40 mg Subcutaneous Q12H [address]    gabapentin (NEURONTIN) capsule 300 mg  300 mg Oral HS PRN    HYDROmorphone (DILAUDID) injection 0 5 mg  0 5 mg Intravenous Q3H PRN    LORazepam (ATIVAN) injection 0 5 mg  0 5 mg Intravenous Once    LORazepam (ATIVAN) injection 2 mg  2 mg Intravenous Once PRN    magnesium sulfate 2 g/50 mL IVPB (premix) 2 g  2 g Intravenous Once    [START ON 7/7/2021] magnesium sulfate 2 g/50 mL IVPB (premix) 2 g  2 g Intravenous Once    methocarbamol (ROBAXIN) tablet 500 mg  500 mg Oral Q6H [address]    ondansetron (ZOFRAN) injection 4 mg  4 mg Intravenous Q4H PRN    oxyCODONE (ROXICODONE) immediate release tablet 10 mg  10 mg Oral Q4H PRN    oxyCODONE (ROXICODONE) IR tablet 5 mg  5 mg Oral Q4H PRN    senna (SENOKOT) tablet 17 2 mg  2 tablet Oral Daily     Prior to Admission medications    Medication Sig Start Date End Date Taking?  Authorizing Provider   divalproex sodium (DEPAKOTE ER) 500 mg 24 hr tablet Take 1,500 mg by mouth daily at bedtime   Yes Historical Provider, MD     CODE STATUS & ADVANCED DIRECTIVES     Code Status: Level 1 - Full Code  Advance Directive and Living Will:      Power of :    POLST:      VTE Pharmacologic Prophylaxis: Enoxaparin (Lovenox)  VTE Mechanical Prophylaxis: sequential compression device    ==  Sara Good MD  Resident, Neurology, PGY-2  [facility]

## 2021-07-06 NOTE — ED PROVIDER NOTES
Emergency Department Airway Evaluation and Management Form    History  Obtained from: EMS      Chief complaint  Back pain, headache    HPI  Fall down stairs, back pain, headache, report of 2 generalized seizures    No past medical history on file  No past surgical history on file  No family history on file  Social History   Substance Use Topics    Smoking status: Not on file    Smokeless tobacco: Not on file    Alcohol use Not on file     I have reviewed and agree with the history as documented  Review of Systems  Headache, Back pain      Physical Exam  There were no vitals taken for this visit  Physical Exam  AAOX3, GCS 15, airway patent, ls cta bilat, pulses intact         ED Medications  Medications - No data to display    Intubation  no    Notes      CritCare Time      ED Provider  Electronically Signed by       Janusz Neely DO  07/06/21 4457

## 2021-07-06 NOTE — ASSESSMENT & PLAN NOTE
Patient presents s/p fall w/ witnessed seizure activity   PMH seizures for 4 years, 2 types of episodes as detailed below   On depakote 750 mg bid for prevention but on admission level < 3 - on further discussion patient admits she has been noncompliant for a few weeks   Unclear if seizure precipitated fall down stairs but she was witnessed to have 2 episodes of generalized shaking, each lasting less than 30 sec after the fall   Post-ictal period after   MRI spine clear of fracture or acute injury but does show multilevel disc disease, including L5-S1 protrusion possibly impinging slightly   Patient AOx4 but w/ BLE weakness, significantly improved from yesterday - per patient this is similar to her previous post-ictal motor deficits but this was the first time she had bilateral     Plan:   S/p load of depakote 10 mg/kg 7/7/21 am, f/u post-load levels   Continue depakote 24 hr  ER 1500 mg qhs   2 mg ativan prn for seizures > 1 minute   Seizure precautions   Absolutely no driving for at least 6 mo   Will need [employer] notification form completed

## 2021-07-06 NOTE — H&P
H&P Exam - [address] 27 y o  female MRN: [id number]  Unit/Bed#: ED 28 Encounter: [id number]    Assessment/Plan   Trauma Alert: Level B  Model of Arrival: Helicopter  Trauma Team: Attending To and Residents Cristino Laboy  Consultants: Neurosurgery: consult  Time [phone], Returned call: Yes [phone]    Trauma Active Problems:   1  Decreased motor/sensation bilateral lower extremities    Trauma Plan:   1  Neurosurgery consult  - CT head, C-spine, chest abdomen pelvis no acute process   - MRI T&L spine  - keep cervical collar in place  - neurochecks  - admit SD2    Chief Complaint: head and back pain    History of Present Illness   HPI:  Christopher Forde is a 27 y o  female with history of seizure disorder on Depakote who presents as a level B trauma alert status post fall downstairs  Patient states that she believes she was distracted and she slipped and fell down a flight of stairs  Witnesses reported grand mal seizure-like activity for less than a couple minutes  Postictal phase  Patient complaining of generalized headache as well as midthoracic and lumbar back pain in addition to bilateral knee pain and weakness below both knees  Mechanism:Fall    Review of Systems   Constitutional: Negative for appetite change, chills, diaphoresis, fever and unexpected weight change  HENT: Negative for congestion and rhinorrhea  Eyes: Negative for photophobia and visual disturbance  Respiratory: Negative for cough, chest tightness and shortness of breath  Cardiovascular: Negative for chest pain, palpitations and leg swelling  Gastrointestinal: Negative for abdominal distention, abdominal pain, blood in stool, constipation, diarrhea, nausea and vomiting  Genitourinary: Negative for dysuria and hematuria  Musculoskeletal: Positive for arthralgias, back pain, myalgias and neck pain  Negative for joint swelling and neck stiffness  Skin: Positive for wound   Negative for color change, pallor and rash    Neurological: Positive for weakness  Negative for dizziness, syncope, light-headedness and headaches  Psychiatric/Behavioral: Negative for agitation  All other systems reviewed and are negative  12-point, complete review of systems was reviewed and negative except as stated above  Historical Information   History is unobtainable from the patient due to none  Efforts to obtain history included the following sources: other medical personnel, obtained from other records    Past Medical History:   Diagnosis Date    Seizure [facility])      No past surgical history on file  Social History   Social History     Substance and Sexual Activity   Alcohol Use Not on file     Social History     Substance and Sexual Activity   Drug Use Not on file     Social History     Tobacco Use   Smoking Status Not on file     E-Cigarette/Vaping     E-Cigarette/Vaping Substances       There is no immunization history on file for this patient  Last Tetanus: 3/22/21  Family History: Non-contributory  Unable to obtain/limited by none      Meds/Allergies   all current active meds have been reviewed, current meds:   Current Facility-Administered Medications   Medication Dose Route Frequency    acetaminophen (TYLENOL) tablet 975 mg  975 mg Oral Q8H [address]    HYDROmorphone (DILAUDID) injection 0 5 mg  0 5 mg Intravenous Q3H PRN    methocarbamol (ROBAXIN) tablet 500 mg  500 mg Oral Q6H [address]    ondansetron (ZOFRAN) injection 4 mg  4 mg Intravenous Once    ondansetron (ZOFRAN) injection 4 mg  4 mg Intravenous Q4H PRN    oxyCODONE (ROXICODONE) immediate release tablet 10 mg  10 mg Oral Q4H PRN    oxyCODONE (ROXICODONE) IR tablet 5 mg  5 mg Oral Q4H PRN    senna (SENOKOT) tablet 17 2 mg  2 tablet Oral Daily    and PTA meds:   None       Allergies   Allergen Reactions    Azithromycin Other (See Comments)                PHYSICAL EXAM    PE limited by: none    Objective   Vitals:   First set: Temperature: 97 9 °F (36 6 °C) (07/06/21 3683)  Pulse: (!) 115 (07/06/21 0049)  Respirations: 18 (07/06/21 0049)  Blood Pressure: 117/90 (07/06/21 0049)    Primary Survey:   (A) Airway: patent  (B) Breathing: CTAB  (C) Circulation: Pulses:   normal, pedal  2/4 and radial  2/4  (D) Disabliity:  GCS Total:  15  (E) Expose:  Completed    Secondary Survey: (Click on Physical Exam tab above)  Physical Exam  Vitals and nursing note reviewed  Constitutional:       General: She is not in acute distress  Appearance: Normal appearance  She is well-developed  She is not ill-appearing, toxic-appearing or diaphoretic  HENT:      Head: Normocephalic and atraumatic  Nose: Nose normal  No congestion or rhinorrhea  Mouth/Throat:      Mouth: Mucous membranes are moist       Pharynx: Oropharynx is clear  No oropharyngeal exudate or posterior oropharyngeal erythema  Eyes:      General: No scleral icterus  Right eye: No discharge  Left eye: No discharge  Extraocular Movements: Extraocular movements intact  Conjunctiva/sclera: Conjunctivae normal       Pupils: Pupils are equal, round, and reactive to light  Neck:      Vascular: No JVD  Trachea: No tracheal deviation  Cardiovascular:      Rate and Rhythm: Normal rate and regular rhythm  Heart sounds: Normal heart sounds  No murmur heard  No friction rub  No gallop  Comments: Normal rate and regular rhythm  Pulmonary:      Effort: Pulmonary effort is normal  No respiratory distress  Breath sounds: Normal breath sounds  No stridor  No wheezing or rales  Comments: Clear to auscultation bilaterally  Chest:      Chest wall: No tenderness  Abdominal:      General: Bowel sounds are normal  There is no distension  Palpations: Abdomen is soft  Tenderness: There is abdominal tenderness  There is no right CVA tenderness, left CVA tenderness, guarding or rebound  Comments: TTP LLQ and RLQ without guarding  Otherwise soft and nondistended   Normal bowel sounds throughout   Musculoskeletal:         General: Tenderness present  No swelling, deformity or signs of injury  Normal range of motion  Cervical back: Normal range of motion and neck supple  No rigidity  No muscular tenderness  Right lower leg: No edema  Left lower leg: No edema  Comments: TTP b/l knees  Abrasion as below  TTP midline thoracic and lumbar spine  Abrasion to right shoulder  Lymphadenopathy:      Cervical: No cervical adenopathy  Skin:     General: Skin is warm and dry  Coloration: Skin is not pale  Findings: Lesion present  No erythema or rash  Comments: Abrasion right knee with overlying TTP   Neurological:      Mental Status: She is alert and oriented to person, place, and time  Cranial Nerves: No cranial nerve deficit  Sensory: Sensory deficit present  Motor: Weakness present  No abnormal muscle tone  Comments: A&Ox3 to person, place, and time  CN 2-12 intact  Strength throughout lower extremities including but not limited to flexion/extension at bilateral hips, knees, ankles, and feet 0/5  Occasional 2-3/5 toe flexion/extension  Strength otherwise 5/5 throughout above waist  Good rectal tone  Some sensation to bilateral knees but otherwise no sensation bilateral lower extremities  Sensation otherwise intact throughout  Cerebellar exam excluding gait intact  Psychiatric:         Behavior: Behavior normal          Thought Content:  Thought content normal          Invasive Devices     Peripheral Intravenous Line            Peripheral IV 07/06/21 Left Antecubital <1 day    Peripheral IV 07/06/21 Left Hand <1 day                Lab Results:   Results: I have personally reviewed pertinent reports   , BMP/CMP:   Lab Results   Component Value Date    CO2 25 07/06/2021    GLUCOSE 96 07/06/2021   , CBC:   Lab Results   Component Value Date    WBC 12 33 (H) 07/06/2021    HGB 12 9 07/06/2021    HCT 38 07/06/2021    MCV 80 (L) 07/06/2021  07/06/2021    MCH 26 2 (L) 07/06/2021    MCHC 32 8 07/06/2021    RDW 13 9 07/06/2021    MPV 10 6 07/06/2021    NRBC 0 07/06/2021    and Coagulation:   Lab Results   Component Value Date    INR 0 99 07/06/2021     Imaging/EKG Studies: Results: I have personally reviewed pertinent reports         7/6 CT head and c-spine: No acute intracranial abnormality    7/6 CT CAP: No acute traumatic injury identified within the chest, abdomen, or pelvis    Other Studies: fast    Code Status: Level 1 - Full Code  Advance Directive and Living Will:      Power of :    POLST:

## 2021-07-06 NOTE — ASSESSMENT & PLAN NOTE
Patient w/ severe BLE weakness s/p fall and seizure   ddx includes Talat's or post-ictal paralysis (which can sometimes last up to 24-36 hrs), lower suspicion for spinal injury at this time, some pain limitation   Reported urinary retention as well   Imaging w/o clear explanation for motor findings though L5-S1 protrusion w/ possible consequent radiculopathy could account for nonsustained unilateral clonus    Plan:   Neuro checks   Urinary retention protocol   Consider reimaging for worsening sx

## 2021-07-06 NOTE — TRAUMA DOCUMENTATION
RN spoke with Kaylyn Shelton in lab regarding using existing specimen on hold  ok'd scanning label and sending down  Will add on in lab

## 2021-07-06 NOTE — ASSESSMENT & PLAN NOTE
- Exhibiting upper and lower extremity weakness  - MRI Thoracic and lumbar spine showed multilevel disc herniation from T6 to T9 and central disc protrusion that affects the Left S1 nerve root  - Consulted Neuro surg  - Per Neuro surgery MRI of C-spine and bladder scan   Results pending

## 2021-07-06 NOTE — PLAN OF CARE
Problem: MOBILITY - ADULT  Goal: Maintain or return to baseline ADL function  Description: INTERVENTIONS:  -  Assess patient's ability to carry out ADLs; assess patient's baseline for ADL function and identify physical deficits which impact ability to perform ADLs (bathing, care of mouth/teeth, toileting, grooming, dressing, etc )  - Assess/evaluate cause of self-care deficits   - Assess range of motion  - Assess patient's mobility; develop plan if impaired  - Assess patient's need for assistive devices and provide as appropriate  - Encourage maximum independence but intervene and supervise when necessary  - Involve family in performance of ADLs  - Assess for home care needs following discharge   - Consider OT consult to assist with ADL evaluation and planning for discharge  - Provide patient education as appropriate  7/6/2021 0950 by Dali Payne RN  Outcome: Progressing  7/6/2021 0950 by Dali Payne RN  Outcome: Progressing  Goal: Maintains/Returns to pre admission functional level  Description: INTERVENTIONS:  - Perform BMAT or MOVE assessment daily    - Set and communicate daily mobility goal to care team and patient/family/caregiver  - Collaborate with rehabilitation services on mobility goals if consulted  - Perform Range of Motion **3* times a day  - Reposition patient every *2** hours    - Dangle patient **3* times a day  - Stand patient *3** times a day  - Ambulate patient *3** times a day  - Out of bed to chair 3 times a day   - Out of bed for meals *3  Problem: NEUROSENSORY - ADULT  Goal: Achieves stable or improved neurological status  Description: INTERVENTIONS  - Monitor and report changes in neurological status  - Monitor vital signs such as temperature, blood pressure, glucose, and any other labs ordered   - Initiate measures to prevent increased intracranial pressure  - Monitor for seizure activity and implement precautions if appropriate      Outcome: Progressing  Goal: Achieves maximal functionality and self care  Description: INTERVENTIONS  - Monitor swallowing and airway patency with patient fatigue and changes in neurological status  - Encourage and assist patient to increase activity and self care     - Encourage visually impaired, hearing impaired and aphasic patients to use assistive/communication devices  Outcome: Progressing     Problem: GENITOURINARY - ADULT  Goal: Maintains or returns to baseline urinary function  Description: INTERVENTIONS:  - Assess urinary function  - Encourage oral fluids to ensure adequate hydration if ordered  - Administer IV fluids as ordered to ensure adequate hydration  - Administer ordered medications as needed  - Offer frequent toileting  - Follow urinary retention protocol if ordered  Outcome: Progressing     Problem: SKIN/TISSUE INTEGRITY - ADULT  Goal: Skin Integrity remains intact(Skin Breakdown Prevention)  Description: Assess:  -Perform Carlos assessment every shift  -Clean and moisturize skin every **shift*  -Inspect skin when repositioning, toileting, and assisting with ADLS  -Assess under medical devices such as *aspen ** every 2hrs  -Assess extremities for adequate circulation and sensation     Bed Management:  -Have minimal linens on bed & keep smooth, unwrinkled  -Change linens as needed when moist or perspiring  -Avoid sitting or lying in one position for more than 2 hours while in bed  -Keep HOB at 10degrees     Toileting:  -Offer bedside commode  -Assess for incontinence every **2hrs*  -    Activity:  -Mobilize patient **3* times a day  -Encourage activity and walks on unit  -Encourage or provide ROM exercises   -Turn and reposition patient every **2* Hours  -Use appropriate equipment to lift or move patient in bed  -Instruct/ Assist with weight shifting every *20mins** when out of bed in chair  -Consider limitation of chair time *2** hour intervals    Skin Care:  -Avoid use of baby powder, tape, friction and shearing, hot water or constrictive clothing  -Relieve pressure over bony prominences using *cushions**  -Do not massage red bony areas    Next Steps:    Problem: MUSCULOSKELETAL - ADULT  Goal: Maintain or return mobility to safest level of function  Description: INTERVENTIONS:  - Assess patient's ability to carry out ADLs; assess patient's baseline for ADL function and identify physical deficits which impact ability to perform ADLs (bathing, care of mouth/teeth, toileting, grooming, dressing, etc )  - Assess/evaluate cause of self-care deficits   - Assess range of motion  - Assess patient's mobility  - Assess patient's need for assistive devices and provide as appropriate  - Encourage maximum independence but intervene and supervise when necessary  - Involve family in performance of ADLs  - Assess for home care needs following discharge   - Consider OT consult to assist with ADL evaluation and planning for discharge  - Provide patient education as appropriate  Outcome: Progressing  Goal: Maintain proper alignment of affected body part  Description: INTERVENTIONS:  - Support, maintain and protect limb and body alignment  - Provide patient/ family with appropriate education  Outcome: Progressing     Problem: PAIN - ADULT  Goal: Verbalizes/displays adequate comfort level or baseline comfort level  Description: Interventions:  - Encourage patient to monitor pain and request assistance  - Assess pain using appropriate pain scale  - Administer analgesics based on type and severity of pain and evaluate response  - Implement non-pharmacological measures as appropriate and evaluate response  - Consider cultural and social influences on pain and pain management  - Notify physician/advanced practitioner if interventions unsuccessful or patient reports new pain  Outcome: Progressing     Problem: SKIN/TISSUE INTEGRITY - ADULT  Goal: Skin Integrity remains intact(Skin Breakdown Prevention)  Description: Assess:  -Perform Carlos assessment every shift  -Clean and moisturize skin every shift  -Inspect skin when repositioning, toileting, and assisting with ADLS    -Assess extremities for adequate circulation and sensation     Bed Management:  -Have minimal linens on bed & keep smooth, unwrinkled  -Change linens as needed when moist or perspiring  -Avoid sitting or lying in one position for more than 2 hours while in bed  -Keep HOB at *10**degrees     Toileting:  -Offer bedside commode  -Assess for incontinence every **shift*  Activity:  -Mobilize patient *3** times a day  -Encourage activity and walks on unit  -Encourage or provide ROM exercises   -Turn and reposition patient every 2 Hours  -Use appropriate equipment to lift or move patient in bed  -Instruct/ Assist with weight shifting every *20mins** when out of bed in chair  -Consider limitation of chair time *2** hour intervals    Skin Care:  -Avoid use of baby powder, tape, friction and shearing, hot water or constrictive clothing    -Do not massage red bony areas    Next Steps:    Outcome: Progressing     Problem: INFECTION - ADULT  Goal: Absence or prevention of progression during hospitalization  Description: INTERVENTIONS:  - Assess and monitor for signs and symptoms of infection  - Monitor lab/diagnostic results  - Monitor all insertion sites, i e  indwelling lines, tubes, and drains  - Monitor endotracheal if appropriate and nasal secretions for changes in amount and color  - [place] appropriate cooling/warming therapies per order  - Administer medications as ordered  - Instruct and encourage patient and family to use good hand hygiene technique  - Identify and instruct in appropriate isolation precautions for identified infection/condition  Outcome: Progressing  Goal: Absence of fever/infection during neutropenic period  Description: INTERVENTIONS:  - Monitor WBC    Outcome: Progressing     Problem: SAFETY ADULT  Goal: Maintain or return to baseline ADL function  Description: INTERVENTIONS:  - Assess patient's ability to carry out ADLs; assess patient's baseline for ADL function and identify physical deficits which impact ability to perform ADLs (bathing, care of mouth/teeth, toileting, grooming, dressing, etc )  - Assess/evaluate cause of self-care deficits   - Assess range of motion  - Assess patient's mobility; develop plan if impaired  - Assess patient's need for assistive devices and provide as appropriate  - Encourage maximum independence but intervene and supervise when necessary  - Involve family in performance of ADLs  - Assess for home care needs following discharge   - Consider OT consult to assist with ADL evaluation and planning for discharge  - Provide patient education as appropriate  7/6/2021 0950 by Mariah Ortiz RN  Outcome: Progressing  7/6/2021 0950 by Mariah Ortiz RN  Outcome: Progressing  Goal: Maintains/Returns to pre admission functional level  Description: INTERVENTIONS:  - Perform BMAT or MOVE assessment daily    - Set and communicate daily mobility goal to care team and patient/family/caregiver  - Collaborate with rehabilitation services on mobility goals if consulted  - Perform Range of Motion 3 times a day  - Reposition patient every **2* hours    - Dangle patient *3** times a day  - Stand patient *3** times a day  - Ambulate patient *3** times a day  - Out of bed to chair **3* times a day   - Out of bed for meals *3** times a day  - Out of bed for toileting  - Record patient progress and toleration of activity level   7/6/2021 0950 by Mariah Ortiz RN  Outcome: Progressing  7/6/2021 0950 by Mariah Ortiz RN  Outcome: Progressing  Goal: Patient will remain free of falls  Description: INTERVENTIONS:  -  Assess patient's ability to carry out ADLs; assess patient's baseline for ADL function and identify physical deficits which impact ability to perform ADLs (bathing, care of mouth/teeth, toileting, grooming, dressing, etc )  - Assess/evaluate cause of self-care deficits   - Assess range of motion  - Assess patient's mobility; develop plan if impaired  - Assess patient's need for assistive devices and provide as appropriate  - Encourage maximum independence but intervene and supervise when necessary  - Involve family in performance of ADLs  - Assess for home care needs following discharge   - Consider OT consult to assist with ADL evaluation and planning for discharge  - Provide patient education as appropriate  Outcome: Progressing     Problem: DISCHARGE PLANNING  Goal: Discharge to home or other facility with appropriate resources  Description: INTERVENTIONS:  - Identify barriers to discharge w/patient and caregiver  - Arrange for needed discharge resources and transportation as appropriate  - Identify discharge learning needs (meds, wound care, etc )  - Arrange for interpretive services to assist at discharge as needed  - Refer to Case Management Department for coordinating discharge planning if the patient needs post-hospital services based on physician/advanced practitioner order or complex needs related to functional status, cognitive ability, or social support system  Outcome: Progressing     Problem: Knowledge Deficit  Goal: Patient/family/caregiver demonstrates understanding of disease process, treatment plan, medications, and discharge instructions  Description: Complete learning assessment and assess knowledge base    Interventions:  - Provide teaching at level of understanding  - Provide teaching via preferred learning methods  Outcome: Progressing     Outcome: Progressing  Goal: Incision(s), wounds(s) or drain site(s) healing without S/S of infection  Description: INTERVENTIONS  - Assess and document dressing, incision, wound bed, drain sites and surrounding tissue  - Provide patient and family education  - Perform skin care/dressing changes every shift  Outcome: Progressing   ** times a day  - Out of bed for toileting  - Record patient progress and toleration of activity level   7/6/2021 0950 by Doug Gonzalez, RN  Outcome: Progressing  7/6/2021 0950 by Doug Gonzalez, RN  Outcome: Progressing

## 2021-07-06 NOTE — PROGRESS NOTES
[address] [facility]  Progress Note - Erendira Guadarrama [facility] [address] 1995, 27 y o  female MRN: [id number]  [facility] Encounter: [id number]  Primary Care Provider: Deanna Estrada DO   Date and time admitted to hospital: 7/6/2021 12:37 AM    Focal motor deficit  Assessment & Plan  - Exhibiting upper and lower extremity weakness  - MRI Thoracic and lumbar spine showed multilevel disc herniation from T6 to T9 and central disc protrusion that affects the Left S1 nerve root  - Consulted Neuro surg  - Per Neuro surgery MRI of C-spine and bladder scan  Results pending    Fall down stairs  Assessment & Plan  - Pain in back and bilateral knees  - CT and X-ray negative  - Multimodal analgesia     Bipolar 2 disorder (HCC)  Assessment & Plan  - continue home Abilify 5mg daily    Sensory deficit, bilateral  Assessment & Plan  - Improving   - Some numbness and tingling in her toes but return of sensation in her legs  Plan as above    Seizure ([facility] [address] )  Assessment & Plan  - Started on Depakote HS 1500mg  - Neurology consulted      TERTIARY TRAUMA SURVEY NOTE    Prophylaxis: Sequential compression device (Venodyne)  and Enoxaparin (Lovenox)    Disposition: Inpatient management    Code status:  Level 1 - Full Code    Consultants: Neurology, Neurosurgery    Is the patient 72 years or older?: No          SUBJECTIVE:     Tertiary Exam Due on: 7/6/21    Mechanism of Injury:Fall    Details related to Injury: +LOC:  yes    Chief Complaint: Back pain and Bilateral knee pain     HPI/Last 24 hour events:   Per Jatin  "Alejandrina Li is a 27 y o  female with history of seizure disorder on Depakote who presents as a level B trauma alert status post fall downstairs  Patient states that she believes she was distracted and she slipped and fell down a flight of stairs  Witnesses reported grand mal seizure-like activity for less than a couple minutes  Postictal phase    Patient complaining of generalized headache as well as midthoracic and lumbar back pain in addition to bilateral knee pain and weakness below both knees "    Overnight patient slept well  She states that her weakness and numbness is mildly improved but that her pain in her back and her knees persists  Other than pain and her weakness and numbness in her extremities patient has no complaints  Active medications:           Current Facility-Administered Medications:     acetaminophen (TYLENOL) tablet 975 mg, 975 mg, Oral, Q8H YADIRA    ARIPiprazole (ABILIFY) tablet 5 mg, 5 mg, Oral, Daily    divalproex sodium (DEPAKOTE) EC tablet 1,500 mg, 1,500 mg, Oral, HS    enoxaparin (LOVENOX) subcutaneous injection 40 mg, 40 mg, Subcutaneous, Q12H [address]    HYDROmorphone (DILAUDID) injection 0 5 mg, 0 5 mg, Intravenous, Q3H PRN, 0 5 mg at 07/06/21 0602    LORazepam (ATIVAN) injection 0 5 mg, 0 5 mg, Intravenous, Once    methocarbamol (ROBAXIN) tablet 500 mg, 500 mg, Oral, Q6H YADIRA, 500 mg at 07/06/21 1153    ondansetron (ZOFRAN) injection 4 mg, 4 mg, Intravenous, Q4H PRN, 4 mg at 07/06/21 0758    oxyCODONE (ROXICODONE) immediate release tablet 10 mg, 10 mg, Oral, Q4H PRN    oxyCODONE (ROXICODONE) IR tablet 5 mg, 5 mg, Oral, Q4H PRN    senna (SENOKOT) tablet 17 2 mg, 2 tablet, Oral, Daily, 17 2 mg at 07/06/21 1153      OBJECTIVE:     Vitals:   Vitals:    07/06/21 1320   BP:    Pulse:    Resp:    Temp:    SpO2: 96%       Physical Exam:   GENERAL APPEARANCE: No acute distress lying in bed comfortably   NEURO: GCS 15 AOx3   HEENT: PERRL EOM intact  CV: RRR No M/R/G  LUNGS: CTA bilaterally   GI: NT ND   MSK: 4/5 strength in upper extremities 3/5 strength in right leg and 1/5 strength in left leg  SKIN: Abrasions in bilateral knees     I/O:   I/O       07/04 0701 - 07/05 0700 07/05 0701 - 07/06 0700 07/06 0701 - 07/07 0700    P  O    0    Total Intake(mL/kg)   0 (0)    Net   0                 Invasive Devices:    Invasive Devices     Peripheral Intravenous Line            Peripheral IV 07/06/21 Left Antecubital <1 day    Peripheral IV 07/06/21 Left Hand <1 day                  Imaging:   XR knee 4+ vw left injury    Result Date: 7/6/2021  Impression: No acute osseous abnormality  Workstation performed: [id number]     XR knee 4+ vw right injury    Result Date: 7/6/2021  Impression: No acute osseous abnormality  Workstation performed: [id number]     TRAUMA - CT head wo contrast    Result Date: 7/6/2021  Impression: No acute intracranial abnormality  Workstation performed: [id number]     TRAUMA - CT spine cervical wo contrast    Result Date: 7/6/2021  Impression: No cervical spine fracture or traumatic malalignment  Workstation performed: [id number]     MRI thoracic spine wo contrast    Result Date: 7/6/2021  Impression: 1  No cord signal abnormality  2   Multilevel disc herniation, pronounced at levels T6 through T9 indenting ventral thecal sac with mild canal narrowing  Workstation performed: [id number]     MRI lumbar spine wo contrast    Result Date: 7/6/2021  Impression: 1  Central disc protrusion at level L5-S1 contacting bilateral S1 nerve roots with suggested impingement of left S1 nerve root  Correlate for S1 radiculopathy  2   Disc bulge at levels L3-4 and L4-5 resulting in mild canal stenosis  Workstation performed: [id number]     TRAUMA - CT chest abdomen pelvis w contrast    Result Date: 7/6/2021  Impression: No acute traumatic injury identified within the chest, abdomen, or pelvis   Workstation performed: [id number]       Labs:   CBC:   Lab Results   Component Value Date    WBC 12 33 (H) 07/06/2021    HGB 12 9 07/06/2021    HCT 38 07/06/2021    MCV 80 (L) 07/06/2021     07/06/2021    MCH 26 2 (L) 07/06/2021    MCHC 32 8 07/06/2021    RDW 13 9 07/06/2021    MPV 10 6 07/06/2021    NRBC 0 07/06/2021     CMP:   Lab Results   Component Value Date     (H) 07/06/2021    CO2 25 07/06/2021    BUN 12 07/06/2021    CREATININE 0 78 07/06/2021    GLUCOSE 96 07/06/2021    CALCIUM 9 5 07/06/2021    EGFR 104 07/06/2021     Phosphorus: No results found for: PHOS  Magnesium: No results found for: MG  Urinalysis:   Lab Results   Component Value Date    COLORU Yellow 07/06/2021    CLARITYU Clear 07/06/2021    SPECGRAV 1 044 (H) 07/06/2021    PHUR 6 0 07/06/2021    LEUKOCYTESUR Negative 07/06/2021    NITRITE Negative 07/06/2021    GLUCOSEU Negative 07/06/2021    KETONESU Negative 07/06/2021    BILIRUBINUR Negative 07/06/2021    BLOODU Negative 07/06/2021     Ionized Calcium: No results found for: CAION  Coagulation:   Lab Results   Component Value Date    INR 0 99 07/06/2021     Troponin: No results found for: TROPONINI  ABG: No results found for: PHART, ASE2JFY, PO2ART, UKR7MRL, S8BKETRB, BEART, SOURCE

## 2021-07-07 PROBLEM — G43.909 MIGRAINE: Status: ACTIVE | Noted: 2021-07-06

## 2021-07-07 LAB — VALPROATE SERPL-MCNC: 92 UG/ML (ref 50–100)

## 2021-07-07 PROCEDURE — 80165 DIPROPYLACETIC ACID FREE: CPT | Performed by: PSYCHIATRY & NEUROLOGY

## 2021-07-07 PROCEDURE — 99232 SBSQ HOSP IP/OBS MODERATE 35: CPT | Performed by: PSYCHIATRY & NEUROLOGY

## 2021-07-07 PROCEDURE — 80164 ASSAY DIPROPYLACETIC ACD TOT: CPT | Performed by: PSYCHIATRY & NEUROLOGY

## 2021-07-07 PROCEDURE — 99232 SBSQ HOSP IP/OBS MODERATE 35: CPT | Performed by: NEUROLOGICAL SURGERY

## 2021-07-07 PROCEDURE — 97167 OT EVAL HIGH COMPLEX 60 MIN: CPT

## 2021-07-07 PROCEDURE — NC001 PR NO CHARGE: Performed by: SURGERY

## 2021-07-07 PROCEDURE — 99232 SBSQ HOSP IP/OBS MODERATE 35: CPT | Performed by: SURGERY

## 2021-07-07 PROCEDURE — 97163 PT EVAL HIGH COMPLEX 45 MIN: CPT

## 2021-07-07 RX ORDER — KETOROLAC TROMETHAMINE 30 MG/ML
30 INJECTION, SOLUTION INTRAMUSCULAR; INTRAVENOUS ONCE
Status: COMPLETED | OUTPATIENT
Start: 2021-07-07 | End: 2021-07-07

## 2021-07-07 RX ORDER — DIVALPROEX SODIUM 500 MG/1
1500 TABLET, EXTENDED RELEASE ORAL
Status: DISCONTINUED | OUTPATIENT
Start: 2021-07-07 | End: 2021-07-08 | Stop reason: HOSPADM

## 2021-07-07 RX ORDER — SUMATRIPTAN 6 MG/.5ML
6 INJECTION, SOLUTION SUBCUTANEOUS ONCE
Status: COMPLETED | OUTPATIENT
Start: 2021-07-07 | End: 2021-07-07

## 2021-07-07 RX ORDER — SODIUM CHLORIDE 9 MG/ML
80 INJECTION, SOLUTION INTRAVENOUS CONTINUOUS
Status: DISCONTINUED | OUTPATIENT
Start: 2021-07-07 | End: 2021-07-07

## 2021-07-07 RX ORDER — SODIUM CHLORIDE 9 MG/ML
80 INJECTION, SOLUTION INTRAVENOUS CONTINUOUS
Status: DISPENSED | OUTPATIENT
Start: 2021-07-07 | End: 2021-07-08

## 2021-07-07 RX ORDER — PROMETHAZINE HYDROCHLORIDE 25 MG/ML
25 INJECTION, SOLUTION INTRAMUSCULAR; INTRAVENOUS EVERY 6 HOURS PRN
Status: DISCONTINUED | OUTPATIENT
Start: 2021-07-07 | End: 2021-07-08 | Stop reason: HOSPADM

## 2021-07-07 RX ADMIN — METHOCARBAMOL 500 MG: 500 TABLET, FILM COATED ORAL at 17:21

## 2021-07-07 RX ADMIN — OXYCODONE HYDROCHLORIDE 10 MG: 10 TABLET ORAL at 08:42

## 2021-07-07 RX ADMIN — SENNOSIDES 17.2 MG: 8.6 TABLET ORAL at 08:41

## 2021-07-07 RX ADMIN — KETOROLAC TROMETHAMINE 30 MG: 30 INJECTION, SOLUTION INTRAMUSCULAR at 13:26

## 2021-07-07 RX ADMIN — SODIUM CHLORIDE 1160 MG: 9 INJECTION, SOLUTION INTRAVENOUS at 14:15

## 2021-07-07 RX ADMIN — ARIPIPRAZOLE 5 MG: 5 TABLET ORAL at 08:41

## 2021-07-07 RX ADMIN — SODIUM CHLORIDE 80 ML/HR: 0.9 INJECTION, SOLUTION INTRAVENOUS at 13:28

## 2021-07-07 RX ADMIN — MAGNESIUM SULFATE HEPTAHYDRATE 2 G: 40 INJECTION, SOLUTION INTRAVENOUS at 08:41

## 2021-07-07 RX ADMIN — ACETAMINOPHEN 975 MG: 325 TABLET, FILM COATED ORAL at 22:12

## 2021-07-07 RX ADMIN — METHOCARBAMOL 500 MG: 500 TABLET, FILM COATED ORAL at 13:26

## 2021-07-07 RX ADMIN — DIVALPROEX SODIUM 1500 MG: 500 TABLET, EXTENDED RELEASE ORAL at 22:12

## 2021-07-07 RX ADMIN — ENOXAPARIN SODIUM 30 MG: 30 INJECTION, SOLUTION INTRAVENOUS; SUBCUTANEOUS at 22:12

## 2021-07-07 RX ADMIN — DIPHENHYDRAMINE HYDROCHLORIDE 25 MG: 50 INJECTION, SOLUTION INTRAMUSCULAR; INTRAVENOUS at 13:26

## 2021-07-07 RX ADMIN — SUMATRIPTAN 6 MG: 6 INJECTION, SOLUTION SUBCUTANEOUS at 13:26

## 2021-07-07 RX ADMIN — ACETAMINOPHEN 975 MG: 325 TABLET, FILM COATED ORAL at 05:02

## 2021-07-07 RX ADMIN — ENOXAPARIN SODIUM 30 MG: 30 INJECTION, SOLUTION INTRAVENOUS; SUBCUTANEOUS at 08:41

## 2021-07-07 RX ADMIN — ACETAMINOPHEN 975 MG: 325 TABLET, FILM COATED ORAL at 13:26

## 2021-07-07 RX ADMIN — METHOCARBAMOL 500 MG: 500 TABLET, FILM COATED ORAL at 05:02

## 2021-07-07 NOTE — PROGRESS NOTES
Cervical Collar Clearance: The patient had a MRI of the cervical spine demonstrating no acute injury  On exam, the patient had no midline point tenderness or paresthesias/numbness/weakness in the extremities  The patient had full range of motion (was then able to flex, extend, and rotate head laterally) without pain  There were no distracting injuries and the patient was not intoxicated  The patient's cervical spine was cleared radiologically and clinically  Cervical collar removed at this time       Marcia Solitario MD  7/7/2021 2:57 AM

## 2021-07-07 NOTE — ASSESSMENT & PLAN NOTE
- lower extremity weakness bilaterally much improved  Strength 4+/5 in all muscle groups     - MRI C/T/L spine with evidence of L5-S1 disc protrusion with possible impingement of L S1 nerve root as well as multi level disc herniation from T6-T9  - neurosurgery recommending no intervention at this time  - f/u with neurosurgery in 2 weeks clinically for re-evaluation

## 2021-07-07 NOTE — ASSESSMENT & PLAN NOTE
- sustaining the below stated injuries  - PT/OT recommending inpatient rehab, will re-evaluate on 7/8/21

## 2021-07-07 NOTE — PROGRESS NOTES
[address] [facility]  Progress Note - Dayana Jimenez [facility] [address] 57 1995, 27 y o  female MRN: [id number]  Unit/Bed#: [facility] 605-01 Encounter: [id number]  Primary Care Provider: Alben Koyanagi, DO   Date and time admitted to hospital: 7/6/2021 12:37 AM    Migraine  Assessment & Plan  - sumatriptan, benadryl and toradol added by neurology today  - will continue to monitor symptoms for improvement    Fall down stairs  Assessment & Plan  - sustaining the below stated injuries  - PT/OT recommending inpatient rehab, will re-evaluate on 7/8/21    Bipolar 2 disorder ([facility] [address] )  Assessment & Plan  - continue home Abilify 5mg daily    Sensory deficit, bilateral  Assessment & Plan  - Improving  - Some numbness in her RLE, but resolved in the LLE  - MRI C/T/L spine with evidence of L5-S1 disc protrusion with possible impingement of L S1 nerve root as well as multi level disc herniation from T6-T9  - neurosurgery recommending no intervention at this time  - f/u with neurosurgery in 2 weeks clinically for re-evaluation    Focal motor deficit  Assessment & Plan  - lower extremity weakness bilaterally much improved  Strength 4+/5 in all muscle groups  - MRI C/T/L spine with evidence of L5-S1 disc protrusion with possible impingement of L S1 nerve root as well as multi level disc herniation from T6-T9  - neurosurgery recommending no intervention at this time  - f/u with neurosurgery in 2 weeks clinically for re-evaluation    * Seizure [facility])  Assessment & Plan  - Patient has a history of seizure disorder    - fell down stairs with reported seizure activity x2  Patient has been non-compliant with her home Depakote for the last several weeks     - Home depakote 1500 mg qhs resumed  - Neurology consulted, appreciate recommendations  - giving IV dose of depakote today with continuation of extended release qhs dose as stated        Disposition: continue med-surg status, PT/OT re-evaluations on 7/8      SUBJECTIVE:  Chief Complaint: "I'm doing better"    Subjective: Patient notes improvement in bilateral lower extremity weakness  She notes pain in both of her knees  Reiterated the x-rays were negative  She also notes pain in her low back  She has no new areas of pain today  She is motivated to get out of bed today with physical therapy        OBJECTIVE:     Meds/Allergies     Current Facility-Administered Medications:     acetaminophen (TYLENOL) tablet 975 mg, 975 mg, Oral, Q8H [address], Cj Garcia MD, 975 mg at 07/07/21 0502    ARIPiprazole (ABILIFY) tablet 5 mg, 5 mg, Oral, Daily, Cj Garcia MD, 5 mg at 07/07/21 0841    diphenhydrAMINE (BENADRYL) 25 mg in sodium chloride 0 9 % 50 mL IVPB, 25 mg, Intravenous, Once, Eleonora Richard MD    divalproex sodium (DEPAKOTE ER) 24 hr tablet 1,500 mg, 1,500 mg, Oral, HS, Eleonora Richard MD    enoxaparin (LOVENOX) subcutaneous injection 30 mg, 30 mg, Subcutaneous, Q12H [address], Marquita Fernandes PA-C, 30 mg at 07/07/21 0841    gabapentin (NEURONTIN) capsule 300 mg, 300 mg, Oral, HS PRN, Eleonora Richard MD    HYDROmorphone (DILAUDID) injection 0 5 mg, 0 5 mg, Intravenous, Q3H PRN, Cj Garcia MD, 0 5 mg at 07/06/21 0602    ketorolac (TORADOL) injection 30 mg, 30 mg, Intravenous, Once, Eleonora Richard MD    LORazepam (ATIVAN) injection 0 5 mg, 0 5 mg, Intravenous, Once, Cj Garcia MD    LORazepam (ATIVAN) injection 2 mg, 2 mg, Intravenous, Once PRN, Eleonora Richard MD    methocarbamol (ROBAXIN) tablet 500 mg, 500 mg, Oral, Q6H [address], Cj Garcia MD, 500 mg at 07/07/21 0502    oxyCODONE (ROXICODONE) immediate release tablet 10 mg, 10 mg, Oral, Q4H PRN, Cj Garcia MD, 10 mg at 07/07/21 0842    oxyCODONE (ROXICODONE) IR tablet 5 mg, 5 mg, Oral, Q4H PRN, Cj Garcia MD    [facility]) injection 25 mg, 25 mg, Intramuscular, Q6H PRN, Kaya Conklin PA-C    senna (SENOKOT) tablet 17 2 mg, 2 tablet, Oral, Daily, Cj Garcia MD, 17 2 mg at 07/07/21 0841    sodium chloride 0 9 % infusion, 80 mL/hr, Intravenous, Continuous, Kim Horvath MD    SUMAtriptan (IMITREX) subcutaneous injection 6 mg, 6 mg, Subcutaneous, Once, Kim Horvath MD    valproate (DEPACON) 1,160 mg in sodium chloride 0 9 % 50 mL BOLUS, 10 mg/kg, Intravenous, Once, Kim Horvath MD     Vitals:   Vitals:    07/07/21 1100   BP: 125/71   Pulse: 74   Resp: 17   Temp: 98 9 °F (37 2 °C)   SpO2: 95%       Intake/Output:  I/O       07/05 0701 - 07/06 0700 07/06 0701 - 07/07 0700 07/07 0701 - 07/08 0700    P  O   360 240    IV Piggyback  50     Total Intake(mL/kg)  410 (3 6) 240 (2 1)    Urine (mL/kg/hr)  1475 (0 5) 350 (0 5)    Total Output  1475 350    Net  -1065 -110           Unmeasured Urine Occurrence   1 x           Nutrition/GI Proph/Bowel Reg:  Regular    Physical Exam:   GENERAL APPEARANCE:  No Acute distress  NEURO:  GCS 15, nonfocal exam  HEENT:  Normocephalic, atraumatic  CV:  Regular rate and rhythm, no murmurs gallops or rubs  LUNGS:  Clear to auscultation bilaterally  GI:  Soft, nontender, nondistended  :  Voiding  MSK:  Moving all extremities equally with full strength with the exception of the bilateral lower extremities which are 4+ out of 5 strength throughout all muscle groups, hips, knees and ankles  Sensation is decreased to light touch in all dermatomes throughout the right lower extremity and is normal on the left  SKIN:  Pink, warm, dry    Invasive Devices     Peripheral Intravenous Line            Peripheral IV 07/06/21 Left Antecubital 1 day                 Lab Results: Results: I have personally reviewed pertinent reports   , BMP/CMP: No results found for: SODIUM, K, CL, CO2, ANIONGAP, BUN, CREATININE, GLUCOSE, CALCIUM, AST, ALT, ALKPHOS, PROT, BILITOT, EGFR and CBC: No results found for: WBC, HGB, HCT, MCV, PLT, ADJUSTEDWBC, MCH, MCHC, RDW, MPV, NRBC  Imaging/EKG Studies: Results: I have personally reviewed pertinent reports      VTE Prophylaxis: Sequential compression device (Venodyne)  and Enoxaparin (Lovenox)

## 2021-07-07 NOTE — OCCUPATIONAL THERAPY NOTE
Occupational Therapy Evaluation     Patient Name: Brigid Hebert  NPPQH'Y Date: 7/7/2021  Problem List  Principal Problem:    Seizure [facility])  Active Problems:    Focal motor deficit    Sensory deficit, bilateral    Bipolar 2 disorder ([address] )    Fall down stairs    Migraine    Past Medical History  Past Medical History:   Diagnosis Date    Seizure [facility])      Past Surgical History  History reviewed  No pertinent surgical history  07/07/21 1031   OT Last Visit   OT Visit Date 07/07/21   Note Type   Note type Evaluation   Restrictions/Precautions   Weight Bearing Precautions Per Order No   Other Precautions Cognitive; Chair Alarm; Bed Alarm; Fall Risk;Pain   Pain Assessment   Pain Assessment Tool 0-10   Pain Score Worst Possible Pain   Pain Location/Orientation Orientation: Right;Location: Knee   Patient's Stated Pain Goal No pain   Hospital Pain Intervention(s) Repositioned; Ambulation/increased activity; Emotional support   Home Living   Type of [address] [place] [address] Multi-level;1/2 bath on main level;Stairs to enter with rails  ([address] )   Bathroom Shower/Tub Tub/shower unit   Bathroom Toilet Standard   Bathroom Equipment Built-in shower seat   Bathroom Accessibility Accessible   Additional Comments NO USE OF DME AT BASELINE    Prior Function   Level of [place] Independent with ADLs and functional mobility   Lives With Family;Significant other   Receives Help From Family   ADL Assistance Independent   IADLs Independent   Falls in the last 6 months 1 to 4  (4)   Vocational Full time employment   Lifestyle   Autonomy PATIENT REPORTS BEING INDEPENDENT WITH ADLS/IADLS/DRIVING PRIOR TO ADMISSION   Reciprocal Relationships LIVES WITH SIGNIFICANT OTHER, 2 CHILDREN, AND HER PARENTS    PATIENT REPORTS HER MOTHER IS ABLE TO BE HOME TO ASSIST UPON DISCHARGE   Service to Others WORKS FULL-TIME [employer]   Intrinsic Gratification ENJOYS SPENDING TIME WITH HER CHILDREN   Psychosocial   Psychosocial (WDL) WDL   ADL Eating Assistance 7  Independent   Grooming Assistance 7  Independent   UB Bathing Assistance 5  Supervision/Setup   LB Bathing Assistance 4  Minimal Assistance   UB Dressing Assistance 5  Supervision/Setup   LB Dressing Assistance 4  Minimal Assistance   Toileting Assistance  4  Minimal Assistance   Functional Assistance 4  Minimal Assistance   Bed Mobility   Additional Comments PATIENT ON COMMODE UPON ARRIVAL  PATIENT LEFT IN BEDSIDE CHAIR WITH ALARM ON + ALL NEEDS IN REACH    Transfers   Sit to Stand 4  Minimal assistance   Additional items Increased time required;Assist x 1   Stand to Sit 4  Minimal assistance   Additional items Increased time required;Assist x 1   Functional Mobility   Functional Mobility 4  Minimal assistance   Additional items Rolling walker   Balance   Static Sitting Fair -   Static Standing Poor +   Ambulatory Poor +   Activity Tolerance   Activity Tolerance Patient limited by fatigue;Patient limited by pain   Medical Staff Made Aware PT SEEN FOR CO-SESSION WITH SKILLED PHYSICAL THERAPIST 2' CLINICALLY UNSTABLE PRESENTATION, NEW PRECAUTIONS/LIMITATIONS, LIMITED ACTIVITY TOLERANCE AND PRESENT IMPAIRMENTS WHICH ARE A REGRESSION FROM THE PT'S BASELINE AND IMPACTING OVERALL OCCUPATIONAL PERFORMANCE  Nurse Made Aware APPROPRIATE TO SEE    RUE Assessment   RUE Assessment   (VARYING ASSESSMENT )   LUE Assessment   LUE Assessment   (VARYING ASSESSMENT )   Sensation   Light Touch Partial deficits in the RUE;Partial deficits in the LUE;Partial deficits in the RLE;Partial deficits in the LLE  (PER PT )   Cognition   Overall Cognitive Status [facility]   Arousal/Participation Alert; Cooperative   Attention Within functional limits   Orientation Level Oriented X4   Memory Within functional limits   Following Commands Follows all commands and directions without difficulty   Assessment   Limitation Decreased ADL status; Decreased Safe judgement during ADL;Decreased endurance;Decreased self-care trans;Decreased high-level ADLs   Prognosis Good   Assessment 27 YO Female SEEN FOR INITIAL OCCUPATIONAL THERAPY EVALUATION FOLLOWING ADMISSION TO [facility] S/P FALL DOWN STEPS WITH REPORTED B/L SENSORY DEFICITS AND FOCAL MOTOR DEFICIT  MRI C/T/L spine with evidence of L5-S1 disc protrusion with possible impingement of L S1 nerve root as well as multi level disc herniation from T6-T9  PROBLEMS LIST INCLUDES MIGRAINE, SEIZURES, H/O MULTIPLE FALLS AND BIPOLAR DISORDER  PT IS FROM HOME WITH FAMILY WHERE SHE REPORTS BEING INDEPENDENT WITH ADLS/IADLS/DRIVING PTA  PT CURRENTLY REQUIRES OVERALL MIN A WITH ADLS, TRANSFERS AND FUNCTIONAL MOBILITY WITH USE OF RW  PATIENT WITH INCONSISTENT PRESENTATION OF DEFICITS THROUGHOUT SESSION  APPEARS AS THOUGH SYMPTOMS WORSEN WHEN ASKED TO FORMALLY ASSESS  PT PRESENTS WITH IMPROVEMENT WHEN DISTRACTED  PT IS LIMITED 2' PAIN, FATIGUE, IMPAIRED BALANCE, FALL RISK , OVERALL WEAKNESS/DECONDITIONING ,B/L SENSORY DEFICITS REPORTED,  DIZZINESS WITH CHANGE OF POSITIONING , INACCESSIBLE HOME ENVIRONMENT and OVERALL LIMITED ACTIVITY TOLERANCE  PT EDUCATED ON DEEP BREATHING TECHNIQUES T/O ACTIVITY, SLOWING OF PACE, ENERGY CONSERVATION TECHNIQUES FOR CARRY OVER UPON D/C, INCREASED FAMILY SUPPORT and CONTINUE PARTICIPATION IN SELF-CARE/MOBILITY WITH STAFF  [facility]   The patient's raw score on the AM-PAC Daily Activity inpatient short form is 20, standardized score is 42 03, greater than 39 4  Patients at this level are likely to benefit from discharge to home  Please refer to the recommendation of the Occupational Therapist for safe discharge planning  FROM AN OCCUPATIONAL THERAPY PERSPECTIVE, RECOMMEND HOME WITH INCREASED FAMILY SUPPORT VS INPT REHAB PENDING PT PROGRESS  WILL CONT TO FOLLOW TO ADDRESS THE BELOW DESCRIBED GOALS      Goals   Patient Goals TO SIT DOWN    LTG Time Frame 10-14   Long Term Goal #1 SEE BELOW    Plan   Treatment Interventions ADL retraining;Functional transfer training;UE strengthening/ROM; Endurance training;Patient/family training;Equipment evaluation/education; Compensatory technique education; Energy conservation; Activityengagement   Goal Expiration Date 07/21/21   OT Frequency 3-5x/wk   Recommendation   OT Discharge Recommendation Post acute rehabilitation services  (VS HOME WITH FAMILY SUPPORT PENDING PT PROGRESS )   OT - OK to Discharge Yes   AM-PAC Daily Activity Inpatient   Lower Body Dressing 3   Bathing 3   Toileting 3   Upper Body Dressing 3   Grooming 4   Eating 4   Daily Activity Raw Score 20   Daily Activity Standardized Score (Calc for Raw Score >=11) 42 03   AM-PAC Applied Cognition Inpatient   Following a Speech/Presentation 4   Understanding Ordinary Conversation 4   Taking Medications 4   Remembering Where Things Are Placed or Put Away 4   Remembering List of 4-5 Errands 4   Taking Care of Complicated Tasks 4   Applied Cognition Raw Score 24   Applied Cognition Standardized Score 62 21       OCCUPATIONAL THERAPY GOALS TO BE MET WITHIN 14 DAYS:    -Pt will increase bed mobility to MOD I to participate in functional activities with G tolerance and balance  -Pt will improve functional mobility and transfers to MOD I on/off all surfaces w/ G balance/safety including toileting   -Pt will participate in lt grooming task with MOD I after set-up standing at sink ~3-5 minutes with G safety and balance  -Pt will increase independence in all ADLS to MOD I with G balance sitting upright in chair   -Pt will improve activity tolerance to G for 30 min txment sessions w/ G carry over of learned energy conservation techniques   -Pt will improve independence in lt homemaking activities to MOD I without requiring cues for safety   -Pt will demonstrate G carryover of learned safety techniques and proper body mechanics in functional and leisure activities with use of DME        Documentation completed by LONG Sr, OTR/L

## 2021-07-07 NOTE — PROGRESS NOTES
NEUROLOGY RESIDENCY PROGRESS NOTE     Name: Alannah Rogers   Age & Sex: 27 y o  female   MRN: [id number]  Unit/Bed#: [facility] 605-01   Encounter: [id number]    ASSESSMENT & PLAN     Migraine  Assessment & Plan  Patient w/ migrainous type HA c/w her prior migraines   Continues to have HA/pain behind the eye   Notes that this is similar to her chronic HA which she does not usually treat at home    Plan:   IVF - 1L NS @ 80 cc/hr   Continue depakote 1500 mg qhs   S/p Mg 2 g x 2 doses   Gabapentin 300 mg qhs prn for sleep    Zofran vs promethazine (can make this scheduled for max benefit)   Migraine cocktail - sumatriptan and benadryl w/ toradol (can repeat if incompletely effective)    * Seizure [facility])  Assessment & Plan  Patient presents s/p fall w/ witnessed seizure activity   PMH seizures for 4 years, 2 types of episodes as detailed below   On depakote 750 mg bid for prevention but on admission level < 3 - on further discussion patient admits she has been noncompliant for a few weeks   Unclear if seizure precipitated fall down stairs but she was witnessed to have 2 episodes of generalized shaking, each lasting less than 30 sec after the fall   Post-ictal period after   MRI spine clear of fracture or acute injury but does show multilevel disc disease, including L5-S1 protrusion possibly impinging slightly   Patient AOx4 but w/ BLE weakness, significantly improved from yesterday - per patient this is similar to her previous post-ictal motor deficits but this was the first time she had bilateral     Plan:   Load depakote 10 mg/kg this am, f/u post-load levels   Continue depakote 24 ER 1500 mg qhs   2 mg ativan prn for seizures > 1 minute   Seizure precautions   Absolutely no driving for at least 6 mo   Will need [employer] notification form completed    Fall down stairs  Assessment & Plan  Possible consequence of radiculopathy or neuropathy in setting of multilevel disc disease   F/u w/ nsg o/p    Bipolar 2 disorder ([facility] [address] )  Assessment & Plan  Chronic   Continue home meds   Depakote may help     Sensory deficit, bilateral  Assessment & Plan  BLE numbness which had resolved by consult exam - currently LLE > RLE, subtle and patchy   Plan as above    Focal motor deficit  Assessment & Plan  Patient w/ severe BLE weakness s/p fall and seizure   ddx includes Talat's or post-ictal paralysis (which can sometimes last up to 24-36 hrs), lower suspicion for spinal injury at this time, some pain limitation   Reported urinary retention as well   Imaging w/o clear explanation for motor findings though L5-S1 protrusion w/ possible consequent radiculopathy could account for nonsustained unilateral clonus    Plan:   Neuro checks   Urinary retention protocol   Consider reimaging for worsening sx    Qing ROGERS Wyatt will need follow up in in 2 weeks with epilepsy    She may also need further workup depending on her progress PTD    SUBJECTIVE     Patient was seen and examined  No critical events overnight  Patient's BLE weakness has improved  Denies N/V/F/C, abdominal pain, hematemesis, dizziness, endorses persistent HA, urinary retention  Continue depakote for seizure and migraine ppx  Will continue working on migraine and recommend PT/OT for weakness  ROS negative unless otherwise noted    OBJECTIVE     Patient ID: Tong Tyler is a 27 y o  female  Vitals:    21 2236 21 0245 21 0706 21 1100   BP: 115/73 118/70 120/69 125/71   BP Location:       Pulse: 94 82 79 74   Resp: 20 20 20 17   Temp: 98 3 °F (36 8 °C) 97 9 °F (36 6 °C) 98 °F (36 7 °C) 98 9 °F (37 2 °C)   TempSrc:       SpO2: 94% 95% 95% 95%   Weight:       Height:          Temperature:   Temp (24hrs), Av 2 °F (36 8 °C), Min:97 9 °F (36 6 °C), Max:98 9 °F (37 2 °C)    Temperature: 98 9 °F (37 2 °C)    Physical Exam  Vitals reviewed  Constitutional:       General: She is not in acute distress       Appearance: Normal appearance  She is not ill-appearing, toxic-appearing or diaphoretic  HENT:      Head: Normocephalic and atraumatic  Right Ear: External ear normal       Left Ear: External ear normal       Nose: Nose normal  No congestion or rhinorrhea  Mouth/Throat:      Mouth: Mucous membranes are moist       Pharynx: Oropharynx is clear  No oropharyngeal exudate or posterior oropharyngeal erythema  Eyes:      General: No scleral icterus  Right eye: No discharge  Left eye: No discharge  Extraocular Movements: Extraocular movements intact and EOM normal       Conjunctiva/sclera: Conjunctivae normal       Pupils: Pupils are equal, round, and reactive to light  Comments: Right periorbital ecchymosis improving   Neck:      Comments: No longer in c-collar  Pulmonary:      Effort: Pulmonary effort is normal  No respiratory distress  Musculoskeletal:         General: No swelling or deformity  Right lower leg: No edema  Left lower leg: No edema  Skin:     Coloration: Skin is pale  Findings: No erythema or rash  Neurological:      Mental Status: She is alert and oriented to person, place, and time  Cranial Nerves: No cranial nerve deficit  Sensory: No sensory deficit  Motor: Weakness present  Coordination: Coordination normal  Finger-Nose-Finger Test and Heel to [facility] Test normal       Deep Tendon Reflexes: Reflexes normal       Reflex Scores:       Bicep reflexes are 2+ on the right side and 2+ on the left side  Brachioradialis reflexes are 2+ on the right side and 2+ on the left side  Patellar reflexes are 2+ on the right side and 2+ on the left side  Psychiatric:         Mood and Affect: Mood normal          Speech: Speech normal          Behavior: Behavior normal        Neurologic Exam     Mental Status   Oriented to person, place, and time  Follows 2 step commands     Attention: normal    Speech: speech is normal   Level of consciousness: alert  Knowledge: consistent with education  Able to name object  Able to repeat  Normal comprehension  Cranial Nerves     CN II   Visual fields full to confrontation  CN III, IV, VI   Pupils are equal, round, and reactive to light  Extraocular motions are normal    Right pupil: Reactivity: brisk  Consensual response: intact  Left pupil: Reactivity: brisk  Consensual response: intact  CN III: no CN III palsy  CN VI: no CN VI palsy  Nystagmus: none     CN V   Facial sensation intact  CN VII   Facial expression full, symmetric  CN VIII   Hearing: intact    CN IX, X   Palate: symmetric    CN XI   Right trapezius strength: normal  Left trapezius strength: normal    CN XII   CN XII normal      Motor Exam   Muscle bulk: normal  Overall muscle tone: normal  Right arm pronator drift: absent  Left arm pronator drift: absent    Strength   Right deltoid: 5/5  Left deltoid: 5/5  Right biceps: 5/5  Left biceps: 5/5  Right triceps: 5/5  Left triceps: 5/5  Right iliopsoas: 4/5  Left iliopsoas: 4/5  Right quadriceps: 4/5  Left quadriceps: 4/5  Right anterior tibial: 3/5  Left anterior tibial: 3/5  Right gastroc: 3/5  Left gastroc: 3/5    Sensory Exam   Light touch normal    Vibration normal      Gait, Coordination, and Reflexes     Coordination   Finger to nose coordination: normal  Heel to shin coordination: normal    Tremor   Resting tremor: absent  Action tremor: absent    Reflexes   Right brachioradialis: 2+  Left brachioradialis: 2+  Right biceps: 2+  Left biceps: 2+  Right patellar: 2+  Left patellar: 2+  Right plantar: normal  Left plantar: normal    LABORATORY DATA     Labs: I have personally reviewed pertinent reports      Results from last 7 days   Lab Units 07/06/21  0100 07/06/21  0055   WBC Thousand/uL  --  12 33*   HEMOGLOBIN g/dL  --  12 9   I STAT HEMOGLOBIN g/dl 12 9  --    HEMATOCRIT %  --  39 3   HEMATOCRIT, ISTAT % 38  --    PLATELETS Thousands/uL  --  342   NEUTROS PCT %  -- 56   MONOS PCT %  --  6      Results from last 7 days   Lab Units 07/06/21  0100 07/06/21  0055   POTASSIUM mmol/L  --  4 0   CHLORIDE mmol/L  --  109*   CO2 mmol/L  --  26   CO2, I-STAT mmol/L 25  --    BUN mg/dL  --  12   CREATININE mg/dL  --  0 78   CALCIUM mg/dL  --  9 5   GLUCOSE, ISTAT mg/dl 96  --               Results from last 7 days   Lab Units 07/06/21  0055   INR  0 99   PTT seconds 32             ABG:No results found for: PHART, RMI5WBG, PO2ART, RJJ2LYO, M0JQVFTZ, BEART, SOURCE    IMAGING & DIAGNOSTIC TESTING     Radiology Results: I have personally reviewed pertinent reports  and I have personally reviewed pertinent films in PACS  MRI cervical spine wo contrast   Final Result by Matty Pak DO (07/06 1408)      Normal MRI of the cervical spine  Workstation performed: [id number]         MRI lumbar spine wo contrast   Final Result by Mono Stevens MD (07/06 0804)      1  Central disc protrusion at level L5-S1 contacting bilateral S1 nerve roots with suggested impingement of left S1 nerve root  Correlate for S1 radiculopathy  2   Disc bulge at levels L3-4 and L4-5 resulting in mild canal stenosis  Workstation performed: [id number]         MRI thoracic spine wo contrast   Final Result by Mono Stevens MD (07/06 0802)      1  No cord signal abnormality  2   Multilevel disc herniation, pronounced at levels T6 through T9 indenting ventral thecal sac with mild canal narrowing  Workstation performed: [id number]         XR knee 4+ vw left injury   Final Result by Amy Finch MD (07/06 3331)      No acute osseous abnormality  Workstation performed: [id number]         XR knee 4+ vw right injury   Final Result by Amy Finch MD (07/06 2099)      No acute osseous abnormality  Workstation performed: [id number]         TRAUMA - CT head wo contrast   Final Result by Luci Farrell MD (07/06 0133)      No acute intracranial abnormality  Workstation performed: [id number]         TRAUMA - CT spine cervical wo contrast   Final Result by Shanice Traore MD (07/06 0132)      No cervical spine fracture or traumatic malalignment  Workstation performed: [id number]         TRAUMA - CT chest abdomen pelvis w contrast   Final Result by Shanice Traore MD (07/06 0131)      No acute traumatic injury identified within the chest, abdomen, or pelvis  Workstation performed: [id number]         XR Trauma multiple (SLB/SLRA trauma bay ONLY)   Final Result by Ivan Shelton MD (07/06 1206)      No acute cardiopulmonary disease within limitations of supine imaging  Workstation performed: [id number]         XR chest 1 view   Final Result by Ivan Shelton MD (07/07 1225)      No acute cardiopulmonary disease within limitations of supine imaging  Workstation performed: [id number]           Other Diagnostic Testing: I have personally reviewed pertinent reports        ACTIVE MEDICATIONS     Current Facility-Administered Medications   Medication Dose Route Frequency    acetaminophen (TYLENOL) tablet 975 mg  975 mg Oral Q8H [facility]    ARIPiprazole (ABILIFY) tablet 5 mg  5 mg Oral Daily    divalproex sodium (DEPAKOTE ER) 24 hr tablet 1,500 mg  1,500 mg Oral HS    enoxaparin (LOVENOX) subcutaneous injection 30 mg  30 mg Subcutaneous Q12H [facility]    gabapentin (NEURONTIN) capsule 300 mg  300 mg Oral HS PRN    HYDROmorphone (DILAUDID) injection 0 5 mg  0 5 mg Intravenous Q3H PRN    LORazepam (ATIVAN) injection 0 5 mg  0 5 mg Intravenous Once    LORazepam (ATIVAN) injection 2 mg  2 mg Intravenous Once PRN    methocarbamol (ROBAXIN) tablet 500 mg  500 mg Oral Q6H [facility]    oxyCODONE (ROXICODONE) immediate release tablet 10 mg  10 mg Oral Q4H PRN    oxyCODONE (ROXICODONE) IR tablet 5 mg  5 mg Oral Q4H PRN    promethazine (PHENERGAN) injection 25 mg  25 mg Intramuscular Q6H PRN    senna (SENOKOT) tablet 17 2 mg  2 tablet Oral Daily    sodium chloride 0 9 % infusion  80 mL/hr Intravenous Continuous    valproate (DEPACON) 1,160 mg in sodium chloride 0 9 % 50 mL BOLUS  10 mg/kg Intravenous Once     Prior to Admission medications    Medication Sig Start Date End Date Taking?  Authorizing Provider   divalproex sodium (DEPAKOTE ER) 500 mg 24 hr tablet Take 1,500 mg by mouth daily at bedtime   Yes Historical Provider, MD     VTE Pharmacologic Prophylaxis: Enoxaparin (Lovenox)  VTE Mechanical Prophylaxis: sequential compression device    ==  Елена Valderrama MD  Resident, Neurology, PGY-2  [facility]

## 2021-07-07 NOTE — ASSESSMENT & PLAN NOTE
- Patient has a history of seizure disorder    - fell down stairs with reported seizure activity x2  Patient has been non-compliant with her home Depakote for the last several weeks     - Home depakote 1500 mg qhs resumed  - Neurology consulted, appreciate recommendations  - giving IV dose of depakote today with continuation of extended release qhs dose as stated

## 2021-07-07 NOTE — UTILIZATION REVIEW
Inpatient Admission Authorization Request   NOTIFICATION OF INPATIENT ADMISSION/INPATIENT AUTHORIZATION REQUEST   SERVICING FACILITY:   [facility]  Address: [address] [place] [address], [address] [place] [address]  Tax ID: [id number]  NPI: [id number]  Place of Service: Inpatient [address] [facility]  [address]  Place of Service Code: 24     ATTENDING PROVIDER:  Attending Name and NPI#: Leland Pierce [[id number]]  Address: [address] [place] [address]  Phone: [phone]     UTILIZATION REVIEW CONTACT:  Lynsey Arthur Utilization   Network Utilization Review Department  Phone: [phone]  Fax: [phone]  Email: [email or web address]     PHYSICIAN ADVISORY SERVICES:  FOR QAYN-[id number] REVIEW - MEDICAL NECESSITY DENIAL  Phone: [phone]  Fax: [phone]  Email: [email or web address]     TYPE OF REQUEST:  Inpatient Status     ADMISSION INFORMATION:  ADMISSION DATE/TIME: 7/6/21  2:25 AM  PATIENT DIAGNOSIS CODE/DESCRIPTION:  Seizure ([facility] [address] ) [R56 9]  Focal motor deficit [R29 898]  Fall down stairs, initial encounter [W10 8XXA]  Head injuries, initial encounter [S09 90XA]  Back injuries, initial encounter [S39 92XA]  Unspecified multiple injuries, initial encounter [T07  XXXA]  DISCHARGE DATE/TIME: No discharge date for patient encounter  DISCHARGE DISPOSITION (IF DISCHARGED): Final discharge disposition not confirmed     IMPORTANT INFORMATION:  Please contact the Lynsey Arthur directly with any questions or concerns regarding this request  Department voicemails are confidential     Send requests for admission clinical reviews, concurrent reviews, approvals, and administrative denials due to lack of clinical to fax [phone]

## 2021-07-07 NOTE — ASSESSMENT & PLAN NOTE
- sumatriptan, benadryl and toradol added by neurology today  - will continue to monitor symptoms for improvement

## 2021-07-07 NOTE — PHYSICAL THERAPY NOTE
PHYSICAL THERAPY EVALUATION  NAME:  Feliberto Jones  DATE: 07/07/21    AGE:   27 y o  Mrn:   [id number]  ADMIT DX:  Seizure ([address] ) [R56 9]  Focal motor deficit [R29 898]  Fall down stairs, initial encounter [W10 8XXA]  Head injuries, initial encounter [S09 90XA]  Back injuries, initial encounter [S39 92XA]  Unspecified multiple injuries, initial encounter [T07  XXXA]    Past Medical History:   Diagnosis Date    Seizure [facility])        History reviewed  No pertinent surgical history  Length Of Stay: 1    PHYSICAL THERAPY EVALUATION:        07/07/21 1035   Note Type   Note type Evaluation   Pain Assessment   Pain Assessment Tool 0-10   Pain Score Worst Possible Pain   Pain Location/Orientation Orientation: Right;Location: Knee   Pain Onset/Description Onset: Ongoing;Frequency: Constant/Continuous; Descriptor: Aching   Effect of Pain on Daily Activities Increased pain with activity   Patient's Stated Pain Goal No pain   Hospital Pain Intervention(s) Ambulation/increased activity;Repositioned   Home Living   Type of [address] Two level;Bed/bath upstairs; Able to live on main level with bedroom/bathroom;Stairs to enter with rails  ([address], able to have [address] if needed )   Home Equipment   (none as per pt )   Additional Comments Patient reports living with her 2 children, significant other, as well as her parents  Patient states her parents and significant other are able to assist as needed   Prior Function   Level of [place] Independent with ADLs and functional mobility   Lives With Significant other;Family   Receives Help From Family   ADL Assistance Independent   Falls in the last 6 months 1 to 4  (4 as per pt )   Comments Patient denies use of an assistive device for ambulation prior to admission   Restrictions/Precautions   Weight Bearing Precautions Per Order No   Other Precautions Chair Alarm; Bed Alarm;Multiple lines; Fall Risk;Pain   General   Additional Pertinent History Throughout entire PT evaluation patient was inconsistent with presentation  Patient was unable to fully participate in lower extremity manual muscle testing due to " pain and weakness" however was able to ambulate with rolling walker to doorway of room  Family/Caregiver Present Yes   Cognition   Overall Cognitive Status WFL   Arousal/Participation Alert   Orientation Level Oriented to person;Oriented to place;Oriented to time   Memory Within functional limits   Following Commands Follows one step commands without difficulty   RUE Assessment   RUE Assessment WFL   LUE Assessment   LUE Assessment WFL   RLE Assessment   RLE Assessment X  (AROM limited by pain )   Strength RLE   RLE Overall Strength 3-/5   LLE Assessment   LLE Assessment WFL   Strength LLE   LLE Overall Strength 3+/5   Bed Mobility   Additional Comments NA, Pt seated OOB in commode at time of PT eval    Transfers   Sit to Stand 4  Minimal assistance   Additional items Assist x 1; Increased time required;Verbal cues   Stand to Sit 4  Minimal assistance   Additional items Assist x 1; Increased time required;Verbal cues   Additional Comments VC and TC needed for hand placement and safety during transfers    Ambulation/Elevation   Gait pattern Excessively slow; Short stride; Foward flexed; Inconsistent sreekanth   Gait Assistance 4  Minimal assist   Additional items Assist x 1   Assistive Device Rolling walker   Distance 8ft   (limited by fatigue, pain )   Balance   Static Sitting Fair -   Static Standing Poor +   Ambulatory Poor +   Endurance Deficit   Endurance Deficit Yes   Endurance Deficit Description fatigue, pain    Activity Tolerance   Activity Tolerance Patient limited by fatigue;Patient limited by pain   Medical Staff Made Aware Julianse, OT; OT present for co evaluation due to pts unstable presentation, new physical limitations, and decreased activity tolerance which limit pts overall physical performance    Nurse Made Aware Pt appropriate to be seen and mobilize per nsg    Assessment   Prognosis Good   Problem List Decreased strength;Decreased range of motion;Decreased endurance; Impaired balance;Decreased mobility;Pain   Assessment Pt is 27 y o  female seen for PT evaluation s/p admit to [facility] on 7/6/2021  Two pt identifiers were used to confirm  Pt presented s/p fall down steps with midthoracic pain, lumbar back pain, bilateral knee pain  Pt was admitted with a primary dx of:  Seizure, focal motor deficit, and other active problems including migraine, bipolar 2 disorder, bilateral lower extremity sensory deficit  MRI C/T/L spine with evidence of L5-S1 disc protrusion with possible impingement of L S1 nerve root as well as multi level disc herniation from T6-T9  PT now consulted for assessment of mobility and d/c needs  Pt with Up in chair orders  Pts current co morbidities affecting treatment include:  Seizure, bipolar disorder,  and personal factors including steps to manage at home  Pts current clinical presentation is Unstable/ Unpredictable (high complexity) due to Ongoing medical management for primary dx, Increased reliance on more restrictive AD compared to baseline, Decreased activity tolerance compared to baseline, Fall risk, Increased assistance needed from caregiver at current time, Continuous pulse oximetry monitoring     Upon evaluation, pt currently is requiring ; Min Ax1 for transfers and Min Ax1 for ambulation w/ RW  Pt presents at PT eval functioning below baseline and currently w/ overall mobility deficits 2* to: BLE weakness, decreased ROM, impaired balance, decreased endurance, gait deviations, pain, decreased activity tolerance compared to baseline, fall risk  Pt currently at a fall risk 2* to impairments listed above  Based on the aforementioned PT evaluation, pt will continue to benefit from skilled Acute PT interventions to address stated impairments; to maximize functional mobility; for ongoing pt/ family training; and DME needs  At conclusion of PT session pt returned back in chair with phone and call bell within reach  Pt denies any further questions at this time  PT is currently recommending rehab vs home with increased support, OPPT pending progress  PT will continue to follow during hospital stay  Barriers to Discharge Inaccessible home environment   Goals   Patient Goals " to sit down"   STG Expiration Date 07/17/21   Short Term Goal #1 In 10 days pt will complete: 1) Bed mobility skills with mod I to increase safety and independence as well as decrease caregiver burden  2) Functional transfers with S to promote increased independence, safety, and QOL  3) Ambulate 150' using least restrictive AD with S without LOB and stable vitals so that pt can negotiate previous living environment safely and promote independence with functional mobility and return to PLOF  4) Stair training up/ down 7 step/s using rail/s with S so that pt can enter/negotiate previous living environment safely and decrease fall risk  5) Improve balance grades by 1/2 grade to increase safety with all mobility and decrease fall risk  6) Improve BLE strength by 1/2 grade to help increase overall functional mobility and decrease fall risk  Plan   Treatment/Interventions Functional transfer training;LE strengthening/ROM; Elevations; Therapeutic exercise; Endurance training;Patient/family training;Equipment eval/education; Bed mobility;Gait training;Spoke to nursing;OT   PT Frequency Other (Comment)  (3-6x a week )   Recommendation   PT Discharge Recommendation   (rehab vs home with increased support, OPPT pend progress )   Equipment Recommended [facility] Recommended Wheeled walker   PT - OK to Discharge Yes  (YES to rehab; NO TO HOME AT [address] )   Teresa Jones 435   Turning in Bed Without Bedrails 3   Lying on Back to Sitting on Edge of Flat Bed 3   Moving Bed to Chair 3   Standing Up From Chair 3   Walk in Room 3   Climb 3-5 Stairs 3 Basic Mobility Inpatient Raw Score 18   Basic Mobility Standardized Score 41 05   Modified Tianna Scale   Modified Tianna Scale 4   Barthel Index   Feeding 10   Bathing 0   Grooming Score 5   Dressing Score 5   Bladder Score 10   Bowels Score 10   Toilet Use Score 10   Transfers (Bed/Chair) Score 10   Mobility (Level Surface) Score 0   Stairs Score 0   Barthel Index Score 60   Portions of the documentation may have been created using voice recognition software  Occasional wrong word or sound alike substitutions may have occurred due to the inherent limitations of the voice recognition software  Read the chart carefully and recognize, using context, where substitutions have occurred      Fred Wren, PT, DPT

## 2021-07-07 NOTE — ASSESSMENT & PLAN NOTE
- Improving  - Some numbness in her RLE, but resolved in the LLE  - MRI C/T/L spine with evidence of L5-S1 disc protrusion with possible impingement of L S1 nerve root as well as multi level disc herniation from T6-T9  - neurosurgery recommending no intervention at this time  - f/u with neurosurgery in 2 weeks clinically for re-evaluation

## 2021-07-07 NOTE — PLAN OF CARE
Problem: PHYSICAL THERAPY ADULT  Goal: Performs mobility at highest level of function for planned discharge setting  See evaluation for individualized goals  Description: Treatment/Interventions: Functional transfer training, LE strengthening/ROM, Elevations, Therapeutic exercise, Endurance training, Patient/family training, Equipment eval/education, Bed mobility, Gait training, Spoke to nursing, OT  Equipment Recommended: Hassan Shone       See flowsheet documentation for full assessment, interventions and recommendations  Note: Prognosis: Good  Problem List: Decreased strength, Decreased range of motion, Decreased endurance, Impaired balance, Decreased mobility, Pain  Assessment: Pt is 27 y o  female seen for PT evaluation s/p admit to [facility] on 7/6/2021  Two pt identifiers were used to confirm  Pt presented s/p fall down steps with midthoracic pain, lumbar back pain, bilateral knee pain  Pt was admitted with a primary dx of:  Seizure, focal motor deficit, and other active problems including migraine, bipolar 2 disorder, bilateral lower extremity sensory deficit  MRI C/T/L spine with evidence of L5-S1 disc protrusion with possible impingement of L S1 nerve root as well as multi level disc herniation from T6-T9  PT now consulted for assessment of mobility and d/c needs  Pt with Up in chair orders  Pts current co morbidities affecting treatment include:  Seizure, bipolar disorder,  and personal factors including steps to manage at home  Pts current clinical presentation is Unstable/ Unpredictable (high complexity) due to Ongoing medical management for primary dx, Increased reliance on more restrictive AD compared to baseline, Decreased activity tolerance compared to baseline, Fall risk, Increased assistance needed from caregiver at current time, Continuous pulse oximetry monitoring     Upon evaluation, pt currently is requiring ; Min Ax1 for transfers and Min Ax1 for ambulation w/ RW   Pt presents at PT eval functioning below baseline and currently w/ overall mobility deficits 2* to: BLE weakness, decreased ROM, impaired balance, decreased endurance, gait deviations, pain, decreased activity tolerance compared to baseline, fall risk  Pt currently at a fall risk 2* to impairments listed above  Based on the aforementioned PT evaluation, pt will continue to benefit from skilled Acute PT interventions to address stated impairments; to maximize functional mobility; for ongoing pt/ family training; and DME needs  At conclusion of PT session pt returned back in chair with phone and call bell within reach  Pt denies any further questions at this time  PT is currently recommending rehab vs home with increased support, OPPT pending progress  PT will continue to follow during hospital stay  Barriers to Discharge: Inaccessible home environment        PT Discharge Recommendation:  (rehab vs home with increased support, OPPT pend progress )     PT - OK to Discharge: Yes (YES to rehab; NO TO HOME AT [facility] )    See flowsheet documentation for full assessment

## 2021-07-07 NOTE — ASSESSMENT & PLAN NOTE
Bilateral lower extremity weakness s/p fall down stairs/seizure  · Witnessed seizure and fall down a flight of steps  · On presentation complaining of bilateral LE numbness  · On Depakote maintenance  Imaging:  · MRI cervical spine wo 7/6/21:  Normal MRI of the cervical spine  · MRI thoracic spine wo 7/6/21: No cord signal abnormality  2   Multilevel disc herniation, pronounced at levels T6 through T9 indenting ventral thecal sac with mild canal narrowing  · MRI lumbar spine wo 7/6/21: Central disc protrusion at level L5-S1 contacting bilateral S1 nerve roots with suggested impingement of left S1 nerve root  Correlate for S1 radiculopathy  2   Disc bulge at levels L3-4 and L4-5 resulting in mild canal stenosis  · CT cervical spine 7/6/21: No cervical spine fracture or traumatic malalignment  Plan:  · Frequent neuro checks  · MRI imaging without etiology for LE weakness at presentation  Weakness continues to show some improvement  · Pain control and medical management per trauma team   · Cleared to work with PT/OT  · No spinal bracing required at this time  · Possible neuropraxia injury resulting in improving and transient weakness  · Straight cath x2  Pending spontaneous void  · DVT ppx: SCDs, Lovenox  Neurosurgery will sign off and follow up clinically in 2 weeks to re-examine patient for continued neurological improvement  Please call with any questions or concerns

## 2021-07-07 NOTE — PLAN OF CARE
Problem: OCCUPATIONAL THERAPY ADULT  Goal: Performs self-care activities at highest level of function for planned discharge setting  See evaluation for individualized goals  Description: Treatment Interventions: ADL retraining, Functional transfer training, UE strengthening/ROM, Endurance training, Patient/family training, Equipment evaluation/education, Compensatory technique education, Energy conservation, Activityengagement          See flowsheet documentation for full assessment, interventions and recommendations  Note: Limitation: Decreased ADL status, Decreased Safe judgement during ADL, Decreased endurance, Decreased self-care trans, Decreased high-level ADLs  Prognosis: Good  Assessment: 28 YO Female SEEN FOR INITIAL OCCUPATIONAL THERAPY EVALUATION FOLLOWING ADMISSION TO [facility] S/P FALL DOWN STEPS WITH REPORTED B/L SENSORY DEFICITS AND FOCAL MOTOR DEFICIT  MRI C/T/L spine with evidence of L5-S1 disc protrusion with possible impingement of L S1 nerve root as well as multi level disc herniation from T6-T9  PROBLEMS LIST INCLUDES MIGRAINE, SEIZURES, H/O MULTIPLE FALLS AND BIPOLAR DISORDER  PT IS FROM HOME WITH FAMILY WHERE SHE REPORTS BEING INDEPENDENT WITH ADLS/IADLS/DRIVING PTA  PT CURRENTLY REQUIRES OVERALL MIN A WITH ADLS, TRANSFERS AND FUNCTIONAL MOBILITY WITH USE OF RW  PATIENT WITH INCONSISTENT PRESENTATION OF DEFICITS THROUGHOUT SESSION  APPEARS AS THOUGH SYMPTOMS WORSEN WHEN ASKED TO FORMALLY ASSESS  PT PRESENTS WITH IMPROVEMENT WHEN DISTRACTED  PT IS LIMITED 2' PAIN, FATIGUE, IMPAIRED BALANCE, FALL RISK , OVERALL WEAKNESS/DECONDITIONING ,B/L SENSORY DEFICITS REPORTED,  DIZZINESS WITH CHANGE OF POSITIONING , INACCESSIBLE HOME ENVIRONMENT and OVERALL LIMITED ACTIVITY TOLERANCE   PT EDUCATED ON DEEP BREATHING TECHNIQUES T/O ACTIVITY, SLOWING OF PACE, ENERGY CONSERVATION TECHNIQUES FOR CARRY OVER UPON D/C, INCREASED FAMILY SUPPORT and CONTINUE PARTICIPATION IN SELF-CARE/MOBILITY WITH STAFF WHILE IN THE HOSPITAL   The patient's raw score on the AM-PAC Daily Activity inpatient short form is 20, standardized score is 42 03, greater than 39 4  Patients at this level are likely to benefit from discharge to home  Please refer to the recommendation of the Occupational Therapist for safe discharge planning  FROM AN OCCUPATIONAL THERAPY PERSPECTIVE, RECOMMEND HOME WITH INCREASED FAMILY SUPPORT VS INPT REHAB PENDING PT PROGRESS  WILL CONT TO FOLLOW TO ADDRESS THE BELOW DESCRIBED GOALS  OT Discharge Recommendation: Post acute rehabilitation services (VS HOME WITH FAMILY SUPPORT PENDING PT PROGRESS )  OT - OK to Discharge:  Yes

## 2021-07-07 NOTE — ASSESSMENT & PLAN NOTE
Possible consequence of radiculopathy or neuropathy in setting of multilevel disc disease   F/u w/ nsg o/p

## 2021-07-07 NOTE — UTILIZATION REVIEW
Initial Clinical Review    Admission: Date/Time/Statement:   Admission Orders (From admission, onward)     Ordered        07/06/21 0226  Inpatient Admission  Once                   Orders Placed This Encounter   Procedures    Inpatient Admission     Standing Status:   Standing     Number of Occurrences:   1     Order Specific Question:   Level of Care     Answer:   Level 2 Stepdown / HOT [14]     Order Specific Question:   Estimated length of stay     Answer:   More than 2 Midnights     Order Specific Question:   Certification     Answer:   I certify that inpatient services are medically necessary for this patient for a duration of greater than two midnights  See H&P and MD Progress Notes for additional information about the patient's course of treatment  ED Arrival Information     Expected Arrival Acuity    - 7/6/2021 00:37 Emergent         Means of arrival Escorted by Service Admission type    Helicopter C/Morgan [facility]y [id number] complaint            Chief Complaint   Patient presents with    Fall     fall down 12-14 cqarpeted stairs unkown LOC 2 seizures post fall - thinners       Initial Presentation: 26 y/o female with hx of seizure disorder presents to [place] ED via [facility] after slipping and falling down flight of stairs  Witnesses reported grand mal seizure-like activity for less than a couple minutes  Postictal phase  Patient complaining of generalized headache as well as midthoracic and lumbar back pain in addition to bilateral knee pain and weakness below both knees  Upon exam, abdominal tenderness LLQ and RLQ, BL knees TTP, abrasions noted, midline thoracic and lumbar spine TTP, abrasion noted to right shoulder, motor weakness present, sensory deficit present, GCS 15  See CT results below  Admit Inpatient Level 2 stepdown/HOT unit, keep cervical collar in place, neuro checks, neurosurgery consult, Depakote level      7/6 Neurology Consult:  [facility] seizures on depakote, bipolar disorder, migraine who presents s/p fall down stairs w/ subsequent witnessed seizure activity (2 episodes of generalized shaking lasting less than 30 sec each)  Post-ictal confusion and memory loss noted  Patient's trauma series negative for acute bleed or bony abnormality, but patient remained significantly weak BLE (0-1/5), reportedly waxing and waning throughout the day  Endorses significant pain and HA similar to her chronic migraines  Unclear etiology of original fall but patient endorses increased falls over last few months w/o prodrome or LOC or clear etiology  Of note, patients depakote level undetectable on admission and on further discussion patient admits to having been largely non-compliant over the last few weeks  Patient had a single episode of small volume hematemesis during exam this morning consistent w/ swallowing blood from what appears to be traumatic tongue and cheek injury  Endorses urinary retention since the fall  Plan:  Continue depakote, ativan prn, seizure precautions, encourage fluids, zofran prn, f/up in 2 wks  Date: 7/7/21   Day 2:   Per Trauma Progress Note:  The patient had a MRI of the cervical spine demonstrating no acute injury  On exam, the patient had no midline point tenderness or paresthesias/numbness/weakness in the extremities  The patient had full range of motion (was then able to flex, extend, and rotate head laterally) without pain  There were no distracting injuries and the patient was not intoxicated  The patient's cervical spine was cleared radiologically and clinically  Cervical collar removed at this time  Per Neurosurgery Progress Notes:  Patient states overall she continues to have some improvement  She states today she is doing better than she was yesterday  Upon entering the room patient was seen moving her legs in order to scratch her left foot  Per nurse Dalila Bowen,  patient has been straight cathed x2 and is due to void shortly  May require Rivera catheter placement  ED Triage Vitals   Temperature Pulse Respirations Blood Pressure SpO2   07/06/21 0049 07/06/21 0049 07/06/21 0049 07/06/21 0049 07/06/21 0049   97 9 °F (36 6 °C) (!) 115 18 117/90 98 %      Temp Source Heart Rate Source Patient Position - Orthostatic VS BP Location FiO2 (%)   07/06/21 0049 07/06/21 0105 07/06/21 0100 07/06/21 0715 --   Tympanic Monitor Lying Right arm       Pain Score       07/06/21 0102       8          Wt Readings from Last 1 Encounters:   07/06/21 115 kg (254 lb 10 1 oz)     Additional Vital Signs:   Date/Time  Temp  Pulse  Resp  BP  MAP (mmHg)  SpO2  O2 Device   07/07/21 0706  98 °F (36 7 °C)  79  20  120/69  --  95 %  --   07/07/21 02:45:55  97 9 °F (36 6 °C)  82  20  118/70  86  95 %  --   07/06/21 22:36:41  98 3 °F (36 8 °C)  94  20  115/73  87  94 %  --   07/06/21 19:10:09  --  73  18  99/59  72  96 %  --   07/06/21 1441  98 1 °F (36 7 °C)  89  17  131/79  --  96 %  --   07/06/21 1055  98 1 °F (36 7 °C)  84  17  131/70  --  96 %  --   07/06/21 0915  98 3 °F (36 8 °C)  81  17  135/69  --  96 %  --   07/06/21 0800  --  86  16  131/63  --  96 %  --   07/06/21 0715  98 2 °F (36 8 °C)  78  16  131/63  90  92 %  None (Room air)   07/06/21 0205  --  90  18  124/80  --  98 %  --   07/06/21 0150  --  94  18  122/72  --  93 %  --   07/06/21 0135  --  90  18  122/67  --  98 %  --   07/06/21 0120  --  96  18  121/70  --  96 %  --   07/06/21 0110  --  102  18  132/70  --  94 %  --   07/06/21 0105  --  97  18  132/76  --  94 %  --   07/06/21 0100  --  99  18  142/88  --  94 %  --       Pertinent Labs/Diagnostic Test Results:   7/6 CT head and c-spine: No acute intracranial abnormality  7/6 CT CAP: No acute traumatic injury identified within the chest, abdomen, or pelvis  7/6 CXR:  No acute cardiopulmonary disease within limitations of supine imaging  7/6 XR LEFT KNEE:  No acute osseous abnormality  7/6 XR RIGHT KNEE:  No acute osseous abnormality    7/6 MRI THORACIC SPINE:  1   No cord signal abnormality  2   Multilevel disc herniation, pronounced at levels T6 through T9 indenting ventral thecal sac with mild canal narrowing  7/6 MRI LUMBAR SPINE:  1  Central disc protrusion at level L5-S1 contacting bilateral S1 nerve roots with suggested impingement of left S1 nerve root  Correlate for S1 radiculopathy  2   Disc bulge at levels L3-4 and L4-5 resulting in mild canal stenosis  7/6 MRI CERVICAL SPINE:  Normal MRI of the cervical spine      Results from last 7 days   Lab Units 07/06/21 0100 07/06/21  0055   WBC Thousand/uL  --  12 33*   HEMOGLOBIN g/dL  --  12 9   I STAT HEMOGLOBIN g/dl 12 9  --    HEMATOCRIT %  --  39 3   HEMATOCRIT, ISTAT % 38  --    PLATELETS Thousands/uL  --  342   NEUTROS ABS Thousands/µL  --  6 97     Results from last 7 days   Lab Units 07/06/21 0100 07/06/21  0055   SODIUM mmol/L  --  138   POTASSIUM mmol/L  --  4 0   CHLORIDE mmol/L  --  109*   CO2 mmol/L  --  26   CO2, I-STAT mmol/L 25  --    ANION GAP mmol/L  --  3*   BUN mg/dL  --  12   CREATININE mg/dL  --  0 78   EGFR ml/min/1 73sq m  --  104   CALCIUM mg/dL  --  9 5     Results from last 7 days   Lab Units 07/06/21  0055   GLUCOSE RANDOM mg/dL 88     Results from last 7 days   Lab Units 07/06/21  0100   PH, HO I-STAT  7 434*   PCO2, HO ISTAT mm HG 36 4*   PO2, HO ISTAT mm HG 50 0*   HCO3, HO ISTAT mmol/L 24 4   I STAT BASE EXC mmol/L 0   I STAT O2 SAT % 86*     Results from last 7 days   Lab Units 07/06/21  0055   PROTIME seconds 13 1   INR  0 99   PTT seconds 32     Results from last 7 days   Lab Units 07/06/21  0605   CLARITY UA  Clear   COLOR UA  Yellow   SPEC GRAV UA  1 044*   PH UA  6 0   GLUCOSE UA mg/dl Negative   KETONES UA mg/dl Negative   BLOOD UA  Negative   PROTEIN UA mg/dl Negative   NITRITE UA  Negative   BILIRUBIN UA  Negative   UROBILINOGEN UA E U /dl 0 2   LEUKOCYTES UA  Negative   WBC UA /hpf 2-4   RBC UA /hpf None Seen   BACTERIA UA /hpf Occasional EPITHELIAL CELLS WET PREP /hpf None Seen     Results from last 7 days   Lab Units 07/06/21  0151   ETHANOL LVL mg/dL <3   ACETAMINOPHEN LVL ug/mL <2*   SALICYLATE LVL mg/dL <3*     ED Treatment:   Medication Administration from 07/06/2021 0037 to 07/06/2021 0915       Date/Time Order Dose Route Action     07/06/2021 0107 iohexol (OMNIPAQUE) 350 MG/ML injection (MULTI-DOSE) 100 mL 100 mL Intravenous Given     07/06/2021 0152 HYDROmorphone (DILAUDID) injection 0 5 mg 0 5 mg Intravenous Given     07/06/2021 0156 ondansetron (ZOFRAN) injection 4 mg 4 mg Intravenous Given     07/06/2021 0758 ondansetron (ZOFRAN) injection 4 mg 4 mg Intravenous Given     07/06/2021 0324 methocarbamol (ROBAXIN) tablet 500 mg 500 mg Oral Given     07/06/2021 0602 HYDROmorphone (DILAUDID) injection 0 5 mg 0 5 mg Intravenous Given        Past Medical History:   Diagnosis Date    Seizure [facility])      Present on Admission:  **None**      Admitting Diagnosis: Seizure ([facility] [address] ) [R56 9]  Focal motor deficit [R29 898]  Fall down stairs, initial encounter [W10 8XXA]  Head injuries, initial encounter [S09 90XA]  Back injuries, initial encounter [S39 92XA]  Unspecified multiple injuries, initial encounter [T07  XXXA]  Age/Sex: 27 y o  female  Admission Orders:  Scheduled Medications:  acetaminophen, 975 mg, Oral, Q8H [address]  ARIPiprazole, 5 mg, Oral, Daily  divalproex sodium, 1,500 mg, Oral, HS  enoxaparin, 30 mg, Subcutaneous, Q12H YADIRA  LORazepam, 0 5 mg, Intravenous, Once  magnesium sulfate, 2 g, Intravenous, Once  methocarbamol, 500 mg, Oral, Q6H YADIRA  senna, 2 tablet, Oral, Daily      Continuous IV Infusions:  NSS at 80 ml/hr     PRN Meds:  gabapentin, 300 mg, Oral, HS PRN  HYDROmorphone, 0 5 mg, Intravenous, Q3H PRN  LORazepam, 2 mg, Intravenous, Once PRN  oxyCODONE, 10 mg, Oral, Q4H PRN x3 thus far  oxyCODONE, 5 mg, Oral, Q4H PRN  promethazine, 25 mg, Intramuscular, Q6H PRN      Seizure precautions  scd  Neuro checks q2h  oob  Pt/ot  IP CONSULT TO NEUROSURGERY  IP CONSULT TO NEUROLOGY    Network Utilization Review Department  ATTENTION: Please call with any questions or concerns to [phone] and carefully listen to the prompts so that you are directed to the right person  All voicemails are confidential   [place] Drop all requests for admission clinical reviews, approved or denied determinations and any other requests to dedicated fax number below belonging to the campus where the patient is receiving treatment   List of dedicated fax numbers for the Facilities:  [address] DENIALS (Administrative/Medical Necessity) [phone]   [address] (Maternity/NICU/Pediatrics) [phone] [address] [place] [address] [address] [address] [place] [address] [address] [facility] [place] [address] [phone]

## 2021-07-08 ENCOUNTER — TELEPHONE (OUTPATIENT)
Dept: NEUROSURGERY | Facility: CLINIC | Age: 26
End: 2021-07-08

## 2021-07-08 VITALS
OXYGEN SATURATION: 98 % | HEART RATE: 74 BPM | SYSTOLIC BLOOD PRESSURE: 128 MMHG | TEMPERATURE: 98.2 F | HEIGHT: 67 IN | DIASTOLIC BLOOD PRESSURE: 79 MMHG | BODY MASS INDEX: 39.97 KG/M2 | WEIGHT: 254.63 LBS | RESPIRATION RATE: 17 BRPM

## 2021-07-08 LAB
ANION GAP SERPL CALCULATED.3IONS-SCNC: 6 MMOL/L (ref 4–13)
BASOPHILS # BLD AUTO: 0.03 THOUSANDS/ΜL (ref 0–0.1)
BASOPHILS NFR BLD AUTO: 0 % (ref 0–1)
BUN SERPL-MCNC: 8 MG/DL (ref 5–25)
CALCIUM SERPL-MCNC: 8.6 MG/DL (ref 8.3–10.1)
CHLORIDE SERPL-SCNC: 109 MMOL/L (ref 100–108)
CO2 SERPL-SCNC: 25 MMOL/L (ref 21–32)
CREAT SERPL-MCNC: 0.62 MG/DL (ref 0.6–1.3)
EOSINOPHIL # BLD AUTO: 0.24 THOUSAND/ΜL (ref 0–0.61)
EOSINOPHIL NFR BLD AUTO: 3 % (ref 0–6)
ERYTHROCYTE [DISTWIDTH] IN BLOOD BY AUTOMATED COUNT: 13.4 % (ref 11.6–15.1)
GFR SERPL CREATININE-BSD FRML MDRD: 123 ML/MIN/1.73SQ M
GLUCOSE SERPL-MCNC: 78 MG/DL (ref 65–140)
HCT VFR BLD AUTO: 35.3 % (ref 34.8–46.1)
HGB BLD-MCNC: 11.4 G/DL (ref 11.5–15.4)
IMM GRANULOCYTES # BLD AUTO: 0.03 THOUSAND/UL (ref 0–0.2)
IMM GRANULOCYTES NFR BLD AUTO: 0 % (ref 0–2)
LYMPHOCYTES # BLD AUTO: 4.24 THOUSANDS/ΜL (ref 0.6–4.47)
LYMPHOCYTES NFR BLD AUTO: 47 % (ref 14–44)
MCH RBC QN AUTO: 26.1 PG (ref 26.8–34.3)
MCHC RBC AUTO-ENTMCNC: 32.3 G/DL (ref 31.4–37.4)
MCV RBC AUTO: 81 FL (ref 82–98)
MONOCYTES # BLD AUTO: 0.47 THOUSAND/ΜL (ref 0.17–1.22)
MONOCYTES NFR BLD AUTO: 5 % (ref 4–12)
NEUTROPHILS # BLD AUTO: 4.11 THOUSANDS/ΜL (ref 1.85–7.62)
NEUTS SEG NFR BLD AUTO: 45 % (ref 43–75)
NRBC BLD AUTO-RTO: 0 /100 WBCS
PLATELET # BLD AUTO: 283 THOUSANDS/UL (ref 149–390)
PMV BLD AUTO: 10.3 FL (ref 8.9–12.7)
POTASSIUM SERPL-SCNC: 3.8 MMOL/L (ref 3.5–5.3)
RBC # BLD AUTO: 4.37 MILLION/UL (ref 3.81–5.12)
SODIUM SERPL-SCNC: 140 MMOL/L (ref 136–145)
VALPROATE FREE SERPL-MCNC: NORMAL UG/ML (ref 6–22)
WBC # BLD AUTO: 9.12 THOUSAND/UL (ref 4.31–10.16)

## 2021-07-08 PROCEDURE — 99238 HOSP IP/OBS DSCHRG MGMT 30/<: CPT | Performed by: SURGERY

## 2021-07-08 PROCEDURE — 97530 THERAPEUTIC ACTIVITIES: CPT

## 2021-07-08 PROCEDURE — 80048 BASIC METABOLIC PNL TOTAL CA: CPT | Performed by: EMERGENCY MEDICINE

## 2021-07-08 PROCEDURE — 97116 GAIT TRAINING THERAPY: CPT

## 2021-07-08 PROCEDURE — 85025 COMPLETE CBC W/AUTO DIFF WBC: CPT | Performed by: EMERGENCY MEDICINE

## 2021-07-08 RX ORDER — METHOCARBAMOL 500 MG/1
500 TABLET, FILM COATED ORAL EVERY 6 HOURS SCHEDULED
Qty: 45 TABLET | Refills: 0 | Status: SHIPPED | OUTPATIENT
Start: 2021-07-08 | End: 2021-07-19

## 2021-07-08 RX ORDER — GABAPENTIN 300 MG/1
300 CAPSULE ORAL
Qty: 30 CAPSULE | Refills: 0 | Status: SHIPPED | OUTPATIENT
Start: 2021-07-08 | End: 2021-07-19

## 2021-07-08 RX ADMIN — ACETAMINOPHEN 975 MG: 325 TABLET, FILM COATED ORAL at 05:12

## 2021-07-08 RX ADMIN — METHOCARBAMOL 500 MG: 500 TABLET, FILM COATED ORAL at 12:24

## 2021-07-08 RX ADMIN — METHOCARBAMOL 500 MG: 500 TABLET, FILM COATED ORAL at 05:12

## 2021-07-08 RX ADMIN — METHOCARBAMOL 500 MG: 500 TABLET, FILM COATED ORAL at 00:10

## 2021-07-08 RX ADMIN — ARIPIPRAZOLE 5 MG: 5 TABLET ORAL at 09:44

## 2021-07-08 RX ADMIN — ENOXAPARIN SODIUM 30 MG: 30 INJECTION, SOLUTION INTRAVENOUS; SUBCUTANEOUS at 09:44

## 2021-07-08 RX ADMIN — SENNOSIDES 17.2 MG: 8.6 TABLET ORAL at 09:44

## 2021-07-08 NOTE — ASSESSMENT & PLAN NOTE
- sustaining the below stated injuries  - PT/OT recommending inpatient rehab, will re-evaluate on 7/8/21  - Re-eval 7/8: PT and OT recommends DC home with family support

## 2021-07-08 NOTE — ASSESSMENT & PLAN NOTE
- MRI C/T/L spine with evidence of L5-S1 disc protrusion with possible impingement of L S1 nerve root as well as multi level disc herniation from T6-T9  - Resolved in bilateral lower extremities  - neurosurgery recommending no intervention at this time  - f/u with neurosurgery in 2 weeks clinically for re-evaluation if needed

## 2021-07-08 NOTE — ASSESSMENT & PLAN NOTE
- Appreciate Neurology consultation  - sumatriptan, benadryl and toradol initiated and has given the patient relief  - Follow up with Neurology outpatient

## 2021-07-08 NOTE — PHYSICAL THERAPY NOTE
Physical Therapy Progress Note     07/08/21 1040   PT Last Visit   PT Visit Date 07/08/21   Note Type   Note Type Treatment   Pain Assessment   Pain Assessment Tool FLACC   Pain Rating: FLACC (Rest) - Face 1   Pain Rating: FLACC (Rest) - Legs 0   Pain Rating: FLACC (Rest) - Activity 0   Pain Rating: FLACC (Rest) - Cry 1   Pain Rating: FLACC (Rest) - Consolability 0   Score: FLACC (Rest) 2   Pain Rating: FLACC (Activity) - Face 2   Pain Rating: FLACC (Activity) - Legs 1   Pain Rating: FLACC (Activity) - Activity 1   Pain Rating: FLACC (Activity) - Cry 2   Pain Rating: FLACC (Activity) - Consolability 0   Score: FLACC (Activity) 6   Restrictions/Precautions   Other Precautions Pain; Fall Risk   Subjective   Subjective Pt encountered R sidelying in bed, pleasant and agreeable to treatment  Reports no new complaints at rest   Does report pain in R knee/hip & low back with activity  Also had dizziness & nausea during 1st gait trial that resided with seated rest    Bed Mobility   Supine to Sit 5  Supervision   Additional items Assist x 1   Transfers   Sit to Stand 5  Supervision   Additional items Assist x 1   Stand to Sit 5  Supervision   Additional items Assist x 1; Armrests; Increased time required;Verbal cues  (for controlled descent)   Ambulation/Elevation   Gait pattern Excessively slow; Step to;Short stride; Foward flexed; Inconsistent sreekanth;Decreased R stance;Decreased foot clearance; Antalgic; Improper Weight shift  (initially walking on lateral aspect of R foot in stance)   Gait Assistance 4  Minimal assist   Additional items Assist x 1   Assistive Device Rolling walker   Distance 40', 50', 30'   Stair Management Assistance 4  Minimal assist   Additional items Assist x 1   Stair Management Technique One rail R;Step to pattern; Sideways   Number of Stairs 3   Balance   Static Sitting Fair   Static Standing Fair -   Ambulatory Fair -   Endurance Deficit   Endurance Deficit Yes   Endurance Deficit Description pain Activity Tolerance   Activity Tolerance Patient tolerated treatment well;Patient limited by pain   Nurse Sharath Hubbard RN   Assessment   Prognosis Good   Problem List Decreased strength;Decreased range of motion;Decreased endurance; Impaired balance;Decreased mobility;Pain   Assessment Pt continues to be limited by pain in all aspects of mobiilty, but was able to ambulate repated distances with seated rests in between without LOB  Did so slowly with gait deviations as noted above due to pain  Was able to improve gait when directed, but unable to progress to reciprocal gait pattern due to RLE pain with hip flexion  pt negotiated steps as noted above without incdient & anticipate she will be able to negotiate steps at home with smilar set up with assist form family  Pt encouraged to maintain activity as tolerable at home & progres ambualtory distances to maximzie strength & reduce risks associated with acute trauma  pt also instructed to avoid lifting & excessive trunk flexion to reduce pain & reduce strain on spine  pt verbalized understanding & confidence in ability to safely manage at home with family's support  CM informed pt is cleared to discharge home with RW & family support  pt declined need for [facility], stating she had counter to hold onto so safely transfer  Will continue to benefit from therapy services to maximize functional endruance & independence with all tasks prior to discharging from acute hospital setting  Goals   Patient Goals to go home   STG Expiration Date 07/17/21   PT Treatment Day 1   Plan   Treatment/Interventions Functional transfer training;LE strengthening/ROM; Elevations; Therapeutic exercise; Endurance training;Patient/family training;Equipment eval/education; Bed mobility;Gait training   Progress Progressing toward goals   PT Frequency Other (Comment)  (3-6x/week)   Recommendation   PT Discharge Recommendation Home with outpatient rehabilitation  (when medically cleared for activity)   Equipment Recommended Liset Duran Package Recommended Wheeled walker   PT - OK to Discharge Yes   AM-PAC Basic Mobility Inpatient   Turning in Bed Without Bedrails 4   Lying on Back to Sitting on Edge of Flat Bed 4   Moving Bed to Chair 4   Standing Up From Chair 4   Walk in Room 3   Climb 3-5 Stairs 3   Basic Mobility Inpatient Raw Score 22   Basic Mobility Standardized Score 47 4   The patient's AM-PAC Basic Mobility Inpatient Short Form Raw Score is 22, Standardized Score is 47 4  A standardized score greater than 42 9 suggests the patient may benefit from discharge to home  Please also refer to the recommendation of the Physical Therapist for safe discharge planning            Rajat Gorman PTA

## 2021-07-08 NOTE — TELEPHONE ENCOUNTER
7/9/21- PT DISCHARGED TO HOME  CONFIRMED 7/22/21 APT   PT IS AWARE     7/8/21- PT [facility] F/U SCHEDULED 7/22/21 7/7/21- (BRIGETTE) S/O  CLINICAL F/U IN [facility]

## 2021-07-08 NOTE — CASE MANAGEMENT
Pt's WC claim number hasn't been generated but the claim has been made  [employer]  [address] [facility], [address]  :  Marshall Rivera  [phone] phone  Martine Avery  [email or web address]    CM sent to billing as well as [employer] for pt's r/w   CM provided walker from [employer]

## 2021-07-08 NOTE — DISCHARGE SUMMARY
[facility]  Discharge- Reza Hoffman [facility] [address] 1995, 27 y o  female MRN: [id number]  Unit/Bed#: DISCHARGE POOL Encounter: [id number]  Primary Care Provider: Magdy Nagy DO   Date and time admitted to hospital: 7/6/2021 12:37 AM    Migraine  Assessment & Plan  - Appreciate Neurology consultation  - sumatriptan, benadryl and toradol initiated and has given the patient relief  - Follow up with Neurology outpatient    Fall down stairs  Assessment & Plan  - sustaining the below stated injuries  - PT/OT recommending inpatient rehab, will re-evaluate on 7/8/21  - Re-eval 7/8: PT and OT recommends DC home with family support    Bipolar 2 disorder ([facility] [address] )  Assessment & Plan  - continue home Abilify 5mg daily  - Follow up with prescribing provider    Sensory deficit, bilateral  Assessment & Plan  - MRI C/T/L spine with evidence of L5-S1 disc protrusion with possible impingement of L S1 nerve root as well as multi level disc herniation from T6-T9  - Resolved in bilateral lower extremities  - neurosurgery recommending no intervention at this time  - f/u with neurosurgery in 2 weeks clinically for re-evaluation if needed    Focal motor deficit  Assessment & Plan  - lower extremity weakness bilaterally much improved  Strength 5+/5 in all muscle groups  - MRI C/T/L spine with evidence of L5-S1 disc protrusion with possible impingement of L S1 nerve root as well as multi level disc herniation from T6-T9  - neurosurgery recommending no intervention at this time  - f/u with neurosurgery in 2 weeks clinically for re-evaluation if needed  - PT and OT recommend DC home    * Seizure [facility])  Assessment & Plan  - Patient has a history of seizure disorder    - fell down stairs with reported seizure activity x2  Patient has been non-compliant with her home Depakote for the last several weeks     - Neurology consulted, appreciate recommendations  - Home depakote 1500 mg qhs resumed  - 7/7 IV dose of depakote  - Follow up with Neurology outpatient      Disposition:   Discharge home with family support and follow-up with Neurology, Neurosurgery if needed        SUBJECTIVE:  Chief Complaint: " I want to go home"    Subjective:  Patient reports that she feels ready to go home after working with physical therapy today  She was able to ambulate independently without numbness, tingling, motor or sensory deficits  No ambulatory dysfunction  Patient reports bladder and bowel function is intact  She reports resolution of her migraine          OBJECTIVE:     Meds/Allergies     Current Facility-Administered Medications:     acetaminophen (TYLENOL) tablet 975 mg, 975 mg, Oral, Q8H [address], Darylene Buoy, MD, 975 mg at 07/08/21 0512    ARIPiprazole (ABILIFY) tablet 5 mg, 5 mg, Oral, Daily, Darylene Buoy, MD, 5 mg at 07/08/21 0944    divalproex sodium (DEPAKOTE ER) 24 hr tablet 1,500 mg, 1,500 mg, Oral, HS, Leslie Sanchez MD, 1,500 mg at 07/07/21 2212    enoxaparin (LOVENOX) subcutaneous injection 30 mg, 30 mg, Subcutaneous, Q12H [address], Marquita Fernandes PA-C, 30 mg at 07/08/21 0944    gabapentin (NEURONTIN) capsule 300 mg, 300 mg, Oral, HS PRN, Leslie Sanchez MD    HYDROmorphone (DILAUDID) injection 0 5 mg, 0 5 mg, Intravenous, Q3H PRN, Darylene Buoy, MD, 0 5 mg at 07/06/21 0602    LORazepam (ATIVAN) injection 0 5 mg, 0 5 mg, Intravenous, Once, Darylene Buoy, MD    LORazepam (ATIVAN) injection 2 mg, 2 mg, Intravenous, Once PRN, Leslie Sanchez MD    methocarbamol (ROBAXIN) tablet 500 mg, 500 mg, Oral, Q6H [address], Darylene Buoy, MD, 500 mg at 07/08/21 1224    oxyCODONE (ROXICODONE) IR tablet 5 mg, 5 mg, Oral, Q4H PRN, Darylene Buoy, MD    promethazine (PHENERGAN) injection 25 mg, 25 mg, Intramuscular, Q6H PRN, Marquita Fernandes PA-C    senna (SENOKOT) tablet 17 2 mg, 2 tablet, Oral, Daily, Darylene Buoy, MD, 17 2 mg at 07/08/21 0944     Vitals:   Vitals:    07/08/21 1032   BP: 128/79   Pulse: 74   Resp: 17   Temp: 98 2 °F (36 8 °C)   SpO2: 98%       Intake/Output:  I/O       07/06 0701 - 07/07 0700 07/07 0701 - 07/08 0700 07/08 0701 - 07/09 0700    P  O  360 600 360    IV Piggyback 50 50     Total Intake(mL/kg) 410 (3 6) 650 (5 7) 360 (3 1)    Urine (mL/kg/hr) 1475 (0 5) 650 (0 2) 1050 (2)    Total Output 4872 057 7435    Net -1065 0 -690           Unmeasured Urine Occurrence  3 x 2 x           Nutrition/GI Proph/Bowel Reg: Senna    Physical Exam:   GENERAL APPEARANCE: Patient in no acute distress  HEENT: NCAT; PERRL, EOMs intact; Mucous membranes moist  NECK / BACK: ROM intact, nontender  CV: Regular rate and rhythm; no murmur/gallops/rubs appreciated  CHEST / LUNGS: Clear to auscultation; no wheezes/rales/rhonci  ABD: NABS; soft; non-distended; non-tender  : Voiding spontaneously  EXT: +2 pulses bilaterally upper & lower extremities; no edema  NEURO: GCS 15; no focal neurologic deficits; neurovascularly intact,   No sensory or motor deficits  5/5  Strength throughout  SKIN: Warm, dry and well perfused; no rash; no jaundice  Invasive Devices     None                  Lab Results: Results: I have personally reviewed pertinent reports  Imaging/EKG Studies: Results: I have personally reviewed pertinent reports  Other Studies:   MRI cervical spine wo contrast   Final Result by Farzana Torres DO (07/06 1408)      Normal MRI of the cervical spine  Workstation performed: [id number]         MRI lumbar spine wo contrast   Final Result by Humphrey Sutton MD (07/06 0804)      1  Central disc protrusion at level L5-S1 contacting bilateral S1 nerve roots with suggested impingement of left S1 nerve root  Correlate for S1 radiculopathy  2   Disc bulge at levels L3-4 and L4-5 resulting in mild canal stenosis  Workstation performed: [id number]         MRI thoracic spine wo contrast   Final Result by Humphrey Sutton MD (07/06 0802)      1  No cord signal abnormality        2   Multilevel disc herniation, pronounced at levels T6 through T9 indenting ventral thecal sac with mild canal narrowing  Workstation performed: [id number]         XR knee 4+ vw left injury   Final Result by Hawa Olsen MD (07/06 5281)      No acute osseous abnormality  Workstation performed: [id number]         XR knee 4+ vw right injury   Final Result by Hawa Olsen MD (07/06 2110)      No acute osseous abnormality  Workstation performed: [id number]         TRAUMA - CT head wo contrast   Final Result by Carol Alston MD (07/06 0133)      No acute intracranial abnormality  Workstation performed: [id number]         TRAUMA - CT spine cervical wo contrast   Final Result by Carol Alston MD (07/06 0132)      No cervical spine fracture or traumatic malalignment  Workstation performed: [id number]         TRAUMA - CT chest abdomen pelvis w contrast   Final Result by Carol Alston MD (07/06 0131)      No acute traumatic injury identified within the chest, abdomen, or pelvis  Workstation performed: [id number]         XR Trauma multiple (SLB/SLRA trauma bay ONLY)   Final Result by Demarco Lai MD (07/06 1206)      No acute cardiopulmonary disease within limitations of supine imaging  Workstation performed: [id number]         XR chest 1 view   Final Result by Demarco Lai MD (07/07 5262)      No acute cardiopulmonary disease within limitations of supine imaging        Workstation performed: [id number]             VTE Prophylaxis:Sequential compression device (Venodyne)  and Enoxaparin (Lovenox)           Medical Problems     Resolved Problems  Date Reviewed: 7/8/2021    None                Admission Date:   Admission Orders (From admission, onward)     Ordered        07/06/21 0226  Inpatient Admission  Once                     Admitting Diagnosis: Seizure ([facility] [address] ) [R56 9]  Focal motor deficit [R29 898]  Fall down stairs, initial encounter [W10 8XXA]  Head injuries, initial encounter [S09 90XA]  Back injuries, initial encounter [S39 92XA]  Unspecified multiple injuries, initial encounter [T07  XXXA]    HPI written by Juan Manuel Skinner MD 7/6 "Flora Mckeon is a 27 y o  female with history of seizure disorder on Depakote who presents as a level B trauma alert status post fall downstairs  Patient states that she believes she was distracted and she slipped and fell down a flight of stairs  Witnesses reported grand mal seizure-like activity for less than a couple minutes  Postictal phase  Patient complaining of generalized headache as well as midthoracic and lumbar back pain in addition to bilateral knee pain and weakness below both knees    "    Procedures Performed:   Orders Placed This Encounter   Procedures    Fast Ultrasound       Summary of Hospital Course:  Please see above and  reference Hospital medical records    Significant Findings, Care, Treatment and Services Provided:  Level B trauma activation with arrival by helicopter  Neurosurgery and neurology consultations and interventions with MRI  Complications: None    Condition at Discharge: good         Discharge instructions/Information to patient and family:   See after visit summary for information provided to patient and family  Provisions for Follow-Up Care:  See after visit summary for information related to follow-up care and any pertinent home health orders  PCP: Sehela Ramos DO    Disposition: Home    Planned Readmission: No    Discharge Statement   I spent 15 minutes discharging the patient  This time was spent on the day of discharge  I had direct contact with the patient on the day of discharge  Additional documentation is required if more than 30 minutes were spent on discharge  Discharge Medications:  See after visit summary for reconciled discharge medications provided to patient and family

## 2021-07-08 NOTE — CASE MANAGEMENT
Pt is NOT A Bundle  Pt is NOT A 30 Day Readmission    CM met with pt to discuss the role of CM, as well as d/c planning  Pt lives with her bf Donal Garcia and two children in a 2 story home which has 7STE and 13 steps inside  Pt has a 1/2 bath (standard toilet) on the 1st floor  Pt's bedroom and main bathroom (walk-in shower with grab bars and standard toilet) are on 2nd floor  Pt drives, works the  at [employer], and reports being fully independent for all ADL/IADLs  Pt's had 1 fall in the last 6 months  Pt owns no DME or living will  Pt reports no hx of mental health, substance abuse, IP rehab, or VNA  Pt is cleared for a home d/c with a walker and OP therapy  CM provided pt with a list of OP therapies in her area  CM left a voicemail for Kavon Emilia [phone] (pt's HR liaison) to obtain the [facility] information so that CM can submit for the rolling walker  Pt will d/c home today @5062 via her bf Ang  CM reviewed d/c planning process including the following: identifying help at home, patient preference for d/c planning needs, Discharge Lounge, [facility] Meds to Bed program, availability of treatment team to discuss questions or concerns patient and/or family may have regarding understanding medications and recognizing signs and symptoms once discharged  CM also encouraged patient to follow up with all recommended appointments after discharge  Patient advised of importance for patient and family to participate in managing patients medical well being

## 2021-07-08 NOTE — PLAN OF CARE
Problem: PHYSICAL THERAPY ADULT  Goal: Performs mobility at highest level of function for planned discharge setting  See evaluation for individualized goals  Description: Treatment/Interventions: Functional transfer training, LE strengthening/ROM, Elevations, Therapeutic exercise, Endurance training, Patient/family training, Equipment eval/education, Bed mobility, Gait training, Spoke to nursing, OT  Equipment Recommended: Paris Santiago       See flowsheet documentation for full assessment, interventions and recommendations  Outcome: Progressing  Note: Prognosis: Good  Problem List: Decreased strength, Decreased range of motion, Decreased endurance, Impaired balance, Decreased mobility, Pain  Assessment: Pt continues to be limited by pain in all aspects of mobiilty, but was able to ambulate repated distances with seated rests in between without LOB  Did so slowly with gait deviations as noted above due to pain  Was able to improve gait when directed, but unable to progress to reciprocal gait pattern due to RLE pain with hip flexion  pt negotiated steps as noted above without incdient & anticipate she will be able to negotiate steps at home with smilar set up with assist form family  Pt encouraged to maintain activity as tolerable at home & progres ambualtory distances to maximzie strength & reduce risks associated with acute trauma  pt also instructed to avoid lifting & excessive trunk flexion to reduce pain & reduce strain on spine  pt verbalized understanding & confidence in ability to safely manage at home with family's support  CM informed pt is cleared to discharge home with RW & family support  pt declined need for [facility], stating she had counter to hold onto so safely transfer  Will continue to benefit from therapy services to maximize functional endruance & independence with all tasks prior to discharging from acute hospital setting    Barriers to Discharge: Inaccessible home environment        PT Discharge Recommendation: Home with outpatient rehabilitation (when medically cleared for activity)     PT - OK to Discharge: Yes    See flowsheet documentation for full assessment

## 2021-07-08 NOTE — DISCHARGE INSTRUCTIONS
From Neuro:  1  You had multiple observed seizures likely at least in part due to not having adequate depakote in your system - you will need to see an epilepsy specialist within 2-4 weeks and should continue on your extended release (ER) depakote at 1500 mg daily at bedtime  2  Your migraines, particularly with the relatively high frequency will likely also be improved with regular depakote (this is a preventive migraine medication)  Acutely you can take a triptan (for example sumatriptan which you got in the hospital) but we would recommend continuing to follow up with neurology or your PCP for management of this medication    Preventive therapy for headaches:   -Over-the-counter supplements: to decrease intensity and frequency of migraines  - Magnesium Oxide 400mg a day  If any diarrhea or upset stomach, decrease dose  as tolerated  - Vitamin B2 200 mg twice a day  May cause the urine to turn yellow which is normal for B 2 to do and is not a sign that you are dehydrated  - Avoid cigarette smoking and carefully manage stroke risk factors such as hypertension, hyperlipidemia, diabetes  - There is evidence that aspirin (100 mg every other day) is effective in the primary treatment of stroke in women over 39years old but no direct evidence that it is useful in preventing stroke related to migraine with aura in a younger population  Reproductive age women: Should take folic acid daily when taking anti-seizure drugs especially Depakote  Women with migraine over age 28 years, and those with aura at any age should consider birth control alternative to combined oral contraceptives, (prefer progesterone only) particularly if they have other vascular risk factors (hypertension, hyperlipidemia, smoking), or a personal or family history of thrombosis (World Health Organization consensus guidelines)   However if is reasonable to recommend lowest possible dose of estrogen to be used if a combined hormonal contraceptive has to be used in migraine patient without aura  Vitamins recommended for patients with headaches:  Coenzyme Q10- Coenzyme Q10 is a fat soluble vitamin  Small amount of Vitamin E containing forms help its absorption  You can search internet for chewable and liquid forms   Fish Whole grains  Beef Spinach  Soy Peanuts  Mackerel Soybeans  Sardines Vegetable oil  Riboflavin (Vitamin B2)- We recommend that your child take Riboflavin with food so that it will be better absorbed  Side effects are not expected  However, your childs urine will likely appear bright yellow  Meats Spinach  Nuts Fish  Cheese Legumes  Eggs Whole grains  Milk Yogurt  Vitamin D  Exposure to sunlight (15-20 minutes daily)   Canned tuna Fortified Milk  Fresh wild salmon Fortified Orange Juice  Pickled Herring Fortified Yogurts  Poached Catfish Fortified cheese  Canned Sardines Fortified breakfast cereals  Canned Mackerel  Cod liver oil (1tbsp)    Headache management instructions  - When patient has a moderate to severe headache, they should seek rest, initiate relaxation and apply cold compresses to the head  - Maintain regular sleep schedule  Adults need at least 7-8 hours of uninterrupted a night  - Limit over the counter medications such as Tylenol, Ibuprofen, Aleve, Excedrin  (No more than 2- 3 times a week or max 10 a month)  - Maintain headache diary  Free RAYMUNDO for a smart phone, which can be used is "Migraine mellissa"  - Limit caffeine to 1-2 cups 8 to 16 oz a day or less  - Avoid dietary trigger  (aged cheese, peanuts, MSG, aspartame and nitrates)  - Patient is to have regular frequent meals to prevent headache onset  - Please drink at least 64 ounces of water a day to help remain hydrated  - If an aura lasts longer than its typical time or if you have any new neurological symptoms (numbness/ tingling/ weakness), please see physician for evaluation      Cognitive behavioral therapy (CBT) is a common type of talk therapy (psychotherapy) were you work with a psychotherapist or therapist   CBT helps you become aware of inaccurate or negative thinking so you can view challenging situations more clearly and respond to them in a more effective way  CBT can be a very helpful tool ? either alone or in combination with other therapies ? in treating mental health disorders (depression, post-traumatic stress disorder (PTSD) or an eating disorder) and chronic pain  CBT can be an effective tool to help anyone learn how to better manage stressful life situations and pain  In some cases, CBT is most effective when it's combined with other treatments, such as antidepressants or other medications  You can start yourself by down loading the gurvinder: [employer]      Mindfulness/Meditation for Treating Migraine  Many people believe that stress is a major trigger for their migraine  This is where mindfulness and meditation can come into play, as they have been known to help reduce migraine severity, duration and acute pain medication use  It may also help to relieve stress and anxiety while improving feelings of well being  Biofeedback involves becoming more aware of the changes that occur in the body and learning how to exert control over generally involuntary functions  Biofeedback allows you to see your vitals in real-time and learn how to stabilize them on your own  There is great evidence that biofeedback can reduce the frequency, intensity, and duration of migraine and tension-type headache  When you're stressed, you may notice elevated heart rates, tightened muscles, and sweating  During biofeedback, you can see these changes on a monitor, then a therapist teaches you exercises to help manage these changes  Woody Vigil for Treating Migraine  The kind of mind/body therapy that yoga can provide may help create relief from migraine   Keeping up with yoga consistently can reduce headache frequency, intensity and duration, so it's important to practice regularly if you plan to use it as a complementary migraine treatment  However, certain types of yoga such as hot yoga may be uncomfortable for people with migraine  Others, such as restorative yoga, may be tolerable even for a patient with chronic migraine  Angeline Masseye can also have a similar benefit for patients with migraine  Specifically, it can help improve balance, which can be very useful for those with vestibular symptoms or vestibular migraine  Acupuncture for Treating Migraine  A traditional [facility] medicine, acupuncture is reported to increase the release of serotonin, dopamine and other chemicals that may help to treat chronic pain, and can be helpful in preventing episodic migraine  There are, however, conflicting results on studies in acupuncture as a treatment for migraine  Basic neck exercises for daily use:    - Neck pathology and poor posture, with straightening of the normal cervical lordosis, can cause headaches  Tightening of the neck muscles can irritate the nerves in the occipital region of the head and cause or worsen head pain  Thus neck strengthening and relaxation exercises, can help improve this particular pain  It is importance to have good posture for improving shoulder, neck, and head pain  - Here are some exercises which should take 5 minutes:     1  Standing, drop your head to one side while continuing to look ahead  Hold for 10 seconds then swap sides  Repeat twice more each side  To increase the stretch, drop the opposite shoulder  2  Standing again, lower your chin to your chest, hold for 10 and then look up to the ceiling and hold for 10  Repeat twice more  3  Next, standing straight again, look over your right shoulder and hold firm for 10 seconds, then over your left shoulder for 10  Repeat this 3 times  4  Finally, while sitting upright, bring your head forward and hold for 10, then all the way back and hold for 10      If this simple exercise does not help improve the posture, we will consider formal physical therapy in the future  Importance of Healthy Sleep:  Behavioral sleep changes can promote restful, regular sleep and reduce headache  Simple changes like establishing consistent sleep and wake-up times, as well as getting between 7 and 8 hours of sleep a day, can make a world of difference  Experts also recommend avoiding substances that impair sleep, like caffeine, nicotine and alcohol, and also suggest winding down before bed to prevent sleep problems  To read more go to https://[employer]  org/resource-library/sleep/    Exercising for migraineurs:  Regular exercise can reduce the frequency and intensity of headaches and migraines  When one exercises, the body releases endorphins, which are the bodys natural painkillers  Exercise reduces stress and helps individuals to sleep at night  Exercising at least 30 to 40 minutes 3 times a week is sufficient for most patients  When exercising, follow this plan to prevent headaches:  - First, stay hydrated before, during, and after exercise  - Second part of the exercise plan is to eat sufficient food about an hour and a half before you exercise  Exercise causes ones blood sugar level to decrease, and it is important to have a source of energy    - Final part of the exercise plan is to warm-up  Do not jump into sudden, vigorous exercise if that triggers a headache or migraine  To read more go to https://[employer]  org/resource-library/effects-of-exercise-on-headaches-and-migraines

## 2021-07-08 NOTE — ASSESSMENT & PLAN NOTE
- lower extremity weakness bilaterally much improved  Strength 5+/5 in all muscle groups     - MRI C/T/L spine with evidence of L5-S1 disc protrusion with possible impingement of L S1 nerve root as well as multi level disc herniation from T6-T9  - neurosurgery recommending no intervention at this time  - f/u with neurosurgery in 2 weeks clinically for re-evaluation if needed  - PT and OT recommend DC home

## 2021-07-09 ENCOUNTER — TRANSITIONAL CARE MANAGEMENT (OUTPATIENT)
Dept: INTERNAL MEDICINE CLINIC | Facility: CLINIC | Age: 26
End: 2021-07-09

## 2021-07-09 LAB — VALPROATE FREE SERPL-MCNC: 46.1 UG/ML (ref 6–22)

## 2021-07-09 NOTE — PROGRESS NOTES
1st attempt-"VMB not set up"  unable to LVM asking pt to return call for TCM documentation and/or appointment        By Matty Dent

## 2021-07-09 NOTE — UTILIZATION REVIEW
Notification of Discharge   This is a Notification of Discharge from our facility [address]  Please be advised that this patient has been discharge from our facility  Below you will find the admission and discharge date and time including the patients disposition  UTILIZATION REVIEW CONTACT:  Emma Kingston  Utilization   Network Utilization Review Department  Phone: [phone] x carefully listen to the prompts  All voicemails are confidential   Email: [email or web address]     PHYSICIAN ADVISORY SERVICES:  FOR TBPW-XX-EYFO REVIEW - MEDICAL NECESSITY DENIAL  Phone: [phone]  Fax: [phone]  Email: [email or web address]     PRESENTATION DATE: 7/6/2021 12:37 AM  OBERVATION ADMISSION DATE:   INPATIENT ADMISSION DATE: 7/6/21  2:25 AM   DISCHARGE DATE: 7/8/2021  5:46 PM  DISPOSITION: Home/Self Care Home/Self Care      IMPORTANT INFORMATION:  Send all requests for admission clinical reviews, approved or denied determinations and any other requests to dedicated fax number below belonging to the campus where the patient is receiving treatment   List of dedicated fax numbers:  [address] DENIALS (Administrative/Medical Necessity) [phone]   [address] (Maternity/NICU/Pediatrics) [phone]   Vik Ortiz [phone]   Isidro Zaragoza [phone]   Jhonatan Jin [phone]   [address] [phone]   [facility]  [phone]   [address] [facility], [facility]  [address] [facility] [place][note 2] [address] [facility] [phone]

## 2021-07-12 NOTE — PROGRESS NOTES
2nd attempt-phone dropping calls  Unable to LVM asking pt to return call for TCM documentation  TCM appt   has been scheduled  for 7/29/12      By Jose Javier

## 2021-07-14 ENCOUNTER — OFFICE VISIT (OUTPATIENT)
Dept: NEUROLOGY | Facility: CLINIC | Age: 26
End: 2021-07-14
Payer: COMMERCIAL

## 2021-07-14 VITALS
HEART RATE: 90 BPM | BODY MASS INDEX: 41.28 KG/M2 | SYSTOLIC BLOOD PRESSURE: 130 MMHG | DIASTOLIC BLOOD PRESSURE: 90 MMHG | WEIGHT: 263 LBS | HEIGHT: 67 IN

## 2021-07-14 DIAGNOSIS — G40.909 SEIZURE DISORDER (HCC): Primary | ICD-10-CM

## 2021-07-14 PROCEDURE — 99214 OFFICE O/P EST MOD 30 MIN: CPT | Performed by: PSYCHIATRY & NEUROLOGY

## 2021-07-14 PROCEDURE — 1036F TOBACCO NON-USER: CPT | Performed by: PSYCHIATRY & NEUROLOGY

## 2021-07-14 RX ORDER — DIVALPROEX SODIUM 500 MG/1
1500 TABLET, EXTENDED RELEASE ORAL DAILY
Qty: 90 TABLET | Refills: 5 | Status: SHIPPED | OUTPATIENT
Start: 2021-07-14 | End: 2021-10-25 | Stop reason: SDUPTHER

## 2021-07-14 NOTE — PROGRESS NOTES
[place] [address] [facility]  Follow Up Visit    Impression/Plan    Ms Stacey Mary is a 27 y o  female with events concerning for seizure and/or nonepileptic spells  Her bipolar disorder is a risk factor for nonepileptic spells  Diagnosis remains uncertain, but the early July event may be more convincing for a generalized tonic-clonic seizure  Interictal EEG has been essentially normal and nondiagnostic  No events have been captured on EEG  EMU admission has been recommended, but was not set up  To more severe events resulting in hospitalization have occurred since her 3/11/2021 visit  Both of the events occurred while she was not taking her divalproex  For now she will continue on divalproex unchanged both for mood and potential seizures  She will keep a calendar to keep track of her less severe events  She will let us know right away if events worsen or if there are additional events concerning for generalized tonic-clonic seizure  Low threshold to admit to epilepsy monitoring unit        Patient Instructions   1  Continue divalproex ER 1500 mg daily  2  Keep seizure calendar  Let us know if there are generalized tonic clonic seizures or if other seizure types worsen and we will consider Epilepsy Monitoring Unit admission  3  Have blood work done in about 1-2 weeks  4  [facility] about [facility]  Diagnoses and all orders for this visit:    Seizure disorder ([facility] [address] )  -     Valproic acid level, total; Future  -     Valproic acid level, free; Future  -     Comprehensive metabolic panel; Future  -     divalproex sodium (DEPAKOTE ER) 500 mg 24 hr tablet; Take 3 tablets (1,500 mg total) by mouth daily        William Schneider is returning to the [place] [address] [facility] for follow up  Interval Events:   [facility]/11[facility]2021   EMU admission as recommended to characterize her events, but she did not schedule despite the attempts by staff to reach out to her  Is not clear exactly how often her mild events are occurring  There were two hospitalizations related to events since last visit  On 4/20/2021 she presented to the [facility] ER after 2 apparent seizures at work  She had headache and dizziness prior to the events  Her coworkers activated EMS and reported the events lasted about 10 seconds each  There were 2 additional 5 second events witnessed by EMS  At the time she reported she had not been taking her Depakote for a few weeks  She was loaded with valproate with subsequent level of 71  She was admitted 7/6/2021 after fall down stairs and then witnessed apparent seizure (generalize shaking x 2 lasting less than 30 seconds)  There was postictal confusions  She reported falls over the last few months without clear cause and without LOC or prodrome  On admission valproate level was undetectable and she admitted to not taking it for a few weeks  There was likely tongue injury  Entire spine MRI revealed evidence of left S1 impingement and multilevel thoracic disc herniation resulting in mild canal narrowing  Current AEDs:  Divalproex  mg tab, 1500 mg daily (from psychiatry)  Medication side effects: None  Medication adherence: was not compliant leading up to 7/6 admission    Event/Seizure semiology:  · Pressure in right occipital region, left sided (arm/face/leg) numbness, loss of consciousness, diffuse body movements for 30-60 seconds, drooling  May cluster with partial recovery in between  Tired afterward  Some with urinary incontinence  · She feels lightheaded, sits down  Feels eyes twitching, like eyes back and forth  Mother has seen her eyes going back and forth  Right pupil gets big  No LOC  May space out a little  Lasts a few seconds  Back to normal in a minute to a couple minutes  No tired afterward and go back to activities     · Morning myoclonus     Special Features  Status epilepticus: yes - repeated events without return to baseline  Self Injury Seizures: no  Precipitating Factors: Pain, stress, tiredness, and flashing lights trigger events     Epilepsy Risk Factors:  Paternal grandmother likely had epilepsy and  when the patient's father was 15  Details uncertain  Brother with Aspergers  Paternal aunt had seizures after TBI     Prior AEDs:  Lacosamide (started early , stopped by Dr Lissette Yu)  Levetiracetam     Prior Evaluation:  About 72 hours of continuous VEEG in 2017: No events  No epileptiform discharges  Mild slowing of PDR  REEG likely while admitted at [facility] 2020: normal  24 hour AEEG 3/9/2021: normal, no events  Brain MRI 2020: normal     History Reviewed: The following were reviewed and updated as appropriate: allergies, current medications, past family history, past medical history, past social history, past surgical history and problem list  S/p tubal ligation       Psychiatric History:  Bipolar disorder  Not following with psychiatry right now  Did virtual intake visit with therapist on 10/2, but didn't setup follow up       Social History:   Driving: No  Lives Alone: No  Lives with parents  Rarely alone  Cares for her 2 young children  Occupation: works at hotel   Works days, unless she does a double shift to cover for someone  Objective    /90 (BP Location: Left arm, Patient Position: Sitting, Cuff Size: Large)   Pulse 90   Ht 5' 7" (1 702 m)   Wt 119 kg (263 lb)   LMP 2021   BMI 41 19 kg/m²      General Exam  No acute distress  Neurologic Exam  Mental Status:  Alert and oriented x 3  Language: normal fluency and comprehension  Cranial Nerves:   No dysarthria  Gait: Normal casual gait  ROS:    Review of Systems   Constitutional: Negative  Negative for appetite change and fever  HENT: Negative  Negative for hearing loss, tinnitus, trouble swallowing and voice change      Eyes: Negative  Negative for photophobia and pain  Respiratory: Negative  Negative for shortness of breath  Cardiovascular: Negative  Negative for palpitations  Gastrointestinal: Negative  Negative for nausea and vomiting  Endocrine: Negative  Negative for cold intolerance  Genitourinary: Negative  Negative for dysuria, frequency and urgency  Musculoskeletal: Negative  Negative for myalgias and neck pain  Skin: Negative  Negative for rash  Neurological: Positive for dizziness, seizures, light-headedness and headaches  Negative for tremors, syncope, facial asymmetry, speech difficulty, weakness and numbness  Hematological: Negative  Does not bruise/bleed easily  Psychiatric/Behavioral: Negative  Negative for confusion, hallucinations and sleep disturbance  ROS reviewed and updated as appropriate

## 2021-07-14 NOTE — PATIENT INSTRUCTIONS
1  Continue divalproex ER 1500 mg daily  2  Keep seizure calendar  Let us know if there are generalized tonic clonic seizures or if other seizure types worsen and we will consider Epilepsy Monitoring Unit admission  3  Have blood work done in about 1-2 weeks  4  Return in about 3 months to see JESSICA Hoffmann

## 2021-07-19 ENCOUNTER — APPOINTMENT (EMERGENCY)
Dept: RADIOLOGY | Facility: HOSPITAL | Age: 26
End: 2021-07-19
Payer: OTHER MISCELLANEOUS

## 2021-07-19 ENCOUNTER — HOSPITAL ENCOUNTER (EMERGENCY)
Facility: HOSPITAL | Age: 26
Discharge: HOME | End: 2021-07-19
Attending: EMERGENCY MEDICINE
Payer: OTHER MISCELLANEOUS

## 2021-07-19 VITALS
BODY MASS INDEX: 41.44 KG/M2 | HEART RATE: 93 BPM | SYSTOLIC BLOOD PRESSURE: 128 MMHG | OXYGEN SATURATION: 96 % | WEIGHT: 264 LBS | HEIGHT: 67 IN | DIASTOLIC BLOOD PRESSURE: 72 MMHG | RESPIRATION RATE: 16 BRPM

## 2021-07-19 DIAGNOSIS — M25.561 RIGHT KNEE PAIN: Primary | ICD-10-CM

## 2021-07-19 PROCEDURE — 96372 THER/PROPH/DIAG INJ SC/IM: CPT

## 2021-07-19 PROCEDURE — 99283 EMERGENCY DEPT VISIT LOW MDM: CPT

## 2021-07-19 PROCEDURE — 73564 X-RAY EXAM KNEE 4 OR MORE: CPT

## 2021-07-19 PROCEDURE — 99283 EMERGENCY DEPT VISIT LOW MDM: CPT | Performed by: EMERGENCY MEDICINE

## 2021-07-19 RX ORDER — DICLOFENAC SODIUM 25 MG/1
25 TABLET, DELAYED RELEASE ORAL 2 TIMES DAILY
Qty: 30 TABLET | Refills: 0 | Status: SHIPPED | OUTPATIENT
Start: 2021-07-19 | End: 2021-08-29 | Stop reason: ALTCHOICE

## 2021-07-19 RX ORDER — KETOROLAC TROMETHAMINE 30 MG/ML
30 INJECTION, SOLUTION INTRAMUSCULAR; INTRAVENOUS ONCE
Status: COMPLETED | OUTPATIENT
Start: 2021-07-19 | End: 2021-07-19

## 2021-07-19 RX ADMIN — KETOROLAC TROMETHAMINE 30 MG: 30 INJECTION, SOLUTION INTRAMUSCULAR at 02:14

## 2021-07-19 NOTE — ED PROVIDER NOTES
History  Chief Complaint   Patient presents with    Knee Pain     pt fell down stairs 1 month ago, states she is having worsening knee pain, in right knee     26 yo F with PMHx absence seizures and generalized tonic-clonic seizures presents to ED for evaluation of R knee pain worsening over the past 24 hours s/p injury 2 weeks ago after fall down stairs  Pt had difficulty standing and bearing weight throughout today due to pain and states she's felt a general "popping" sensation when walking in the recent past  Pt reports she was admitted to B on  after fall at which point she underwent R knee xray showing no acute fracture among other workups  Pt describes R knee pain as intermittent "hot, shooting pain down and up from the knee " She is unsure whether she has reinjured the R knee due to having frequent seizures  Pt also states she feels the R LE is more "numb" compared to L LE but reports this is her baseline especially after seizure episodes  No fevers, no warmth, redness or swelling of R knee  Pt denies N/V/D, CP, abdominal pain or any other Sx  No other concerns  Prior to Admission Medications   Prescriptions Last Dose Informant Patient Reported? Taking?   divalproex sodium (DEPAKOTE ER) 500 mg 24 hr tablet   No No   Sig: Take 3 tablets (1,500 mg total) by mouth daily      Facility-Administered Medications: None       Past Medical History:   Diagnosis Date    Depression     Migraine     Morbid obesity with BMI of 40 0-44 9, adult (HCC)     Obesity (BMI 30-39  9)     Seizure ([address] )     Seizures ([address] )        Past Surgical History:   Procedure Laterality Date     SECTION       SECTION      SHOULDER SURGERY      TUBAL LIGATION      TUMOR REMOVAL      from ear       Family History   Problem Relation Age of Onset    No Known Problems Mother     No Known Problems Father     No Known Problems Sister     No Known Problems Brother     No Known Problems Maternal Aunt     No Known Problems Paternal Aunt     No Known Problems Maternal Uncle     No Known Problems Paternal Uncle     No Known Problems Maternal Grandfather     No Known Problems Maternal Grandmother     No Known Problems Paternal Grandfather     No Known Problems Paternal Grandmother     No Known Problems Cousin     ADD / ADHD Neg Hx     Alcohol abuse Neg Hx     Anxiety disorder Neg Hx     Bipolar disorder Neg Hx     Completed Suicide  Neg Hx     Dementia Neg Hx     Depression Neg Hx     Drug abuse Neg Hx     OCD Neg Hx     Psychiatric Illness Neg Hx     Psychosis Neg Hx     Schizoaffective Disorder  Neg Hx     Schizophrenia Neg Hx     Self-Injury Neg Hx     Suicide Attempts Neg Hx      I have reviewed and agree with the history as documented  E-Cigarette/Vaping    E-Cigarette Use Never User      E-Cigarette/Vaping Substances    Nicotine No     THC No     CBD No     Flavoring No      Social History     Tobacco Use    Smoking status: Never Smoker    Smokeless tobacco: Never Used   Vaping Use    Vaping Use: Never used   Substance Use Topics    Alcohol use: Yes     Comment: socially    Drug use: Never        Review of Systems   Constitutional: Negative  Negative for fever  HENT: Negative  Eyes: Negative  Respiratory: Negative  Cardiovascular: Negative  Negative for chest pain  Gastrointestinal: Negative  Negative for abdominal pain, nausea and vomiting  Endocrine: Negative  Genitourinary: Negative  Musculoskeletal:        R knee pain   Skin: Negative  Allergic/Immunologic: Negative  Neurological: Negative  Negative for headaches  Hematological: Negative  Psychiatric/Behavioral: Negative  All other systems reviewed and are negative        Physical Exam  ED Triage Vitals [07/19/21 0039]   Temp Pulse Respirations Blood Pressure SpO2   -- 93 16 128/72 96 %      Temp src Heart Rate Source Patient Position - Orthostatic VS BP Location FiO2 (%)   -- Monitor Sitting Left arm --      Pain Score       Worst Possible Pain             Orthostatic Vital Signs  Vitals:    07/19/21 0039   BP: 128/72   Pulse: 93   Patient Position - Orthostatic VS: Sitting       Physical Exam  Vitals and nursing note reviewed  Constitutional:       General: She is not in acute distress  Appearance: Normal appearance  She is obese  She is not ill-appearing, toxic-appearing or diaphoretic  HENT:      Head: Normocephalic and atraumatic  Nose: Nose normal    Eyes:      Extraocular Movements: Extraocular movements intact  Cardiovascular:      Rate and Rhythm: Normal rate and regular rhythm  Pulses: Normal pulses  Heart sounds: Normal heart sounds  No murmur heard  Pulmonary:      Effort: Pulmonary effort is normal  No respiratory distress  Breath sounds: Normal breath sounds  Abdominal:      General: Abdomen is flat  Palpations: Abdomen is soft  Tenderness: There is no abdominal tenderness  Musculoskeletal:         General: Tenderness present  Cervical back: Normal range of motion  Comments: Difficult extension and flexion of R knee secondary to pain  No overlying warmth or erythema of R knee  No appreciable edema of R knee compared to L  Point tenderness to proximal edge of R fibular head and superior aspect of R knee cap at midline  No overlying bruising over knee  Valgus, varus stress maneuvers and anterior and posterior drawer tests difficult to perform secondary to pain  Skin:     General: Skin is warm and dry  Neurological:      Mental Status: She is alert and oriented to person, place, and time     Psychiatric:         Mood and Affect: Mood normal          Behavior: Behavior normal          ED Medications  Medications   ketorolac (TORADOL) injection 30 mg (30 mg Intramuscular Given 7/19/21 0214)       Diagnostic Studies  Results Reviewed     None                 XR knee 4+ vw right injury   ED Interpretation by Chance Mahmood MD (07/19 0153)   No acute fractures or bony abnormality            Procedures  Procedures      ED Course                                       MDM  Number of Diagnoses or Management Options  Right knee pain  Diagnosis management comments: 28 yo F with PMHx absence seizures and generalized tonic-clonic seizures presents to ED for evaluation of R knee pain worsening over the past 24 hours s/p injury 2 weeks ago after fall down stairs  Pt remained vitally stable throughout ED course  XR of R knee did not show any acute fracture or bony abnormality  Pt's pain was managed with IM Toradol  Pt was instructed to f/u with orthopedic surgery for outpt MRI and management of potential meniscal tear  Pt agreed with plan  D/c with diclofenac and crutches  All questions answered  No other concerns  Amount and/or Complexity of Data Reviewed  Tests in the radiology section of CPT®: ordered and reviewed    Risk of Complications, Morbidity, and/or Mortality  Presenting problems: moderate  Diagnostic procedures: moderate  Management options: moderate    Patient Progress  Patient progress: stable      Disposition  Final diagnoses:   Right knee pain     Time reflects when diagnosis was documented in both MDM as applicable and the Disposition within this note     Time User Action Codes Description Comment    7/19/2021  2:06 AM Ilda Platt Add [M25 561] Right knee pain       ED Disposition     ED Disposition Condition Date/Time Comment    Discharge Stable Mon Jul 19, 2021  2:05 AM Nazario Jernigan 57 discharge to home/self care  Follow-up Information     Follow up With Specialties Details Why Contact Info Additional [address] [facility], DO Family Medicine In 1 week As needed [address] [facility] [address]  [address] [facility] Orthopedic Surgery Call in 2 days For follow up appointment and outpatient MRI of R knee   [address] [facility] [address] [facility] [phone] 077 [facility], [address], [address], [place], [place], [phone] 26[address] [facility] Emergency Department Emergency Medicine Go to  If symptoms worsen, if you begin having fevers, if worsening R knee pain is accompanied with redness, swelling and warmth of knee  [address] [facility] [address] [facility] Emergency Department, [address], [place], [place], [address]          Patient's Medications   Discharge Prescriptions    DICLOFENAC (VOLTAREN) 25 MG EC TABLET    Take 1 tablet (25 mg total) by mouth 2 (two) times a day       Start Date: 7/19/2021 End Date: --       Order Dose: 25 mg       Quantity: 30 tablet    Refills: 0     No discharge procedures on file  PDMP Review     None           ED Provider  Attending physically available and evaluated Jerson Jernigan 57  I managed the patient along with the ED Attending      Electronically Signed by         Torrie Leyden, MD  07/19/21 8691

## 2021-07-19 NOTE — ED ATTENDING ATTESTATION
7/19/2021  I, Silva Gonzalez MD, saw and evaluated the patient  I have discussed the patient with the resident/non-physician practitioner and agree with the resident's/non-physician practitioner's findings, Plan of Care, and MDM as documented in the resident's/non-physician practitioner's note, except where noted  All available labs and Radiology studies were reviewed  I was present for key portions of any procedure(s) performed by the resident/non-physician practitioner and I was immediately available to provide assistance  At this point I agree with the current assessment done in the Emergency Department  I have conducted an independent evaluation of this patient a history and physical is as follows:    28 y/o female presenting with right knee pain worsening over the last day  Had injury about 2 weeks with fall down stairs  Feels moderate sharp pain with radiation into thigh and lower leg  Worse with weight bearing and flexion/extension  Xray here unremarkable  Plan knee immobilizer/crutches for internal knee derangement, possible meniscus tear  Will advise NSAIDs and ortho f/u      ED Course         Critical Care Time  Procedures

## 2021-07-22 ENCOUNTER — OFFICE VISIT (OUTPATIENT)
Dept: NEUROSURGERY | Facility: CLINIC | Age: 26
End: 2021-07-22
Payer: OTHER MISCELLANEOUS

## 2021-07-22 VITALS
WEIGHT: 264 LBS | HEART RATE: 89 BPM | HEIGHT: 67 IN | DIASTOLIC BLOOD PRESSURE: 92 MMHG | RESPIRATION RATE: 18 BRPM | BODY MASS INDEX: 41.44 KG/M2 | SYSTOLIC BLOOD PRESSURE: 117 MMHG | TEMPERATURE: 97.4 F

## 2021-07-22 DIAGNOSIS — S89.91XA INJURY OF RIGHT KNEE, INITIAL ENCOUNTER: ICD-10-CM
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
## 2021-07-22 DIAGNOSIS — R56.9 SEIZURE (HCC): ICD-10-CM

## 2021-07-22 PROCEDURE — 3008F BODY MASS INDEX DOCD: CPT | Performed by: PSYCHIATRY & NEUROLOGY

## 2021-07-22 PROCEDURE — 99213 OFFICE O/P EST LOW 20 MIN: CPT | Performed by: PHYSICIAN ASSISTANT

## 2021-07-22 NOTE — LETTER
July 22, 2021     Patient: Milagro Jernigan 57   YOB: 1995   Date of Visit: 7/22/2021       To Whom it May Concern:    Suresh Horan is under my professional care  She was seen in my office on 7/22/2021  From a neurosurgical perspective she may returned to all her usual duties, however as she has a  significant right knee injury and remains in a brace, with pending follow-up with Orthopedic surgery, clearance from them to return to work is also advised     In addition she may also may need clearance from her neurologist for return to duties       If you have any questions or concerns, please don't hesitate to call           Sincerely,          Marge Gonzalez PA-C        CC: Mahin Dudley, Adjustor: Marshall Rivera

## 2021-07-22 NOTE — PATIENT INSTRUCTIONS
Gradually return to all usual activities without specific restrictions from a neurosurgical perspective  Keep planned follow-up with orthopedic surgery relative to year right knee injury  Continue to follow with Neurology, Dr Smitha Leon per his protocols  Further follow-up with Neurosurgery will be on an as-needed basis

## 2021-07-22 NOTE — PROGRESS NOTES
Patient ID: Ezra Galindo is a 27 y o  female  Diagnoses and all orders for this visit:    Fall down stairs, subsequent encounter    Seizure [facility])    Injury of right knee, initial encounter          Assessment/Plan:    Very pleasant 78-year-old female, returns for 2 week hospital follow-up, arrives today with a compressive knee brace on the right, reports she has knee pain is told she probably has a torn meniscus  Reports she is doing very well she has back spasms which are significantly decreased and otherwise offers no complaints  She works at [employer] area as a , she was walking out front is reported to have a grand mal seizure, lost her balance, fell and went down a number of steps  This occurred on 7/6/21  (This is reported as a workman's comp case, the claims is through [place],  [address], fax [phone])    She has a diagnosed history of seizure disorder since 2017 but she recollects that she has had seizure-like activity even prior to that  She reports she typically has seizure with stressful activity, and when she misses her anti seizure medication she will have my daily basis  She reports she has regularly taking her anti seizure medicine for the last several months  She reports otherwise she is doing very well she has some occasional back spasm but that is significantly decreased she denies motor or sensory difficulties in either the upper or lower extremities  She denies bowel or bladder incontinence, or perineal anesthesia  She denies headache    There is no recent imaging for review  She has had a complete series of studies include including MRI cervical, thoracic and lumbar spine  There is only mild degenerative changes noted on the studies      On examination today she is awake, alert, oriented x3, motor exam of the upper and lower extremities is 5 x 5 for power, in a standing position she was able to easily lift on her heel and toes, she was and to bend at the waist to greater than 90°  Reflexes triceps, biceps, brachioradialis, patella and Achilles are +2 and symmetric with the exception of the right patella is which was not evaluated secondary to her brace  Sensation was grossly intact to both the upper lower extremities  Her gait balance was within normal limits she was favoring her right knee/leg however secondary to discomfort  At this juncture further follow-up with Neurosurgery will be on an as-needed basis  In addition from a neurosurgical perspective she is cleared/released to return to all usual activities without restriction  She does understand the need to follow with Orthopedic surgery and has a appointment scheduled with Sarahi Cárdenas [facility] AT THE [facility] Orthopedic surgery, 8/5/21 to further evaluate her right knee injury  At this juncture I would advise that she is not able to return to work until she is cleared by Orthopedic surgery  She has been evaluated by her usual neurologist, Dr Jarred Horton, and understands to continue to follow with him per his protocols on a regular basis  These findings, impressions recommendations were reviewed in great detail with the patient, she expressed understanding and agreement, her questions were answered completely into her satisfaction  Return if symptoms worsen or fail to improve  Chief Complaint  Her only complaint today is right knee pain and smoke occasional left flank muscle spasm/discomfort  HPI       The following portions of the patient's history were reviewed and updated as appropriate: allergies, current medications, past family history, past medical history, past social history, past surgical history and problem list     Review of Systems   Constitutional: Negative  HENT: Negative  Eyes: Negative  Respiratory: Negative  Cardiovascular: Negative  Gastrointestinal: Negative  Endocrine: Negative      Genitourinary: Negative  Musculoskeletal: Negative  Skin: Negative  Allergic/Immunologic: Negative  Neurological: Positive for seizures and headaches  H/o Migraines     Hematological: Negative  Psychiatric/Behavioral: Negative  All other systems reviewed and are negative  Objective:    Physical Exam  Constitutional:       Appearance: She is well-developed  HENT:      Head: Normocephalic and atraumatic  Eyes:      Pupils: Pupils are equal, round, and reactive to light  Cardiovascular:      Rate and Rhythm: Normal rate  Pulmonary:      Effort: Pulmonary effort is normal       Breath sounds: Normal breath sounds  Skin:     General: Skin is warm and dry  Neurological:      Mental Status: She is alert and oriented to person, place, and time  Neurologic Exam     Mental Status   Oriented to person, place, and time  Cranial Nerves     CN III, IV, VI   Pupils are equal, round, and reactive to light

## 2021-07-22 NOTE — LETTER
July 22, 2021     [facility], DO  [address] [address]    Patient: Azra Fam   YOB: 1995   Date of Visit: 7/22/2021       Dear Dr Uday Pineda: Thank you for allowing me to participate in the care of Delisa Everett  Below are my notes for this consultation  If you have questions, please do not hesitate to call me  I look forward to following your patient along with you  Sincerely,        Lydia Neumann PA-C        CC: MD Momo Bustos MD Juli Portela:  [id number] [facility]   Lydia Neumann PA-C  7/22/2021  2:58 PM  Sign when Signing Visit  Patient ID: Cherri Mistry is a 27 y o  female  Diagnoses and all orders for this visit:    Fall down stairs, subsequent encounter    Seizure [facility])    Injury of right knee, initial encounter          Assessment/Plan:    Very pleasant 67-year-old female, returns for 2 week hospital follow-up, arrives today with a compressive knee brace on the right, reports she has knee pain is told she probably has a torn meniscus  Reports she is doing very well she has back spasms which are significantly decreased and otherwise offers no complaints  She works at [employer] area as a , she was walking out front is reported to have a grand mal seizure, lost her balance, fell and went down a number of steps  This occurred on 7/6/21  (This is reported as a workman's comp case, the claims is through Stormy Almaraz,  [address], fax [phone])    She has a diagnosed history of seizure disorder since 2017 but she recollects that she has had seizure-like activity even prior to that  She reports she typically has seizure with stressful activity, and when she misses her anti seizure medication she will have my daily basis  She reports she has regularly taking her anti seizure medicine for the last several months      She reports otherwise she is doing very well she has some occasional back spasm but that is significantly decreased she denies motor or sensory difficulties in either the upper or lower extremities  She denies bowel or bladder incontinence, or perineal anesthesia  She denies headache    There is no recent imaging for review  She has had a complete series of studies include including MRI cervical, thoracic and lumbar spine  There is only mild degenerative changes noted on the studies  On examination today she is awake, alert, oriented x3, motor exam of the upper and lower extremities is 5 x 5 for power, in a standing position she was able to easily lift on her heel and toes, she was and to bend at the waist to greater than 90°  Reflexes triceps, biceps, brachioradialis, patella and Achilles are +2 and symmetric with the exception of the right patella is which was not evaluated secondary to her brace  Sensation was grossly intact to both the upper lower extremities  Her gait balance was within normal limits she was favoring her right knee/leg however secondary to discomfort  At this juncture further follow-up with Neurosurgery will be on an as-needed basis  In addition from a neurosurgical perspective she is cleared/released to return to all usual activities without restriction  She does understand the need to follow with Orthopedic surgery and [facility], 8/5/21 to further evaluate her right knee injury  At this juncture I would advise that she is not able to return to work until she is cleared by Orthopedic surgery  She has been evaluated by her usual neurologist, Dr Shon Fabian, and understands to continue to follow with him per his protocols on a regular basis      These findings, impressions recommendations were reviewed in great detail with the patient, she expressed understanding and agreement, her questions were answered completely into her satisfaction  Return if symptoms worsen or fail to improve  Chief Complaint  Her only complaint today is right knee pain and smoke occasional left flank muscle spasm/discomfort  HPI       The following portions of the patient's history were reviewed and updated as appropriate: allergies, current medications, past family history, past medical history, past social history, past surgical history and problem list     Review of Systems   Constitutional: Negative  HENT: Negative  Eyes: Negative  Respiratory: Negative  Cardiovascular: Negative  Gastrointestinal: Negative  Endocrine: Negative  Genitourinary: Negative  Musculoskeletal: Negative  Skin: Negative  Allergic/Immunologic: Negative  Neurological: Positive for seizures and headaches  H/o Migraines     Hematological: Negative  Psychiatric/Behavioral: Negative  All other systems reviewed and are negative  Objective:    Physical Exam  Constitutional:       Appearance: She is well-developed  HENT:      Head: Normocephalic and atraumatic  Eyes:      Pupils: Pupils are equal, round, and reactive to light  Cardiovascular:      Rate and Rhythm: Normal rate  Pulmonary:      Effort: Pulmonary effort is normal       Breath sounds: Normal breath sounds  Skin:     General: Skin is warm and dry  Neurological:      Mental Status: She is alert and oriented to person, place, and time  Neurologic Exam     Mental Status   Oriented to person, place, and time  Cranial Nerves     CN III, IV, VI   Pupils are equal, round, and reactive to light

## 2021-07-22 NOTE — LETTER
July 22, 2021     Tab Conception, DO  [address] [facility] [facility] [id number]    Patient: Raf Fam   YOB: 1995   Date of Visit: 7/22/2021       Dear Dr Aguillon Seat: Thank you for  Joy Hein to me for evaluation  Below are my notes for this consultation  If you have questions, please do not hesitate to call me  I look forward to following your patient along with you  Sincerely,        Gita Rascon PA-C        CC: MD Hawa Bonilla,  Adjustor: Nehemias Taylor PA-C  7/22/2021  2:52 PM  Incomplete  Patient ID: Cindy Gipson is a 27 y o  female  Diagnoses and all orders for this visit:    Fall down stairs, subsequent encounter    Seizure [facility])    Injury of right knee, initial encounter          Assessment/Plan:    Very pleasant 39-year-old female, returns for 2 week hospital follow-up, arrives today with a compressive knee brace on the right, reports she has knee pain is told she probably has a torn meniscus  She works at [employer] area as a , she was walking out front is reported to have a grand mal seizure, lost her balance, fell and went down a number of steps  This occurred on 7/6/21  (This is reported as a workman's comp case, the claims is through Hawa Caban,  [address], fax [phone])    She has a diagnosed history of seizure disorder since 2017 but she recollects that she has had seizure-like activity even prior to that  She reports she typically has seizure with stressful activity, and when she misses her anti seizure medication she will have my daily basis  She reports she has regularly taking her anti seizure medicine for the last several months  She reports otherwise she is doing very well she has some occasional back spasm but that is significantly decreased she denies motor sensory difficulties in either the upper lower extremities    She denies bowel or bladder incontinence, or perineal anesthesia  There is no recent imaging for review  She has had a complete series of studies include including MRI cervical, thoracic and lumbar spine  There is only mild degenerative changes noted on the studies  On examination today she is awake, alert, oriented x3, motor exam of the upper and lower extremities is 5 x 5 for power, in a standing position she was able to easily lift on her heel and toes, she was and to bend at the waist to greater than 90°  Reflexes triceps, biceps, brachioradialis, patella and Achilles are +2 and symmetric with the exception of the right patella is which was not evaluated secondary to her brace  Sensation was grossly intact to both the upper lower extremities  Her gait balance was within normal limits she was favoring her right knee/leg however secondary to discomfort  At this juncture further follow-up with Neurosurgery will be on an as-needed basis  In addition from a neurosurgical perspective she is cleared/released to return to all usual activities without restriction  She does understand the need to follow with Orthopedic surgery and has a appointment scheduled with Berenice Hu, [address] [place] [address] Orthopedic surgery, 8/5/21 to further evaluate her right knee injury  At this juncture I would advise that she is not able to return to work until she is cleared by Orthopedic surgery  She has been evaluated by her usual neurologist, Dr Concepcion Rice, and understands to continue to follow with him per his protocols on a regular basis  These findings, impressions recommendations were reviewed in great detail with the patient, she expressed understanding and agreement, her questions were answered completely into her satisfaction  Return if symptoms worsen or fail to improve  Chief Complaint  Her only complaint today is right knee pain and smoke occasional left flank muscle spasm/discomfort      HPI       The following portions of the patient's history were reviewed and updated as appropriate: allergies, current medications, past family history, past medical history, past social history, past surgical history and problem list     Review of Systems   Constitutional: Negative  HENT: Negative  Eyes: Negative  Respiratory: Negative  Cardiovascular: Negative  Gastrointestinal: Negative  Endocrine: Negative  Genitourinary: Negative  Musculoskeletal: Negative  Skin: Negative  Allergic/Immunologic: Negative  Neurological: Positive for seizures and headaches  H/o Migraines     Hematological: Negative  Psychiatric/Behavioral: Negative  All other systems reviewed and are negative  Objective:    Physical Exam  Constitutional:       Appearance: She is well-developed  HENT:      Head: Normocephalic and atraumatic  Eyes:      Pupils: Pupils are equal, round, and reactive to light  Cardiovascular:      Rate and Rhythm: Normal rate  Pulmonary:      Effort: Pulmonary effort is normal       Breath sounds: Normal breath sounds  Skin:     General: Skin is warm and dry  Neurological:      Mental Status: She is alert and oriented to person, place, and time  Neurologic Exam     Mental Status   Oriented to person, place, and time  Cranial Nerves     CN III, IV, VI   Pupils are equal, round, and reactive to light

## 2021-07-26 ENCOUNTER — EVALUATION (OUTPATIENT)
Dept: PHYSICAL THERAPY | Facility: CLINIC | Age: 26
End: 2021-07-26

## 2021-08-02 LAB
DME PARACHUTE DELIVERY DATE ACTUAL: NORMAL
DME PARACHUTE DELIVERY DATE REQUESTED: NORMAL
DME PARACHUTE ITEM DESCRIPTION: NORMAL
DME PARACHUTE ORDER STATUS: NORMAL
DME PARACHUTE SUPPLIER NAME: NORMAL
DME PARACHUTE SUPPLIER PHONE: NORMAL

## 2021-08-20 ENCOUNTER — HOSPITAL ENCOUNTER (EMERGENCY)
Facility: HOSPITAL | Age: 26
Discharge: HOME | End: 2021-08-20
Attending: EMERGENCY MEDICINE | Admitting: EMERGENCY MEDICINE
Payer: COMMERCIAL

## 2021-08-20 ENCOUNTER — APPOINTMENT (EMERGENCY)
Dept: RADIOLOGY | Facility: HOSPITAL | Age: 26
End: 2021-08-20
Payer: COMMERCIAL

## 2021-08-20 ENCOUNTER — APPOINTMENT (EMERGENCY)
Dept: CT IMAGING | Facility: HOSPITAL | Age: 26
End: 2021-08-20
Payer: COMMERCIAL

## 2021-08-20 VITALS
TEMPERATURE: 98.5 F | SYSTOLIC BLOOD PRESSURE: 140 MMHG | RESPIRATION RATE: 18 BRPM | DIASTOLIC BLOOD PRESSURE: 83 MMHG | HEART RATE: 72 BPM

## 2021-08-20 DIAGNOSIS — G40.919 BREAKTHROUGH SEIZURE (HCC): Primary | ICD-10-CM

## 2021-08-20 LAB
ALBUMIN SERPL BCP-MCNC: 3.7 G/DL (ref 3.5–5)
ALP SERPL-CCNC: 79 U/L (ref 46–116)
ALT SERPL W P-5'-P-CCNC: 23 U/L (ref 12–78)
ANION GAP SERPL CALCULATED.3IONS-SCNC: 12 MMOL/L (ref 4–13)
AST SERPL W P-5'-P-CCNC: 12 U/L (ref 5–45)
BASOPHILS # BLD AUTO: 0.05 THOUSANDS/ΜL (ref 0–0.1)
BASOPHILS NFR BLD AUTO: 0 % (ref 0–1)
BILIRUB SERPL-MCNC: 0.48 MG/DL (ref 0.2–1)
BUN SERPL-MCNC: 13 MG/DL (ref 5–25)
CALCIUM SERPL-MCNC: 8.6 MG/DL (ref 8.3–10.1)
CHLORIDE SERPL-SCNC: 106 MMOL/L (ref 100–108)
CO2 SERPL-SCNC: 22 MMOL/L (ref 21–32)
CREAT SERPL-MCNC: 0.87 MG/DL (ref 0.6–1.3)
EOSINOPHIL # BLD AUTO: 0.13 THOUSAND/ΜL (ref 0–0.61)
EOSINOPHIL NFR BLD AUTO: 1 % (ref 0–6)
ERYTHROCYTE [DISTWIDTH] IN BLOOD BY AUTOMATED COUNT: 14 % (ref 11.6–15.1)
GFR SERPL CREATININE-BSD FRML MDRD: 91 ML/MIN/1.73SQ M
GLUCOSE SERPL-MCNC: 118 MG/DL (ref 65–140)
HCT VFR BLD AUTO: 40.3 % (ref 34.8–46.1)
HGB BLD-MCNC: 13.4 G/DL (ref 11.5–15.4)
IMM GRANULOCYTES # BLD AUTO: 0.03 THOUSAND/UL (ref 0–0.2)
IMM GRANULOCYTES NFR BLD AUTO: 0 % (ref 0–2)
LYMPHOCYTES # BLD AUTO: 3.76 THOUSANDS/ΜL (ref 0.6–4.47)
LYMPHOCYTES NFR BLD AUTO: 33 % (ref 14–44)
MCH RBC QN AUTO: 25.8 PG (ref 26.8–34.3)
MCHC RBC AUTO-ENTMCNC: 33.3 G/DL (ref 31.4–37.4)
MCV RBC AUTO: 78 FL (ref 82–98)
MONOCYTES # BLD AUTO: 0.65 THOUSAND/ΜL (ref 0.17–1.22)
MONOCYTES NFR BLD AUTO: 6 % (ref 4–12)
NEUTROPHILS # BLD AUTO: 6.67 THOUSANDS/ΜL (ref 1.85–7.62)
NEUTS SEG NFR BLD AUTO: 60 % (ref 43–75)
NRBC BLD AUTO-RTO: 0 /100 WBCS
PLATELET # BLD AUTO: 342 THOUSANDS/UL (ref 149–390)
PMV BLD AUTO: 9.7 FL (ref 8.9–12.7)
POTASSIUM SERPL-SCNC: 4.1 MMOL/L (ref 3.5–5.3)
PROT SERPL-MCNC: 7.9 G/DL (ref 6.4–8.2)
RBC # BLD AUTO: 5.2 MILLION/UL (ref 3.81–5.12)
SODIUM SERPL-SCNC: 140 MMOL/L (ref 136–145)
VALPROATE SERPL-MCNC: 51.5 UG/ML (ref 50–100)
WBC # BLD AUTO: 11.29 THOUSAND/UL (ref 4.31–10.16)

## 2021-08-20 PROCEDURE — 99285 EMERGENCY DEPT VISIT HI MDM: CPT

## 2021-08-20 PROCEDURE — 36415 COLL VENOUS BLD VENIPUNCTURE: CPT | Performed by: EMERGENCY MEDICINE

## 2021-08-20 PROCEDURE — 70450 CT HEAD/BRAIN W/O DYE: CPT

## 2021-08-20 PROCEDURE — 99284 EMERGENCY DEPT VISIT MOD MDM: CPT | Performed by: EMERGENCY MEDICINE

## 2021-08-20 PROCEDURE — 80053 COMPREHEN METABOLIC PANEL: CPT | Performed by: EMERGENCY MEDICINE

## 2021-08-20 PROCEDURE — 73070 X-RAY EXAM OF ELBOW: CPT

## 2021-08-20 PROCEDURE — 80164 ASSAY DIPROPYLACETIC ACD TOT: CPT | Performed by: EMERGENCY MEDICINE

## 2021-08-20 PROCEDURE — 85025 COMPLETE CBC W/AUTO DIFF WBC: CPT | Performed by: EMERGENCY MEDICINE

## 2021-08-20 RX ORDER — ACETAMINOPHEN 325 MG/1
975 TABLET ORAL ONCE
Status: DISCONTINUED | OUTPATIENT
Start: 2021-08-20 | End: 2021-08-20 | Stop reason: HOSPADM

## 2021-08-20 NOTE — ED NOTES
Call placed to [facility], who indicated that 34 petitioned by boyfriend has been denied by a [facility] delegate       Mariam Seo, SULMA  08/20/21  [id number]

## 2021-08-20 NOTE — PROGRESS NOTES
Patient transported to CT and Xray completed in room at this time  Pt calm and cooperative      ER, PCA   1140 AM 08/20/21

## 2021-08-20 NOTE — ED NOTES
1:1 in place for pt safety for time being (ER tech remains bedside) due to possible 302  petition from family  Pt currently calm, cooperative and appropriate   Awaiting provider for initial eval     Sung Day RN  08/20/21 [id number]

## 2021-08-29 ENCOUNTER — APPOINTMENT (EMERGENCY)
Dept: CT IMAGING | Facility: HOSPITAL | Age: 26
End: 2021-08-29
Payer: COMMERCIAL

## 2021-08-29 ENCOUNTER — HOSPITAL ENCOUNTER (EMERGENCY)
Facility: HOSPITAL | Age: 26
Discharge: HOME | End: 2021-08-30
Attending: EMERGENCY MEDICINE | Admitting: EMERGENCY MEDICINE
Payer: COMMERCIAL

## 2021-08-29 VITALS
WEIGHT: 269.1 LBS | HEIGHT: 67 IN | RESPIRATION RATE: 18 BRPM | BODY MASS INDEX: 42.24 KG/M2 | DIASTOLIC BLOOD PRESSURE: 84 MMHG | HEART RATE: 95 BPM | TEMPERATURE: 97.9 F | SYSTOLIC BLOOD PRESSURE: 136 MMHG | OXYGEN SATURATION: 97 %

## 2021-08-29 DIAGNOSIS — R10.9 RIGHT SIDED ABDOMINAL PAIN: Primary | ICD-10-CM

## 2021-08-29 LAB
ALBUMIN SERPL BCP-MCNC: 4 G/DL (ref 3.4–4.8)
ALP SERPL-CCNC: 55 U/L (ref 35–140)
ALT SERPL W P-5'-P-CCNC: 12 U/L (ref 5–54)
ANION GAP SERPL CALCULATED.3IONS-SCNC: 8 MMOL/L (ref 4–13)
AST SERPL W P-5'-P-CCNC: 8 U/L (ref 15–41)
BASOPHILS # BLD AUTO: 0.02 THOUSANDS/ΜL (ref 0–0.1)
BASOPHILS NFR BLD AUTO: 0 % (ref 0–1)
BILIRUB SERPL-MCNC: 0.34 MG/DL (ref 0.3–1.2)
BILIRUB UR QL STRIP: NEGATIVE
BUN SERPL-MCNC: 10 MG/DL (ref 6–20)
CALCIUM SERPL-MCNC: 9 MG/DL (ref 8.4–10.2)
CHLORIDE SERPL-SCNC: 106 MMOL/L (ref 96–108)
CLARITY UR: ABNORMAL
CO2 SERPL-SCNC: 26 MMOL/L (ref 22–33)
COLOR UR: YELLOW
CREAT SERPL-MCNC: 0.68 MG/DL (ref 0.4–1.1)
EOSINOPHIL # BLD AUTO: 0.23 THOUSAND/ΜL (ref 0–0.61)
EOSINOPHIL NFR BLD AUTO: 2 % (ref 0–6)
ERYTHROCYTE [DISTWIDTH] IN BLOOD BY AUTOMATED COUNT: 14.5 % (ref 11.6–15.1)
EXT PREG TEST URINE: NEGATIVE
EXT. CONTROL ED NAV: NORMAL
GFR SERPL CREATININE-BSD FRML MDRD: 119 ML/MIN/1.73SQ M
GLUCOSE SERPL-MCNC: 88 MG/DL (ref 65–140)
GLUCOSE UR STRIP-MCNC: NEGATIVE MG/DL
HCG SERPL QL: NEGATIVE
HCT VFR BLD AUTO: 39.3 % (ref 34.8–46.1)
HGB BLD-MCNC: 13.1 G/DL (ref 11.5–15.4)
HGB UR QL STRIP.AUTO: NEGATIVE
IMM GRANULOCYTES # BLD AUTO: 0.06 THOUSAND/UL (ref 0–0.2)
IMM GRANULOCYTES NFR BLD AUTO: 1 % (ref 0–2)
KETONES UR STRIP-MCNC: ABNORMAL MG/DL
LEUKOCYTE ESTERASE UR QL STRIP: NEGATIVE
LIPASE SERPL-CCNC: 19 U/L (ref 13–60)
LYMPHOCYTES # BLD AUTO: 4.77 THOUSANDS/ΜL (ref 0.6–4.47)
LYMPHOCYTES NFR BLD AUTO: 42 % (ref 14–44)
MAGNESIUM SERPL-MCNC: 2.1 MG/DL (ref 1.6–2.6)
MCH RBC QN AUTO: 25.9 PG (ref 26.8–34.3)
MCHC RBC AUTO-ENTMCNC: 33.3 G/DL (ref 31.4–37.4)
MCV RBC AUTO: 78 FL (ref 82–98)
MONOCYTES # BLD AUTO: 0.83 THOUSAND/ΜL (ref 0.17–1.22)
MONOCYTES NFR BLD AUTO: 7 % (ref 4–12)
NEUTROPHILS # BLD AUTO: 5.49 THOUSANDS/ΜL (ref 1.85–7.62)
NEUTS SEG NFR BLD AUTO: 48 % (ref 43–75)
NITRITE UR QL STRIP: NEGATIVE
PH UR STRIP.AUTO: 5 [PH]
PLATELET # BLD AUTO: 327 THOUSANDS/UL (ref 149–390)
PMV BLD AUTO: 10.4 FL (ref 8.9–12.7)
POTASSIUM SERPL-SCNC: 4.2 MMOL/L (ref 3.5–5)
PROT SERPL-MCNC: 6.8 G/DL (ref 6.4–8.3)
PROT UR STRIP-MCNC: NEGATIVE MG/DL
RBC # BLD AUTO: 5.06 MILLION/UL (ref 3.81–5.12)
SODIUM SERPL-SCNC: 140 MMOL/L (ref 133–145)
SP GR UR STRIP.AUTO: >=1.03 (ref 1–1.03)
UROBILINOGEN UR QL STRIP.AUTO: 0.2 E.U./DL
WBC # BLD AUTO: 11.4 THOUSAND/UL (ref 4.31–10.16)

## 2021-08-29 PROCEDURE — 83690 ASSAY OF LIPASE: CPT | Performed by: EMERGENCY MEDICINE

## 2021-08-29 PROCEDURE — 96361 HYDRATE IV INFUSION ADD-ON: CPT

## 2021-08-29 PROCEDURE — 81025 URINE PREGNANCY TEST: CPT | Performed by: EMERGENCY MEDICINE

## 2021-08-29 PROCEDURE — 81003 URINALYSIS AUTO W/O SCOPE: CPT | Performed by: EMERGENCY MEDICINE

## 2021-08-29 PROCEDURE — 85025 COMPLETE CBC W/AUTO DIFF WBC: CPT | Performed by: EMERGENCY MEDICINE

## 2021-08-29 PROCEDURE — 80053 COMPREHEN METABOLIC PANEL: CPT | Performed by: EMERGENCY MEDICINE

## 2021-08-29 PROCEDURE — 74177 CT ABD & PELVIS W/CONTRAST: CPT

## 2021-08-29 PROCEDURE — 99285 EMERGENCY DEPT VISIT HI MDM: CPT | Performed by: EMERGENCY MEDICINE

## 2021-08-29 PROCEDURE — 83735 ASSAY OF MAGNESIUM: CPT | Performed by: EMERGENCY MEDICINE

## 2021-08-29 PROCEDURE — 96375 TX/PRO/DX INJ NEW DRUG ADDON: CPT

## 2021-08-29 PROCEDURE — 84703 CHORIONIC GONADOTROPIN ASSAY: CPT | Performed by: EMERGENCY MEDICINE

## 2021-08-29 PROCEDURE — 96374 THER/PROPH/DIAG INJ IV PUSH: CPT

## 2021-08-29 PROCEDURE — 36415 COLL VENOUS BLD VENIPUNCTURE: CPT | Performed by: EMERGENCY MEDICINE

## 2021-08-29 PROCEDURE — 99284 EMERGENCY DEPT VISIT MOD MDM: CPT

## 2021-08-29 RX ORDER — ONDANSETRON 2 MG/ML
4 INJECTION INTRAMUSCULAR; INTRAVENOUS ONCE
Status: COMPLETED | OUTPATIENT
Start: 2021-08-29 | End: 2021-08-29

## 2021-08-29 RX ORDER — MORPHINE SULFATE 4 MG/ML
4 INJECTION, SOLUTION INTRAMUSCULAR; INTRAVENOUS ONCE
Status: COMPLETED | OUTPATIENT
Start: 2021-08-29 | End: 2021-08-29

## 2021-08-29 RX ADMIN — SODIUM CHLORIDE 1000 ML: 0.9 INJECTION, SOLUTION INTRAVENOUS at 22:34

## 2021-08-29 RX ADMIN — ONDANSETRON 4 MG: 2 INJECTION INTRAMUSCULAR; INTRAVENOUS at 22:36

## 2021-08-29 RX ADMIN — MORPHINE SULFATE 4 MG: 4 INJECTION INTRAVENOUS at 22:36

## 2021-08-29 RX ADMIN — IOHEXOL 100 ML: 350 INJECTION, SOLUTION INTRAVENOUS at 23:25

## 2021-08-30 PROCEDURE — 96361 HYDRATE IV INFUSION ADD-ON: CPT

## 2021-08-30 PROCEDURE — 96375 TX/PRO/DX INJ NEW DRUG ADDON: CPT

## 2021-08-30 RX ORDER — KETOROLAC TROMETHAMINE 30 MG/ML
30 INJECTION, SOLUTION INTRAMUSCULAR; INTRAVENOUS ONCE
Status: COMPLETED | OUTPATIENT
Start: 2021-08-30 | End: 2021-08-30

## 2021-08-30 RX ORDER — ONDANSETRON 2 MG/ML
INJECTION INTRAMUSCULAR; INTRAVENOUS
Status: DISCONTINUED
Start: 2021-08-30 | End: 2021-08-30 | Stop reason: HOSPADM

## 2021-08-30 RX ADMIN — KETOROLAC TROMETHAMINE 30 MG: 30 INJECTION, SOLUTION INTRAMUSCULAR; INTRAVENOUS at 03:23

## 2021-08-30 NOTE — ED PROVIDER NOTES
History  Chief Complaint   Patient presents with    Abdominal Pain     R sided abdominal pain radiates to R back, "twisting" (+) nausea, denies urinary symptoms  Patient is a 57-year-old female seen in the emergency department with concern for intermittent squeezing right upper quadrant abdominal pain which began this morning  Patient states the pain became persistent approximately 1 hour prior to evaluation  Patient notes radiation of the pain to her back  Patient notes some associated nausea  Patient notes no fever, chills, vomiting, chest pain, shortness of breath, diarrhea, weakness, numbness, tingling  Patient notes no other clear exacerbating or alleviating factors for her symptoms  Patient states that she did not take any medication for symptom control prior to arrival in the emergency department  Patient notes history of  x2 in addition to tubal ligation  Prior to Admission Medications   Prescriptions Last Dose Informant Patient Reported? Taking?   divalproex sodium (DEPAKOTE ER) 500 mg 24 hr tablet 2021 at Unknown time  No Yes   Sig: Take 3 tablets (1,500 mg total) by mouth daily      Facility-Administered Medications: None       Past Medical History:   Diagnosis Date    Depression     Migraine     Morbid obesity with BMI of 40 0-44 9, adult (HCC)     Obesity (BMI 30-39  9)     Seizure ([address] )     Seizures ([facility] [address] )        Past Surgical History:   Procedure Laterality Date     SECTION       SECTION      SHOULDER SURGERY      TUBAL LIGATION      TUMOR REMOVAL      from ear       Family History   Problem Relation Age of Onset    No Known Problems Mother     No Known Problems Father     No Known Problems Sister     No Known Problems Brother     No Known Problems Maternal Aunt     No Known Problems Paternal Aunt     No Known Problems Maternal Uncle     No Known Problems Paternal Uncle     No Known Problems Maternal Grandfather     No Known Problems Maternal Grandmother     No Known Problems Paternal Grandfather     No Known Problems Paternal Grandmother     No Known Problems Cousin     ADD / ADHD Neg Hx     Alcohol abuse Neg Hx     Anxiety disorder Neg Hx     Bipolar disorder Neg Hx     Completed Suicide  Neg Hx     Dementia Neg Hx     Depression Neg Hx     Drug abuse Neg Hx     OCD Neg Hx     Psychiatric Illness Neg Hx     Psychosis Neg Hx     Schizoaffective Disorder  Neg Hx     Schizophrenia Neg Hx     Self-Injury Neg Hx     Suicide Attempts Neg Hx      I have reviewed and agree with the history as documented  E-Cigarette/Vaping    E-Cigarette Use Never User      E-Cigarette/Vaping Substances    Nicotine No     THC No     CBD No     Flavoring No      Social History     Tobacco Use    Smoking status: Never Smoker    Smokeless tobacco: Never Used   Vaping Use    Vaping Use: Never used   Substance Use Topics    Alcohol use: Yes     Comment: socially    Drug use: Never       Review of Systems   Constitutional: Negative for chills and fever  HENT: Negative for ear pain and sore throat  Eyes: Negative for pain and visual disturbance  Respiratory: Negative for cough and shortness of breath  Cardiovascular: Negative for chest pain and palpitations  Gastrointestinal: Positive for abdominal pain and nausea  Negative for diarrhea and vomiting  Genitourinary: Negative for dysuria and hematuria  Musculoskeletal: Positive for back pain  Negative for arthralgias  Skin: Negative for color change and rash  Neurological: Negative for seizures and syncope  All other systems reviewed and are negative  Physical Exam  Physical Exam  Vitals and nursing note reviewed  Constitutional:       Appearance: She is well-developed  Comments: appears uncomfortable   HENT:      Head: Normocephalic and atraumatic        Right Ear: External ear normal       Left Ear: External ear normal       Nose: Nose normal  Mouth/Throat:      Pharynx: Oropharynx is clear  Eyes:      General: No scleral icterus  Conjunctiva/sclera: Conjunctivae normal    Cardiovascular:      Rate and Rhythm: Normal rate and regular rhythm  Heart sounds: No murmur heard  Pulmonary:      Effort: Pulmonary effort is normal  No respiratory distress  Breath sounds: Normal breath sounds  Abdominal:      General: There is no distension  Palpations: Abdomen is soft  Tenderness: There is abdominal tenderness  There is guarding  There is no right CVA tenderness or left CVA tenderness  Comments: moderate right upper quadrant tenderness   Musculoskeletal:         General: No deformity or signs of injury  Cervical back: Neck supple  Skin:     General: Skin is warm and dry  Neurological:      General: No focal deficit present  Mental Status: She is alert  Cranial Nerves: No cranial nerve deficit  Sensory: No sensory deficit     Psychiatric:         Mood and Affect: Mood normal          Behavior: Behavior normal          Vital Signs  ED Triage Vitals [08/29/21 2207]   Temperature Pulse Respirations Blood Pressure SpO2   97 9 °F (36 6 °C) 95 18 136/84 97 %      Temp Source Heart Rate Source Patient Position - Orthostatic VS BP Location FiO2 (%)   Oral Monitor Sitting Left arm --      Pain Score       Worst Possible Pain           Vitals:    08/29/21 2207   BP: 136/84   Pulse: 95   Patient Position - Orthostatic VS: Sitting         Visual Acuity      ED Medications  Medications   ondansetron (ZOFRAN) 4 mg/2 mL injection **ADS Override Pull** (has no administration in time range)   ketorolac (TORADOL) injection 30 mg (has no administration in time range)   sodium chloride 0 9 % bolus 1,000 mL (0 mL Intravenous Stopped 8/30/21 0215)   morphine (PF) 4 mg/mL injection 4 mg (4 mg Intravenous Given 8/29/21 2236)   ondansetron (ZOFRAN) injection 4 mg (4 mg Intravenous Given 8/29/21 2236)   iohexol (OMNIPAQUE) 350 MG/ML injection (SINGLE-DOSE) 100 mL (100 mL Intravenous Given 8/29/21 2325)       Diagnostic Studies  Results Reviewed     Procedure Component Value Units Date/Time    hCG, qualitative pregnancy [[id number]]  (Normal) Collected: 08/29/21 2232    Lab Status: Final result Specimen: Blood from Arm, Right Updated: 08/29/21 2309     Preg, Serum Negative    Comprehensive metabolic panel [[id number]]  (Abnormal) Collected: 08/29/21 2232    Lab Status: Final result Specimen: Blood from Arm, Right Updated: 08/29/21 2302     Sodium 140 mmol/L      Potassium 4 2 mmol/L      Chloride 106 mmol/L      CO2 26 mmol/L      ANION GAP 8 mmol/L      BUN 10 mg/dL      Creatinine 0 68 mg/dL      Glucose 88 mg/dL      Calcium 9 0 mg/dL      AST 8 U/L      ALT 12 U/L      Alkaline Phosphatase 55 0 U/L      Total Protein 6 8 g/dL      Albumin 4 0 g/dL      Total Bilirubin 0 34 mg/dL      eGFR 119 ml/min/1 73sq m     Narrative:      [place] guidelines for Chronic Kidney Disease (CKD):     Stage 1 with normal or high GFR (GFR > 90 mL/min/1 73 square meters)    Stage 2 Mild CKD (GFR = 60-89 mL/min/1 73 square meters)    Stage 3A Moderate CKD (GFR = 45-59 mL/min/1 73 square meters)    Stage 3B Moderate CKD (GFR = 30-44 mL/min/1 73 square meters)    Stage 4 Severe CKD (GFR = 15-29 mL/min/1 73 square meters)    Stage 5 End Stage CKD (GFR <15 mL/min/1 73 square meters)  Note: GFR calculation is accurate only with a steady state creatinine    Magnesium [[id number]]  (Normal) Collected: 08/29/21 2232    Lab Status: Final result Specimen: Blood from Arm, Right Updated: 08/29/21 2302     Magnesium 2 1 mg/dL     Lipase [[id number]]  (Normal) Collected: 08/29/21 2232    Lab Status: Final result Specimen: Blood from Arm, Right Updated: 08/29/21 2302     Lipase 19 u/L     CBC and differential [[id number]]  (Abnormal) Collected: 08/29/21 2232    Lab Status: Final result Specimen: Blood from Arm, Right Updated: 08/29/21 2257 WBC 11 40 Thousand/uL      RBC 5 06 Million/uL      Hemoglobin 13 1 g/dL      Hematocrit 39 3 %      MCV 78 fL      MCH 25 9 pg      MCHC 33 3 g/dL      RDW 14 5 %      MPV 10 4 fL      Platelets 798 Thousands/uL      Neutrophils Relative 48 %      Immat GRANS % 1 %      Lymphocytes Relative 42 %      Monocytes Relative 7 %      Eosinophils Relative 2 %      Basophils Relative 0 %      Neutrophils Absolute 5 49 Thousands/µL      Immature Grans Absolute 0 06 Thousand/uL      Lymphocytes Absolute 4 77 Thousands/µL      Monocytes Absolute 0 83 Thousand/µL      Eosinophils Absolute 0 23 Thousand/µL      Basophils Absolute 0 02 Thousands/µL     UA w Reflex to Microscopic w Reflex to Culture [[id number]]  (Abnormal) Collected: 08/29/21 2236    Lab Status: Final result Specimen: Urine, Clean Catch Updated: 08/29/21 2249     Color, UA Yellow     Clarity, UA Slightly Cloudy     Specific Gravity, UA >=1 030     pH, UA 5 0     Leukocytes, UA Negative     Nitrite, UA Negative     Protein, UA Negative mg/dl      Glucose, UA Negative mg/dl      Ketones, UA Trace mg/dl      Urobilinogen, UA 0 2 E U /dl      Bilirubin, UA Negative     Blood, UA Negative    POCT pregnancy, urine [[id number]]  (Normal) Resulted: 08/29/21 2232    Lab Status: Final result Updated: 08/29/21 2233     EXT PREG TEST UR (Ref: Negative) negative     Control valid                 CT abdomen pelvis with contrast   Final Result by Ilir Sanford MD (08/30 3723)      No acute findings in the abdomen or pelvis  Stable findings as above compared to prior recent study dated 7/6/2021  Workstation performed: [id number]                    Procedures  Procedures         ED Course                             SBIRT 22yo+      Most Recent Value   SBIRT (22 yo +)   In order to provide better care to our patients, we are screening all of our patients for alcohol and drug use  Would it be okay to ask you these screening questions?   No Filed at: 08/29/2021 3588 MDM  Number of Diagnoses or Management Options  Right sided abdominal pain  Diagnosis management comments: Patient is a 24-year-old female seen in the emergency department with concern for right-sided abdominal pain, nausea  Patient was treated with medication for symptom control, with good effect  Laboratory evaluation remarkable for elevated white blood cell count 11 40, low MCV of 78, urinalysis positive for trace ketones  CT abdomen/pelvis was obtained to evaluate for acute intra-abdominal abnormality  CT imaging showed no acute abnormality  No definite cause of the patient's symptoms was discovered  Evaluation is not consistent with urinary tract infection, pyelonephritis, appendicitis, diverticulitis, cholecystitis, bowel obstruction, or pancreatitis  Plan to have patient follow up with PCP  Patient stable for discharge home  Discharge instructions were reviewed with patient  Amount and/or Complexity of Data Reviewed  Clinical lab tests: ordered and reviewed  Tests in the radiology section of CPT®: ordered and reviewed        Disposition  Final diagnoses:   Right sided abdominal pain     Time reflects when diagnosis was documented in both MDM as applicable and the Disposition within this note     Time User Action Codes Description Comment    8/30/2021  2:54 AM Carmel Goldman Add [R10 9] Right sided abdominal pain       ED Disposition     ED Disposition Condition Date/Time Comment    Discharge Stable Mon Aug 30, 2021  2:54 AM Kaila Jernigan 57 discharge to home/self care  Follow-up Information     Follow up With Specialties Details Why Contact Burak Joseph DO Family Medicine Call in 1 day  [address]  [phone]            Patient's Medications   Discharge Prescriptions    No medications on file     No discharge procedures on file      PDMP Review     None          ED Provider  Electronically Signed by Alex Daniels MD  08/30/21 7447

## 2021-08-30 NOTE — DISCHARGE INSTRUCTIONS
CT abdomen/pelvis showed no acute abnormality  No definite cause of your symptoms was discovered  Follow up with your primary doctor, and return to the emergency department for new or worsening symptoms

## 2021-09-02 NOTE — ED PROVIDER NOTES
History  Chief Complaint   Patient presents with    Seizure - Prior Hx Of     pt states seizure last night now with right elbow and right knee pain  pt does not recall seizure so is unsure of head strike  pt neurologically intact  pt brought in by police as she was reported missing since last night by family  family is currently in waiting room petitioning a [phone] for this pt  pt states she left home last night because the environment is toxic and not healthy for her  pt denies feeling unsafe at home  pt also denies SI/HI     28 yo f presenting to the emergency department for eval of seizure  Pt had a break through seizure last night, has some elbow pain as well  This occurred during an argument with family, who is allegedly petitioning a [phone]  Pt denies SI or HI at the time  Has a safe environment to which she can return  Prior to Admission Medications   Prescriptions Last Dose Informant Patient Reported? Taking?   divalproex sodium (DEPAKOTE ER) 500 mg 24 hr tablet 2021 at Unknown time  No Yes   Sig: Take 3 tablets (1,500 mg total) by mouth daily      Facility-Administered Medications: None       Past Medical History:   Diagnosis Date    Depression     Migraine     Morbid obesity with BMI of 40 0-44 9, adult (HCC)     Obesity (BMI 30-39  9)     Seizure ([address] )     Seizures ([address] )        Past Surgical History:   Procedure Laterality Date     SECTION       SECTION      SHOULDER SURGERY      TUBAL LIGATION      TUMOR REMOVAL      from ear       Family History   Problem Relation Age of Onset    No Known Problems Mother     No Known Problems Father     No Known Problems Sister     No Known Problems Brother     No Known Problems Maternal Aunt     No Known Problems Paternal Aunt     No Known Problems Maternal Uncle     No Known Problems Paternal Uncle     No Known Problems Maternal Grandfather     No Known Problems Maternal Grandmother     No Known Problems Paternal Grandfather     No Known Problems Paternal Grandmother     No Known Problems Cousin     ADD / ADHD Neg Hx     Alcohol abuse Neg Hx     Anxiety disorder Neg Hx     Bipolar disorder Neg Hx     Completed Suicide  Neg Hx     Dementia Neg Hx     Depression Neg Hx     Drug abuse Neg Hx     OCD Neg Hx     Psychiatric Illness Neg Hx     Psychosis Neg Hx     Schizoaffective Disorder  Neg Hx     Schizophrenia Neg Hx     Self-Injury Neg Hx     Suicide Attempts Neg Hx      I have reviewed and agree with the history as documented  E-Cigarette/Vaping    E-Cigarette Use Never User      E-Cigarette/Vaping Substances    Nicotine No     THC No     CBD No     Flavoring No      Social History     Tobacco Use    Smoking status: Never Smoker    Smokeless tobacco: Never Used   Vaping Use    Vaping Use: Never used   Substance Use Topics    Alcohol use: Yes     Comment: socially    Drug use: Never       Review of Systems   Constitutional: Negative for appetite change, chills, fatigue and fever  HENT: Negative for sneezing and sore throat  Eyes: Negative for visual disturbance  Respiratory: Negative for cough, choking, chest tightness, shortness of breath and wheezing  Cardiovascular: Negative for chest pain and palpitations  Gastrointestinal: Negative for abdominal pain, constipation, diarrhea, nausea and vomiting  Genitourinary: Negative for difficulty urinating and dysuria  Neurological: Positive for seizures  Negative for dizziness, weakness, light-headedness, numbness and headaches  All other systems reviewed and are negative  Physical Exam  Physical Exam  Vitals and nursing note reviewed  Constitutional:       General: She is not in acute distress  Appearance: She is well-developed  She is not diaphoretic  HENT:      Head: Normocephalic and atraumatic  Eyes:      Pupils: Pupils are equal, round, and reactive to light  Neck:      Vascular: No JVD        Trachea: No tracheal deviation  Cardiovascular:      Rate and Rhythm: Normal rate and regular rhythm  Heart sounds: Normal heart sounds  No murmur heard  No friction rub  No gallop  Pulmonary:      Effort: Pulmonary effort is normal  No respiratory distress  Breath sounds: Normal breath sounds  No wheezing or rales  Abdominal:      General: Bowel sounds are normal  There is no distension  Palpations: Abdomen is soft  Tenderness: There is no abdominal tenderness  There is no guarding or rebound  Skin:     General: Skin is warm and dry  Coloration: Skin is not pale  Neurological:      Mental Status: She is alert and oriented to person, place, and time  Cranial Nerves: No cranial nerve deficit  Motor: No abnormal muscle tone     Psychiatric:         Behavior: Behavior normal          Vital Signs  ED Triage Vitals [08/20/21 1047]   Temperature Pulse Respirations Blood Pressure SpO2   98 5 °F (36 9 °C) 72 18 140/83 --      Temp Source Heart Rate Source Patient Position - Orthostatic VS BP Location FiO2 (%)   Oral -- Sitting Right arm --      Pain Score       --           Vitals:    08/20/21 1047   BP: 140/83   Pulse: 72   Patient Position - Orthostatic VS: Sitting         Visual Acuity  Visual Acuity      Most Recent Value   L Pupil Size (mm)  2   R Pupil Size (mm)  2          ED Medications  Medications - No data to display    Diagnostic Studies  Results Reviewed     Procedure Component Value Units Date/Time    Comprehensive metabolic panel [[id number]] Collected: 08/20/21 1134    Lab Status: Final result Specimen: Blood from Arm, Right Updated: 08/20/21 1201     Sodium 140 mmol/L      Potassium 4 1 mmol/L      Chloride 106 mmol/L      CO2 22 mmol/L      ANION GAP 12 mmol/L      BUN 13 mg/dL      Creatinine 0 87 mg/dL      Glucose 118 mg/dL      Calcium 8 6 mg/dL      AST 12 U/L      ALT 23 U/L      Alkaline Phosphatase 79 U/L      Total Protein 7 9 g/dL      Albumin 3 7 g/dL      Total Bilirubin 0 48 mg/dL      eGFR 91 ml/min/1 73sq m     Narrative:      [employer] guidelines for Chronic Kidney Disease (CKD):     Stage 1 with normal or high GFR (GFR > 90 mL/min/1 73 square meters)    Stage 2 Mild CKD (GFR = 60-89 mL/min/1 73 square meters)    Stage 3A Moderate CKD (GFR = 45-59 mL/min/1 73 square meters)    Stage 3B Moderate CKD (GFR = 30-44 mL/min/1 73 square meters)    Stage 4 Severe CKD (GFR = 15-29 mL/min/1 73 square meters)    Stage 5 End Stage CKD (GFR <15 mL/min/1 73 square meters)  Note: GFR calculation is accurate only with a steady state creatinine    Valproic acid level, total [[id number]]  (Normal) Collected: 08/20/21 1134    Lab Status: Final result Specimen: Blood from Arm, Right Updated: 08/20/21 1201     Valproic Acid, Total 51 5 ug/mL     CBC and differential [[id number]]  (Abnormal) Collected: 08/20/21 1134    Lab Status: Final result Specimen: Blood from Arm, Right Updated: 08/20/21 1142     WBC 11 29 Thousand/uL      RBC 5 20 Million/uL      Hemoglobin 13 4 g/dL      Hematocrit 40 3 %      MCV 78 fL      MCH 25 8 pg      MCHC 33 3 g/dL      RDW 14 0 %      MPV 9 7 fL      Platelets 579 Thousands/uL      nRBC 0 /100 WBCs      Neutrophils Relative 60 %      Immat GRANS % 0 %      Lymphocytes Relative 33 %      Monocytes Relative 6 %      Eosinophils Relative 1 %      Basophils Relative 0 %      Neutrophils Absolute 6 67 Thousands/µL      Immature Grans Absolute 0 03 Thousand/uL      Lymphocytes Absolute 3 76 Thousands/µL      Monocytes Absolute 0 65 Thousand/µL      Eosinophils Absolute 0 13 Thousand/µL      Basophils Absolute 0 05 Thousands/µL                  XR elbow 2 views RIGHT   Final Result by Marky Emery MD (08/20 1153)      No acute osseous abnormality              Workstation performed: [id number]         CT head without contrast   Final Result by Imani Peter MD (08/20 1141)      No evidence of acute intracranial process or significant interval change  Workstation performed: [id number]                    Procedures  Procedures         ED Course                                           MDM  Number of Diagnoses or Management Options  Breakthrough seizure [facility])  Diagnosis management comments: 26 yo f with breakthrough sz, alert and oriented here, will check ct, head, labs, AED levels  Medical w/u wnl  No 302 petition has been made at this time, pt feel safe and again denies HI/SI, will discharge  Disposition  Final diagnoses:   Breakthrough seizure ([address] )     Time reflects when diagnosis was documented in both MDM as applicable and the Disposition within this note     Time User Action Codes Description Comment    8/20/2021 12:08 PM Katie Dallas Add [G40 919] Breakthrough seizure [facility])       ED Disposition     ED Disposition Condition Date/Time Comment    Discharge Stable Fri Aug 20, 2021 12:08 PM Paulino Jernigan 57 discharge to home/self care  Follow-up Information     Follow up With Specialties Details Why Contact Info    Davy Suggs, [address] [address]  [phone]            Discharge Medication List as of 8/20/2021 12:08 PM      CONTINUE these medications which have NOT CHANGED    Details   divalproex sodium (DEPAKOTE ER) 500 mg 24 hr tablet Take 3 tablets (1,500 mg total) by mouth daily, Starting Wed 7/14/2021, Normal      diclofenac (VOLTAREN) 25 MG EC tablet Take 1 tablet (25 mg total) by mouth 2 (two) times a day, Starting Mon 7/19/2021, Normal           No discharge procedures on file      PDMP Review     None          ED Provider  Electronically Signed by           Zofia Keane MD  09/01/21 2052

## 2021-09-05 ENCOUNTER — HOSPITAL ENCOUNTER (EMERGENCY)
Facility: HOSPITAL | Age: 26
Discharge: HOME | End: 2021-09-05
Attending: EMERGENCY MEDICINE
Payer: COMMERCIAL

## 2021-09-05 ENCOUNTER — APPOINTMENT (EMERGENCY)
Dept: RADIOLOGY | Facility: HOSPITAL | Age: 26
End: 2021-09-05
Payer: COMMERCIAL

## 2021-09-05 VITALS
SYSTOLIC BLOOD PRESSURE: 126 MMHG | HEART RATE: 86 BPM | WEIGHT: 279.98 LBS | OXYGEN SATURATION: 98 % | HEIGHT: 67 IN | TEMPERATURE: 97.5 F | RESPIRATION RATE: 20 BRPM | DIASTOLIC BLOOD PRESSURE: 62 MMHG | BODY MASS INDEX: 43.94 KG/M2

## 2021-09-05 DIAGNOSIS — M19.90 ARTHRITIS: Primary | ICD-10-CM

## 2021-09-05 LAB
ANION GAP SERPL CALCULATED.3IONS-SCNC: 12 MMOL/L (ref 4–13)
BASOPHILS # BLD AUTO: 0.05 THOUSANDS/ΜL (ref 0–0.1)
BASOPHILS NFR BLD AUTO: 1 % (ref 0–1)
BUN SERPL-MCNC: 15 MG/DL (ref 5–25)
CALCIUM SERPL-MCNC: 8.3 MG/DL (ref 8.3–10.1)
CHLORIDE SERPL-SCNC: 105 MMOL/L (ref 100–108)
CO2 SERPL-SCNC: 23 MMOL/L (ref 21–32)
CREAT SERPL-MCNC: 0.79 MG/DL (ref 0.6–1.3)
CRP SERPL QL: 12.1 MG/L
EOSINOPHIL # BLD AUTO: 0.21 THOUSAND/ΜL (ref 0–0.61)
EOSINOPHIL NFR BLD AUTO: 2 % (ref 0–6)
ERYTHROCYTE [DISTWIDTH] IN BLOOD BY AUTOMATED COUNT: 14.5 % (ref 11.6–15.1)
GFR SERPL CREATININE-BSD FRML MDRD: 102 ML/MIN/1.73SQ M
GLUCOSE SERPL-MCNC: 217 MG/DL (ref 65–140)
HCT VFR BLD AUTO: 38.1 % (ref 34.8–46.1)
HGB BLD-MCNC: 12.4 G/DL (ref 11.5–15.4)
IMM GRANULOCYTES # BLD AUTO: 0.04 THOUSAND/UL (ref 0–0.2)
IMM GRANULOCYTES NFR BLD AUTO: 0 % (ref 0–2)
LYMPHOCYTES # BLD AUTO: 2.9 THOUSANDS/ΜL (ref 0.6–4.47)
LYMPHOCYTES NFR BLD AUTO: 30 % (ref 14–44)
MCH RBC QN AUTO: 25.8 PG (ref 26.8–34.3)
MCHC RBC AUTO-ENTMCNC: 32.5 G/DL (ref 31.4–37.4)
MCV RBC AUTO: 79 FL (ref 82–98)
MONOCYTES # BLD AUTO: 0.7 THOUSAND/ΜL (ref 0.17–1.22)
MONOCYTES NFR BLD AUTO: 7 % (ref 4–12)
NEUTROPHILS # BLD AUTO: 5.77 THOUSANDS/ΜL (ref 1.85–7.62)
NEUTS SEG NFR BLD AUTO: 60 % (ref 43–75)
NRBC BLD AUTO-RTO: 0 /100 WBCS
PLATELET # BLD AUTO: 286 THOUSANDS/UL (ref 149–390)
PMV BLD AUTO: 10.6 FL (ref 8.9–12.7)
POTASSIUM SERPL-SCNC: 4.3 MMOL/L (ref 3.5–5.3)
RBC # BLD AUTO: 4.8 MILLION/UL (ref 3.81–5.12)
SODIUM SERPL-SCNC: 140 MMOL/L (ref 136–145)
URATE SERPL-MCNC: 6.3 MG/DL (ref 2–6.8)
WBC # BLD AUTO: 9.67 THOUSAND/UL (ref 4.31–10.16)

## 2021-09-05 PROCEDURE — 86140 C-REACTIVE PROTEIN: CPT | Performed by: EMERGENCY MEDICINE

## 2021-09-05 PROCEDURE — 86430 RHEUMATOID FACTOR TEST QUAL: CPT | Performed by: EMERGENCY MEDICINE

## 2021-09-05 PROCEDURE — 86618 LYME DISEASE ANTIBODY: CPT | Performed by: EMERGENCY MEDICINE

## 2021-09-05 PROCEDURE — 99284 EMERGENCY DEPT VISIT MOD MDM: CPT

## 2021-09-05 PROCEDURE — 99284 EMERGENCY DEPT VISIT MOD MDM: CPT | Performed by: EMERGENCY MEDICINE

## 2021-09-05 PROCEDURE — 84550 ASSAY OF BLOOD/URIC ACID: CPT | Performed by: EMERGENCY MEDICINE

## 2021-09-05 PROCEDURE — 36415 COLL VENOUS BLD VENIPUNCTURE: CPT | Performed by: EMERGENCY MEDICINE

## 2021-09-05 PROCEDURE — 86038 ANTINUCLEAR ANTIBODIES: CPT | Performed by: EMERGENCY MEDICINE

## 2021-09-05 PROCEDURE — 73610 X-RAY EXAM OF ANKLE: CPT

## 2021-09-05 PROCEDURE — 85025 COMPLETE CBC W/AUTO DIFF WBC: CPT | Performed by: EMERGENCY MEDICINE

## 2021-09-05 PROCEDURE — 80048 BASIC METABOLIC PNL TOTAL CA: CPT | Performed by: EMERGENCY MEDICINE

## 2021-09-05 RX ORDER — DOXYCYCLINE HYCLATE 100 MG
100 TABLET ORAL 2 TIMES DAILY
Qty: 60 TABLET | Refills: 0 | Status: SHIPPED | OUTPATIENT
Start: 2021-09-05 | End: 2021-10-05

## 2021-09-05 NOTE — DISCHARGE INSTRUCTIONS
Doxycycline twice daily  Labs should result over this next week  Follow-up with  your provider this week

## 2021-09-05 NOTE — ED PROVIDER NOTES
History  Chief Complaint   Patient presents with    Ankle Pain     R abkle with pain and swelling x3-4 days, no known trauma     HPI patient is a 64-year-old female, reports over the last few days gradual swelling of her right ankle  Patient reports that she does not remember any specific injury  There is no puncture wound  She denies any fever or chills  Denies any previous joint swelling other than she has a knee problem which she reports is more of an orthopedic injury  Denies any history of rheumatoid arthritis  She denies any recent exposure to Lyme disease  She denies any redness or warmth to the skin of the ankle  She apparently reports just diffuse swelling of her ankle  Patient denies any prior history of joint swelling or gout  Past medical history of depression migraine, seizure, Lyme disease  Family history noncontributory  Social history, nonsmoker no history of drug abuse    Prior to Admission Medications   Prescriptions Last Dose Informant Patient Reported? Taking?   divalproex sodium (DEPAKOTE ER) 500 mg 24 hr tablet   No No   Sig: Take 3 tablets (1,500 mg total) by mouth daily      Facility-Administered Medications: None       Past Medical History:   Diagnosis Date    Depression     Migraine     Morbid obesity with BMI of 40 0-44 9, adult (HCC)     Obesity (BMI 30-39  9)     Seizure ([address]     Seizures ([address] )        Past Surgical History:   Procedure Laterality Date     SECTION       SECTION      SHOULDER SURGERY      TUBAL LIGATION      TUMOR REMOVAL      from ear       Family History   Problem Relation Age of Onset    No Known Problems Mother     No Known Problems Father     No Known Problems Sister     No Known Problems Brother     No Known Problems Maternal Aunt     No Known Problems Paternal Aunt     No Known Problems Maternal Uncle     No Known Problems Paternal Uncle     No Known Problems Maternal Grandfather     No Known Problems Maternal Grandmother     No Known Problems Paternal Grandfather     No Known Problems Paternal Grandmother     No Known Problems Cousin     ADD / ADHD Neg Hx     Alcohol abuse Neg Hx     Anxiety disorder Neg Hx     Bipolar disorder Neg Hx     Completed Suicide  Neg Hx     Dementia Neg Hx     Depression Neg Hx     Drug abuse Neg Hx     OCD Neg Hx     Psychiatric Illness Neg Hx     Psychosis Neg Hx     Schizoaffective Disorder  Neg Hx     Schizophrenia Neg Hx     Self-Injury Neg Hx     Suicide Attempts Neg Hx      I have reviewed and agree with the history as documented  E-Cigarette/Vaping    E-Cigarette Use Never User      E-Cigarette/Vaping Substances    Nicotine No     THC No     CBD No     Flavoring No      Social History     Tobacco Use    Smoking status: Never Smoker    Smokeless tobacco: Never Used   Vaping Use    Vaping Use: Never used   Substance Use Topics    Alcohol use: Yes     Comment: socially    Drug use: Never       Review of Systems   Constitutional: Negative for chills and fever  HENT: Negative for congestion  Eyes: Negative for pain and redness  Respiratory: Negative for cough and shortness of breath  Cardiovascular: Negative for chest pain  Gastrointestinal: Negative for abdominal pain and vomiting  Musculoskeletal: Positive for joint swelling  Physical Exam  Physical Exam  Vitals and nursing note reviewed  Constitutional:       Appearance: She is well-developed  HENT:      Head: Normocephalic  Right Ear: External ear normal       Left Ear: External ear normal       Nose: Nose normal    Eyes:      General: Lids are normal       Pupils: Pupils are equal, round, and reactive to light  Pulmonary:      Effort: Pulmonary effort is normal  No respiratory distress  Musculoskeletal:         General: No deformity  Normal range of motion  Cervical back: Normal range of motion and neck supple  Comments:  There is a right ankle effusion, no redness no warmth minimal tenderness, decreased range of motion secondary fusion, distal neurovascular tendon intact  Skin:     General: Skin is warm and dry  Neurological:      Mental Status: She is alert and oriented to person, place, and time           Vital Signs  ED Triage Vitals [09/05/21 0846]   Temperature Pulse Respirations Blood Pressure SpO2   97 5 °F (36 4 °C) 86 20 126/62 98 %      Temp Source Heart Rate Source Patient Position - Orthostatic VS BP Location FiO2 (%)   Oral Monitor Lying Right arm --      Pain Score       9           Vitals:    09/05/21 0846   BP: 126/62   Pulse: 86   Patient Position - Orthostatic VS: Lying         Visual Acuity      ED Medications  Medications - No data to display    Diagnostic Studies  Results Reviewed     Procedure Component Value Units Date/Time    Basic metabolic panel [[id number]]  (Abnormal) Collected: 09/05/21 0902    Lab Status: Final result Specimen: Blood from Arm, Right Updated: 09/05/21 1002     Sodium 140 mmol/L      Potassium 4 3 mmol/L      Chloride 105 mmol/L      CO2 23 mmol/L      ANION GAP 12 mmol/L      BUN 15 mg/dL      Creatinine 0 79 mg/dL      Glucose 217 mg/dL      Calcium 8 3 mg/dL      eGFR 102 ml/min/1 73sq m     Narrative:      [place] guidelines for Chronic Kidney Disease (CKD):     Stage 1 with normal or high GFR (GFR > 90 mL/min/1 73 square meters)    Stage 2 Mild CKD (GFR = 60-89 mL/min/1 73 square meters)    Stage 3A Moderate CKD (GFR = 45-59 mL/min/1 73 square meters)    Stage 3B Moderate CKD (GFR = 30-44 mL/min/1 73 square meters)    Stage 4 Severe CKD (GFR = 15-29 mL/min/1 73 square meters)    Stage 5 End Stage CKD (GFR <15 mL/min/1 73 square meters)  Note: GFR calculation is accurate only with a steady state creatinine    C-reactive protein [[id number]]  (Abnormal) Collected: 09/05/21 0902    Lab Status: Final result Specimen: Blood from Arm, Right Updated: 09/05/21 1002     CRP 12 1 mg/L     Uric acid [[id number]]  (Normal) Collected: 09/05/21 0902    Lab Status: Final result Specimen: Blood from Arm, Right Updated: 09/05/21 0951     Uric Acid 6 3 mg/dL     CBC and differential [[id number]]  (Abnormal) Collected: 09/05/21 0902    Lab Status: Final result Specimen: Blood from Arm, Right Updated: 09/05/21 0937     WBC 9 67 Thousand/uL      RBC 4 80 Million/uL      Hemoglobin 12 4 g/dL      Hematocrit 38 1 %      MCV 79 fL      MCH 25 8 pg      MCHC 32 5 g/dL      RDW 14 5 %      MPV 10 6 fL      Platelets 069 Thousands/uL      nRBC 0 /100 WBCs      Neutrophils Relative 60 %      Immat GRANS % 0 %      Lymphocytes Relative 30 %      Monocytes Relative 7 %      Eosinophils Relative 2 %      Basophils Relative 1 %      Neutrophils Absolute 5 77 Thousands/µL      Immature Grans Absolute 0 04 Thousand/uL      Lymphocytes Absolute 2 90 Thousands/µL      Monocytes Absolute 0 70 Thousand/µL      Eosinophils Absolute 0 21 Thousand/µL      Basophils Absolute 0 05 Thousands/µL     Rheumatoid factor screen [[id number]] Collected: 09/05/21 0902    Lab Status: In process Specimen: Blood from Arm, Right Updated: 09/05/21 0935    SHEILA Screen w/ Reflex to Titer/Pattern [[id number]] Collected: 09/05/21 0902    Lab Status: In process Specimen: Blood from Arm, Right Updated: 09/05/21 0935    Lyme Antibody Profile with reflex to [facility] [[id number]] Collected: 09/05/21 0902    Lab Status: In process Specimen: Blood from Arm, Right Updated: 09/05/21 0935                 XR ankle 3+ views RIGHT    (Results Pending)              Procedures  Procedures         ED Course        x-ray the right ankle showed no fracture no dislocation no bony lesions, no joint breakdown, interpreted by me, there was a cyst within the distal tibia, discussed with patient           diagnostic testing showed white count 9 6-normal, hemoglobin was normal at 12     uric acid was normal      CRP was elevated at 12,  Electrolytes within normal limits other than a blood sugar 217  Other test pending                      MDM medical decision making 49-year-old female presents with right ankle swelling consistent with a joint effusion  Patient denies any trauma  We discussed the possible diagnostic differential for an isolated joint effusion  Patient apparently had Lyme disease as a younger person, she was concerned about Lyme  We discussed that we could treat her Lyme test would take several days to result  Patient understood we discussed the risk benefit of antibiotic therapy  We discussed follow-up we discussed indications to return  Disposition  Final diagnoses:   Arthritis - Right ankle fusion     Time reflects when diagnosis was documented in both MDM as applicable and the Disposition within this note     Time User Action Codes Description Comment    9/5/2021  9:28 AM Mariangelhumberto Javed Add [M19 90] Arthritis     9/5/2021  9:28 AM Mariangel Tegan Modify [M19 90] Arthritis Right ankle fusion      ED Disposition     ED Disposition Condition Date/Time Comment    Discharge Stable Sun Sep 5, 2021  9:28 AM Rodrigo Jernigan 57 discharge to home/self care  Follow-up Information     Follow up With Specialties Details Why Contact Info    Dragan Tim, [address] [address]  [phone]            Discharge Medication List as of 9/5/2021  9:32 AM      START taking these medications    Details   doxycycline hyclate (VIBRA-TABS) 100 mg tablet Take 1 tablet (100 mg total) by mouth 2 (two) times a day, Starting Sun 9/5/2021, Until Tue 10/5/2021, Normal         CONTINUE these medications which have NOT CHANGED    Details   divalproex sodium (DEPAKOTE ER) 500 mg 24 hr tablet Take 3 tablets (1,500 mg total) by mouth daily, Starting Wed 7/14/2021, Normal           No discharge procedures on file      PDMP Review     None          ED Provider  Electronically Signed by           Giacomo Hackett MD  09/05/21 [phone]

## 2021-09-06 LAB — RYE IGE QN: NEGATIVE

## 2021-09-07 LAB
B BURGDOR IGG+IGM SER-ACNC: 4
RHEUMATOID FACT SER QL LA: NEGATIVE

## 2021-10-25 ENCOUNTER — TELEPHONE (OUTPATIENT)
Dept: NEUROLOGY | Facility: CLINIC | Age: 26
End: 2021-10-25

## 2021-10-25 ENCOUNTER — OFFICE VISIT (OUTPATIENT)
Dept: NEUROLOGY | Facility: CLINIC | Age: 26
End: 2021-10-25
Payer: COMMERCIAL

## 2021-10-25 VITALS
SYSTOLIC BLOOD PRESSURE: 118 MMHG | DIASTOLIC BLOOD PRESSURE: 82 MMHG | HEIGHT: 67 IN | RESPIRATION RATE: 18 BRPM | WEIGHT: 270 LBS | BODY MASS INDEX: 42.38 KG/M2 | HEART RATE: 88 BPM | TEMPERATURE: 97.7 F

## 2021-10-25 DIAGNOSIS — F31.81 BIPOLAR 2 DISORDER (HCC): ICD-10-CM

## 2021-10-25 DIAGNOSIS — G43.909 MIGRAINE: ICD-10-CM

## 2021-10-25 DIAGNOSIS — R56.9 SEIZURE-LIKE ACTIVITY (HCC): Primary | ICD-10-CM

## 2021-10-25 PROCEDURE — 99215 OFFICE O/P EST HI 40 MIN: CPT | Performed by: NURSE PRACTITIONER

## 2021-10-25 RX ORDER — DIPHENOXYLATE HYDROCHLORIDE AND ATROPINE SULFATE 2.5; .025 MG/1; MG/1
1 TABLET ORAL DAILY
COMMUNITY

## 2021-10-25 RX ORDER — DIVALPROEX SODIUM 500 MG/1
2000 TABLET, EXTENDED RELEASE ORAL DAILY
Qty: 120 TABLET | Refills: 11 | Status: SHIPPED | OUTPATIENT
Start: 2021-10-25

## 2021-10-26 ENCOUNTER — TELEPHONE (OUTPATIENT)
Dept: NEUROLOGY | Facility: CLINIC | Age: 26
End: 2021-10-26

## 2021-10-26 DIAGNOSIS — R56.9 SEIZURE-LIKE ACTIVITY (HCC): Primary | ICD-10-CM

## 2021-11-03 ENCOUNTER — APPOINTMENT (EMERGENCY)
Dept: CT IMAGING | Facility: HOSPITAL | Age: 26
End: 2021-11-03
Payer: COMMERCIAL

## 2021-11-03 ENCOUNTER — APPOINTMENT (EMERGENCY)
Dept: ULTRASOUND IMAGING | Facility: HOSPITAL | Age: 26
End: 2021-11-03
Payer: COMMERCIAL

## 2021-11-03 ENCOUNTER — HOSPITAL ENCOUNTER (EMERGENCY)
Facility: HOSPITAL | Age: 26
Discharge: HOME | End: 2021-11-03
Attending: EMERGENCY MEDICINE
Payer: COMMERCIAL

## 2021-11-03 VITALS
OXYGEN SATURATION: 98 % | HEART RATE: 80 BPM | SYSTOLIC BLOOD PRESSURE: 131 MMHG | DIASTOLIC BLOOD PRESSURE: 70 MMHG | RESPIRATION RATE: 18 BRPM | TEMPERATURE: 97.6 F

## 2021-11-03 DIAGNOSIS — R10.9 ABDOMINAL PAIN: Primary | ICD-10-CM

## 2021-11-03 LAB
ALBUMIN SERPL BCP-MCNC: 3.2 G/DL (ref 3.5–5)
ALP SERPL-CCNC: 79 U/L (ref 46–116)
ALT SERPL W P-5'-P-CCNC: 35 U/L (ref 12–78)
ANION GAP SERPL CALCULATED.3IONS-SCNC: 5 MMOL/L (ref 4–13)
AST SERPL W P-5'-P-CCNC: 30 U/L (ref 5–45)
BACTERIA UR QL AUTO: NORMAL /HPF
BASOPHILS # BLD AUTO: 0.03 THOUSANDS/ΜL (ref 0–0.1)
BASOPHILS NFR BLD AUTO: 0 % (ref 0–1)
BILIRUB SERPL-MCNC: 0.57 MG/DL (ref 0.2–1)
BILIRUB UR QL STRIP: NEGATIVE
BUN SERPL-MCNC: 9 MG/DL (ref 5–25)
CALCIUM ALBUM COR SERPL-MCNC: 8.7 MG/DL (ref 8.3–10.1)
CALCIUM SERPL-MCNC: 8.1 MG/DL (ref 8.3–10.1)
CHLORIDE SERPL-SCNC: 106 MMOL/L (ref 100–108)
CLARITY UR: CLEAR
CO2 SERPL-SCNC: 29 MMOL/L (ref 21–32)
COLOR UR: YELLOW
CREAT SERPL-MCNC: 0.75 MG/DL (ref 0.6–1.3)
EOSINOPHIL # BLD AUTO: 0.22 THOUSAND/ΜL (ref 0–0.61)
EOSINOPHIL NFR BLD AUTO: 3 % (ref 0–6)
ERYTHROCYTE [DISTWIDTH] IN BLOOD BY AUTOMATED COUNT: 14.2 % (ref 11.6–15.1)
EXT PREG TEST URINE: NEGATIVE
EXT. CONTROL ED NAV: NORMAL
GFR SERPL CREATININE-BSD FRML MDRD: 109 ML/MIN/1.73SQ M
GLUCOSE SERPL-MCNC: 96 MG/DL (ref 65–140)
GLUCOSE UR STRIP-MCNC: NEGATIVE MG/DL
HCT VFR BLD AUTO: 38.4 % (ref 34.8–46.1)
HGB BLD-MCNC: 12.3 G/DL (ref 11.5–15.4)
HGB UR QL STRIP.AUTO: NEGATIVE
IMM GRANULOCYTES # BLD AUTO: 0.03 THOUSAND/UL (ref 0–0.2)
IMM GRANULOCYTES NFR BLD AUTO: 0 % (ref 0–2)
KETONES UR STRIP-MCNC: NEGATIVE MG/DL
LEUKOCYTE ESTERASE UR QL STRIP: ABNORMAL
LIPASE SERPL-CCNC: 58 U/L (ref 73–393)
LYMPHOCYTES # BLD AUTO: 2.94 THOUSANDS/ΜL (ref 0.6–4.47)
LYMPHOCYTES NFR BLD AUTO: 34 % (ref 14–44)
MCH RBC QN AUTO: 26.1 PG (ref 26.8–34.3)
MCHC RBC AUTO-ENTMCNC: 32 G/DL (ref 31.4–37.4)
MCV RBC AUTO: 82 FL (ref 82–98)
MONOCYTES # BLD AUTO: 0.63 THOUSAND/ΜL (ref 0.17–1.22)
MONOCYTES NFR BLD AUTO: 7 % (ref 4–12)
NEUTROPHILS # BLD AUTO: 4.93 THOUSANDS/ΜL (ref 1.85–7.62)
NEUTS SEG NFR BLD AUTO: 56 % (ref 43–75)
NITRITE UR QL STRIP: NEGATIVE
NON-SQ EPI CELLS URNS QL MICRO: NORMAL /HPF
NRBC BLD AUTO-RTO: 0 /100 WBCS
PH UR STRIP.AUTO: 7 [PH]
PLATELET # BLD AUTO: 282 THOUSANDS/UL (ref 149–390)
PMV BLD AUTO: 9.9 FL (ref 8.9–12.7)
POTASSIUM SERPL-SCNC: 4.3 MMOL/L (ref 3.5–5.3)
PROT SERPL-MCNC: 6.9 G/DL (ref 6.4–8.2)
PROT UR STRIP-MCNC: NEGATIVE MG/DL
RBC # BLD AUTO: 4.71 MILLION/UL (ref 3.81–5.12)
RBC #/AREA URNS AUTO: NORMAL /HPF
SODIUM SERPL-SCNC: 140 MMOL/L (ref 136–145)
SP GR UR STRIP.AUTO: 1.01 (ref 1–1.03)
UROBILINOGEN UR QL STRIP.AUTO: 0.2 E.U./DL
WBC # BLD AUTO: 8.78 THOUSAND/UL (ref 4.31–10.16)
WBC #/AREA URNS AUTO: NORMAL /HPF

## 2021-11-03 PROCEDURE — 85025 COMPLETE CBC W/AUTO DIFF WBC: CPT | Performed by: EMERGENCY MEDICINE

## 2021-11-03 PROCEDURE — 76830 TRANSVAGINAL US NON-OB: CPT

## 2021-11-03 PROCEDURE — 99284 EMERGENCY DEPT VISIT MOD MDM: CPT | Performed by: EMERGENCY MEDICINE

## 2021-11-03 PROCEDURE — 96361 HYDRATE IV INFUSION ADD-ON: CPT

## 2021-11-03 PROCEDURE — 74177 CT ABD & PELVIS W/CONTRAST: CPT

## 2021-11-03 PROCEDURE — 81001 URINALYSIS AUTO W/SCOPE: CPT | Performed by: EMERGENCY MEDICINE

## 2021-11-03 PROCEDURE — 96375 TX/PRO/DX INJ NEW DRUG ADDON: CPT

## 2021-11-03 PROCEDURE — 83690 ASSAY OF LIPASE: CPT | Performed by: EMERGENCY MEDICINE

## 2021-11-03 PROCEDURE — 96374 THER/PROPH/DIAG INJ IV PUSH: CPT

## 2021-11-03 PROCEDURE — 81025 URINE PREGNANCY TEST: CPT | Performed by: EMERGENCY MEDICINE

## 2021-11-03 PROCEDURE — 80053 COMPREHEN METABOLIC PANEL: CPT | Performed by: EMERGENCY MEDICINE

## 2021-11-03 PROCEDURE — 76856 US EXAM PELVIC COMPLETE: CPT

## 2021-11-03 PROCEDURE — 99284 EMERGENCY DEPT VISIT MOD MDM: CPT

## 2021-11-03 PROCEDURE — 36415 COLL VENOUS BLD VENIPUNCTURE: CPT | Performed by: EMERGENCY MEDICINE

## 2021-11-03 RX ORDER — ONDANSETRON 2 MG/ML
4 INJECTION INTRAMUSCULAR; INTRAVENOUS ONCE
Status: COMPLETED | OUTPATIENT
Start: 2021-11-03 | End: 2021-11-03

## 2021-11-03 RX ORDER — KETOROLAC TROMETHAMINE 30 MG/ML
15 INJECTION, SOLUTION INTRAMUSCULAR; INTRAVENOUS ONCE
Status: COMPLETED | OUTPATIENT
Start: 2021-11-03 | End: 2021-11-03

## 2021-11-03 RX ORDER — IBUPROFEN 800 MG/1
800 TABLET ORAL 3 TIMES DAILY
Qty: 21 TABLET | Refills: 0 | Status: SHIPPED | OUTPATIENT
Start: 2021-11-03

## 2021-11-03 RX ADMIN — KETOROLAC TROMETHAMINE 15 MG: 30 INJECTION, SOLUTION INTRAMUSCULAR at 16:50

## 2021-11-03 RX ADMIN — ONDANSETRON 4 MG: 2 INJECTION INTRAMUSCULAR; INTRAVENOUS at 16:49

## 2021-11-03 RX ADMIN — IOHEXOL 100 ML: 350 INJECTION, SOLUTION INTRAVENOUS at 18:36

## 2021-11-03 RX ADMIN — SODIUM CHLORIDE 1000 ML: 0.9 INJECTION, SOLUTION INTRAVENOUS at 16:56

## 2021-11-16 ENCOUNTER — HOSPITAL ENCOUNTER (INPATIENT)
Facility: HOSPITAL | Age: 26
LOS: 7 days | Discharge: HOME | DRG: 053 | End: 2021-11-23
Attending: PSYCHIATRY & NEUROLOGY | Admitting: STUDENT IN AN ORGANIZED HEALTH CARE EDUCATION/TRAINING PROGRAM
Payer: COMMERCIAL

## 2021-11-16 ENCOUNTER — APPOINTMENT (INPATIENT)
Dept: NEUROLOGY | Facility: CLINIC | Age: 26
DRG: 053 | End: 2021-11-16
Payer: COMMERCIAL

## 2021-11-16 PROBLEM — E66.813 CLASS 3 SEVERE OBESITY WITHOUT SERIOUS COMORBIDITY WITH BODY MASS INDEX (BMI) OF 40.0 TO 44.9 IN ADULT (HCC): Status: ACTIVE | Noted: 2020-09-04

## 2021-11-16 PROBLEM — E66.01 CLASS 3 SEVERE OBESITY WITHOUT SERIOUS COMORBIDITY WITH BODY MASS INDEX (BMI) OF 40.0 TO 44.9 IN ADULT (HCC): Status: ACTIVE | Noted: 2020-09-04

## 2021-11-16 LAB
ALBUMIN SERPL BCP-MCNC: 3.6 G/DL (ref 3.5–5)
ALP SERPL-CCNC: 88 U/L (ref 46–116)
ALT SERPL W P-5'-P-CCNC: 50 U/L (ref 12–78)
ANION GAP SERPL CALCULATED.3IONS-SCNC: 12 MMOL/L (ref 4–13)
AST SERPL W P-5'-P-CCNC: 28 U/L (ref 5–45)
ATRIAL RATE: 90 BPM
BASOPHILS # BLD AUTO: 0.07 THOUSANDS/ΜL (ref 0–0.1)
BASOPHILS NFR BLD AUTO: 1 % (ref 0–1)
BILIRUB SERPL-MCNC: 0.64 MG/DL (ref 0.2–1)
BUN SERPL-MCNC: 10 MG/DL (ref 5–25)
CALCIUM SERPL-MCNC: 9.4 MG/DL (ref 8.3–10.1)
CHLORIDE SERPL-SCNC: 106 MMOL/L (ref 100–108)
CO2 SERPL-SCNC: 21 MMOL/L (ref 21–32)
CREAT SERPL-MCNC: 0.88 MG/DL (ref 0.6–1.3)
EOSINOPHIL # BLD AUTO: 0.16 THOUSAND/ΜL (ref 0–0.61)
EOSINOPHIL NFR BLD AUTO: 2 % (ref 0–6)
ERYTHROCYTE [DISTWIDTH] IN BLOOD BY AUTOMATED COUNT: 14.2 % (ref 11.6–15.1)
GFR SERPL CREATININE-BSD FRML MDRD: 90 ML/MIN/1.73SQ M
GLUCOSE SERPL-MCNC: 134 MG/DL (ref 65–140)
HCT VFR BLD AUTO: 43.4 % (ref 34.8–46.1)
HGB BLD-MCNC: 14.1 G/DL (ref 11.5–15.4)
IMM GRANULOCYTES # BLD AUTO: 0.03 THOUSAND/UL (ref 0–0.2)
IMM GRANULOCYTES NFR BLD AUTO: 0 % (ref 0–2)
LYMPHOCYTES # BLD AUTO: 4.13 THOUSANDS/ΜL (ref 0.6–4.47)
LYMPHOCYTES NFR BLD AUTO: 39 % (ref 14–44)
MCH RBC QN AUTO: 26.4 PG (ref 26.8–34.3)
MCHC RBC AUTO-ENTMCNC: 32.5 G/DL (ref 31.4–37.4)
MCV RBC AUTO: 81 FL (ref 82–98)
MONOCYTES # BLD AUTO: 0.72 THOUSAND/ΜL (ref 0.17–1.22)
MONOCYTES NFR BLD AUTO: 7 % (ref 4–12)
NEUTROPHILS # BLD AUTO: 5.56 THOUSANDS/ΜL (ref 1.85–7.62)
NEUTS SEG NFR BLD AUTO: 51 % (ref 43–75)
NRBC BLD AUTO-RTO: 0 /100 WBCS
P AXIS: 32 DEGREES
PLATELET # BLD AUTO: 355 THOUSANDS/UL (ref 149–390)
PMV BLD AUTO: 10 FL (ref 8.9–12.7)
POTASSIUM SERPL-SCNC: 4.1 MMOL/L (ref 3.5–5.3)
PR INTERVAL: 142 MS
PROT SERPL-MCNC: 7.9 G/DL (ref 6.4–8.2)
QRS AXIS: 3 DEGREES
QRSD INTERVAL: 86 MS
QT INTERVAL: 354 MS
QTC INTERVAL: 433 MS
RBC # BLD AUTO: 5.35 MILLION/UL (ref 3.81–5.12)
SODIUM SERPL-SCNC: 139 MMOL/L (ref 136–145)
T WAVE AXIS: 19 DEGREES
VALPROATE SERPL-MCNC: 72 UG/ML (ref 50–100)
VENTRICULAR RATE: 90 BPM
WBC # BLD AUTO: 10.67 THOUSAND/UL (ref 4.31–10.16)

## 2021-11-16 PROCEDURE — 95700 EEG CONT REC W/VID EEG TECH: CPT

## 2021-11-16 PROCEDURE — 80164 ASSAY DIPROPYLACETIC ACD TOT: CPT | Performed by: NURSE PRACTITIONER

## 2021-11-16 PROCEDURE — 80053 COMPREHEN METABOLIC PANEL: CPT | Performed by: NURSE PRACTITIONER

## 2021-11-16 PROCEDURE — 95715 VEEG EA 12-26HR INTMT MNTR: CPT

## 2021-11-16 PROCEDURE — 93010 ELECTROCARDIOGRAM REPORT: CPT | Performed by: INTERNAL MEDICINE

## 2021-11-16 PROCEDURE — 99222 1ST HOSP IP/OBS MODERATE 55: CPT | Performed by: NURSE PRACTITIONER

## 2021-11-16 PROCEDURE — 85025 COMPLETE CBC W/AUTO DIFF WBC: CPT | Performed by: NURSE PRACTITIONER

## 2021-11-16 PROCEDURE — 93005 ELECTROCARDIOGRAM TRACING: CPT

## 2021-11-16 RX ORDER — POLYETHYLENE GLYCOL 3350 17 G/17G
17 POWDER, FOR SOLUTION ORAL DAILY PRN
Status: DISCONTINUED | OUTPATIENT
Start: 2021-11-16 | End: 2021-11-23 | Stop reason: HOSPADM

## 2021-11-16 RX ORDER — DIPHENHYDRAMINE HCL 25 MG
25 TABLET ORAL EVERY 8 HOURS PRN
Status: DISCONTINUED | OUTPATIENT
Start: 2021-11-16 | End: 2021-11-23 | Stop reason: HOSPADM

## 2021-11-16 RX ORDER — LORAZEPAM 2 MG/ML
2 INJECTION INTRAMUSCULAR EVERY 8 HOURS PRN
Status: DISCONTINUED | OUTPATIENT
Start: 2021-11-16 | End: 2021-11-23 | Stop reason: HOSPADM

## 2021-11-16 RX ORDER — ACETAMINOPHEN 325 MG/1
650 TABLET ORAL EVERY 6 HOURS PRN
Status: DISCONTINUED | OUTPATIENT
Start: 2021-11-16 | End: 2021-11-23 | Stop reason: HOSPADM

## 2021-11-16 RX ORDER — DIVALPROEX SODIUM 500 MG/1
1000 TABLET, EXTENDED RELEASE ORAL DAILY
Status: DISCONTINUED | OUTPATIENT
Start: 2021-11-17 | End: 2021-11-17

## 2021-11-16 RX ORDER — DIVALPROEX SODIUM 500 MG/1
2000 TABLET, EXTENDED RELEASE ORAL DAILY
Status: DISCONTINUED | OUTPATIENT
Start: 2021-11-17 | End: 2021-11-16

## 2021-11-16 RX ORDER — ONDANSETRON 2 MG/ML
4 INJECTION INTRAMUSCULAR; INTRAVENOUS EVERY 6 HOURS PRN
Status: DISCONTINUED | OUTPATIENT
Start: 2021-11-16 | End: 2021-11-23 | Stop reason: HOSPADM

## 2021-11-17 ENCOUNTER — APPOINTMENT (INPATIENT)
Dept: NEUROLOGY | Facility: CLINIC | Age: 26
DRG: 053 | End: 2021-11-17
Payer: COMMERCIAL

## 2021-11-17 PROCEDURE — 95715 VEEG EA 12-26HR INTMT MNTR: CPT

## 2021-11-17 PROCEDURE — 95720 EEG PHY/QHP EA INCR W/VEEG: CPT | Performed by: STUDENT IN AN ORGANIZED HEALTH CARE EDUCATION/TRAINING PROGRAM

## 2021-11-17 PROCEDURE — 99233 SBSQ HOSP IP/OBS HIGH 50: CPT | Performed by: STUDENT IN AN ORGANIZED HEALTH CARE EDUCATION/TRAINING PROGRAM

## 2021-11-17 RX ORDER — LIDOCAINE 50 MG/G
1 PATCH TOPICAL DAILY
Status: DISCONTINUED | OUTPATIENT
Start: 2021-11-17 | End: 2021-11-23 | Stop reason: HOSPADM

## 2021-11-17 RX ADMIN — DIVALPROEX SODIUM 1000 MG: 500 TABLET, EXTENDED RELEASE ORAL at 08:48

## 2021-11-17 RX ADMIN — LIDOCAINE 5% 1 PATCH: 700 PATCH TOPICAL at 10:09

## 2021-11-17 RX ADMIN — B-COMPLEX W/ C & FOLIC ACID TAB 1 TABLET: TAB at 08:48

## 2021-11-17 RX ADMIN — ENOXAPARIN SODIUM 40 MG: 40 INJECTION SUBCUTANEOUS at 08:49

## 2021-11-18 ENCOUNTER — APPOINTMENT (INPATIENT)
Dept: NEUROLOGY | Facility: CLINIC | Age: 26
DRG: 053 | End: 2021-11-18
Payer: COMMERCIAL

## 2021-11-18 PROCEDURE — 95715 VEEG EA 12-26HR INTMT MNTR: CPT

## 2021-11-18 PROCEDURE — 95720 EEG PHY/QHP EA INCR W/VEEG: CPT | Performed by: STUDENT IN AN ORGANIZED HEALTH CARE EDUCATION/TRAINING PROGRAM

## 2021-11-18 PROCEDURE — 99232 SBSQ HOSP IP/OBS MODERATE 35: CPT | Performed by: STUDENT IN AN ORGANIZED HEALTH CARE EDUCATION/TRAINING PROGRAM

## 2021-11-18 RX ADMIN — LIDOCAINE 5% 1 PATCH: 700 PATCH TOPICAL at 08:50

## 2021-11-18 RX ADMIN — ACETAMINOPHEN 650 MG: 325 TABLET, FILM COATED ORAL at 09:55

## 2021-11-18 RX ADMIN — ENOXAPARIN SODIUM 40 MG: 40 INJECTION SUBCUTANEOUS at 08:50

## 2021-11-18 RX ADMIN — B-COMPLEX W/ C & FOLIC ACID TAB 1 TABLET: TAB at 08:50

## 2021-11-19 ENCOUNTER — APPOINTMENT (INPATIENT)
Dept: NEUROLOGY | Facility: CLINIC | Age: 26
DRG: 053 | End: 2021-11-19
Payer: COMMERCIAL

## 2021-11-19 LAB
BASOPHILS # BLD AUTO: 0.04 THOUSANDS/ΜL (ref 0–0.1)
BASOPHILS NFR BLD AUTO: 1 % (ref 0–1)
EOSINOPHIL # BLD AUTO: 0.15 THOUSAND/ΜL (ref 0–0.61)
EOSINOPHIL NFR BLD AUTO: 2 % (ref 0–6)
ERYTHROCYTE [DISTWIDTH] IN BLOOD BY AUTOMATED COUNT: 14.1 % (ref 11.6–15.1)
HCT VFR BLD AUTO: 40.6 % (ref 34.8–46.1)
HGB BLD-MCNC: 12.9 G/DL (ref 11.5–15.4)
IMM GRANULOCYTES # BLD AUTO: 0.02 THOUSAND/UL (ref 0–0.2)
IMM GRANULOCYTES NFR BLD AUTO: 0 % (ref 0–2)
LYMPHOCYTES # BLD AUTO: 2.77 THOUSANDS/ΜL (ref 0.6–4.47)
LYMPHOCYTES NFR BLD AUTO: 36 % (ref 14–44)
MCH RBC QN AUTO: 26.1 PG (ref 26.8–34.3)
MCHC RBC AUTO-ENTMCNC: 31.8 G/DL (ref 31.4–37.4)
MCV RBC AUTO: 82 FL (ref 82–98)
MONOCYTES # BLD AUTO: 0.42 THOUSAND/ΜL (ref 0.17–1.22)
MONOCYTES NFR BLD AUTO: 6 % (ref 4–12)
NEUTROPHILS # BLD AUTO: 4.28 THOUSANDS/ΜL (ref 1.85–7.62)
NEUTS SEG NFR BLD AUTO: 55 % (ref 43–75)
NRBC BLD AUTO-RTO: 0 /100 WBCS
PLATELET # BLD AUTO: 282 THOUSANDS/UL (ref 149–390)
PMV BLD AUTO: 10.3 FL (ref 8.9–12.7)
RBC # BLD AUTO: 4.95 MILLION/UL (ref 3.81–5.12)
WBC # BLD AUTO: 7.68 THOUSAND/UL (ref 4.31–10.16)

## 2021-11-19 PROCEDURE — 99232 SBSQ HOSP IP/OBS MODERATE 35: CPT | Performed by: NURSE PRACTITIONER

## 2021-11-19 PROCEDURE — 95720 EEG PHY/QHP EA INCR W/VEEG: CPT | Performed by: STUDENT IN AN ORGANIZED HEALTH CARE EDUCATION/TRAINING PROGRAM

## 2021-11-19 PROCEDURE — 85025 COMPLETE CBC W/AUTO DIFF WBC: CPT | Performed by: NURSE PRACTITIONER

## 2021-11-19 PROCEDURE — 95715 VEEG EA 12-26HR INTMT MNTR: CPT

## 2021-11-19 RX ADMIN — B-COMPLEX W/ C & FOLIC ACID TAB 1 TABLET: TAB at 08:15

## 2021-11-19 RX ADMIN — LIDOCAINE 5% 1 PATCH: 700 PATCH TOPICAL at 08:15

## 2021-11-19 RX ADMIN — ENOXAPARIN SODIUM 40 MG: 40 INJECTION SUBCUTANEOUS at 08:15

## 2021-11-20 ENCOUNTER — APPOINTMENT (INPATIENT)
Dept: NEUROLOGY | Facility: CLINIC | Age: 26
DRG: 053 | End: 2021-11-20
Payer: COMMERCIAL

## 2021-11-20 PROCEDURE — 99232 SBSQ HOSP IP/OBS MODERATE 35: CPT | Performed by: STUDENT IN AN ORGANIZED HEALTH CARE EDUCATION/TRAINING PROGRAM

## 2021-11-20 PROCEDURE — 95715 VEEG EA 12-26HR INTMT MNTR: CPT

## 2021-11-20 PROCEDURE — 95720 EEG PHY/QHP EA INCR W/VEEG: CPT | Performed by: STUDENT IN AN ORGANIZED HEALTH CARE EDUCATION/TRAINING PROGRAM

## 2021-11-20 RX ORDER — IBUPROFEN 600 MG/1
600 TABLET ORAL EVERY 6 HOURS PRN
Status: DISCONTINUED | OUTPATIENT
Start: 2021-11-20 | End: 2021-11-23 | Stop reason: HOSPADM

## 2021-11-20 RX ADMIN — LIDOCAINE 5% 1 PATCH: 700 PATCH TOPICAL at 08:21

## 2021-11-20 RX ADMIN — ACETAMINOPHEN 650 MG: 325 TABLET, FILM COATED ORAL at 16:50

## 2021-11-20 RX ADMIN — B-COMPLEX W/ C & FOLIC ACID TAB 1 TABLET: TAB at 08:21

## 2021-11-20 RX ADMIN — IBUPROFEN 600 MG: 600 TABLET, FILM COATED ORAL at 12:50

## 2021-11-20 RX ADMIN — ACETAMINOPHEN 650 MG: 325 TABLET, FILM COATED ORAL at 07:26

## 2021-11-20 RX ADMIN — ENOXAPARIN SODIUM 40 MG: 40 INJECTION SUBCUTANEOUS at 08:21

## 2021-11-20 RX ADMIN — ONDANSETRON 4 MG: 2 INJECTION INTRAMUSCULAR; INTRAVENOUS at 12:52

## 2021-11-21 ENCOUNTER — APPOINTMENT (INPATIENT)
Dept: NEUROLOGY | Facility: CLINIC | Age: 26
DRG: 053 | End: 2021-11-21
Payer: COMMERCIAL

## 2021-11-21 PROCEDURE — 99232 SBSQ HOSP IP/OBS MODERATE 35: CPT | Performed by: STUDENT IN AN ORGANIZED HEALTH CARE EDUCATION/TRAINING PROGRAM

## 2021-11-21 PROCEDURE — 95720 EEG PHY/QHP EA INCR W/VEEG: CPT | Performed by: STUDENT IN AN ORGANIZED HEALTH CARE EDUCATION/TRAINING PROGRAM

## 2021-11-21 PROCEDURE — 95715 VEEG EA 12-26HR INTMT MNTR: CPT

## 2021-11-21 RX ADMIN — B-COMPLEX W/ C & FOLIC ACID TAB 1 TABLET: TAB at 08:19

## 2021-11-21 RX ADMIN — ENOXAPARIN SODIUM 40 MG: 40 INJECTION SUBCUTANEOUS at 08:20

## 2021-11-21 RX ADMIN — LIDOCAINE 5% 1 PATCH: 700 PATCH TOPICAL at 08:19

## 2021-11-21 RX ADMIN — IBUPROFEN 600 MG: 600 TABLET, FILM COATED ORAL at 19:10

## 2021-11-21 RX ADMIN — ACETAMINOPHEN 650 MG: 325 TABLET, FILM COATED ORAL at 08:19

## 2021-11-22 ENCOUNTER — APPOINTMENT (INPATIENT)
Dept: NEUROLOGY | Facility: CLINIC | Age: 26
DRG: 053 | End: 2021-11-22
Payer: COMMERCIAL

## 2021-11-22 PROCEDURE — 99233 SBSQ HOSP IP/OBS HIGH 50: CPT | Performed by: PSYCHIATRY & NEUROLOGY

## 2021-11-22 PROCEDURE — 95715 VEEG EA 12-26HR INTMT MNTR: CPT

## 2021-11-22 RX ORDER — DIVALPROEX SODIUM 500 MG/1
2000 TABLET, EXTENDED RELEASE ORAL
Status: DISCONTINUED | OUTPATIENT
Start: 2021-11-22 | End: 2021-11-22

## 2021-11-22 RX ORDER — GINSENG 100 MG
1 CAPSULE ORAL 2 TIMES DAILY
Status: DISCONTINUED | OUTPATIENT
Start: 2021-11-22 | End: 2021-11-23 | Stop reason: HOSPADM

## 2021-11-22 RX ORDER — DIVALPROEX SODIUM 500 MG/1
2000 TABLET, EXTENDED RELEASE ORAL DAILY
Status: DISCONTINUED | OUTPATIENT
Start: 2021-11-22 | End: 2021-11-23 | Stop reason: HOSPADM

## 2021-11-22 RX ORDER — DIVALPROEX SODIUM 500 MG/1
1000 TABLET, DELAYED RELEASE ORAL ONCE
Status: DISCONTINUED | OUTPATIENT
Start: 2021-11-22 | End: 2021-11-22

## 2021-11-22 RX ADMIN — B-COMPLEX W/ C & FOLIC ACID TAB 1 TABLET: TAB at 09:11

## 2021-11-22 RX ADMIN — ENOXAPARIN SODIUM 40 MG: 40 INJECTION SUBCUTANEOUS at 09:10

## 2021-11-22 RX ADMIN — LIDOCAINE 5% 1 PATCH: 700 PATCH TOPICAL at 09:10

## 2021-11-22 RX ADMIN — DIVALPROEX SODIUM 2000 MG: 500 TABLET, EXTENDED RELEASE ORAL at 12:07

## 2021-11-23 ENCOUNTER — TRANSITIONAL CARE MANAGEMENT (OUTPATIENT)
Dept: INTERNAL MEDICINE CLINIC | Facility: CLINIC | Age: 26
End: 2021-11-23

## 2021-11-23 ENCOUNTER — TELEPHONE (OUTPATIENT)
Dept: NEUROLOGY | Facility: CLINIC | Age: 26
End: 2021-11-23

## 2021-11-23 VITALS
SYSTOLIC BLOOD PRESSURE: 93 MMHG | TEMPERATURE: 97.4 F | RESPIRATION RATE: 18 BRPM | OXYGEN SATURATION: 94 % | HEART RATE: 84 BPM | DIASTOLIC BLOOD PRESSURE: 63 MMHG

## 2021-11-23 PROBLEM — G89.29 CHRONIC LOW BACK PAIN: Status: ACTIVE | Noted: 2020-09-04

## 2021-11-23 PROCEDURE — 95720 EEG PHY/QHP EA INCR W/VEEG: CPT | Performed by: STUDENT IN AN ORGANIZED HEALTH CARE EDUCATION/TRAINING PROGRAM

## 2021-11-23 PROCEDURE — 99239 HOSP IP/OBS DSCHRG MGMT >30: CPT | Performed by: PSYCHIATRY & NEUROLOGY

## 2021-11-23 PROCEDURE — 95720 EEG PHY/QHP EA INCR W/VEEG: CPT | Performed by: PSYCHIATRY & NEUROLOGY

## 2021-11-23 RX ADMIN — BACITRACIN ZINC 1 SMALL APPLICATION: 500 OINTMENT TOPICAL at 09:12

## 2021-11-23 RX ADMIN — ENOXAPARIN SODIUM 40 MG: 40 INJECTION SUBCUTANEOUS at 09:12

## 2021-11-23 RX ADMIN — DIVALPROEX SODIUM 2000 MG: 500 TABLET, EXTENDED RELEASE ORAL at 09:12

## 2021-12-20 ENCOUNTER — TELEPHONE (OUTPATIENT)
Dept: NEUROLOGY | Facility: CLINIC | Age: 26
End: 2021-12-20

## 2022-02-07 ENCOUNTER — TELEPHONE (OUTPATIENT)
Dept: INTERNAL MEDICINE CLINIC | Facility: CLINIC | Age: 27
End: 2022-02-07

## 2022-02-07 DIAGNOSIS — G43.809 OTHER MIGRAINE WITHOUT STATUS MIGRAINOSUS, NOT INTRACTABLE: ICD-10-CM

## 2022-02-07 DIAGNOSIS — R56.9 GENERALIZED-ONSET SEIZURES (HCC): Primary | ICD-10-CM

## 2022-02-07 NOTE — TELEPHONE ENCOUNTER
[facility] Neurology [phone]     Pt has appt w/Neurology, Dr Kelli Copeland and needs a Dr's referral placed in Epic      DX R [phone]         [phone]

## 2022-02-16 ENCOUNTER — HOSPITAL ENCOUNTER (EMERGENCY)
Facility: HOSPITAL | Age: 27
Discharge: HOME | End: 2022-02-16
Attending: EMERGENCY MEDICINE | Admitting: EMERGENCY MEDICINE
Payer: COMMERCIAL

## 2022-02-16 VITALS
TEMPERATURE: 98.4 F | OXYGEN SATURATION: 96 % | RESPIRATION RATE: 18 BRPM | HEART RATE: 91 BPM | DIASTOLIC BLOOD PRESSURE: 78 MMHG | SYSTOLIC BLOOD PRESSURE: 132 MMHG

## 2022-02-16 DIAGNOSIS — T88.7XXA MEDICATION SIDE EFFECT: Primary | ICD-10-CM

## 2022-02-16 DIAGNOSIS — B34.9 VIRAL SYNDROME: ICD-10-CM

## 2022-02-16 LAB
EXT PREG TEST URINE: NEGATIVE
EXT. CONTROL ED NAV: NORMAL
FLUAV RNA RESP QL NAA+PROBE: NEGATIVE
FLUBV RNA RESP QL NAA+PROBE: NEGATIVE
RSV RNA RESP QL NAA+PROBE: NEGATIVE
SARS-COV-2 RNA RESP QL NAA+PROBE: NEGATIVE

## 2022-02-16 PROCEDURE — 96372 THER/PROPH/DIAG INJ SC/IM: CPT

## 2022-02-16 PROCEDURE — 0241U HB NFCT DS VIR RESP RNA 4 TRGT: CPT | Performed by: EMERGENCY MEDICINE

## 2022-02-16 PROCEDURE — 99282 EMERGENCY DEPT VISIT SF MDM: CPT | Performed by: EMERGENCY MEDICINE

## 2022-02-16 PROCEDURE — 81025 URINE PREGNANCY TEST: CPT | Performed by: EMERGENCY MEDICINE

## 2022-02-16 PROCEDURE — 99283 EMERGENCY DEPT VISIT LOW MDM: CPT

## 2022-02-16 RX ORDER — KETOROLAC TROMETHAMINE 30 MG/ML
15 INJECTION, SOLUTION INTRAMUSCULAR; INTRAVENOUS ONCE
Status: COMPLETED | OUTPATIENT
Start: 2022-02-16 | End: 2022-02-16

## 2022-02-16 RX ADMIN — KETOROLAC TROMETHAMINE 15 MG: 30 INJECTION, SOLUTION INTRAMUSCULAR at 12:38

## 2022-02-16 NOTE — Clinical Note
Fay Karson was seen and treated in our emergency department on 2/16/2022  No restrictions            Diagnosis:     Jackson Gimenez  may return to work on return date  She may return on this date: 02/17/2022         If you have any questions or concerns, please don't hesitate to call        Stefany Speaker, DO    ______________________________           _______________          _______________  Hospital Representative                              Date                                Time

## 2022-02-16 NOTE — Clinical Note
Julian Silverio was seen and treated in our emergency department on 2/16/2022  Diagnosis:     Jorge L Fernando    She may return on this date: If you have any questions or concerns, please don't hesitate to call        Alexsandra Remy, DO    ______________________________           _______________          _______________  Hospital Representative                              Date                                Time

## 2022-03-19 NOTE — ASSESSMENT & PLAN NOTE
- Patient has a history of seizure disorder    - fell down stairs with reported seizure activity x2  Patient has been non-compliant with her home Depakote for the last several weeks     - Neurology consulted, appreciate recommendations  - Home depakote 1500 mg qhs resumed  - 7/7 IV dose of depakote  - Follow up with Neurology outpatient Dizziness

## 2022-03-31 ENCOUNTER — APPOINTMENT (EMERGENCY)
Dept: RADIOLOGY | Facility: HOSPITAL | Age: 27
End: 2022-03-31
Payer: COMMERCIAL

## 2022-03-31 ENCOUNTER — HOSPITAL ENCOUNTER (EMERGENCY)
Facility: HOSPITAL | Age: 27
Discharge: HOME | End: 2022-03-31
Attending: EMERGENCY MEDICINE
Payer: COMMERCIAL

## 2022-03-31 VITALS
OXYGEN SATURATION: 99 % | TEMPERATURE: 97.5 F | RESPIRATION RATE: 18 BRPM | HEART RATE: 78 BPM | SYSTOLIC BLOOD PRESSURE: 103 MMHG | DIASTOLIC BLOOD PRESSURE: 52 MMHG

## 2022-03-31 DIAGNOSIS — M54.9 BACK PAIN: ICD-10-CM

## 2022-03-31 DIAGNOSIS — R56.9 SEIZURE (HCC): Primary | ICD-10-CM

## 2022-03-31 LAB
ALBUMIN SERPL BCP-MCNC: 3.4 G/DL (ref 3.5–5)
ALP SERPL-CCNC: 83 U/L (ref 46–116)
ALT SERPL W P-5'-P-CCNC: 42 U/L (ref 12–78)
ANION GAP SERPL CALCULATED.3IONS-SCNC: 9 MMOL/L (ref 4–13)
AST SERPL W P-5'-P-CCNC: 20 U/L (ref 5–45)
BASOPHILS # BLD AUTO: 0.03 THOUSANDS/ΜL (ref 0–0.1)
BASOPHILS NFR BLD AUTO: 0 % (ref 0–1)
BILIRUB SERPL-MCNC: 0.36 MG/DL (ref 0.2–1)
BUN SERPL-MCNC: 11 MG/DL (ref 5–25)
CALCIUM ALBUM COR SERPL-MCNC: 8.9 MG/DL (ref 8.3–10.1)
CALCIUM SERPL-MCNC: 8.4 MG/DL (ref 8.3–10.1)
CHLORIDE SERPL-SCNC: 105 MMOL/L (ref 100–108)
CO2 SERPL-SCNC: 24 MMOL/L (ref 21–32)
CREAT SERPL-MCNC: 0.76 MG/DL (ref 0.6–1.3)
EOSINOPHIL # BLD AUTO: 0.12 THOUSAND/ΜL (ref 0–0.61)
EOSINOPHIL NFR BLD AUTO: 1 % (ref 0–6)
ERYTHROCYTE [DISTWIDTH] IN BLOOD BY AUTOMATED COUNT: 13.9 % (ref 11.6–15.1)
GFR SERPL CREATININE-BSD FRML MDRD: 107 ML/MIN/1.73SQ M
GLUCOSE SERPL-MCNC: 90 MG/DL (ref 65–140)
HCG SERPL QL: NEGATIVE
HCT VFR BLD AUTO: 38.9 % (ref 34.8–46.1)
HGB BLD-MCNC: 12.6 G/DL (ref 11.5–15.4)
IMM GRANULOCYTES # BLD AUTO: 0.02 THOUSAND/UL (ref 0–0.2)
IMM GRANULOCYTES NFR BLD AUTO: 0 % (ref 0–2)
LYMPHOCYTES # BLD AUTO: 4.93 THOUSANDS/ΜL (ref 0.6–4.47)
LYMPHOCYTES NFR BLD AUTO: 57 % (ref 14–44)
MCH RBC QN AUTO: 25.7 PG (ref 26.8–34.3)
MCHC RBC AUTO-ENTMCNC: 32.4 G/DL (ref 31.4–37.4)
MCV RBC AUTO: 79 FL (ref 82–98)
MONOCYTES # BLD AUTO: 0.54 THOUSAND/ΜL (ref 0.17–1.22)
MONOCYTES NFR BLD AUTO: 6 % (ref 4–12)
NEUTROPHILS # BLD AUTO: 3.16 THOUSANDS/ΜL (ref 1.85–7.62)
NEUTS SEG NFR BLD AUTO: 36 % (ref 43–75)
NRBC BLD AUTO-RTO: 0 /100 WBCS
PLATELET # BLD AUTO: 306 THOUSANDS/UL (ref 149–390)
PMV BLD AUTO: 9.9 FL (ref 8.9–12.7)
POTASSIUM SERPL-SCNC: 3.6 MMOL/L (ref 3.5–5.3)
PROT SERPL-MCNC: 6.8 G/DL (ref 6.4–8.2)
RBC # BLD AUTO: 4.91 MILLION/UL (ref 3.81–5.12)
SODIUM SERPL-SCNC: 138 MMOL/L (ref 136–145)
VALPROATE SERPL-MCNC: <3 UG/ML (ref 50–100)
WBC # BLD AUTO: 8.8 THOUSAND/UL (ref 4.31–10.16)

## 2022-03-31 PROCEDURE — 80053 COMPREHEN METABOLIC PANEL: CPT | Performed by: EMERGENCY MEDICINE

## 2022-03-31 PROCEDURE — 99284 EMERGENCY DEPT VISIT MOD MDM: CPT

## 2022-03-31 PROCEDURE — 72100 X-RAY EXAM L-S SPINE 2/3 VWS: CPT

## 2022-03-31 PROCEDURE — 99284 EMERGENCY DEPT VISIT MOD MDM: CPT | Performed by: EMERGENCY MEDICINE

## 2022-03-31 PROCEDURE — 84703 CHORIONIC GONADOTROPIN ASSAY: CPT | Performed by: EMERGENCY MEDICINE

## 2022-03-31 PROCEDURE — 80164 ASSAY DIPROPYLACETIC ACD TOT: CPT | Performed by: EMERGENCY MEDICINE

## 2022-03-31 PROCEDURE — 85025 COMPLETE CBC W/AUTO DIFF WBC: CPT | Performed by: EMERGENCY MEDICINE

## 2022-03-31 PROCEDURE — 36415 COLL VENOUS BLD VENIPUNCTURE: CPT | Performed by: EMERGENCY MEDICINE

## 2022-03-31 NOTE — ED PROVIDER NOTES
History  Chief Complaint   Patient presents with    Back Pain     Pt reports "histoy of back pain because of my seizure" pain radiates down R leg    Seizure - Prior Hx Of     HPI  73-year-old male past medical history of seizure disorder presents with back pain has started today after her seizure  Denies trauma  When asked her pain is located she points to her SI joint  Denies fever, chills, weakness, numbness, loss of bowel or bladder  Took Tylenol without improvement  Prior to Admission Medications   Prescriptions Last Dose Informant Patient Reported? Taking?   divalproex sodium (DEPAKOTE ER) 500 mg 24 hr tablet   No No   Sig: Take 4 tablets (2,000 mg total) by mouth daily   ibuprofen (MOTRIN) 800 mg tablet   No No   Sig: Take 1 tablet (800 mg total) by mouth 3 (three) times a day   multivitamin (THERAGRAN) TABS   Yes No   Sig: Take 1 tablet by mouth daily      Facility-Administered Medications: None       Past Medical History:   Diagnosis Date    Depression     Migraine     Morbid obesity with BMI of 40 0-44 9, adult (HCC)     Obesity (BMI 30-39  9)     [address] [address] [address]     Seizures ([facility] )        Past Surgical History:   Procedure Laterality Date     SECTION       SECTION      SHOULDER SURGERY      TUBAL LIGATION      TUMOR REMOVAL      from ear       Family History   Problem Relation Age of Onset    No Known Problems Mother     No Known Problems Father     No Known Problems Sister     No Known Problems Brother     No Known Problems Maternal Aunt     No Known Problems Paternal Aunt     No Known Problems Maternal Uncle     No Known Problems Paternal Uncle     No Known Problems Maternal Grandfather     No Known Problems Maternal Grandmother     No Known Problems Paternal Grandfather     No Known Problems Paternal Grandmother     No Known Problems Cousin     ADD / ADHD Neg Hx     Alcohol abuse Neg Hx     Anxiety disorder Neg Hx     Bipolar disorder Neg Hx     Completed Suicide  Neg Hx     Dementia Neg Hx     Depression Neg Hx     Drug abuse Neg Hx     OCD Neg Hx     Psychiatric Illness Neg Hx     Psychosis Neg Hx     Schizoaffective Disorder  Neg Hx     Schizophrenia Neg Hx     Self-Injury Neg Hx     Suicide Attempts Neg Hx      I have reviewed and agree with the history as documented  E-Cigarette/Vaping    E-Cigarette Use Never User      E-Cigarette/Vaping Substances    Nicotine No     THC No     CBD No     Flavoring No      Social History     Tobacco Use    Smoking status: Never Smoker    Smokeless tobacco: Never Used   Vaping Use    Vaping Use: Never used   Substance Use Topics    Alcohol use: Yes     Comment: socially    Drug use: Never       Review of Systems   Constitutional: Negative for chills and fever  HENT: Negative for dental problem and ear pain  Eyes: Negative for pain and redness  Respiratory: Negative for cough and shortness of breath  Cardiovascular: Negative for chest pain and palpitations  Gastrointestinal: Negative for abdominal pain and nausea  Endocrine: Negative for polydipsia and polyphagia  Genitourinary: Negative for dysuria and frequency  Musculoskeletal: Positive for back pain  Negative for arthralgias and joint swelling  Skin: Negative for color change and rash  Neurological: Positive for seizures  Negative for dizziness and headaches  Psychiatric/Behavioral: Negative for behavioral problems and confusion  All other systems reviewed and are negative  Physical Exam  Physical Exam  Vitals and nursing note reviewed  Constitutional:       General: She is not in acute distress  Appearance: She is well-developed  She is not diaphoretic  HENT:      Head: Atraumatic  Right Ear: External ear normal       Left Ear: External ear normal       Nose: Nose normal    Eyes:      Conjunctiva/sclera: Conjunctivae normal       Pupils: Pupils are equal, round, and reactive to light     Neck:      Vascular: No JVD  Cardiovascular:      Rate and Rhythm: Normal rate and regular rhythm  Heart sounds: Normal heart sounds  No murmur heard  Pulmonary:      Effort: Pulmonary effort is normal  No respiratory distress  Breath sounds: Normal breath sounds  No wheezing  Abdominal:      General: Bowel sounds are normal  There is no distension  Palpations: Abdomen is soft  Tenderness: There is no abdominal tenderness  Musculoskeletal:         General: Normal range of motion  Cervical back: Normal range of motion and neck supple  Comments: TTP R SI joint, neg SLR, no midline tenderness, normal strength and sensation b/l lower extremities   Skin:     General: Skin is warm and dry  Capillary Refill: Capillary refill takes less than 2 seconds  Neurological:      Mental Status: She is alert and oriented to person, place, and time  Cranial Nerves: No cranial nerve deficit     Psychiatric:         Behavior: Behavior normal          Vital Signs  ED Triage Vitals [03/31/22 1409]   Temperature Pulse Respirations Blood Pressure SpO2   97 5 °F (36 4 °C) 84 16 117/61 96 %      Temp Source Heart Rate Source Patient Position - Orthostatic VS BP Location FiO2 (%)   Oral Monitor Sitting Left arm --      Pain Score       --           Vitals:    03/31/22 1409 03/31/22 1452 03/31/22 1530   BP: 117/61 106/55 103/52   Pulse: 84 80 78   Patient Position - Orthostatic VS: Sitting Lying Lying         Visual Acuity  Visual Acuity      Most Recent Value   L Pupil Size (mm) 3   R Pupil Size (mm) 3          ED Medications  Medications - No data to display    Diagnostic Studies  Results Reviewed     Procedure Component Value Units Date/Time    Valproic acid level, total [[id number]]  (Abnormal) Collected: 03/31/22 1451    Lab Status: Final result Specimen: Blood from Arm, Right Updated: 03/31/22 1532     Valproic Acid, Total <3 ug/mL     hCG, qualitative pregnancy [[id number]]  (Normal) Collected: 03/31/22 1451 Lab Status: Final result Specimen: Blood from Arm, Right Updated: 03/31/22 1532     Preg, Serum Negative    Comprehensive metabolic panel [[id number]]  (Abnormal) Collected: 03/31/22 1451    Lab Status: Final result Specimen: Blood from Arm, Right Updated: 03/31/22 1513     Sodium 138 mmol/L      Potassium 3 6 mmol/L      Chloride 105 mmol/L      CO2 24 mmol/L      ANION GAP 9 mmol/L      BUN 11 mg/dL      Creatinine 0 76 mg/dL      Glucose 90 mg/dL      Calcium 8 4 mg/dL      Corrected Calcium 8 9 mg/dL      AST 20 U/L      ALT 42 U/L      Alkaline Phosphatase 83 U/L      Total Protein 6 8 g/dL      Albumin 3 4 g/dL      Total Bilirubin 0 36 mg/dL      eGFR 107 ml/min/1 73sq m     Narrative:      [place] guidelines for Chronic Kidney Disease (CKD):     Stage 1 with normal or high GFR (GFR > 90 mL/min/1 73 square meters)    Stage 2 Mild CKD (GFR = 60-89 mL/min/1 73 square meters)    Stage 3A Moderate CKD (GFR = 45-59 mL/min/1 73 square meters)    Stage 3B Moderate CKD (GFR = 30-44 mL/min/1 73 square meters)    Stage 4 Severe CKD (GFR = 15-29 mL/min/1 73 square meters)    Stage 5 End Stage CKD (GFR <15 mL/min/1 73 square meters)  Note: GFR calculation is accurate only with a steady state creatinine    CBC and differential [[id number]]  (Abnormal) Collected: 03/31/22 1451    Lab Status: Final result Specimen: Blood from Arm, Right Updated: 03/31/22 1459     WBC 8 80 Thousand/uL      RBC 4 91 Million/uL      Hemoglobin 12 6 g/dL      Hematocrit 38 9 %      MCV 79 fL      MCH 25 7 pg      MCHC 32 4 g/dL      RDW 13 9 %      MPV 9 9 fL      Platelets 700 Thousands/uL      nRBC 0 /100 WBCs      Neutrophils Relative 36 %      Immat GRANS % 0 %      Lymphocytes Relative 57 %      Monocytes Relative 6 %      Eosinophils Relative 1 %      Basophils Relative 0 %      Neutrophils Absolute 3 16 Thousands/µL      Immature Grans Absolute 0 02 Thousand/uL      Lymphocytes Absolute 4 93 Thousands/µL Monocytes Absolute 0 54 Thousand/µL      Eosinophils Absolute 0 12 Thousand/µL      Basophils Absolute 0 03 Thousands/µL     UA w Reflex to Microscopic w Reflex to Culture [[id number]]     Lab Status: No result Specimen: Urine                             Procedures  Procedures         ED Course                               SBIRT 22yo+      Most Recent Value   SBIRT (24 yo +)    In order to provide better care to our patients, we are screening all of our patients for alcohol and drug use  Would it be okay to ask you these screening questions? Yes Filed at: 03/31/2022 1450   Initial Alcohol Screen: US AUDIT-C     1  How often do you have a drink containing alcohol? 0 Filed at: 03/31/2022 1450   2  How many drinks containing alcohol do you have on a typical day you are drinking? 0 Filed at: 03/31/2022 1450   3a  Male UNDER 65: How often do you have five or more drinks on one occasion? 0 Filed at: 03/31/2022 1450   3b  FEMALE Any Age, or MALE 65+: How often do you have 4 or more drinks on one occassion? 0 Filed at: 03/31/2022 1450   Audit-C Score 0 Filed at: 03/31/2022 1450   KYREE: How many times in the past year have you    Used an illegal drug or used a prescription medication for non-medical reasons? Never Filed at: 03/31/2022 1450                    MDM  Patient presents low back pain after her seizure  X-ray shows no acute fracture per my read  She denies trauma  Her valproic acid level is low, patient states she was not taking this medication but recently got it refilled  She understands to follow-up with her neurologist and primary care doctor    Disposition  Final diagnoses:   Seizure ([address] )   Back pain     Time reflects when diagnosis was documented in both MDM as applicable and the Disposition within this note     Time User Action Codes Description Comment    3/31/2022  3:34 PM Chalice Plaquemines Add [R56 9] Seizure ([address] )     3/31/2022  3:35 PM Chalice Plaquemines Add [M54 9] Back pain       ED Disposition ED Disposition Condition Date/Time Comment    Discharge Stable Thu Mar 31, 2022  3:35 PM Fred Jernigan 57 discharge to home/self care  Follow-up Information     Follow up With Specialties Details Why Contact Info Additional Information    [address] [facility] Emergency Department Emergency Medicine  As needed [facility] 109 [facility] Emergency Department, [address] [place] [address], [address], [address]          Discharge Medication List as of 3/31/2022  3:35 PM      CONTINUE these medications which have NOT CHANGED    Details   divalproex sodium (DEPAKOTE ER) 500 mg 24 hr tablet Take 4 tablets (2,000 mg total) by mouth daily, Starting Mon 10/25/2021, Normal      ibuprofen (MOTRIN) 800 mg tablet Take 1 tablet (800 mg total) by mouth 3 (three) times a day, Starting Wed 11/3/2021, Normal      multivitamin (THERAGRAN) TABS Take 1 tablet by mouth daily, Historical Med             No discharge procedures on file      PDMP Review       Value Time User    PDMP Reviewed  Yes 11/23/2021 10:52 AM [facility], [facility]          ED Provider  Electronically Signed by           Sherry Gan MD  03/31/22 3358

## 2022-03-31 NOTE — ED NOTES
Internal Medicine Pt evaluated and D/C by provider independent of this MIRACLE Velasquez, MIRACLE  03/31/22 2588

## 2022-03-31 NOTE — Clinical Note
Trinh Pryor was seen and treated in our emergency department on 3/31/2022  Diagnosis: seen in ED    Qing    She may return on this date: 04/01/2022         If you have any questions or concerns, please don't hesitate to call        Cornelio Sauer MD    ______________________________           _______________          _______________  Hospital Representative                              Date                                Time

## 2022-05-22 ENCOUNTER — APPOINTMENT (EMERGENCY)
Dept: CT IMAGING | Facility: HOSPITAL | Age: 27
End: 2022-05-22
Payer: COMMERCIAL

## 2022-05-22 ENCOUNTER — HOSPITAL ENCOUNTER (EMERGENCY)
Facility: HOSPITAL | Age: 27
Discharge: HOME | End: 2022-05-22
Attending: EMERGENCY MEDICINE | Admitting: EMERGENCY MEDICINE
Payer: COMMERCIAL

## 2022-05-22 VITALS
HEIGHT: 67 IN | BODY MASS INDEX: 40.18 KG/M2 | TEMPERATURE: 97.9 F | OXYGEN SATURATION: 96 % | WEIGHT: 256 LBS | HEART RATE: 70 BPM | RESPIRATION RATE: 18 BRPM | SYSTOLIC BLOOD PRESSURE: 81 MMHG | DIASTOLIC BLOOD PRESSURE: 46 MMHG

## 2022-05-22 DIAGNOSIS — K52.9 GASTROENTERITIS: Primary | ICD-10-CM

## 2022-05-22 LAB
ALBUMIN SERPL BCP-MCNC: 3.5 G/DL (ref 3.5–5)
ALP SERPL-CCNC: 88 U/L (ref 46–116)
ALT SERPL W P-5'-P-CCNC: 22 U/L (ref 12–78)
ANION GAP SERPL CALCULATED.3IONS-SCNC: 9 MMOL/L (ref 4–13)
AST SERPL W P-5'-P-CCNC: 9 U/L (ref 5–45)
BASOPHILS # BLD AUTO: 0.04 THOUSANDS/ΜL (ref 0–0.1)
BASOPHILS NFR BLD AUTO: 0 % (ref 0–1)
BILIRUB SERPL-MCNC: 0.37 MG/DL (ref 0.2–1)
BUN SERPL-MCNC: 16 MG/DL (ref 5–25)
CALCIUM SERPL-MCNC: 8.8 MG/DL (ref 8.3–10.1)
CHLORIDE SERPL-SCNC: 106 MMOL/L (ref 100–108)
CO2 SERPL-SCNC: 25 MMOL/L (ref 21–32)
CREAT SERPL-MCNC: 0.69 MG/DL (ref 0.6–1.3)
EOSINOPHIL # BLD AUTO: 0.2 THOUSAND/ΜL (ref 0–0.61)
EOSINOPHIL NFR BLD AUTO: 2 % (ref 0–6)
ERYTHROCYTE [DISTWIDTH] IN BLOOD BY AUTOMATED COUNT: 14.1 % (ref 11.6–15.1)
GFR SERPL CREATININE-BSD FRML MDRD: 118 ML/MIN/1.73SQ M
GLUCOSE SERPL-MCNC: 123 MG/DL (ref 65–140)
HCG SERPL QL: NEGATIVE
HCT VFR BLD AUTO: 39.5 % (ref 34.8–46.1)
HGB BLD-MCNC: 13 G/DL (ref 11.5–15.4)
IMM GRANULOCYTES # BLD AUTO: 0.02 THOUSAND/UL (ref 0–0.2)
IMM GRANULOCYTES NFR BLD AUTO: 0 % (ref 0–2)
LIPASE SERPL-CCNC: 92 U/L (ref 73–393)
LYMPHOCYTES # BLD AUTO: 4.46 THOUSANDS/ΜL (ref 0.6–4.47)
LYMPHOCYTES NFR BLD AUTO: 39 % (ref 14–44)
MCH RBC QN AUTO: 25.6 PG (ref 26.8–34.3)
MCHC RBC AUTO-ENTMCNC: 32.9 G/DL (ref 31.4–37.4)
MCV RBC AUTO: 78 FL (ref 82–98)
MONOCYTES # BLD AUTO: 0.79 THOUSAND/ΜL (ref 0.17–1.22)
MONOCYTES NFR BLD AUTO: 7 % (ref 4–12)
NEUTROPHILS # BLD AUTO: 5.93 THOUSANDS/ΜL (ref 1.85–7.62)
NEUTS SEG NFR BLD AUTO: 52 % (ref 43–75)
NRBC BLD AUTO-RTO: 0 /100 WBCS
PLATELET # BLD AUTO: 331 THOUSANDS/UL (ref 149–390)
PMV BLD AUTO: 10.1 FL (ref 8.9–12.7)
POTASSIUM SERPL-SCNC: 3.8 MMOL/L (ref 3.5–5.3)
PROT SERPL-MCNC: 7.1 G/DL (ref 6.4–8.2)
RBC # BLD AUTO: 5.07 MILLION/UL (ref 3.81–5.12)
SODIUM SERPL-SCNC: 140 MMOL/L (ref 136–145)
WBC # BLD AUTO: 11.44 THOUSAND/UL (ref 4.31–10.16)

## 2022-05-22 PROCEDURE — 96374 THER/PROPH/DIAG INJ IV PUSH: CPT

## 2022-05-22 PROCEDURE — 80053 COMPREHEN METABOLIC PANEL: CPT | Performed by: PHYSICIAN ASSISTANT

## 2022-05-22 PROCEDURE — G1004 CDSM NDSC: HCPCS

## 2022-05-22 PROCEDURE — 74176 CT ABD & PELVIS W/O CONTRAST: CPT

## 2022-05-22 PROCEDURE — 99284 EMERGENCY DEPT VISIT MOD MDM: CPT

## 2022-05-22 PROCEDURE — 36415 COLL VENOUS BLD VENIPUNCTURE: CPT | Performed by: PHYSICIAN ASSISTANT

## 2022-05-22 PROCEDURE — 96375 TX/PRO/DX INJ NEW DRUG ADDON: CPT

## 2022-05-22 PROCEDURE — 84703 CHORIONIC GONADOTROPIN ASSAY: CPT | Performed by: PHYSICIAN ASSISTANT

## 2022-05-22 PROCEDURE — 85025 COMPLETE CBC W/AUTO DIFF WBC: CPT | Performed by: PHYSICIAN ASSISTANT

## 2022-05-22 PROCEDURE — 96361 HYDRATE IV INFUSION ADD-ON: CPT

## 2022-05-22 PROCEDURE — 99284 EMERGENCY DEPT VISIT MOD MDM: CPT | Performed by: PHYSICIAN ASSISTANT

## 2022-05-22 PROCEDURE — 83690 ASSAY OF LIPASE: CPT | Performed by: PHYSICIAN ASSISTANT

## 2022-05-22 RX ORDER — FAMOTIDINE 20 MG/1
20 TABLET, FILM COATED ORAL ONCE
Status: COMPLETED | OUTPATIENT
Start: 2022-05-22 | End: 2022-05-22

## 2022-05-22 RX ORDER — KETOROLAC TROMETHAMINE 30 MG/ML
15 INJECTION, SOLUTION INTRAMUSCULAR; INTRAVENOUS ONCE
Status: COMPLETED | OUTPATIENT
Start: 2022-05-22 | End: 2022-05-22

## 2022-05-22 RX ORDER — ONDANSETRON 2 MG/ML
4 INJECTION INTRAMUSCULAR; INTRAVENOUS ONCE
Status: COMPLETED | OUTPATIENT
Start: 2022-05-22 | End: 2022-05-22

## 2022-05-22 RX ORDER — DICYCLOMINE HCL 20 MG
20 TABLET ORAL ONCE
Status: COMPLETED | OUTPATIENT
Start: 2022-05-22 | End: 2022-05-22

## 2022-05-22 RX ADMIN — DICYCLOMINE HYDROCHLORIDE 20 MG: 20 TABLET ORAL at 10:17

## 2022-05-22 RX ADMIN — SODIUM CHLORIDE 1000 ML: 0.9 INJECTION, SOLUTION INTRAVENOUS at 08:25

## 2022-05-22 RX ADMIN — ONDANSETRON 4 MG: 2 INJECTION INTRAMUSCULAR; INTRAVENOUS at 08:27

## 2022-05-22 RX ADMIN — KETOROLAC TROMETHAMINE 15 MG: 30 INJECTION, SOLUTION INTRAMUSCULAR at 08:27

## 2022-05-22 RX ADMIN — FAMOTIDINE 20 MG: 20 TABLET ORAL at 10:16

## 2022-05-22 NOTE — Clinical Note
Isaiah Mendoza was seen and treated in our emergency department on 5/22/2022  No restrictions            Diagnosis:     fIrah Cordero  may return to work on return date  She may return on this date: 05/23/2022         If you have any questions or concerns, please don't hesitate to call        Davy Epstein PA-C    ______________________________           _______________          _______________  Hospital Representative                              Date                                Time

## 2022-05-22 NOTE — DISCHARGE INSTRUCTIONS
Drink small sips of fluids every 10-15 minutes for hydration  Advance slowly to a bland diet (rice, applesauce, toast)  Please follow-up with your family doctor  Return to the ER with any worsening symptoms

## 2022-05-22 NOTE — ED PROVIDER NOTES
History  Chief Complaint   Patient presents with    Abdominal Pain     Pt c/o generalized abd pain, N/V/D since last night  30yo female with a history of seizures, migraines, and obesity presenting for abdominal pain  She reports nausea, vomiting, diarrhea, and abdominal discomfort that began last night  Her abdominal pain is primarily located in the epigastrium and is cramping in nature  She admits to eating at a [employer] buff[employer] for lunch yesterday  No fevers or chills  She is urinating normally  Previous abdominal surgeries include  sections  History provided by:  Patient   used: No    Abdominal Pain  Pain location:  Epigastric  Pain quality: cramping    Pain radiates to:  Does not radiate  Pain severity:  Moderate  Onset quality:  Gradual  Duration: 1  Timing:  Constant  Progression:  Unchanged  Chronicity:  New  Context: suspicious food intake    Relieved by:  Nothing  Worsened by:  Nothing  Associated symptoms: diarrhea, nausea and vomiting    Associated symptoms: no chest pain, no chills, no constipation, no dysuria, no fever, no hematemesis, no hematochezia and no shortness of breath        Prior to Admission Medications   Prescriptions Last Dose Informant Patient Reported? Taking?   divalproex sodium (DEPAKOTE ER) 500 mg 24 hr tablet   No No   Sig: Take 4 tablets (2,000 mg total) by mouth daily   ibuprofen (MOTRIN) 800 mg tablet   No No   Sig: Take 1 tablet (800 mg total) by mouth 3 (three) times a day   multivitamin (THERAGRAN) TABS   Yes No   Sig: Take 1 tablet by mouth daily      Facility-Administered Medications: None       Past Medical History:   Diagnosis Date    Depression     Migraine     Morbid obesity with BMI of 40 0-44 9, adult (HCC)     Obesity (BMI 30-39  9)     [facility]izure ([facility] [address] )     Seizures ([facility] [address] )        Past Surgical History:   Procedure Laterality Date     SECTION       SECTION      SHOULDER SURGERY      TUBAL LIGATION      TUMOR REMOVAL      from ear       Family History   Problem Relation Age of Onset    No Known Problems Mother     No Known Problems Father     No Known Problems Sister     No Known Problems Brother     No Known Problems Maternal Aunt     No Known Problems Paternal Aunt     No Known Problems Maternal Uncle     No Known Problems Paternal Uncle     No Known Problems Maternal Grandfather     No Known Problems Maternal Grandmother     No Known Problems Paternal Grandfather     No Known Problems Paternal Grandmother     No Known Problems Cousin     ADD / ADHD Neg Hx     Alcohol abuse Neg Hx     Anxiety disorder Neg Hx     Bipolar disorder Neg Hx     Completed Suicide  Neg Hx     Dementia Neg Hx     Depression Neg Hx     Drug abuse Neg Hx     OCD Neg Hx     Psychiatric Illness Neg Hx     Psychosis Neg Hx     Schizoaffective Disorder  Neg Hx     Schizophrenia Neg Hx     Self-Injury Neg Hx     Suicide Attempts Neg Hx      I have reviewed and agree with the history as documented  E-Cigarette/Vaping    E-Cigarette Use Never User      E-Cigarette/Vaping Substances    Nicotine No     THC No     CBD No     Flavoring No      Social History     Tobacco Use    Smoking status: Never Smoker    Smokeless tobacco: Never Used   Vaping Use    Vaping Use: Never used   Substance Use Topics    Alcohol use: Yes     Comment: socially    Drug use: Never       Review of Systems   Constitutional: Negative for chills and fever  HENT: Negative for drooling and voice change  Eyes: Negative for discharge and redness  Respiratory: Negative for shortness of breath and stridor  Cardiovascular: Negative for chest pain and leg swelling  Gastrointestinal: Positive for abdominal pain, diarrhea, nausea and vomiting  Negative for constipation, hematemesis and hematochezia  Genitourinary: Negative for dysuria  Musculoskeletal: Negative for neck pain and neck stiffness     Skin: Negative for color change and rash    Neurological: Negative for seizures and syncope  Psychiatric/Behavioral: Negative for confusion  The patient is not nervous/anxious  All other systems reviewed and are negative  Physical Exam  Physical Exam  Vitals and nursing note reviewed  Constitutional:       General: She is not in acute distress  Appearance: She is well-developed  She is not diaphoretic  Comments: Non-toxic appearing   HENT:      Head: Normocephalic and atraumatic  Right Ear: External ear normal       Left Ear: External ear normal       Nose: Nose normal    Eyes:      General: No scleral icterus  Right eye: No discharge  Left eye: No discharge  Conjunctiva/sclera: Conjunctivae normal    Cardiovascular:      Rate and Rhythm: Normal rate and regular rhythm  Heart sounds: Normal heart sounds  No murmur heard  Pulmonary:      Effort: Pulmonary effort is normal  No respiratory distress  Breath sounds: Normal breath sounds  No stridor  No wheezing or rales  Abdominal:      General: Bowel sounds are normal  There is no distension  Palpations: Abdomen is soft  Tenderness: There is abdominal tenderness in the epigastric area  There is no guarding or rebound  Musculoskeletal:         General: No deformity  Normal range of motion  Cervical back: Normal range of motion and neck supple  Lymphadenopathy:      Cervical: No cervical adenopathy  Skin:     General: Skin is warm and dry  Neurological:      Mental Status: She is alert  She is not disoriented  GCS: GCS eye subscore is 4  GCS verbal subscore is 5  GCS motor subscore is 6     Psychiatric:         Behavior: Behavior normal          Vital Signs  ED Triage Vitals   Temperature Pulse Respirations Blood Pressure SpO2   05/22/22 0711 05/22/22 0711 05/22/22 0711 05/22/22 0711 05/22/22 0711   97 9 °F (36 6 °C) 104 16 126/69 96 %      Temp Source Heart Rate Source Patient Position - Orthostatic VS BP Location FiO2 (%)   05/22/22 0711 05/22/22 0711 05/22/22 0711 05/22/22 0711 --   Oral Monitor Sitting Left arm       Pain Score       05/22/22 0827       4           Vitals:    05/22/22 0711 05/22/22 0800 05/22/22 0900 05/22/22 1000   BP: 126/69 114/70 107/71 (!) 81/46   Pulse: 104 78 77 70   Patient Position - Orthostatic VS: Sitting Sitting Lying Lying         Visual Acuity      ED Medications  Medications   sodium chloride 0 9 % bolus 1,000 mL (0 mL Intravenous Stopped 5/22/22 1014)   ondansetron (ZOFRAN) injection 4 mg (4 mg Intravenous Given 5/22/22 0827)   ketorolac (TORADOL) injection 15 mg (15 mg Intravenous Given 5/22/22 0827)   dicyclomine (BENTYL) tablet 20 mg (20 mg Oral Given 5/22/22 1017)   famotidine (PEPCID) tablet 20 mg (20 mg Oral Given 5/22/22 1016)       Diagnostic Studies  Results Reviewed     Procedure Component Value Units Date/Time    Lipase [[id number]]  (Normal) Collected: 05/22/22 0828    Lab Status: Final result Specimen: Blood from Arm, Left Updated: 05/22/22 0904     Lipase 92 u/L     hCG, qualitative pregnancy [[id number]]  (Normal) Collected: 05/22/22 0828    Lab Status: Final result Specimen: Blood from Arm, Left Updated: 05/22/22 0904     Preg, Serum Negative    Comprehensive metabolic panel [[id number]] Collected: 05/22/22 0828    Lab Status: Final result Specimen: Blood from Arm, Left Updated: 05/22/22 0900     Sodium 140 mmol/L      Potassium 3 8 mmol/L      Chloride 106 mmol/L      CO2 25 mmol/L      ANION GAP 9 mmol/L      BUN 16 mg/dL      Creatinine 0 69 mg/dL      Glucose 123 mg/dL      Calcium 8 8 mg/dL      AST 9 U/L      ALT 22 U/L      Alkaline Phosphatase 88 U/L      Total Protein 7 1 g/dL      Albumin 3 5 g/dL      Total Bilirubin 0 37 mg/dL      eGFR 118 ml/min/1 73sq m     Narrative:      [place] guidelines for Chronic Kidney Disease (CKD):     Stage 1 with normal or high GFR (GFR > 90 mL/min/1 73 square meters)    Stage 2 Mild CKD (GFR = 60-89 mL/min/1 73 square meters)    Stage 3A Moderate CKD (GFR = 45-59 mL/min/1 73 square meters)    Stage 3B Moderate CKD (GFR = 30-44 mL/min/1 73 square meters)    Stage 4 Severe CKD (GFR = 15-29 mL/min/1 73 square meters)    Stage 5 End Stage CKD (GFR <15 mL/min/1 73 square meters)  Note: GFR calculation is accurate only with a steady state creatinine    CBC and differential [[id number]]  (Abnormal) Collected: 05/22/22 0828    Lab Status: Final result Specimen: Blood from Arm, Left Updated: 05/22/22 0844     WBC 11 44 Thousand/uL      RBC 5 07 Million/uL      Hemoglobin 13 0 g/dL      Hematocrit 39 5 %      MCV 78 fL      MCH 25 6 pg      MCHC 32 9 g/dL      RDW 14 1 %      MPV 10 1 fL      Platelets 918 Thousands/uL      nRBC 0 /100 WBCs      Neutrophils Relative 52 %      Immat GRANS % 0 %      Lymphocytes Relative 39 %      Monocytes Relative 7 %      Eosinophils Relative 2 %      Basophils Relative 0 %      Neutrophils Absolute 5 93 Thousands/µL      Immature Grans Absolute 0 02 Thousand/uL      Lymphocytes Absolute 4 46 Thousands/µL      Monocytes Absolute 0 79 Thousand/µL      Eosinophils Absolute 0 20 Thousand/µL      Basophils Absolute 0 04 Thousands/µL                  CT abdomen pelvis wo contrast   Final Result by Selina Rodriguez DO (05/22 7407)   No acute CT abnormality to account for the patient's abdominal symptoms  If warranted, consider follow-up GI consultation  Workstation performed: [id number]                    Procedures  Procedures         ED Course                     SBIRT 22yo+    Flowsheet Row Most Recent Value   SBIRT (23 yo +)    In order to provide better care to our patients, we are screening all of our patients for alcohol and drug use  Would it be okay to ask you these screening questions? Yes Filed at: 05/22/2022 9908   Initial Alcohol Screen: US AUDIT-C     1  How often do you have a drink containing alcohol? 0 Filed at: 05/22/2022 0758   2   How many drinks containing alcohol do you have on a typical day you are drinking? 0 Filed at: 05/22/2022 0758   3a  Male UNDER 65: How often do you have five or more drinks on one occasion? 0 Filed at: 05/22/2022 0758   3b  FEMALE Any Age, or MALE 65+: How often do you have 4 or more drinks on one occassion? 0 Filed at: 05/22/2022 0758   Audit-C Score 0 Filed at: 05/22/2022 5226   KYREE: How many times in the past year have you    Used an illegal drug or used a prescription medication for non-medical reasons? Never Filed at: 05/22/2022 5173                    MDM  Number of Diagnoses or Management Options  Gastroenteritis: new and requires workup  Diagnosis management comments: 30yo female presenting for nausea, vomiting, diarrhea, abdominal pain that began last night  Started after eating at a [employer]  No f/c  She is afebrile and hemodynamically stable  No signs of peritonitis on abdominal exam  Differential diagnosis includes but is not limited to: gastritis, GERD, pancreatitis, hepatitis, cholecystitis, cholelithiasis, gastroenteritis, colitis, diverticulitis, appendicitis, mesenteric adenitis, UTI, pyelonephritis, SBO, constipation, kidney stone, musculoskeletal, nonspecific abdominal pain  Initial ED plan: Check abdominal labs, HCG, and CT abdomen without contrast due to nationwide critical shortage of IV contrast  IV Zofran, Toradol, and fluid bolus for symptoms  Final assessment: Labs reveal a mild leukocytosis with a WBC of 11 7  Remainder of labs including electrolytes, renal function, LFTs, lipase are normal  HCG negative  CT abdomen is negative for acute findings  Suspect gastroenteritis  She is feeling significantly improved on re-evaluation and was able to tolerate PO challenge and is requesting discharge  Blood pressure documented as 81/46 although patient has no dizziness and is completely asymptomatic  No indication for admission at this time  Supportive care discussed   She declines any prescriptions  Advised close PCP follow-up  ED return precautions discussed  Patient expressed understanding and is agreeable to plan  Patient discharged in stable condition  Amount and/or Complexity of Data Reviewed  Clinical lab tests: ordered and reviewed  Tests in the radiology section of CPT®: ordered and reviewed  Review and summarize past medical records: yes  Independent visualization of images, tracings, or specimens: yes    Risk of Complications, Morbidity, and/or Mortality  Presenting problems: moderate  Diagnostic procedures: moderate  Management options: moderate    Patient Progress  Patient progress: improved      Disposition  Final diagnoses:   Gastroenteritis     Time reflects when diagnosis was documented in both MDM as applicable and the Disposition within this note     Time User Action Codes Description Comment    5/22/2022 10:54  [facility] MadhuHarika webster [facility] Add [K52 [id number]] Gastroenteritis       ED Disposition     ED Disposition   Discharge    Condition   Stable    Date/Time   Sun May 22, 2022 10:54 AM    Comment   Jody Libman [address] 57 discharge to home/self care                 Follow-up Information     Follow up With Specialties Details Why Contact Info Additional Information    Mihai Pablo DO Family Medicine Schedule an appointment as soon as possible for a visit   [address] Emergency Department Emergency Medicine  If symptoms worsen [address] Emergency Department, [address] [place] [address], [facility]          Discharge Medication List as of 5/22/2022 10:55 AM      CONTINUE these medications which have NOT CHANGED    Details   divalproex sodium (DEPAKOTE ER) 500 mg 24 hr tablet Take 4 tablets (2,000 mg total) by mouth daily, Starting Mon 10/25/2021, Normal      ibuprofen (MOTRIN) 800 mg tablet Take 1 tablet (800 mg total) by mouth 3 (three) times a day, Starting Wed 11/3/2021, Normal      multivitamin (THERAGRAN) TABS Take 1 tablet by mouth daily, Historical Med             No discharge procedures on file      PDMP Review       Value Time User    PDMP Reviewed  Yes 11/23/2021 10:52 AM [facility], [facility]          ED Provider  Electronically Signed by           [id number] David Antonio PA-C  05/23/22 7783

## 2022-05-22 NOTE — ED NOTES
Patient transported to [facility], [address] [place] [address]  05/22/22 9410

## 2022-07-31 ENCOUNTER — APPOINTMENT (EMERGENCY)
Dept: ULTRASOUND IMAGING | Facility: HOSPITAL | Age: 27
End: 2022-07-31
Payer: COMMERCIAL

## 2022-07-31 ENCOUNTER — APPOINTMENT (EMERGENCY)
Dept: CT IMAGING | Facility: HOSPITAL | Age: 27
End: 2022-07-31
Payer: COMMERCIAL

## 2022-07-31 ENCOUNTER — HOSPITAL ENCOUNTER (EMERGENCY)
Facility: HOSPITAL | Age: 27
Discharge: HOME | End: 2022-08-01
Attending: EMERGENCY MEDICINE | Admitting: EMERGENCY MEDICINE
Payer: COMMERCIAL

## 2022-07-31 VITALS
SYSTOLIC BLOOD PRESSURE: 117 MMHG | RESPIRATION RATE: 18 BRPM | OXYGEN SATURATION: 97 % | DIASTOLIC BLOOD PRESSURE: 68 MMHG | HEART RATE: 105 BPM | TEMPERATURE: 98.9 F

## 2022-07-31 DIAGNOSIS — R10.9 RIGHT FLANK PAIN: Primary | ICD-10-CM

## 2022-07-31 LAB
ALBUMIN SERPL BCP-MCNC: 3.5 G/DL (ref 3.5–5)
ALP SERPL-CCNC: 84 U/L (ref 46–116)
ALT SERPL W P-5'-P-CCNC: 26 U/L (ref 12–78)
ANION GAP SERPL CALCULATED.3IONS-SCNC: 9 MMOL/L (ref 4–13)
AST SERPL W P-5'-P-CCNC: 19 U/L (ref 5–45)
BASOPHILS # BLD AUTO: 0.04 THOUSANDS/ΜL (ref 0–0.1)
BASOPHILS NFR BLD AUTO: 0 % (ref 0–1)
BILIRUB SERPL-MCNC: 0.31 MG/DL (ref 0.2–1)
BILIRUB UR QL STRIP: NEGATIVE
BUN SERPL-MCNC: 11 MG/DL (ref 5–25)
CALCIUM SERPL-MCNC: 8.4 MG/DL (ref 8.3–10.1)
CHLORIDE SERPL-SCNC: 105 MMOL/L (ref 96–108)
CLARITY UR: NORMAL
CO2 SERPL-SCNC: 26 MMOL/L (ref 21–32)
COLOR UR: YELLOW
CREAT SERPL-MCNC: 0.79 MG/DL (ref 0.6–1.3)
EOSINOPHIL # BLD AUTO: 0.19 THOUSAND/ΜL (ref 0–0.61)
EOSINOPHIL NFR BLD AUTO: 2 % (ref 0–6)
ERYTHROCYTE [DISTWIDTH] IN BLOOD BY AUTOMATED COUNT: 14.2 % (ref 11.6–15.1)
EXT PREG TEST URINE: NEGATIVE
EXT. CONTROL ED NAV: NORMAL
GFR SERPL CREATININE-BSD FRML MDRD: 101 ML/MIN/1.73SQ M
GLUCOSE SERPL-MCNC: 163 MG/DL (ref 65–140)
GLUCOSE UR STRIP-MCNC: NEGATIVE MG/DL
HCG SERPL QL: NEGATIVE
HCT VFR BLD AUTO: 39.2 % (ref 34.8–46.1)
HGB BLD-MCNC: 12.6 G/DL (ref 11.5–15.4)
HGB UR QL STRIP.AUTO: NEGATIVE
IMM GRANULOCYTES # BLD AUTO: 0.03 THOUSAND/UL (ref 0–0.2)
IMM GRANULOCYTES NFR BLD AUTO: 0 % (ref 0–2)
KETONES UR STRIP-MCNC: NEGATIVE MG/DL
LEUKOCYTE ESTERASE UR QL STRIP: NEGATIVE
LIPASE SERPL-CCNC: 95 U/L (ref 73–393)
LYMPHOCYTES # BLD AUTO: 3.88 THOUSANDS/ΜL (ref 0.6–4.47)
LYMPHOCYTES NFR BLD AUTO: 36 % (ref 14–44)
MCH RBC QN AUTO: 25.6 PG (ref 26.8–34.3)
MCHC RBC AUTO-ENTMCNC: 32.1 G/DL (ref 31.4–37.4)
MCV RBC AUTO: 80 FL (ref 82–98)
MONOCYTES # BLD AUTO: 0.58 THOUSAND/ΜL (ref 0.17–1.22)
MONOCYTES NFR BLD AUTO: 5 % (ref 4–12)
NEUTROPHILS # BLD AUTO: 6.2 THOUSANDS/ΜL (ref 1.85–7.62)
NEUTS SEG NFR BLD AUTO: 57 % (ref 43–75)
NITRITE UR QL STRIP: NEGATIVE
NRBC BLD AUTO-RTO: 0 /100 WBCS
PH UR STRIP.AUTO: 6 [PH]
PLATELET # BLD AUTO: 318 THOUSANDS/UL (ref 149–390)
PMV BLD AUTO: 10.1 FL (ref 8.9–12.7)
POTASSIUM SERPL-SCNC: 4 MMOL/L (ref 3.5–5.3)
PROT SERPL-MCNC: 7.4 G/DL (ref 6.4–8.4)
PROT UR STRIP-MCNC: NEGATIVE MG/DL
RBC # BLD AUTO: 4.92 MILLION/UL (ref 3.81–5.12)
SODIUM SERPL-SCNC: 140 MMOL/L (ref 135–147)
SP GR UR STRIP.AUTO: 1.02 (ref 1–1.03)
UROBILINOGEN UR QL STRIP.AUTO: 0.2 E.U./DL
WBC # BLD AUTO: 10.92 THOUSAND/UL (ref 4.31–10.16)

## 2022-07-31 PROCEDURE — 80053 COMPREHEN METABOLIC PANEL: CPT | Performed by: EMERGENCY MEDICINE

## 2022-07-31 PROCEDURE — 81003 URINALYSIS AUTO W/O SCOPE: CPT | Performed by: EMERGENCY MEDICINE

## 2022-07-31 PROCEDURE — 76830 TRANSVAGINAL US NON-OB: CPT

## 2022-07-31 PROCEDURE — 76856 US EXAM PELVIC COMPLETE: CPT

## 2022-07-31 PROCEDURE — 96361 HYDRATE IV INFUSION ADD-ON: CPT

## 2022-07-31 PROCEDURE — 85025 COMPLETE CBC W/AUTO DIFF WBC: CPT | Performed by: EMERGENCY MEDICINE

## 2022-07-31 PROCEDURE — 36415 COLL VENOUS BLD VENIPUNCTURE: CPT | Performed by: EMERGENCY MEDICINE

## 2022-07-31 PROCEDURE — 84703 CHORIONIC GONADOTROPIN ASSAY: CPT | Performed by: EMERGENCY MEDICINE

## 2022-07-31 PROCEDURE — 99284 EMERGENCY DEPT VISIT MOD MDM: CPT

## 2022-07-31 PROCEDURE — 96374 THER/PROPH/DIAG INJ IV PUSH: CPT

## 2022-07-31 PROCEDURE — 74176 CT ABD & PELVIS W/O CONTRAST: CPT

## 2022-07-31 PROCEDURE — 83690 ASSAY OF LIPASE: CPT | Performed by: EMERGENCY MEDICINE

## 2022-07-31 PROCEDURE — G1004 CDSM NDSC: HCPCS

## 2022-07-31 PROCEDURE — 81025 URINE PREGNANCY TEST: CPT | Performed by: EMERGENCY MEDICINE

## 2022-07-31 RX ORDER — KETOROLAC TROMETHAMINE 30 MG/ML
15 INJECTION, SOLUTION INTRAMUSCULAR; INTRAVENOUS ONCE
Status: COMPLETED | OUTPATIENT
Start: 2022-07-31 | End: 2022-07-31

## 2022-07-31 RX ORDER — ACETAMINOPHEN 325 MG/1
650 TABLET ORAL ONCE
Status: COMPLETED | OUTPATIENT
Start: 2022-07-31 | End: 2022-07-31

## 2022-07-31 RX ADMIN — ACETAMINOPHEN 650 MG: 325 TABLET ORAL at 21:49

## 2022-07-31 RX ADMIN — SODIUM CHLORIDE 1000 ML: 0.9 INJECTION, SOLUTION INTRAVENOUS at 20:01

## 2022-07-31 RX ADMIN — KETOROLAC TROMETHAMINE 15 MG: 30 INJECTION, SOLUTION INTRAMUSCULAR at 19:55

## 2022-07-31 NOTE — Clinical Note
Nellie Morton was seen and treated in our emergency department on 7/31/2022  Diagnosis:     Steve Bowen  is off the rest of the shift today, may return to work on return date  She may return on this date: 08/02/2022         If you have any questions or concerns, please don't hesitate to call        Yaz Ordonez RN    ______________________________           _______________          _______________  Hospital Representative                              Date                                Time

## 2022-08-01 PROCEDURE — 99284 EMERGENCY DEPT VISIT MOD MDM: CPT | Performed by: EMERGENCY MEDICINE

## 2022-08-01 RX ORDER — NAPROXEN 500 MG/1
500 TABLET ORAL 2 TIMES DAILY WITH MEALS
Qty: 30 TABLET | Refills: 0 | Status: SHIPPED | OUTPATIENT
Start: 2022-08-01

## 2022-08-01 RX ORDER — METHOCARBAMOL 500 MG/1
500 TABLET, FILM COATED ORAL 2 TIMES DAILY
Qty: 10 TABLET | Refills: 0 | Status: SHIPPED | OUTPATIENT
Start: 2022-08-01

## 2022-08-01 NOTE — ED PROVIDER NOTES
History  Chief Complaint   Patient presents with    Flank Pain     Pt c/o of lower right flank pain and abd pain, urinary frequency     31 y/o female presents to the ED for right flank pain x few days  Patient states that she has had constant sharp/stabbing pain in her right flank that intermittently radiates to her RLQ  She deneis any n/v, fever, cp, sob, urinary symptoms, or d/c  Has tried tylenol without relief  No hx of simialr in the apst  No other complaints  History provided by:  Patient  Flank Pain  Pain location:  R flank  Pain quality: sharp and stabbing    Pain radiates to:  RLQ  Pain severity:  Moderate  Onset quality:  Sudden  Timing:  Constant  Progression:  Worsening  Chronicity:  New  Relieved by:  None tried  Worsened by:  Nothing  Ineffective treatments:  None tried  Associated symptoms: no chest pain, no chills, no cough, no diarrhea, no dysuria, no fever, no hematuria, no nausea, no shortness of breath, no sore throat and no vomiting        Prior to Admission Medications   Prescriptions Last Dose Informant Patient Reported? Taking?   divalproex sodium (DEPAKOTE ER) 500 mg 24 hr tablet   No No   Sig: Take 4 tablets (2,000 mg total) by mouth daily   ibuprofen (MOTRIN) 800 mg tablet   No No   Sig: Take 1 tablet (800 mg total) by mouth 3 (three) times a day   multivitamin (THERAGRAN) TABS   Yes No   Sig: Take 1 tablet by mouth daily      Facility-Administered Medications: None       Past Medical History:   Diagnosis Date    Depression     Migraine     Morbid obesity with BMI of 40 0-44 9, adult (HCC)     Obesity (BMI 30-39  9)     Seizure ([address]     Seizures ([facility] [address] )        Past Surgical History:   Procedure Laterality Date     SECTION       SECTION      SHOULDER SURGERY      TUBAL LIGATION      TUMOR REMOVAL      from ear       Family History   Problem Relation Age of Onset    No Known Problems Mother     No Known Problems Father     No Known Problems Sister     No Known Problems Brother     No Known Problems Maternal Aunt     No Known Problems Paternal Aunt     No Known Problems Maternal Uncle     No Known Problems Paternal Uncle     No Known Problems Maternal Grandfather     No Known Problems Maternal Grandmother     No Known Problems Paternal Grandfather     No Known Problems Paternal Grandmother     No Known Problems Cousin     ADD / ADHD Neg Hx     Alcohol abuse Neg Hx     Anxiety disorder Neg Hx     Bipolar disorder Neg Hx     Completed Suicide  Neg Hx     Dementia Neg Hx     Depression Neg Hx     Drug abuse Neg Hx     OCD Neg Hx     Psychiatric Illness Neg Hx     Psychosis Neg Hx     Schizoaffective Disorder  Neg Hx     Schizophrenia Neg Hx     Self-Injury Neg Hx     Suicide Attempts Neg Hx      I have reviewed and agree with the history as documented  E-Cigarette/Vaping    E-Cigarette Use Never User      E-Cigarette/Vaping Substances    Nicotine No     THC No     CBD No     Flavoring No      Social History     Tobacco Use    Smoking status: Never Smoker    Smokeless tobacco: Never Used   Vaping Use    Vaping Use: Never used   Substance Use Topics    Alcohol use: Yes     Comment: socially    Drug use: Never       Review of Systems   Constitutional: Negative for chills and fever  HENT: Negative for congestion, ear pain and sore throat  Eyes: Negative for pain and visual disturbance  Respiratory: Negative for cough, shortness of breath and wheezing  Cardiovascular: Negative for chest pain and leg swelling  Gastrointestinal: Negative for abdominal pain, diarrhea, nausea and vomiting  Genitourinary: Positive for flank pain  Negative for dysuria, frequency, hematuria and urgency  Musculoskeletal: Negative for neck pain and neck stiffness  Skin: Negative for rash and wound  Neurological: Negative for weakness, numbness and headaches  Psychiatric/Behavioral: Negative for agitation and confusion     All other systems reviewed and are negative  Physical Exam  Physical Exam  Vitals and nursing note reviewed  Constitutional:       Appearance: She is well-developed  HENT:      Head: Normocephalic and atraumatic  Eyes:      Pupils: Pupils are equal, round, and reactive to light  Cardiovascular:      Rate and Rhythm: Normal rate and regular rhythm  Pulmonary:      Effort: Pulmonary effort is normal       Breath sounds: Normal breath sounds  Abdominal:      General: Bowel sounds are normal       Palpations: Abdomen is soft  Tenderness: There is abdominal tenderness  There is right CVA tenderness  Musculoskeletal:         General: Normal range of motion  Cervical back: Normal range of motion and neck supple  Skin:     General: Skin is warm and dry  Neurological:      General: No focal deficit present  Mental Status: She is alert and oriented to person, place, and time        Comments: No focal deficits         Vital Signs  ED Triage Vitals [07/31/22 1749]   Temperature Pulse Respirations Blood Pressure SpO2   98 9 °F (37 2 °C) 103 15 118/71 96 %      Temp Source Heart Rate Source Patient Position - Orthostatic VS BP Location FiO2 (%)   Oral Monitor Sitting Left arm --      Pain Score       8           Vitals:    07/31/22 1749 07/31/22 2000 07/31/22 2200   BP: 118/71 120/75 117/68   Pulse: 103 89 105   Patient Position - Orthostatic VS: Sitting Lying Lying         Visual Acuity      ED Medications  Medications   sodium chloride 0 9 % bolus 1,000 mL (0 mL Intravenous Stopped 8/1/22 0016)   ketorolac (TORADOL) injection 15 mg (15 mg Intravenous Given 7/31/22 1955)   acetaminophen (TYLENOL) tablet 650 mg (650 mg Oral Given 7/31/22 2149)       Diagnostic Studies  Results Reviewed     Procedure Component Value Units Date/Time    hCG, qualitative pregnancy [[id number]]  (Normal) Collected: 07/31/22 1950    Lab Status: Final result Specimen: Blood from Arm, Left Updated: 07/31/22 2023     Preg, Serum Negative Comprehensive metabolic panel [[id number]]  (Abnormal) Collected: 07/31/22 1950    Lab Status: Final result Specimen: Blood from Arm, Left Updated: 07/31/22 2018     Sodium 140 mmol/L      Potassium 4 0 mmol/L      Chloride 105 mmol/L      CO2 26 mmol/L      ANION GAP 9 mmol/L      BUN 11 mg/dL      Creatinine 0 79 mg/dL      Glucose 163 mg/dL      Calcium 8 4 mg/dL      AST 19 U/L      ALT 26 U/L      Alkaline Phosphatase 84 U/L      Total Protein 7 4 g/dL      Albumin 3 5 g/dL      Total Bilirubin 0 31 mg/dL      eGFR 101 ml/min/1 73sq m     Narrative:      National Kidney Disease Foundation guidelines for Chronic Kidney Disease (CKD):     Stage 1 with normal or high GFR (GFR > 90 mL/min/1 73 square meters)    Stage 2 Mild CKD (GFR = 60-89 mL/min/1 73 square meters)    Stage 3A Moderate CKD (GFR = 45-59 mL/min/1 73 square meters)    Stage 3B Moderate CKD (GFR = 30-44 mL/min/1 73 square meters)    Stage 4 Severe CKD (GFR = 15-29 mL/min/1 73 square meters)    Stage 5 End Stage CKD (GFR <15 mL/min/1 73 square meters)  Note: GFR calculation is accurate only with a steady state creatinine    Lipase [[id number]]  (Normal) Collected: 07/31/22 1950    Lab Status: Final result Specimen: Blood from Arm, Left Updated: 07/31/22 2018     Lipase 95 u/L     UA w Reflex to Microscopic w Reflex to Culture [[id number]] Collected: 07/31/22 1952    Lab Status: Final result Specimen: Urine, Clean Catch Updated: 07/31/22 2000     Color, UA Yellow     Clarity, UA Slightly Cloudy     Specific Gravity, UA 1 025     pH, UA 6 0     Leukocytes, UA Negative     Nitrite, UA Negative     Protein, UA Negative mg/dl      Glucose, UA Negative mg/dl      Ketones, UA Negative mg/dl      Urobilinogen, UA 0 2 E U /dl      Bilirubin, UA Negative     Occult Blood, UA Negative    CBC and differential [[id number]]  (Abnormal) Collected: 07/31/22 1950    Lab Status: Final result Specimen: Blood from Arm, Left Updated: 07/31/22 1959     WBC 10 92 Thousand/uL      RBC 4 92 Million/uL      Hemoglobin 12 6 g/dL      Hematocrit 39 2 %      MCV 80 fL      MCH 25 6 pg      MCHC 32 1 g/dL      RDW 14 2 %      MPV 10 1 fL      Platelets 071 Thousands/uL      nRBC 0 /100 WBCs      Neutrophils Relative 57 %      Immat GRANS % 0 %      Lymphocytes Relative 36 %      Monocytes Relative 5 %      Eosinophils Relative 2 %      Basophils Relative 0 %      Neutrophils Absolute 6 20 Thousands/µL      Immature Grans Absolute 0 03 Thousand/uL      Lymphocytes Absolute 3 88 Thousands/µL      Monocytes Absolute 0 58 Thousand/µL      Eosinophils Absolute 0 19 Thousand/µL      Basophils Absolute 0 04 Thousands/µL     POCT pregnancy, urine [[id number]]  (Normal) Resulted: 07/31/22 1954    Lab Status: Final result Specimen: Urine Updated: 07/31/22 1954     EXT PREG TEST UR (Ref: Negative) Negative     Control Valid                 US pelvis complete w transvaginal   Final Result by Yariel Pal DO (07/31 2356)       Right adnexal cyst redemonstrated, as described; mild interval increase in size but probably benign  The uterus and bilateral ovaries appear grossly unremarkable  Other findings as above  Clinical follow-up recommended  Workstation performed: [id number]         CT abdomen pelvis wo contrast   Final Result by Kang Szymanski MD (07/31 2030)      5 6 cm right adnexal cyst       Otherwise, no acute findings in the abdomen or pelvis  Workstation performed: [id number]                    Procedures  Procedures         ED Course                               SBIRT 22yo+    Flowsheet Row Most Recent Value   SBIRT (23 yo +)    In order to provide better care to our patients, we are screening all of our patients for alcohol and drug use  Would it be okay to ask you these screening questions? Yes Filed at: 07/31/2022 1910   Initial Alcohol Screen: US AUDIT-C     1   How often do you have a drink containing alcohol? 0 Filed at: 07/31/2022 1910   2  How many drinks containing alcohol do you have on a typical day you are drinking? 0 Filed at: 07/31/2022 1910   3b  FEMALE Any Age, or MALE 65+: How often do you have 4 or more drinks on one occassion? 0 Filed at: 07/31/2022 1910   Audit-C Score 0 Filed at: 07/31/2022 1910   KYREE: How many times in the past year have you    Used an illegal drug or used a prescription medication for non-medical reasons? Never Filed at: 07/31/2022 1910                    MDM  Number of Diagnoses or Management Options  Right flank pain: new and requires workup  Diagnosis management comments: Patient with right flank pain - will get labs, UA, and ct abd/pel - will give toradol and reassess  Patient reevaluated and feels improved  Patient updated on results of tests  Discharge instructions given including medications, follow-up, and return precautions  Patient demonstrates verbal understanding and agrees with plan  Muscle relaxer precautions given          Amount and/or Complexity of Data Reviewed  Clinical lab tests: ordered and reviewed  Tests in the radiology section of CPT®: ordered and reviewed  Tests in the medicine section of CPT®: ordered and reviewed  Discussion of test results with the performing providers: yes  Decide to obtain previous medical records or to obtain history from someone other than the patient: yes  Obtain history from someone other than the patient: yes  Review and summarize past medical records: yes  Discuss the patient with other providers: yes  Independent visualization of images, tracings, or specimens: yes    Patient Progress  Patient progress: improved      Disposition  Final diagnoses:   Right flank pain     Time reflects when diagnosis was documented in both MDM as applicable and the Disposition within this note     Time User Action Codes Description Comment    8/1/2022 12:12 AM Donna BRASHER Add [R10 9] Right flank pain       ED Disposition     ED Disposition Discharge    Condition   Stable    Date/Time   Mon Aug 1, 2022 12:12 AM    Comment   Enrike Tayladarius Jernigan 57 discharge to home/self care  Follow-up Information     Follow up With Specialties Details Why Contact Info Additional Information    Bandar Simon DO Family Medicine Call in 1 day for follow up within 2-3 days [id number] [facility] [id number]  [address] [place] [address] Emergency Department Emergency Medicine Go to  immediately for any new or worsening symptoms [address] [facility]  [address] [place] [address] [address] [facility] Emergency Department, [address] [place] [address], [facility]          Discharge Medication List as of 8/1/2022 12:14 AM      START taking these medications    Details   methocarbamol (ROBAXIN) 500 mg tablet Take 1 tablet (500 mg total) by mouth 2 (two) times a day, Starting Mon 8/1/2022, Print      naproxen (Naprosyn) 500 mg tablet Take 1 tablet (500 mg total) by mouth 2 (two) times a day with meals, Starting Mon 8/1/2022, Print         CONTINUE these medications which have NOT CHANGED    Details   divalproex sodium (DEPAKOTE ER) 500 mg 24 hr tablet Take 4 tablets (2,000 mg total) by mouth daily, Starting Mon 10/25/2021, Normal      ibuprofen (MOTRIN) 800 mg tablet Take 1 tablet (800 mg total) by mouth 3 (three) times a day, Starting Wed 11/3/2021, Normal      multivitamin (THERAGRAN) TABS Take 1 tablet by mouth daily, Historical Med             No discharge procedures on file      PDMP Review       Value Time User    PDMP Reviewed  Yes 11/23/2021 10:52 AM [facility], [facility]          ED Provider  Electronically Signed by           Melissa Gamboa DO  08/01/22 3247

## 2022-08-01 NOTE — ED NOTES
Spoke to RIP Mckee from ultrasound regarding patients ultrasound order, she is on her way here at this time       Afia Moncada, MIRACLE  07/31/22 7919

## 2022-08-01 NOTE — ED NOTES
Ultrasound finished at bedside     Magaly Daniel, [facility]  07/31/22 1832

## 2022-08-26 ENCOUNTER — TELEPHONE (OUTPATIENT)
Dept: NEUROLOGY | Facility: CLINIC | Age: 27
End: 2022-08-26

## 2022-08-26 NOTE — TELEPHONE ENCOUNTER
Patient called to schedule a follow up with Dr Geovanna Tidwell I offered her 9-21-22 at 1130am in [facility] and she accepted

## 2022-09-21 NOTE — TELEPHONE ENCOUNTER
Patient called to cancel appointment due to her work schedule  She stated she will call back tomorrow when she gets her new work schedule

## 2022-11-03 ENCOUNTER — OFFICE VISIT (OUTPATIENT)
Dept: OBGYN CLINIC | Age: 27
End: 2022-11-03

## 2022-11-03 VITALS
HEIGHT: 67 IN | SYSTOLIC BLOOD PRESSURE: 108 MMHG | DIASTOLIC BLOOD PRESSURE: 62 MMHG | WEIGHT: 236 LBS | BODY MASS INDEX: 37.04 KG/M2

## 2022-11-03 DIAGNOSIS — Z23 NEED FOR HPV VACCINE: ICD-10-CM

## 2022-11-03 DIAGNOSIS — Z98.51 S/P TUBAL LIGATION: ICD-10-CM

## 2022-11-03 DIAGNOSIS — Z11.3 SCREENING FOR STDS (SEXUALLY TRANSMITTED DISEASES): ICD-10-CM

## 2022-11-03 DIAGNOSIS — Z01.419 ENCOUNTER FOR ANNUAL ROUTINE GYNECOLOGICAL EXAMINATION: Primary | ICD-10-CM

## 2022-11-03 DIAGNOSIS — N97.1 TUBAL INFERTILITY IN FEMALE: ICD-10-CM

## 2022-11-03 PROBLEM — E66.9 OBESITY (BMI 35.0-39.9 WITHOUT COMORBIDITY): Status: ACTIVE | Noted: 2020-11-03

## 2022-11-03 PROBLEM — F33.9 MAJOR DEPRESSIVE DISORDER, RECURRENT EPISODE WITH ANXIOUS DISTRESS (HCC): Status: ACTIVE | Noted: 2020-11-03

## 2022-11-03 NOTE — PATIENT INSTRUCTIONS
HPV (Human Papillomavirus)   AMBULATORY CARE:   Human Papillomavirus (HPV)  is the most common infection spread by sexual contact  It can also be spread from a mother to her baby during delivery  HPV may cause oral and genital warts or tumors in your nose, mouth, throat, and lungs  HPV may also cause vaginal, penile, and anal cancers  You may not show symptoms of any of these conditions for several years after being exposed to HPV  Common symptoms include the following:   Painless warts    Genital or anal discharge, bleeding, itching, or pain    Pain when you urinate    Call your doctor if:   You have warts in your genital or anal area  You have genital or anal discharge, bleeding, itching, or pain  You have pain when you urinate  You have questions or concerns about your condition or care  HPV diagnosis:  Your healthcare provider may use a vinegar liquid to help diagnose HPV genital warts  Women 27to 72years old can be checked for HPV during regular cervical cancer screenings  An HPV test checks for certain types of HPV that can cause changes in cervical cells  Without treatment, the changed cells can become cancer  An HPV test can be done every 5 years if the results show no infection  The test can be done with or without a Pap smear  A Pap smear checks for cancer or for abnormal cells that can become cancer  You may be tested for HPV if you are diagnosed with a mouth or throat cancer  Treatment:  HPV cannot be cured  Conditions caused by HPV can be treated  You will need to be monitored closely for these conditions  Ask your healthcare provider for more information about monitoring, conditions caused by HPV, and available treatments  Prevent HPV infection:   Ask about the HPV vaccine  The vaccine can help protect against HPV infection  Females and males can receive the vaccine  It is most effective if given before sexual activity begins   This allows the body to build almost complete protection against HPV before contact with the virus  The vaccine is usually given at 6or 15years of age but may be given as early as 5 years  The vaccine can be given through age 39  Always use a condom during intercourse  A condom will not completely protect you from HPV infection, but it will help lower your risk  Use a new condom or latex barrier each time you have sex  This includes oral, vaginal, and anal sex  Make sure the condom fits and is put on correctly  Rubber latex sheets or dental dams can be used for oral sex  If you are allergic to latex, use a nonlatex product such as polyurethane  Follow up with your healthcare provider as directed:  Write down your questions so you remember to ask them during your visits  © Copyright [employer] 2022 Information is for End User's use only and may not be sold, redistributed or otherwise used for commercial purposes  All illustrations and images included in CareNotes® are the copyrighted property of A D A M , Inc  or Aleja Thompson   The above information is an  only  It is not intended as medical advice for individual conditions or treatments  Talk to your doctor, nurse or pharmacist before following any medical regimen to see if it is safe and effective for you

## 2022-11-03 NOTE — ASSESSMENT & PLAN NOTE
ASCCP guidelines reviewed - pap UTD  Perineal hygiene reviewed  Kegel exercises recommended  SBE, daily exercise and healthy diet with adequate calcium and vitamin D encouraged  Weight bearing exercises a minimum of 150 minutes/week advised  Advised to call with any issues, all concerns & questions addressed     See provided information in your after visit summary

## 2022-11-03 NOTE — ASSESSMENT & PLAN NOTE
Advised patient that reversal will be dependent on the type of procedure that she had done and may not be successful  Will obtain records  Recommended she have a consultation with infertility for IUI or IVF also  Recommended she also discuss pregnancy with her neurologist- she states her seizure disorder has been stable on Depakote and she was cleared for pregnancy by her neurologist already

## 2022-11-03 NOTE — PROGRESS NOTES
Gabby Deaduane Jernigan 57  1995    Assessment and Plan:  Yearly exam without abnormality  S/P tubal ligation  Advised patient that reversal will be dependent on the type of procedure that she had done and may not be successful  Will obtain records  Recommended she have a consultation with infertility for IUI or IVF also  Recommended she also discuss pregnancy with her neurologist- she states her seizure disorder has been stable on Depakote and she was cleared for pregnancy by her neurologist already  Encounter for annual routine gynecological examination  ASCCP guidelines reviewed - pap UTD  Perineal hygiene reviewed  Kegel exercises recommended  SBE, daily exercise and healthy diet with adequate calcium and vitamin D encouraged  Weight bearing exercises a minimum of 150 minutes/week advised  Advised to call with any issues, all concerns & questions addressed  See provided information in your after visit summary       Diagnoses and all orders for this visit:    Encounter for annual routine gynecological examination    Screening for STDs (sexually transmitted diseases)  -     Chlamydia/GC amplified DNA by PCR    Need for HPV vaccine  -     HPV Vaccine 9 valent IM    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  -     Ambulatory Referral to Infertility; Future    S/P tubal ligation      F/U Annually and PRN    Health Maintenance:    Last PAP: 2021  Results were: Negative  Next PAP Due: 2024    Gardasil Vaccine Series: She has not had the Gardasil series  She thinks she started the series at one point but did not continue it  Gardasil 9 was advised for prevention of HPV-related disease  We discussed risks/benefits, SE's/AE's at length and all questions were answered  Written info was provided for review  The patient agrees to start series today       Subjective    CC: Yearly Exam     Kacie Vasques is a 32 y o  female  here for an annual exam       Menarche: 13    Patient's last menstrual period was 10/25/2022 (exact date)  Contraception: tubal - interested in reversal/pregnancy  non smoker, no drinker  Exercise- walking    Her menstrual cycles are irregular and tend to skip months  Sexual activity: She is sexually active without pain, bleeding or dryness  She reports occasional sharp pain during intercourse  She denies pelvic pain, abdominal pain, urinary symptoms, abnormal vaginal discharge, itching, odor, or irritation  STD testing:  She does want STD testing today  She is interested in another pregnancy  She states she had a tubal ligation per her doctor recommendations in 2017 due to uncontrolled seizure disorder  At the time she was told her seizure medication would cancel out her birth control and they did not believe another pregnancy would be safe for her  She is interested in having her tubal reversed  She will reach out to her previous dr office for records  Family hx of breast cancer: No  Family hx of ovarian cancer: No  Family hx of colon cancer: No    Past Medical History:   Diagnosis Date   • Depression    • Migraine    • Morbid obesity with BMI of 40 0-44 9, adult ([facility] [address] )    • Obesity (BMI 30-39  9)    • Seizure ([facility] [address] )    • Seizures ([facility] [address] )      Past Surgical History:   Procedure Laterality Date   •  SECTION     •  SECTION     • SHOULDER SURGERY     • TUBAL LIGATION     • TUMOR REMOVAL      from ear       Immunization History   Administered Date(s) Administered   • HPV9 2022   • Influenza, injectable, quadrivalent, preservative free 0 5 mL 2020   • Tdap 2021       Family History   Problem Relation Age of Onset   • No Known Problems Mother    • No Known Problems Father    • No Known Problems Sister    • No Known Problems Brother    • No Known Problems Maternal Grandmother    • No Known Problems Maternal Grandfather    • No Known Problems Paternal Grandmother    • No Known Problems Paternal Grandfather    • No Known Problems Maternal Aunt • No Known Problems Maternal Uncle    • No Known Problems Paternal Aunt    • No Known Problems Paternal Uncle    • No Known Problems Cousin    • ADD / ADHD Neg Hx    • Alcohol abuse Neg Hx    • Anxiety disorder Neg Hx    • Bipolar disorder Neg Hx    • Completed Suicide  Neg Hx    • Dementia Neg Hx    • Depression Neg Hx    • Drug abuse Neg Hx    • OCD Neg Hx    • Psychiatric Illness Neg Hx    • Psychosis Neg Hx    • Schizoaffective Disorder  Neg Hx    • Schizophrenia Neg Hx    • Self-Injury Neg Hx    • Suicide Attempts Neg Hx    • Breast cancer Neg Hx    • Colon cancer Neg Hx    • Ovarian cancer Neg Hx    • Uterine cancer Neg Hx    • Cervical cancer Neg Hx      Social History     Tobacco Use   • Smoking status: Never Smoker   • Smokeless tobacco: Never Used   Vaping Use   • Vaping Use: Never used   Substance Use Topics   • Alcohol use: Not Currently     Comment: socially   • Drug use: Never       Current Outpatient Medications:   •  divalproex sodium (DEPAKOTE ER) 500 mg 24 hr tablet, Take 4 tablets (2,000 mg total) by mouth daily, Disp: 120 tablet, Rfl: 11  Patient Active Problem List    Diagnosis Date Noted   • S/P tubal ligation 11/03/2022   • Encounter for annual routine gynecological examination 11/03/2022   • Right knee injury 07/22/2021   • Fall down stairs 07/06/2021   • Migraine 07/06/2021   • Seizure ([facility] )    • Focal motor deficit    • Sensory deficit, bilateral    • Bipolar 2 disorder (HCC)    • Migraine    • Major depressive disorder, recurrent episode with anxious distress ([facility] ) 11/03/2020   • Obesity (BMI 35 0-39 9 without comorbidity) 11/03/2020   • Class 3 severe obesity without serious comorbidity with body mass index (BMI) of 40 0 to 44 9 in adult ([facility] ) 09/04/2020   • Chronic low back pain 09/04/2020   • Increased anion gap metabolic acidosis 89/44/6158   • EVELYN (acute kidney injury) ([facility] ) 09/27/2019   • Asymptomatic microscopic hematuria 09/27/2019   • Insomnia secondary to depression with anxiety 2018   • Nightmares 2018   • Bipolar 2 disorder ([facility] )    • Seizure-like activity ([place] [id number] ) 2017   • S/P  section 2017   • Sickle cell trait ([facility] ) 2016       Allergies   Allergen Reactions   • Erythromycin Swelling   • Peanut Oil - Food Allergy Hives and Anaphylaxis     Does not have Epi-pen prescribed    • Azithromycin Other (See Comments)       Other reaction(s): Other (See Comments)    OB History    Para Term  AB Living   3 2 2 0 1 2   SAB IAB Ectopic Multiple Live Births   1 0 0 0 2      # Outcome Date GA Lbr Bj/2nd Weight Sex Delivery Anes PTL Lv   3 Term 13 40w0d   M CS-LTranv  N JEEVAN   2 SAB            1 Term     M CS-Unspec  N JEEVAN      Complications: Fetal Intolerance       Vitals:    22 1010   BP: 108/62   BP Location: Right arm   Patient Position: Sitting   Cuff Size: Large   Weight: 107 kg (236 lb)   Height: 5' 7" (1 702 m)     Body mass index is 36 96 kg/m²  Review of Systems   Constitutional: Negative for chills, fatigue, fever and unexpected weight change  Gastrointestinal: Negative for abdominal pain, constipation, diarrhea, nausea and vomiting  Endocrine: Negative  Genitourinary: Positive for dyspareunia  Negative for difficulty urinating, dysuria, frequency, genital sores, hematuria, menstrual problem, pelvic pain, urgency, vaginal bleeding, vaginal discharge and vaginal pain  Musculoskeletal: Negative for back pain and myalgias  Skin: Negative for pallor and rash  Neurological: Negative for headaches  Psychiatric/Behavioral: Negative for dysphoric mood  All other systems reviewed and are negative  Physical Exam  Constitutional:       General: She is not in acute distress  Appearance: Normal appearance  She is not ill-appearing  Genitourinary:      Bladder and urethral meatus normal       No lesions in the vagina        Right Labia: No rash, tenderness, lesions, skin changes or Bartholin's cyst      Left Labia: No tenderness, lesions, skin changes, Bartholin's cyst or rash  No inguinal adenopathy present in the right or left side  No vaginal discharge, erythema, tenderness, bleeding or ulceration  Right Adnexa: not tender, not full and no mass present  Left Adnexa: not tender, not full and no mass present  No cervical motion tenderness, discharge, friability, lesion, polyp or nabothian cyst       Uterus is not enlarged, fixed or tender  No uterine mass detected  No urethral prolapse, tenderness or discharge present  Bladder is not tender and urgency on palpation not present  Pelvic exam was performed with patient in the lithotomy position  Breasts:      Right: No swelling, inverted nipple, mass, nipple discharge, skin change, tenderness, axillary adenopathy or supraclavicular adenopathy  Left: No swelling, inverted nipple, mass, nipple discharge, skin change, tenderness, axillary adenopathy or supraclavicular adenopathy  HENT:      Head: Normocephalic and atraumatic  Eyes:      Conjunctiva/sclera: Conjunctivae normal    Pulmonary:      Effort: Pulmonary effort is normal    Abdominal:      General: There is no distension  Palpations: Abdomen is soft  Tenderness: There is no abdominal tenderness  Musculoskeletal:         General: Normal range of motion  Cervical back: Neck supple  Lymphadenopathy:      Upper Body:      Right upper body: No supraclavicular or axillary adenopathy  Left upper body: No supraclavicular or axillary adenopathy  Lower Body: No right inguinal adenopathy  No left inguinal adenopathy  Neurological:      Mental Status: She is alert and oriented to person, place, and time  Skin:     General: Skin is warm and dry  Psychiatric:         Mood and Affect: Mood normal          Behavior: Behavior normal          Thought Content:  Thought content normal          Judgment: Judgment normal    Vitals and nursing note reviewed

## 2022-11-03 NOTE — PROGRESS NOTES
Faxed. Patient is here today for a gynecological annual exam    No question's or concerns today  LMP: 10/25/2022   Menarche age: 13    BC: Tubal     Last pap: 2021     Hx of abnormal paps? Yes     Gardasil: First dose administered today in left deltoid  Tolerated well

## 2022-11-04 LAB
C TRACH DNA SPEC QL NAA+PROBE: NEGATIVE
N GONORRHOEA DNA SPEC QL NAA+PROBE: NEGATIVE

## 2022-11-14 ENCOUNTER — OFFICE VISIT (OUTPATIENT)
Dept: INTERNAL MEDICINE CLINIC | Facility: CLINIC | Age: 27
End: 2022-11-14

## 2022-11-14 VITALS
WEIGHT: 251.6 LBS | HEART RATE: 89 BPM | OXYGEN SATURATION: 94 % | RESPIRATION RATE: 18 BRPM | DIASTOLIC BLOOD PRESSURE: 86 MMHG | TEMPERATURE: 97.4 F | BODY MASS INDEX: 39.41 KG/M2 | SYSTOLIC BLOOD PRESSURE: 132 MMHG

## 2022-11-14 DIAGNOSIS — Z23 ENCOUNTER FOR IMMUNIZATION: ICD-10-CM

## 2022-11-14 DIAGNOSIS — Z00.00 ANNUAL PHYSICAL EXAM: Primary | ICD-10-CM

## 2022-11-14 DIAGNOSIS — R10.13 EPIGASTRIC PAIN: ICD-10-CM

## 2022-11-14 DIAGNOSIS — Z11.59 NEED FOR HEPATITIS C SCREENING TEST: ICD-10-CM

## 2022-11-14 RX ORDER — FAMOTIDINE 20 MG/1
20 TABLET, FILM COATED ORAL DAILY
Qty: 30 TABLET | Refills: 0 | Status: SHIPPED | OUTPATIENT
Start: 2022-11-14 | End: 2022-12-14

## 2022-11-14 NOTE — PROGRESS NOTES
ADULT ANNUAL [address]     NAME: Manuela Schneider  AGE: 32 y o  SEX: female  : 1995     DATE: 2022     Assessment and Plan:     Problem List Items Addressed This Visit    None     Visit Diagnoses     Annual physical exam    -  Primary    Relevant Orders    Comprehensive metabolic panel    Lipid Panel with Direct LDL reflex    TSH, 3rd generation with Free T4 reflex    CBC and differential    Need for hepatitis C screening test        Relevant Orders    Hepatitis C Antibody ([facility])    Epigastric pain        Relevant Medications    famotidine (PEPCID) 20 mg tablet    Encounter for immunization        Relevant Orders    influenza vaccine, quadrivalent, 0 5 mL, preservative-free, for adult and pediatric patients 6 mos+ (AFLURIA, FLUARIX, FLULAVAL, FLUZONE) (Completed)        Probable gastritis  Will start with H2 blockers  If refractory would consider abdominal u/s  Screening studies ordered   Immunizations and preventive care screenings were discussed with patient today  Appropriate education was printed on patient's after visit summary  Counseling:  · Dental Health: discussed importance of regular tooth brushing, flossing, and dental visits  No follow-ups on file  Chief Complaint:     Chief Complaint   Patient presents with   • Physical Exam     Pt states that she has been having stomach pain since a couple of weeks just feels nauseous did a lab pregnancy test was negative on the 11/3/22      History of Present Illness:     Adult Annual Physical   Patient here for a comprehensive physical exam  The patient reports problems - nausea and epigastric discomfort x 3 weeks       Diet and Physical Activity  · Diet/Nutrition: limited fruits/vegetables  · Exercise: walking        Depression Screening  PHQ-2/9 Depression Screening    Little interest or pleasure in doing things: 0 - not at all  Feeling down, depressed, or hopeless: 0 - not at all  Trouble falling or staying asleep, or sleeping too much: 0 - not at all  Feeling tired or having little energy: 0 - not at all  Poor appetite or overeatin - not at all  Feeling bad about yourself - or that you are a failure or have let yourself or your family down: 0 - not at all  Trouble concentrating on things, such as reading the newspaper or watching television: 0 - not at all  Moving or speaking so slowly that other people could have noticed  Or the opposite - being so fidgety or restless that you have been moving around a lot more than usual: 0 - not at all  Thoughts that you would be better off dead, or of hurting yourself in some way: 0 - not at all  PHQ-9 Score: 0   PHQ-9 Interpretation: No or Minimal depression        General Health  · Sleep: gets 7-8 hours of sleep on average  · Hearing: normal - bilateral   · Vision: most recent eye exam >1 year ago  · Dental: no dental visits for >1 year  /GYN Health  · Last menstrual period: last month   · Contraceptive method: b/l tubal       Review of Systems:     Review of Systems   HENT: Negative for congestion, rhinorrhea and sore throat  Respiratory: Negative for shortness of breath  Cardiovascular: Negative for chest pain  Gastrointestinal: Positive for abdominal pain (x3 weeks) and nausea (x3 weeks )  Negative for blood in stool, constipation, diarrhea and vomiting  Musculoskeletal: Negative for gait problem  Neurological: Positive for seizures and headaches  Past Medical History:     Past Medical History:   Diagnosis Date   • Depression    • Migraine    • Morbid obesity with BMI of 40 0-44 9, adult ([facility] [address] )    • Obesity (BMI 30-39  9)    • Seizure ([facility] [address] )    • [facility]ures ([facility] )       Past Surgical History:     Past Surgical History:   Procedure Laterality Date   •  SECTION     •  SECTION     • SHOULDER SURGERY     • TUBAL LIGATION     • TUMOR REMOVAL      from ear      Social History:     Social History     Socioeconomic History   • Marital status: Single     Spouse name: None   • Number of children: None   • Years of education: None   • Highest education level: None   Occupational History   • None   Tobacco Use   • Smoking status: Never Smoker   • Smokeless tobacco: Never Used   Vaping Use   • Vaping Use: Never used   Substance and Sexual Activity   • Alcohol use: Not Currently     Comment: socially   • Drug use: Never   • Sexual activity: Yes     Partners: Male     Birth control/protection: Surgical     Comment: Tubal   Other Topics Concern   • None   Social History Narrative    ** Merged History Encounter **          Social Determinants of Health     Financial Resource Strain: Not on file   Food Insecurity: Not on file   Transportation Needs: Not on file   Physical Activity: Not on file   Stress: Not on file   Social Connections: Not on file   Intimate Partner Violence: Not on file   Housing Stability: Not on file      Family History:     Family History   Problem Relation Age of Onset   • No Known Problems Mother    • No Known Problems Father    • No Known Problems Sister    • No Known Problems Brother    • No Known Problems Maternal Grandmother    • No Known Problems Maternal Grandfather    • No Known Problems Paternal Grandmother    • No Known Problems Paternal Grandfather    • No Known Problems Maternal Aunt    • No Known Problems Maternal Uncle    • No Known Problems Paternal Aunt    • No Known Problems Paternal Uncle    • No Known Problems Cousin    • ADD / ADHD Neg Hx    • Alcohol abuse Neg Hx    • Anxiety disorder Neg Hx    • Bipolar disorder Neg Hx    • Completed Suicide  Neg Hx    • Dementia Neg Hx    • Depression Neg Hx    • Drug abuse Neg Hx    • OCD Neg Hx    • Psychiatric Illness Neg Hx    • Psychosis Neg Hx    • Schizoaffective Disorder  Neg Hx    • Schizophrenia Neg Hx    • Self-Injury Neg Hx    • Suicide Attempts Neg Hx    • Breast cancer Neg Hx    • Colon cancer Neg Hx    • Ovarian cancer Neg Hx    • Uterine cancer Neg Hx    • Cervical cancer Neg Hx       Current Medications:     Current Outpatient Medications   Medication Sig Dispense Refill   • famotidine (PEPCID) 20 mg tablet Take 1 tablet (20 mg total) by mouth daily 30 tablet 0   • divalproex sodium (DEPAKOTE ER) 500 mg 24 hr tablet Take 4 tablets (2,000 mg total) by mouth daily 120 tablet 11     No current facility-administered medications for this visit  Allergies: Allergies   Allergen Reactions   • Erythromycin Swelling   • Peanut Oil - Food Allergy Hives and Anaphylaxis     Does not have Epi-pen prescribed    • Azithromycin Other (See Comments)       Other reaction(s): Other (See Comments)    Physical Exam:     /86 (BP Location: Left arm, Patient Position: Sitting, Cuff Size: Standard)   Pulse 89   Temp (!) 97 4 °F (36 3 °C) (Temporal)   Resp 18   Wt 114 kg (251 lb 9 6 oz)   LMP 10/25/2022 (Exact Date)   SpO2 94%   BMI 39 41 kg/m²     Physical Exam  HENT:      Head: Normocephalic and atraumatic  Right Ear: External ear normal       Left Ear: External ear normal       Mouth/Throat:      Mouth: Mucous membranes are moist       Pharynx: Oropharynx is clear  Eyes:      Extraocular Movements: Extraocular movements intact  Conjunctiva/sclera: Conjunctivae normal       Pupils: Pupils are equal, round, and reactive to light  Cardiovascular:      Rate and Rhythm: Normal rate and regular rhythm  Heart sounds: No murmur heard  Pulmonary:      Effort: Pulmonary effort is normal       Breath sounds: Normal breath sounds  Abdominal:      General: Bowel sounds are normal       Palpations: Abdomen is soft  Tenderness: There is abdominal tenderness (epigastric with deep palpation )  Hernia: No hernia is present  Musculoskeletal:      Right lower leg: No edema  Left lower leg: No edema  Neurological:      Mental Status: She is oriented to person, place, and time  Gait: Gait normal    Psychiatric:         Mood and Affect: Mood normal          Behavior: Behavior normal           DO Marcus Sharp

## 2022-11-14 NOTE — PATIENT INSTRUCTIONS

## 2023-01-20 ENCOUNTER — OFFICE VISIT (OUTPATIENT)
Dept: NEUROLOGY | Facility: CLINIC | Age: 28
End: 2023-01-20

## 2023-01-20 VITALS
HEART RATE: 88 BPM | OXYGEN SATURATION: 96 % | SYSTOLIC BLOOD PRESSURE: 120 MMHG | DIASTOLIC BLOOD PRESSURE: 80 MMHG | BODY MASS INDEX: 39.39 KG/M2 | TEMPERATURE: 97.8 F | WEIGHT: 251 LBS | HEIGHT: 67 IN

## 2023-01-20 DIAGNOSIS — H57.02 ANISOCORIA: ICD-10-CM

## 2023-01-20 DIAGNOSIS — R56.9 SEIZURE (HCC): Primary | ICD-10-CM

## 2023-01-20 DIAGNOSIS — G43.809 OTHER MIGRAINE WITHOUT STATUS MIGRAINOSUS, NOT INTRACTABLE: ICD-10-CM

## 2023-01-20 RX ORDER — DIVALPROEX SODIUM 500 MG/1
2000 TABLET, EXTENDED RELEASE ORAL DAILY
Qty: 360 TABLET | Refills: 3 | Status: SHIPPED | OUTPATIENT
Start: 2023-01-20

## 2023-01-20 NOTE — PROGRESS NOTES
Review of Systems   Constitutional: Negative  Negative for appetite change and fever  HENT: Negative  Negative for hearing loss, tinnitus, trouble swallowing and voice change  Eyes: Negative  Negative for photophobia, pain and visual disturbance  Respiratory: Negative  Negative for shortness of breath  Cardiovascular: Negative  Negative for palpitations  Gastrointestinal: Negative  Negative for nausea and vomiting  Endocrine: Negative  Negative for cold intolerance  Genitourinary: Negative  Negative for dysuria, frequency and urgency  Musculoskeletal: Negative  Negative for gait problem, myalgias and neck pain  Skin: Negative  Negative for rash  Allergic/Immunologic: Negative  Neurological: Negative for dizziness, tremors, seizures, syncope, facial asymmetry, speech difficulty, weakness, light-headedness, numbness and headaches  Hematological: Negative  Does not bruise/bleed easily  Psychiatric/Behavioral: Negative  Negative for confusion, hallucinations and sleep disturbance

## 2023-01-20 NOTE — ASSESSMENT & PLAN NOTE
Patient GTC seizures, well controlled on Depakote ER monotherapy of 2000 mg daily  She has been seizure free since March 2022  Overall she has been doing quite well  She does not that when there is a migraine she might space out but is able to respond to others when spoken to  Overall she has made a big effort to manage stress and lifestyle factors  She would like to return to driving and since she has not had any events concerning for seizures paperwork will be submitted to [place] [employer] and [place] DM[employer] for return to driving  Sending to both given she had a [place] license when revoked but has since been in [place] and [place] a [place] state ID  Aware to wait until paperwork and license are received from [facility]/[place] DMV  To start driving  Patient will continue with Depakote ER 2000 mg daily  History of tubal ligation so no concerns regarding pregnancy on this medicaiton  She will call the office with breakthrough seizures or concerns  Follow up in 6 months or sooner if needed

## 2023-01-20 NOTE — ASSESSMENT & PLAN NOTE
Overall stable at this time  Currently 1-2 times per month, aborted with rest, no need to abortive medication  Greatly improved from daily in the past      If there is worsening of migraines or concerns she will call the office

## 2023-01-20 NOTE — PROGRESS NOTES
Lab results faxed to [email or web address]. US done at [facility], not yet resulted. Will watch for report and fax when available. Patient ID: Shannan Starr is a 32 y o  female with seizures, who is returning to Neurology office for follow up of her seizures  Assessment/Plan:    Seizure ([address] )  Patient GTC seizures, well controlled on Depakote ER monotherapy of 2000 mg daily  She has been seizure free since March 2022  Overall she has been doing quite well  She does not that when there is a migraine she might space out but is able to respond to others when spoken to  Overall she has made a big effort to manage stress and lifestyle factors  She would like to return to driving and since she has not had any events concerning for seizures paperwork will be submitted to [place] [employer] and [address] [place] [address] DMV for return to driving  Sending to both given she had a [facility] [employer] license when revoked but has since been in [address] [place] [address] and has a [place] state ID  Aware to wait until paperwork and license are received from [facility] [employer]/[place] DMV  To start driving  Patient will continue with Depakote ER 2000 mg daily  History of tubal ligation so no concerns regarding pregnancy on this medicaiton  She will call the office with breakthrough seizures or concerns  Follow up in 6 months or sooner if needed  Migraine  Overall stable at this time  Currently 1-2 times per month, aborted with rest, no need to abortive medication  Greatly improved from daily in the past      If there is worsening of migraines or concerns she will call the office  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  Prior imaging without cause, possibly congenital            Subjective:  Shannan Starr is a 32 y o  female with seizures, who is returning to Neurology office for follow up of her seizures  The patient also has a PMH of bipolar disorder, obesity, migraine, R>L anisocoria, and ovarian cysts  She is s/p tubal ligation  Patient was last seen in the office on 10/25/2021   At that time, it was recommended that she was admitted to the EMU due to episodes of spacing out and loss of time about once per week or more  During admission there was an episode of right facial tingling without loss of awareness and an atypical event of staring off, blurred vision and pressure to her bilateral head lasting about 7 minutes  No EEG changes were appreciated with these events but was noted that these events could possibly be focal aware seizures taht are not appreciated on scalp EEG  There were findings on her EEG that are likely benign noted as rare generalized contoured waves during awake, exclusively observed when looking at her phone  Since the patient has had convincing generalized tonic clonic seizures in the past, it is likely that the depakote has controlled her GTC seizures though her new events were more related to stress and change in schedule  If events did not improve with lifestyle changes, discussed establishing with mental health for cognitive behavioral therapy  She was discharged home on pre-admission dose of Depakote  Since their last visit, she has been seizure free  On Depakote 2000 mg HS  No side effects  She has lost some weight  Things are going well  She is eager to return to driving  She has been managing stress well  Consistent sleep schedule  Back in school for medical billing  Headaches have improved  Maybe 1-2 times per month instead of daily  Typically will sleep it off and does not need to take anything and it is gone when she wakes up  If she has a bad headache she may space out but is able to respond to others is not losing time  Tubal in 2017    [facility] [place] [facility] [place] [employer] and [address] [place] [address] DMV    Current seizure medications:  Depakote ER 2000 mg daily  Other medications as per Epic  Prior AEDs:  Lacosamide (started early 2017, stopped by Dr Cheri Lyons)  Levetiracetam - SI, continued to have events on 4000 mg total daily     Prior Evaluation:  About 72 hours of continuous VEEG in 8/2017: No events  No epileptiform discharges   Mild slowing of PDR    REEG likely while admitted at [facility]  REEG 11/2020: normal  24 hour AEEG 3/9/2021: normal, no events  Brain MRI 11/16/2020: read as normal by radiology- on personal review- there may be slight asymmetry of the hippocampi, more CSF space surrounding the right hippocampus       EMU admission November 2021:  EMU CONCLUSION  Summary interictal findings:      Rare interictal diffuse sharply contoured waves  This pattern was exclusively seen while awake and looking at her phone  Likely benign waveform       Summary event findings:   Events of right facial tingling without loss of awareness and events of staring off, blurred vision, and pressure to bilateral head are without EEG change concerning for seizure  Seizure Classification:   N/A     Epilepsy Classification:   N/A     EMU findings and discussion:  Patient had two events concerning for seizure during hospitalization  Neither of the events are with EEG changes consistent with seizures  There is a possibility that these events could represent focal aware seizures that are not appreciated on scalp EEG  There were findings on her EEG that are likely benign noted as rare generalized contoured waves during awake, exclusively observed when looking at her phone  Since the patient has had convincing generalized tonic clonic seizures in the past, it is likely that the depakote has controlled her GTC seizures though her new events are more related to stress and change in schedule  If events do not get improve with lifestyle changes, discussed establishing with mental health for cognitive behavioral therapy  She will continue with her current dose of depakote upon discharge  This medication is likely helpful for mood and headache as well  Recommend that she follow up with Dr Florrie Kehr as scheduled in February if not able to get a sooner appointment but she will call the office with any issues or concerns prior to her appointment     I reviewed prior neurology notes, most recent labs, recent EMU admission documentation, [facility], and summarized above  Objective:    Blood pressure 120/80, pulse 88, temperature 97 8 °F (36 6 °C), temperature source Temporal, height 5' 7" (1 702 m), weight 114 kg (251 lb), SpO2 96 %, unknown if currently breastfeeding  Physical Exam  No apparent distress  Appears well nourished  Mood appropriate for situation     Neurologic Exam  Mental status- alert and oriented to person, place, and time  Speech appropriate for conversation  Good attention and knowledge  Cranial Nerves- R>L pupil - bilaterally reactive to light,  EOMS normal, facial sensation and muscles symmetric, hearing intact bilaterally to finger rubs, tongue midline, palate rise symmetrical, shoulder shrug symmetrical     Motor- No pronator drift  Appropriate strength  Moves all extremities freely  No tremor  Sensory-  Intact distally in all extremities to light touch  DTRs- not assessed at today's visit  Gait- normal casual gait        Coordination- FNF intact  ROS:  Review of Systems   Constitutional: Negative  Negative for appetite change and fever  HENT: Negative  Negative for hearing loss, tinnitus, trouble swallowing and voice change  Eyes: Negative  Negative for photophobia, pain and visual disturbance  Respiratory: Negative  Negative for shortness of breath  Cardiovascular: Negative  Negative for palpitations  Gastrointestinal: Negative  Negative for nausea and vomiting  Endocrine: Negative  Negative for cold intolerance  Genitourinary: Negative  Negative for dysuria, frequency and urgency  Musculoskeletal: Negative  Negative for gait problem, myalgias and neck pain  Skin: Negative  Negative for rash  Allergic/Immunologic: Negative  Neurological: Negative for dizziness, tremors, seizures, syncope, facial asymmetry, speech difficulty, weakness, light-headedness, numbness and headaches  Hematological: Negative  Does not bruise/bleed easily  Psychiatric/Behavioral: Negative  Negative for confusion, hallucinations and sleep disturbance  ROS obtained by MA and reviewed by myself  This note may have been created using voice recognition software  There may be unintentional errors such as grammatical errors, spelling errors, or pronoun errors

## 2023-01-20 NOTE — PATIENT INSTRUCTIONS
- Continue current medications  - Call the office with seizures or concerns  - Paperwork will be sent to [facility] so you can return to driving  - Follow up in 6 months or sooner if needed

## 2023-02-07 ENCOUNTER — OFFICE VISIT (OUTPATIENT)
Dept: URGENT CARE | Facility: CLINIC | Age: 28
End: 2023-02-07

## 2023-02-07 VITALS
WEIGHT: 253 LBS | TEMPERATURE: 97.8 F | OXYGEN SATURATION: 98 % | SYSTOLIC BLOOD PRESSURE: 124 MMHG | RESPIRATION RATE: 16 BRPM | HEART RATE: 97 BPM | BODY MASS INDEX: 39.63 KG/M2 | DIASTOLIC BLOOD PRESSURE: 80 MMHG

## 2023-02-07 DIAGNOSIS — Z02.4 DRIVER'S PERMIT PE (PHYSICAL EXAMINATION): Primary | ICD-10-CM

## 2023-02-07 NOTE — PROGRESS NOTES
[facility]        NAME: Zeyad Engel is a 32 y o  female  : 1995    MRN: [id number]  DATE: 2023  TIME: 6:48 PM    Assessment and Plan   's permit PE (physical examination) [Z02 4]  1  's permit PE (physical examination)              Patient Instructions   There are no Patient Instructions on file for this visit  Follow up with PCP in 3-5 days  Proceed to  ER if symptoms worsen  Chief Complaint     Chief Complaint   Patient presents with   • Annual Exam         History of Present Illness       Patient presents for a drivers permit physical   Admits to history of seizures but cleared by neurologist to drive  Forms are scanned in and were sent to the [facility]  Otherwise denies all other conditions listed on top of form  Review of Systems   Review of Systems   Constitutional: Negative for chills, fatigue, fever and unexpected weight change  HENT: Negative for ear pain, hearing loss and sore throat  Eyes: Negative for pain and visual disturbance  Respiratory: Negative for cough, shortness of breath and wheezing  Cardiovascular: Negative for chest pain and palpitations  Gastrointestinal: Negative for abdominal pain, diarrhea, nausea and vomiting  Genitourinary: Negative for dysuria and hematuria  Musculoskeletal: Negative for arthralgias and back pain  Skin: Negative for color change and rash  Neurological: Positive for seizures  Negative for dizziness, syncope, weakness and light-headedness  Psychiatric/Behavioral: Negative for agitation and behavioral problems  All other systems reviewed and are negative          Current Medications       Current Outpatient Medications:   •  divalproex sodium (DEPAKOTE ER) 500 mg 24 hr tablet, Take 4 tablets (2,000 mg total) by mouth daily, Disp: 360 tablet, Rfl: 3    Current Allergies     Allergies as of 2023 - Reviewed 2023   Allergen Reaction Noted   • Erythromycin Swelling 2016   • Peanut oil - food allergy Hives and Anaphylaxis 2017   • Azithromycin Other (See Comments) 2021            The following portions of the patient's history were reviewed and updated as appropriate: allergies, current medications, past family history, past medical history, past social history, past surgical history and problem list      Past Medical History:   Diagnosis Date   • Depression    • Migraine    • Morbid obesity with BMI of 40 0-44 9, adult ([facility] [address] )    • Obesity (BMI 30-39  9)    • Seizure ([facility] [address] )    • Seizures ([facility] [address] )        Past Surgical History:   Procedure Laterality Date   •  SECTION     •  SECTION     • SHOULDER SURGERY     • TUBAL LIGATION     • TUMOR REMOVAL      from ear       Family History   Problem Relation Age of Onset   • No Known Problems Mother    • No Known Problems Father    • No Known Problems Sister    • No Known Problems Brother    • No Known Problems Maternal Aunt    • No Known Problems Maternal Uncle    • No Known Problems Paternal Aunt    • No Known Problems Paternal Uncle    • No Known Problems Maternal Grandmother    • No Known Problems Maternal Grandfather    • No Known Problems Paternal Grandmother    • No Known Problems Paternal Grandfather    • No Known Problems Cousin    • ADD / ADHD Neg Hx    • Alcohol abuse Neg Hx    • Anxiety disorder Neg Hx    • Bipolar disorder Neg Hx    • Completed Suicide  Neg Hx    • Dementia Neg Hx    • Depression Neg Hx    • Drug abuse Neg Hx    • OCD Neg Hx    • Psychiatric Illness Neg Hx    • Psychosis Neg Hx    • Schizoaffective Disorder  Neg Hx    • Schizophrenia Neg Hx    • Self-Injury Neg Hx    • Suicide Attempts Neg Hx    • Breast cancer Neg Hx    • Colon cancer Neg Hx    • Ovarian cancer Neg Hx    • Uterine cancer Neg Hx    • Cervical cancer Neg Hx          Medications have been verified          Objective   /80   Pulse 97   Temp 97 8 °F (36 6 °C)   Resp 16   Wt 115 kg (253 lb)   SpO2 98%   BMI 39 63 kg/m² Physical Exam     Physical Exam  Constitutional:       Appearance: Normal appearance  HENT:      Head: Normocephalic  Right Ear: Tympanic membrane, ear canal and external ear normal       Left Ear: Tympanic membrane, ear canal and external ear normal       Nose: Nose normal       Mouth/Throat:      Mouth: Mucous membranes are moist       Pharynx: Oropharynx is clear  Eyes:      Extraocular Movements: Extraocular movements intact  Conjunctiva/sclera: Conjunctivae normal       Pupils: Pupils are equal, round, and reactive to light  Cardiovascular:      Rate and Rhythm: Normal rate and regular rhythm  Pulses: Normal pulses  Heart sounds: Normal heart sounds  Pulmonary:      Effort: Pulmonary effort is normal       Breath sounds: Normal breath sounds  Abdominal:      General: Bowel sounds are normal       Palpations: Abdomen is soft  Tenderness: There is no abdominal tenderness  Musculoskeletal:         General: Normal range of motion  Cervical back: Normal range of motion  Skin:     General: Skin is warm and dry  Capillary Refill: Capillary refill takes less than 2 seconds  Neurological:      General: No focal deficit present  Mental Status: She is alert and oriented to person, place, and time  Motor: Motor function is intact  Coordination: Coordination is intact  Gait: Gait is intact  Deep Tendon Reflexes: Reflexes are normal and symmetric     Psychiatric:         Mood and Affect: Mood normal          Behavior: Behavior normal

## 2023-03-24 NOTE — TELEPHONE ENCOUNTER
74yo w/   Overactive Bladder, Urge incontinence, Urinary Frequancy, Urinary Urgency and neuorgenic bladder, last seen 1/2023, started on toviaz 4mg    Today reports min change daytime voids q3, nocturia 1-2, still w/ urgency/urge incontinence several times a day however after discussion only took for 3 days bc concerns about leg cramps (although stopped and still had leg cramps) and felt couldn't contract pelvic floor muscles.  mirabegron not affordable and trospium cause SE    Baseline: h/o stroke 1 year ago and incontinence since then. Describes daytime voids q2-3hr, nocturia 2-3, urgency/urge incontinence, occurs multiple times a day. Wears a depends and changes 2x, damp to soaked. Had been to PFRx but then UTI and progress decreased. No meds. Rare MESSI w/ cough/sneeze, not w/ bend/lift.     Pmhx: CVA on xarelto, HTN, afib    Vitals:    03/24/23 1435   BP: 110/70   Pulse: 80         Assessment:  Overactive Bladder, Urge incontinence, Urinary Frequancy, Urinary Urgency and neuorgenic bladder    Plan:   Will start w/ actually taking toviaz daily and f/u in 4 weeks, understands may need to increase to 8mg or consider 3rd line options    Discussed indications for Botox injections into the bladder.  Discussed mechanism of action, durability (generally about 6 months) and the need for repeat injections, risk of voiding dysfunction/retention, theoretical concerns regarding systemic dissemination with resultant muscle paralysis (including respiratory muscles), and possible lack of insurance coverage.      Total time I spent today on this appointment is 20 minutes. F/u in 1 month       Referral placed

## 2023-04-17 ENCOUNTER — HOSPITAL ENCOUNTER (EMERGENCY)
Facility: HOSPITAL | Age: 28
Discharge: HOME | End: 2023-04-17
Attending: EMERGENCY MEDICINE | Admitting: EMERGENCY MEDICINE

## 2023-04-17 ENCOUNTER — APPOINTMENT (EMERGENCY)
Dept: CT IMAGING | Facility: HOSPITAL | Age: 28
End: 2023-04-17

## 2023-04-17 ENCOUNTER — APPOINTMENT (EMERGENCY)
Dept: ULTRASOUND IMAGING | Facility: HOSPITAL | Age: 28
End: 2023-04-17

## 2023-04-17 VITALS
OXYGEN SATURATION: 99 % | SYSTOLIC BLOOD PRESSURE: 133 MMHG | RESPIRATION RATE: 18 BRPM | HEART RATE: 86 BPM | TEMPERATURE: 97.4 F | DIASTOLIC BLOOD PRESSURE: 88 MMHG

## 2023-04-17 DIAGNOSIS — N83.209 OVARIAN CYST: ICD-10-CM

## 2023-04-17 DIAGNOSIS — R10.9 NONSPECIFIC ABDOMINAL PAIN: Primary | ICD-10-CM

## 2023-04-17 LAB
ALBUMIN SERPL BCP-MCNC: 4.2 G/DL (ref 3.5–5)
ALP SERPL-CCNC: 86 U/L (ref 34–104)
ALT SERPL W P-5'-P-CCNC: 15 U/L (ref 7–52)
ANION GAP SERPL CALCULATED.3IONS-SCNC: 6 MMOL/L (ref 4–13)
AST SERPL W P-5'-P-CCNC: 9 U/L (ref 13–39)
BASOPHILS # BLD AUTO: 0.05 THOUSANDS/ΜL (ref 0–0.1)
BASOPHILS NFR BLD AUTO: 1 % (ref 0–1)
BILIRUB DIRECT SERPL-MCNC: 0.02 MG/DL (ref 0–0.2)
BILIRUB SERPL-MCNC: 0.37 MG/DL (ref 0.2–1)
BILIRUB UR QL STRIP: NEGATIVE
BUN SERPL-MCNC: 13 MG/DL (ref 5–25)
CALCIUM SERPL-MCNC: 9.3 MG/DL (ref 8.4–10.2)
CHLORIDE SERPL-SCNC: 108 MMOL/L (ref 96–108)
CLARITY UR: CLEAR
CO2 SERPL-SCNC: 24 MMOL/L (ref 21–32)
COLOR UR: NORMAL
CREAT SERPL-MCNC: 0.66 MG/DL (ref 0.6–1.3)
EOSINOPHIL # BLD AUTO: 0.16 THOUSAND/ΜL (ref 0–0.61)
EOSINOPHIL NFR BLD AUTO: 2 % (ref 0–6)
ERYTHROCYTE [DISTWIDTH] IN BLOOD BY AUTOMATED COUNT: 13.4 % (ref 11.6–15.1)
EXT PREGNANCY TEST URINE: NEGATIVE
EXT. CONTROL: NORMAL
GFR SERPL CREATININE-BSD FRML MDRD: 118 ML/MIN/1.73SQ M
GLUCOSE SERPL-MCNC: 124 MG/DL (ref 65–140)
GLUCOSE UR STRIP-MCNC: NEGATIVE MG/DL
HCT VFR BLD AUTO: 38.2 % (ref 34.8–46.1)
HGB BLD-MCNC: 12.7 G/DL (ref 11.5–15.4)
HGB UR QL STRIP.AUTO: NEGATIVE
IMM GRANULOCYTES # BLD AUTO: 0.03 THOUSAND/UL (ref 0–0.2)
IMM GRANULOCYTES NFR BLD AUTO: 0 % (ref 0–2)
KETONES UR STRIP-MCNC: NEGATIVE MG/DL
LEUKOCYTE ESTERASE UR QL STRIP: NEGATIVE
LIPASE SERPL-CCNC: 18 U/L (ref 11–82)
LYMPHOCYTES # BLD AUTO: 3.13 THOUSANDS/ΜL (ref 0.6–4.47)
LYMPHOCYTES NFR BLD AUTO: 35 % (ref 14–44)
MCH RBC QN AUTO: 26.1 PG (ref 26.8–34.3)
MCHC RBC AUTO-ENTMCNC: 33.2 G/DL (ref 31.4–37.4)
MCV RBC AUTO: 78 FL (ref 82–98)
MONOCYTES # BLD AUTO: 0.53 THOUSAND/ΜL (ref 0.17–1.22)
MONOCYTES NFR BLD AUTO: 6 % (ref 4–12)
NEUTROPHILS # BLD AUTO: 5.01 THOUSANDS/ΜL (ref 1.85–7.62)
NEUTS SEG NFR BLD AUTO: 56 % (ref 43–75)
NITRITE UR QL STRIP: NEGATIVE
NRBC BLD AUTO-RTO: 0 /100 WBCS
PH UR STRIP.AUTO: 6 [PH]
PLATELET # BLD AUTO: 323 THOUSANDS/UL (ref 149–390)
PMV BLD AUTO: 9.9 FL (ref 8.9–12.7)
POTASSIUM SERPL-SCNC: 4.1 MMOL/L (ref 3.5–5.3)
PROT SERPL-MCNC: 7.2 G/DL (ref 6.4–8.4)
PROT UR STRIP-MCNC: NEGATIVE MG/DL
RBC # BLD AUTO: 4.87 MILLION/UL (ref 3.81–5.12)
SODIUM SERPL-SCNC: 138 MMOL/L (ref 135–147)
SP GR UR STRIP.AUTO: 1.02 (ref 1–1.03)
UROBILINOGEN UR STRIP-ACNC: <2 MG/DL
WBC # BLD AUTO: 8.91 THOUSAND/UL (ref 4.31–10.16)

## 2023-04-17 RX ORDER — MORPHINE SULFATE 4 MG/ML
4 INJECTION, SOLUTION INTRAMUSCULAR; INTRAVENOUS ONCE
Status: COMPLETED | OUTPATIENT
Start: 2023-04-17 | End: 2023-04-17

## 2023-04-17 RX ORDER — ONDANSETRON 2 MG/ML
4 INJECTION INTRAMUSCULAR; INTRAVENOUS ONCE
Status: COMPLETED | OUTPATIENT
Start: 2023-04-17 | End: 2023-04-17

## 2023-04-17 RX ADMIN — MORPHINE SULFATE 4 MG: 4 INJECTION INTRAVENOUS at 13:05

## 2023-04-17 RX ADMIN — ONDANSETRON 4 MG: 2 INJECTION INTRAMUSCULAR; INTRAVENOUS at 13:05

## 2023-04-17 RX ADMIN — MORPHINE SULFATE 4 MG: 4 INJECTION INTRAVENOUS at 14:10

## 2023-04-17 RX ADMIN — SODIUM CHLORIDE 1000 ML: 0.9 INJECTION, SOLUTION INTRAVENOUS at 13:05

## 2023-04-17 RX ADMIN — IOHEXOL 100 ML: 350 INJECTION, SOLUTION INTRAVENOUS at 14:11

## 2023-04-17 NOTE — ED PROVIDER NOTES
History  Chief Complaint   Patient presents with   • Flank Pain     R lower abd/flank pain starting this morning  No hx of same  C/o vomiting x1 and nausea     HPI    Prior to Admission Medications   Prescriptions Last Dose Informant Patient Reported? Taking?   divalproex sodium (DEPAKOTE ER) 500 mg 24 hr tablet   No No   Sig: Take 4 tablets (2,000 mg total) by mouth daily      Facility-Administered Medications: None       Past Medical History:   Diagnosis Date   • Depression    • Migraine    • Morbid obesity with BMI of 40 0-44 9, adult ([facility] [address] )    • Obesity (BMI 30-39  9)    • Seizure ([facility] )    • Seizures ([facility] )        Past Surgical History:   Procedure Laterality Date   •  SECTION     •  SECTION     • SHOULDER SURGERY     • TUBAL LIGATION     • TUMOR REMOVAL      from ear       Family History   Problem Relation Age of Onset   • No Known Problems Mother    • No Known Problems Father    • No Known Problems Sister    • No Known Problems Brother    • No Known Problems Maternal Aunt    • No Known Problems Maternal Uncle    • No Known Problems Paternal Aunt    • No Known Problems Paternal Uncle    • No Known Problems Maternal Grandmother    • No Known Problems Maternal Grandfather    • No Known Problems Paternal Grandmother    • No Known Problems Paternal Grandfather    • No Known Problems Cousin    • ADD / ADHD Neg Hx    • Alcohol abuse Neg Hx    • Anxiety disorder Neg Hx    • Bipolar disorder Neg Hx    • Completed Suicide  Neg Hx    • Dementia Neg Hx    • Depression Neg Hx    • Drug abuse Neg Hx    • OCD Neg Hx    • Psychiatric Illness Neg Hx    • Psychosis Neg Hx    • Schizoaffective Disorder  Neg Hx    • Schizophrenia Neg Hx    • Self-Injury Neg Hx    • Suicide Attempts Neg Hx    • Breast cancer Neg Hx    • Colon cancer Neg Hx    • Ovarian cancer Neg Hx    • Uterine cancer Neg Hx    • Cervical cancer Neg Hx      I have reviewed and agree with the history as documented      E-Cigarette/Vaping   • E-Cigarette Use Never User      E-Cigarette/Vaping Substances   • Nicotine No    • THC No    • CBD No    • Flavoring No      Social History     Tobacco Use   • Smoking status: Never     Passive exposure: Never   • Smokeless tobacco: Never   Vaping Use   • Vaping Use: Never used   Substance Use Topics   • Alcohol use: Not Currently     Comment: socially   • Drug use: Never       Review of Systems    Physical Exam  Physical Exam    Vital Signs  ED Triage Vitals [04/17/23 1226]   Temperature Pulse Respirations Blood Pressure SpO2   (!) 97 4 °F (36 3 °C) 77 20 144/83 99 %      Temp src Heart Rate Source Patient Position - Orthostatic VS BP Location FiO2 (%)   -- -- -- -- --      Pain Score       10 - Worst Possible Pain           Vitals:    04/17/23 1226   BP: 144/83   Pulse: 77         Visual Acuity      ED Medications  Medications - No data to display    Diagnostic Studies  Results Reviewed     None                 No orders to display              Procedures  Procedures         ED Course                               SBIRT 20yo+    Flowsheet Row Most Recent Value   Initial Alcohol Screen: US AUDIT-C     1  How often do you have a drink containing alcohol? 0 Filed at: 04/17/2023 1227   2  How many drinks containing alcohol do you have on a typical day you are drinking? 0 Filed at: 04/17/2023 1227   3a  Male UNDER 65: How often do you have five or more drinks on one occasion? 0 Filed at: 04/17/2023 1227   3b  FEMALE Any Age, or MALE 65+: How often do you have 4 or more drinks on one occassion? 0 Filed at: 04/17/2023 1227   Audit-C Score 0 Filed at: 04/17/2023 1227   KYREE: How many times in the past year have you    Used an illegal drug or used a prescription medication for non-medical reasons?  Never Filed at: 04/17/2023 1227                    MDM    Disposition  Final diagnoses:   None     ED Disposition     None      Follow-up Information    None         Patient's Medications   Discharge Prescriptions    No medications on file       No discharge procedures on file      PDMP Review       Value Time User    PDMP Reviewed  Yes 11/23/2021 10:52 AM [address] [facility],[address], [facility]          ED Provider  Electronically Signed by Studies  Results Reviewed     Procedure Component Value Units Date/Time    Hepatic function panel [[id number]]  (Abnormal) Collected: 04/17/23 1255    Lab Status: Final result Specimen: Blood from Arm, Left Updated: 04/17/23 1329     Total Bilirubin 0 37 mg/dL      Bilirubin, Direct 0 02 mg/dL      Alkaline Phosphatase 86 U/L      AST 9 U/L      ALT 15 U/L      Total Protein 7 2 g/dL      Albumin 4 2 g/dL     Lipase [[id number]]  (Normal) Collected: 04/17/23 1255    Lab Status: Final result Specimen: Blood from Arm, Left Updated: 04/17/23 1329     Lipase 18 u/L     Basic metabolic panel [[id number]] Collected: 04/17/23 1255    Lab Status: Final result Specimen: Blood from Arm, Left Updated: 04/17/23 1329     Sodium 138 mmol/L      Potassium 4 1 mmol/L      Chloride 108 mmol/L      CO2 24 mmol/L      ANION GAP 6 mmol/L      BUN 13 mg/dL      Creatinine 0 66 mg/dL      Glucose 124 mg/dL      Calcium 9 3 mg/dL      eGFR 118 ml/min/1 73sq m     Narrative:      [place] guidelines for Chronic Kidney Disease (CKD):   •  Stage 1 with normal or high GFR (GFR > 90 mL/min/1 73 square meters)  •  Stage 2 Mild CKD (GFR = 60-89 mL/min/1 73 square meters)  •  Stage 3A Moderate CKD (GFR = 45-59 mL/min/1 73 square meters)  •  Stage 3B Moderate CKD (GFR = 30-44 mL/min/1 73 square meters)  •  Stage 4 Severe CKD (GFR = 15-29 mL/min/1 73 square meters)  •  Stage 5 End Stage CKD (GFR <15 mL/min/1 73 square meters)  Note: GFR calculation is accurate only with a steady state creatinine    UA w Reflex to Microscopic w Reflex to Culture [[id number]] Collected: 04/17/23 1254    Lab Status: Final result Specimen: Urine, Clean Catch Updated: 04/17/23 1329     Color, UA Light Yellow     Clarity, UA Clear     Specific Gravity, UA 1 019     pH, UA 6 0     Leukocytes, UA Negative     Nitrite, UA Negative     Protein, UA Negative mg/dl      Glucose, UA Negative mg/dl      Ketones, UA Negative mg/dl      Urobilinogen, UA <2 0 mg/dl      Bilirubin, UA Negative     Occult Blood, UA Negative    CBC and differential [[id number]]  (Abnormal) Collected: 04/17/23 1255    Lab Status: Final result Specimen: Blood from Arm, Left Updated: 04/17/23 1309     WBC 8 91 Thousand/uL      RBC 4 87 Million/uL      Hemoglobin 12 7 g/dL      Hematocrit 38 2 %      MCV 78 fL      MCH 26 1 pg      MCHC 33 2 g/dL      RDW 13 4 %      MPV 9 9 fL      Platelets 807 Thousands/uL      nRBC 0 /100 WBCs      Neutrophils Relative 56 %      Immat GRANS % 0 %      Lymphocytes Relative 35 %      Monocytes Relative 6 %      Eosinophils Relative 2 %      Basophils Relative 1 %      Neutrophils Absolute 5 01 Thousands/µL      Immature Grans Absolute 0 03 Thousand/uL      Lymphocytes Absolute 3 13 Thousands/µL      Monocytes Absolute 0 53 Thousand/µL      Eosinophils Absolute 0 16 Thousand/µL      Basophils Absolute 0 05 Thousands/µL     POCT pregnancy, urine [[id number]]  (Normal) Resulted: 04/17/23 1259    Lab Status: Final result Updated: 04/17/23 1259     EXT Preg Test, Ur Negative     Control Valid                 US pelvis complete w transvaginal   Final Result by Stevo Agosto MD (04/17 1648)   Addendum (preliminary) 1 of 1 by Stevo Agosto MD (04/17 1648)   ADDENDUM:      Please note the described right adnexal cyst appears to be separate from    the ovary and is similar to 7/31/2022 raising the potential that this    represents a focally dilated fallopian tube  Given the stability, no    follow-up is currently recommended  Final       Simple appearing 5 2 x 5 4 x 5 6 cm right ovarian cyst   Follow-up in 8-12 weeks recommended                  Workstation performed: [id number]         CT abdomen pelvis with contrast   Final Result by Stevo Agosto MD (04/17 1446)      No acute intra-abdominal abnormality              Workstation performed: [id number]                    Procedures  Procedures         ED Course                               SBIRT "22yo+    Flowsheet Row Most Recent Value   Initial Alcohol Screen: US AUDIT-C     1  How often do you have a drink containing alcohol? 0 Filed at: 04/17/2023 1227   2  How many drinks containing alcohol do you have on a typical day you are drinking? 0 Filed at: 04/17/2023 1227   3a  Male UNDER 65: How often do you have five or more drinks on one occasion? 0 Filed at: 04/17/2023 1227   3b  FEMALE Any Age, or MALE 65+: How often do you have 4 or more drinks on one occassion? 0 Filed at: 04/17/2023 1227   Audit-C Score 0 Filed at: 04/17/2023 1227   KYREE: How many times in the past year have you    Used an illegal drug or used a prescription medication for non-medical reasons? Never Filed at: 04/17/2023 1227                    Medical Decision Making  This is a 28yo female presenting with right-sided flank and abdominal pain x 1 day  Accompanied by nausea without episodes of vomiting  Differential diagnosis includes but is not limited to: nephrolithiasis, pyelo, uti, acute appendicitis, colitis, diverticulitis, constipation, obstruction, ovarian cyst, musculoskeletal pain    Initial ED plan: labs, imaging, UA    Final ED Assessment: Vital signs reviewed on ED presentation, patient afebrile and non-toxic in appearance  Examination as above  All labs and imaging independently reviewed with imaging interpreted by the Radiologist   There are no significant lab findings, no leukocytosis, electrolyte derangements, or renal impairment  Urinalysis bland without evidence of urinary tract infection  CT abdomen/pelvis reports no acute intra-abdominal abnormality, with mention of \"simple appearing 6 1 x 5 2 cm right adnexal cyst is identified  \"  Given acute right-sided abdominal pain on presentation, will obtain pelvic ultrasound for further evaluation of patient's abdominal pain, which she is understanding of and agreeable with    [facility] results of [facility] and disposition " patient accordingly  If findings on ultrasound are stable, anticipate discharge home with outpatient OB/GYN follow-up  Patient stable and in no acute distress at time of signout  Amount and/or Complexity of Data Reviewed  Labs: ordered  Radiology: ordered  Risk  Prescription drug management  Disposition  Final diagnoses:   Nonspecific abdominal pain   Ovarian cyst     Time reflects when diagnosis was documented in both MDM as applicable and the Disposition within this note     Time User Action Codes Description Comment    4/17/2023  5:07 PM Jorge L Caban BRET Add [R10 9] Nonspecific abdominal pain     4/17/2023  5:07 PM Matyjared Hu Add [J79 028] Ovarian cyst       ED Disposition     ED Disposition   Discharge    Condition   Stable    Date/Time   Mon Apr 17, 2023  3:11 PM    Comment   Ella Jernigan 57 discharge to home/self care  Follow-up Information     Follow up With Specialties Details Why Contact Info Additional Information    Caitlyn Jacques,  Family Medicine   [address] [address]  [address] [facility]   [address] [place] [id number]  [address]  [facility] [address], [address] [place] [address], [address] [place] [address] Rd          Discharge Medication List as of 4/17/2023  5:08 PM      CONTINUE these medications which have NOT CHANGED    Details   divalproex sodium (DEPAKOTE ER) 500 mg 24 hr tablet Take 4 tablets (2,000 mg total) by mouth daily, Starting Fri 1/20/2023, Normal             No discharge procedures on file      PDMP Review       Value Time User    PDMP Reviewed  Yes 11/23/2021 10:52 AM [address] [facility],[address] [facility], [facility]          ED Provider  Electronically Signed by           Karey Moody PA-C  04/25/23 0206

## 2023-04-26 ENCOUNTER — OFFICE VISIT (OUTPATIENT)
Age: 28
End: 2023-04-26

## 2023-04-26 VITALS
BODY MASS INDEX: 39.87 KG/M2 | HEIGHT: 67 IN | WEIGHT: 254 LBS | SYSTOLIC BLOOD PRESSURE: 114 MMHG | DIASTOLIC BLOOD PRESSURE: 76 MMHG

## 2023-04-26 DIAGNOSIS — N83.201 OVARIAN CYST, RIGHT: Primary | ICD-10-CM

## 2023-04-26 NOTE — PATIENT INSTRUCTIONS
Ovarian Cyst   AMBULATORY CARE:   An ovarian cyst  is a fluid-filled sac that grows in or on an ovary  You have 2 ovaries, 1 on each side of your uterus  They are small, about the shape of an almond  Ovarian cysts are common in women who have regular monthly cycles  During your monthly cycle, eggs are released from the ovaries  The cyst usually contains fluid but may sometimes have blood or tissue in it  Most ovarian cysts are harmless and go away without treatment in a few months  Some cysts can grow large, cause pain, or break open  Common signs and symptoms  include pressure, bloating or swelling in your lower abdomen on the side of the cyst  You may also have dull or sharp pain that may come and go  The following are less common signs and symptoms:  A dull ache in your lower back and thighs    Unusual vaginal bleeding    Weight gain you did not expect or plan    Pain during your monthly cycle    Tenderness in your breasts    Trouble completely emptying your bowels or bladder    The need to urinate often    Pain during sex    Call your local emergency number (911 in the [address] [facility]) if:   You have severe pain with fever and vomiting  You have sudden, severe abdominal pain  You are too weak, faint, or dizzy to stand up  You are breathing very quickly  Call your doctor or gynecologist if:   Your periods are early, late, or more painful than usual     You have questions or concerns about your condition or care  Treatment  will depend on your age, symptoms, and the kind of cyst you have  You may need any of the following:  Watchful waiting  may be recommended  This means the cyst is not treated right away  You will need to watch for any signs or symptoms that the cyst is growing  You may need to return for one or more ultrasounds after a certain period of time  These will show if your cyst has changed in size  Medicines:   You may need any of the following:     Birth control pills  may help control your monthly cycle, prevent cysts, or cause them to shrink  Acetaminophen  decreases pain and fever  It is available without a doctor's order  Ask how much to take and how often to take it  Follow directions  Read the labels of all other medicines you are using to see if they also contain acetaminophen, or ask your doctor or pharmacist  Acetaminophen can cause liver damage if not taken correctly  NSAIDs , such as ibuprofen, help decrease swelling, pain, and fever  This medicine is available with or without a doctor's order  NSAIDs can cause stomach bleeding or kidney problems in certain people  If you take blood thinner medicine, always ask your healthcare provider if NSAIDs are safe for you  Always read the medicine label and follow directions  Prescription pain medicine  may be given  Ask your healthcare provider how to take this medicine safely  Some prescription pain medicines contain acetaminophen  Do not take other medicines that contain acetaminophen without talking to your healthcare provider  Too much acetaminophen may cause liver damage  Prescription pain medicine may cause constipation  Ask your healthcare provider how to prevent or treat constipation  Take your medicine as directed  Contact your healthcare provider if you think your medicine is not helping or if you have side effects  Tell your provider if you are allergic to any medicine  Keep a list of the medicines, vitamins, and herbs you take  Include the amounts, and when and why you take them  Bring the list or the pill bottles to follow-up visits  Carry your medicine list with you in case of an emergency  Surgery  may be needed to remove the ovarian cyst     Manage ovarian cysts: You can manage a current cyst and help healthcare providers find future cysts early  Apply heat to decrease pain and cramping from a cyst   Sit in a warm bath, or place a heating pad (turned on low) on your abdomen   Do this for 15 to 20 minutes every hour for comfort  Get regular pelvic exams or Pap smears  This will help providers find any new ovarian cysts  Tell your healthcare provider about any unusual changes in your monthly cycle  Follow up with your doctor or gynecologist as directed:  Write down your questions so you remember to ask them during your visits  © Copyright Ravinder Granados 2022 Information is for End User's use only and may not be sold, redistributed or otherwise used for commercial purposes  The above information is an  only  It is not intended as medical advice for individual conditions or treatments  Talk to your doctor, nurse or pharmacist before following any medical regimen to see if it is safe and effective for you

## 2023-04-26 NOTE — ASSESSMENT & PLAN NOTE
-ovarian cyst larger than previous imaging in July 2022 and November 2021  Advised patient due to size >5cm and symptoms, recommend surgical excision of cyst   Alternative management options include expectant management with repeat imaging in 2-3 months vs OCPs  Patient desires surgical intervention at this time  Will plan for pre-op consult with MD Leeann Encinas patient risk of torsion is increased with ovarian cyst over 5cm and reviewed ER precautions  Patient expressed understanding    -ibuprofen (600 mg q6) and/or tylenol (500 mg q4) PRN for pain

## 2023-04-26 NOTE — PROGRESS NOTES
Assessment/Plan:    Ovarian cyst, right  -ovarian cyst larger than previous imaging in July 2022 and November 2021  Advised patient due to size >5cm and symptoms, recommend surgical excision of cyst   Alternative management options include expectant management with repeat imaging in 2-3 months vs OCPs  Patient desires surgical intervention at this time  Will plan for pre-op consult with MD Leeann Encinas patient risk of torsion is increased with ovarian cyst over 5cm and reviewed ER precautions  Patient expressed understanding    -ibuprofen (600 mg q6) and tylenol (500 mg q4) PRN for pain  Diagnoses and all orders for this visit:    Ovarian cyst, right          Subjective:      Patient ID: Lalita Petersen is a 25 y o  female presents for ED follow up for ovarian cyst  Patient reports sharp right sided pelvic pain  She was seen in the ED on 4/17  Ultrasound showed right ovarian cyst measuring 5 2 x 5 4 x 5 6 cm that is increased in size compared to imaging in July 2022 and November 2021  States pain is improved since ED visit but not resolved  Described as a sharp 6-9/10 that radiates to her lower back  Ibuprofen helps to take the edge off but does not completely take pain away  She denies abnormal bleeding, discharge, itching, or odor  Reports nausea without vomiting  Menstrual cycles are mostly regular  History of tubal at 19years of age  She is interested in a future pregnancy and desires reversal of tubal but is unsure of exact method of sterilization she received 7 years ago  She has tried to call her previous gynecologist but states records are unavailable  The following portions of the patient's history were reviewed and updated as appropriate:   She  has a past medical history of Depression, Migraine, Morbid obesity with BMI of 40 0-44 9, adult ([address] ), Obesity (BMI 30-39 9), Seizure ([facility] [address] ), and Seizures ([facility] [address] )    She   Patient Active Problem List    Diagnosis Date Noted   • Anisocoria 01/20/2023   • S/P tubal ligation 2022   • Encounter for annual routine gynecological examination 2022   • Right knee injury 2021   • Fall down stairs 2021   • Migraine 2021   • Seizure [facility])    • Focal motor deficit    • Sensory deficit, bilateral    • Bipolar 2 disorder ([address] [place] [address] )    • Major depressive disorder, recurrent episode with anxious distress ([address] [address] ) 2020   • Obesity (BMI 35 0-39 9 without comorbidity) 2020   • Class 3 severe obesity without serious comorbidity with body mass index (BMI) of 40 0 to 44 9 in adult ([place] [address] ) 2020   • Chronic low back pain 2020   • Increased anion gap metabolic acidosis    • EVELYN (acute kidney injury) ([address] [place] [address] ) 2019   • Asymptomatic microscopic hematuria 2019   • Insomnia secondary to depression with anxiety 2018   • Nightmares 2018   • Bipolar 2 disorder ([address] [place] [address] )    • Seizure-like activity ([address] [place] [address] ) 2017   • S/P  section 2017   • Sickle cell trait ([address] [place] [address] ) 2016     She  has a past surgical history that includes  section; Shoulder surgery; Tumor removal;  section; and Tubal ligation  Her family history includes No Known Problems in her brother, cousin, father, maternal aunt, maternal grandfather, maternal grandmother, maternal uncle, mother, paternal aunt, paternal grandfather, paternal grandmother, paternal uncle, and sister  She  reports that she has never smoked  She has never been exposed to tobacco smoke  She has never used smokeless tobacco  She reports that she does not currently use alcohol  She reports that she does not use drugs  Current Outpatient Medications   Medication Sig Dispense Refill   • divalproex sodium (DEPAKOTE ER) 500 mg 24 hr tablet Take 4 tablets (2,000 mg total) by mouth daily 360 tablet 3     No current facility-administered medications for this visit  She is allergic to erythromycin, peanut oil - food allergy, and azithromycin       Review of Systems "  Constitutional: Negative for chills, diaphoresis, fatigue and fever  Respiratory: Negative for shortness of breath  Cardiovascular: Negative for chest pain  Gastrointestinal: Positive for abdominal pain and nausea  Negative for constipation, diarrhea and vomiting  Genitourinary: Positive for pelvic pain  Negative for dyspareunia, flank pain, menstrual problem, vaginal bleeding, vaginal discharge and vaginal pain  Musculoskeletal: Positive for back pain  Negative for myalgias  Neurological: Negative for dizziness, light-headedness and headaches  Hematological: Negative for adenopathy  Psychiatric/Behavioral: Negative for confusion  Objective:      /76 (BP Location: Left arm, Patient Position: Sitting, Cuff Size: Large)   Ht 5' 7\" (1 702 m)   Wt 115 kg (254 lb)   LMP 04/09/2023   BMI 39 78 kg/m²        Physical Exam  Vitals and nursing note reviewed  Constitutional:       General: She is not in acute distress  Appearance: Normal appearance  She is not ill-appearing  HENT:      Head: Normocephalic and atraumatic  Eyes:      Conjunctiva/sclera: Conjunctivae normal    Pulmonary:      Effort: Pulmonary effort is normal    Abdominal:      Palpations: Abdomen is soft  Tenderness: There is abdominal tenderness in the right lower quadrant  Musculoskeletal:         General: Normal range of motion  Cervical back: Neck supple  Skin:     General: Skin is warm and dry  Neurological:      General: No focal deficit present  Mental Status: She is alert     Psychiatric:         Mood and Affect: Mood normal          Behavior: Behavior normal          "

## 2023-05-06 ENCOUNTER — APPOINTMENT (EMERGENCY)
Dept: ULTRASOUND IMAGING | Facility: HOSPITAL | Age: 28
End: 2023-05-06

## 2023-05-06 ENCOUNTER — HOSPITAL ENCOUNTER (EMERGENCY)
Facility: HOSPITAL | Age: 28
Discharge: HOME | End: 2023-05-07
Attending: EMERGENCY MEDICINE

## 2023-05-06 DIAGNOSIS — N94.9 ADNEXAL CYST: Primary | ICD-10-CM

## 2023-05-06 DIAGNOSIS — M54.9 BACK PAIN: ICD-10-CM

## 2023-05-06 LAB
ALBUMIN SERPL BCP-MCNC: 4.4 G/DL (ref 3.5–5)
ALP SERPL-CCNC: 93 U/L (ref 34–104)
ALT SERPL W P-5'-P-CCNC: 28 U/L (ref 7–52)
ANION GAP SERPL CALCULATED.3IONS-SCNC: 8 MMOL/L (ref 4–13)
AST SERPL W P-5'-P-CCNC: 16 U/L (ref 13–39)
BASOPHILS # BLD AUTO: 0.03 THOUSANDS/ÂΜL (ref 0–0.1)
BASOPHILS NFR BLD AUTO: 0 % (ref 0–1)
BILIRUB SERPL-MCNC: 0.37 MG/DL (ref 0.2–1)
BUN SERPL-MCNC: 12 MG/DL (ref 5–25)
CALCIUM SERPL-MCNC: 9.4 MG/DL (ref 8.4–10.2)
CHLORIDE SERPL-SCNC: 107 MMOL/L (ref 96–108)
CO2 SERPL-SCNC: 24 MMOL/L (ref 21–32)
CREAT SERPL-MCNC: 1.26 MG/DL (ref 0.6–1.3)
EOSINOPHIL # BLD AUTO: 0.18 THOUSAND/ÂΜL (ref 0–0.61)
EOSINOPHIL NFR BLD AUTO: 2 % (ref 0–6)
ERYTHROCYTE [DISTWIDTH] IN BLOOD BY AUTOMATED COUNT: 13.3 % (ref 11.6–15.1)
GFR SERPL CREATININE-BSD FRML MDRD: 57 ML/MIN/1.73SQ M
GLUCOSE SERPL-MCNC: 127 MG/DL (ref 65–140)
HCG SERPL QL: NEGATIVE
HCT VFR BLD AUTO: 39.5 % (ref 34.8–46.1)
HGB BLD-MCNC: 13.3 G/DL (ref 11.5–15.4)
IMM GRANULOCYTES # BLD AUTO: 0.03 THOUSAND/UL (ref 0–0.2)
IMM GRANULOCYTES NFR BLD AUTO: 0 % (ref 0–2)
LIPASE SERPL-CCNC: 26 U/L (ref 11–82)
LYMPHOCYTES # BLD AUTO: 3.98 THOUSANDS/ÂΜL (ref 0.6–4.47)
LYMPHOCYTES NFR BLD AUTO: 38 % (ref 14–44)
MCH RBC QN AUTO: 26.2 PG (ref 26.8–34.3)
MCHC RBC AUTO-ENTMCNC: 33.7 G/DL (ref 31.4–37.4)
MCV RBC AUTO: 78 FL (ref 82–98)
MONOCYTES # BLD AUTO: 0.62 THOUSAND/ÂΜL (ref 0.17–1.22)
MONOCYTES NFR BLD AUTO: 6 % (ref 4–12)
NEUTROPHILS # BLD AUTO: 5.63 THOUSANDS/ÂΜL (ref 1.85–7.62)
NEUTS SEG NFR BLD AUTO: 54 % (ref 43–75)
NRBC BLD AUTO-RTO: 0 /100 WBCS
PLATELET # BLD AUTO: 328 THOUSANDS/UL (ref 149–390)
PMV BLD AUTO: 9.9 FL (ref 8.9–12.7)
POTASSIUM SERPL-SCNC: 4 MMOL/L (ref 3.5–5.3)
PROT SERPL-MCNC: 7.8 G/DL (ref 6.4–8.4)
RBC # BLD AUTO: 5.08 MILLION/UL (ref 3.81–5.12)
SODIUM SERPL-SCNC: 139 MMOL/L (ref 135–147)
WBC # BLD AUTO: 10.47 THOUSAND/UL (ref 4.31–10.16)

## 2023-05-06 RX ORDER — MORPHINE SULFATE 4 MG/ML
4 INJECTION, SOLUTION INTRAMUSCULAR; INTRAVENOUS ONCE
Status: COMPLETED | OUTPATIENT
Start: 2023-05-06 | End: 2023-05-06

## 2023-05-06 RX ORDER — ONDANSETRON 2 MG/ML
4 INJECTION INTRAMUSCULAR; INTRAVENOUS ONCE
Status: COMPLETED | OUTPATIENT
Start: 2023-05-06 | End: 2023-05-06

## 2023-05-06 RX ADMIN — SODIUM CHLORIDE 1000 ML: 0.9 INJECTION, SOLUTION INTRAVENOUS at 21:02

## 2023-05-06 RX ADMIN — MORPHINE SULFATE 4 MG: 4 INJECTION INTRAVENOUS at 21:01

## 2023-05-06 RX ADMIN — ONDANSETRON 4 MG: 2 INJECTION INTRAMUSCULAR; INTRAVENOUS at 21:02

## 2023-05-06 NOTE — Clinical Note
Raeann Salcedo was seen and treated in our emergency department on 5/6/2023  Diagnosis:     Gee Grewal  may return to work on return date  She may return on this date: 05/08/2023         If you have any questions or concerns, please don't hesitate to call        Ting Love MD    ______________________________           _______________          _______________  [facility] Representative                              Date                                Time

## 2023-05-07 VITALS
DIASTOLIC BLOOD PRESSURE: 74 MMHG | HEART RATE: 78 BPM | SYSTOLIC BLOOD PRESSURE: 117 MMHG | OXYGEN SATURATION: 97 % | TEMPERATURE: 98.7 F | RESPIRATION RATE: 18 BRPM

## 2023-05-07 RX ORDER — METHOCARBAMOL 500 MG/1
500 TABLET, FILM COATED ORAL ONCE
Status: COMPLETED | OUTPATIENT
Start: 2023-05-07 | End: 2023-05-07

## 2023-05-07 RX ORDER — METHOCARBAMOL 750 MG/1
750 TABLET, FILM COATED ORAL 4 TIMES DAILY PRN
Qty: 20 TABLET | Refills: 0 | Status: SHIPPED | OUTPATIENT
Start: 2023-05-07 | End: 2023-05-17

## 2023-05-07 RX ADMIN — METHOCARBAMOL 500 MG: 500 TABLET ORAL at 00:56

## 2023-05-09 NOTE — ED PROVIDER NOTES
History  Chief Complaint   Patient presents with   • Back Pain     Per pt she has lower back pain that radiates down her right leg  Also states she has a cyst on her right ovary and is scared it might be that causing the pain  History provided by:  Patient  Flank Pain  Pain location:  R flank  Pain quality: aching and dull    Pain radiates to:  RLQ  Pain severity:  Moderate  Onset quality:  Gradual  Duration:  1 day  Timing:  Constant  Progression:  Worsening  Chronicity:  Recurrent  Context comment:  Pt has h/o right ovarian cyst, sees OB/GYN, they are planning to remove the cyst and warned her in the pain every gets worse she could have an ovarian torsion and told to get seen if it gets worse  Relieved by:  Nothing  Worsened by:  Nothing  Ineffective treatments:  OTC medications  Associated symptoms: nausea    Associated symptoms: no chills, no cough, no hematuria and no vomiting    Risk factors: not pregnant        Prior to Admission Medications   Prescriptions Last Dose Informant Patient Reported? Taking?   divalproex sodium (DEPAKOTE ER) 500 mg 24 hr tablet   No No   Sig: Take 4 tablets (2,000 mg total) by mouth daily      Facility-Administered Medications: None       Past Medical History:   Diagnosis Date   • Depression    • Migraine    • Morbid obesity with BMI of 40 0-44 9, adult ([facility] [address] )    • Obesity (BMI 30-39  9)    • Seizure ([facility] [address] )    • Seizures ([facility] [address] )        Past Surgical History:   Procedure Laterality Date   •  SECTION     •  SECTION     • SHOULDER SURGERY     • TUBAL LIGATION     • TUMOR REMOVAL      from ear       Family History   Problem Relation Age of Onset   • No Known Problems Mother    • No Known Problems Father    • No Known Problems Sister    • No Known Problems Brother    • No Known Problems Maternal Aunt    • No Known Problems Maternal Uncle    • No Known Problems Paternal Aunt    • No Known Problems Paternal Uncle    • No Known Problems Maternal Grandmother    • No Known Problems Maternal Grandfather    • No Known Problems Paternal Grandmother    • No Known Problems Paternal Grandfather    • No Known Problems Cousin    • ADD / ADHD Neg Hx    • Alcohol abuse Neg Hx    • Anxiety disorder Neg Hx    • Bipolar disorder Neg Hx    • Completed Suicide  Neg Hx    • Dementia Neg Hx    • Depression Neg Hx    • Drug abuse Neg Hx    • OCD Neg Hx    • Psychiatric Illness Neg Hx    • Psychosis Neg Hx    • Schizoaffective Disorder  Neg Hx    • Schizophrenia Neg Hx    • Self-Injury Neg Hx    • Suicide Attempts Neg Hx    • Breast cancer Neg Hx    • Colon cancer Neg Hx    • Ovarian cancer Neg Hx    • Uterine cancer Neg Hx    • Cervical cancer Neg Hx      I have reviewed and agree with the history as documented  E-Cigarette/Vaping   • E-Cigarette Use Never User      E-Cigarette/Vaping Substances   • Nicotine No    • THC No    • CBD No    • Flavoring No      Social History     Tobacco Use   • Smoking status: Never     Passive exposure: Never   • Smokeless tobacco: Never   Vaping Use   • Vaping Use: Never used   Substance Use Topics   • Alcohol use: Not Currently     Comment: socially   • Drug use: Never       Review of Systems   Constitutional: Negative for chills  Respiratory: Negative for cough  Gastrointestinal: Positive for nausea  Negative for vomiting  Genitourinary: Positive for flank pain  Negative for hematuria  All other systems reviewed and are negative  Physical Exam  Physical Exam  Vitals and nursing note reviewed  Constitutional:       General: She is not in acute distress  Appearance: She is well-developed  She is not diaphoretic  HENT:      Head: Normocephalic and atraumatic  Nose: Nose normal    Eyes:      Conjunctiva/sclera: Conjunctivae normal    Cardiovascular:      Rate and Rhythm: Normal rate and regular rhythm  Heart sounds: Normal heart sounds  Pulmonary:      Effort: Pulmonary effort is normal  No respiratory distress        Breath sounds: Normal breath sounds  Abdominal:      Palpations: Abdomen is soft  Tenderness: There is abdominal tenderness (RLQ)  Musculoskeletal:         General: Normal range of motion  Cervical back: Normal range of motion and neck supple  Skin:     General: Skin is warm and dry  Neurological:      General: No focal deficit present  Mental Status: She is alert and oriented to person, place, and time           Vital Signs  ED Triage Vitals [05/06/23 2014]   Temperature Pulse Respirations Blood Pressure SpO2   98 7 °F (37 1 °C) 99 18 128/64 98 %      Temp Source Heart Rate Source Patient Position - Orthostatic VS BP Location FiO2 (%)   Oral Monitor Sitting Left arm --      Pain Score       --           Vitals:    05/06/23 2014 05/06/23 2322 05/07/23 0100   BP: 128/64 110/74 117/74   Pulse: 99 98 78   Patient Position - Orthostatic VS: Sitting           Visual Acuity      ED Medications  Medications   sodium chloride 0 9 % bolus 1,000 mL (0 mL Intravenous Stopped 5/7/23 0057)   ondansetron (ZOFRAN) injection 4 mg (4 mg Intravenous Given 5/6/23 2102)   morphine injection 4 mg (4 mg Intravenous Given 5/6/23 2101)   methocarbamol (ROBAXIN) tablet 500 mg (500 mg Oral Given 5/7/23 0056)       Diagnostic Studies  Results Reviewed     Procedure Component Value Units Date/Time    hCG, qualitative pregnancy [[id number]]  (Normal) Collected: 05/06/23 2100    Lab Status: Final result Specimen: Blood from Arm, Left Updated: 05/06/23 2131     Preg, Serum Negative    Comprehensive metabolic panel [[id number]] Collected: 05/06/23 2100    Lab Status: Final result Specimen: Blood from Arm, Left Updated: 05/06/23 2125     Sodium 139 mmol/L      Potassium 4 0 mmol/L      Chloride 107 mmol/L      CO2 24 mmol/L      ANION GAP 8 mmol/L      BUN 12 mg/dL      Creatinine 1 26 mg/dL      Glucose 127 mg/dL      Calcium 9 4 mg/dL      AST 16 U/L      ALT 28 U/L      Alkaline Phosphatase 93 U/L      Total Protein 7 8 g/dL      Albumin 4 4 g/dL      Total Bilirubin 0 37 mg/dL      eGFR 57 ml/min/1 73sq m     Narrative:      [place] guidelines for Chronic Kidney Disease (CKD):   •  Stage 1 with normal or high GFR (GFR > 90 mL/min/1 73 square meters)  •  Stage 2 Mild CKD (GFR = 60-89 mL/min/1 73 square meters)  •  Stage 3A Moderate CKD (GFR = 45-59 mL/min/1 73 square meters)  •  Stage 3B Moderate CKD (GFR = 30-44 mL/min/1 73 square meters)  •  Stage 4 Severe CKD (GFR = 15-29 mL/min/1 73 square meters)  •  Stage 5 End Stage CKD (GFR <15 mL/min/1 73 square meters)  Note: GFR calculation is accurate only with a steady state creatinine    Lipase [[id number]]  (Normal) Collected: 05/06/23 2100    Lab Status: Final result Specimen: Blood from Arm, Left Updated: 05/06/23 2125     Lipase 26 u/L     CBC and differential [[id number]]  (Abnormal) Collected: 05/06/23 2100    Lab Status: Final result Specimen: Blood from Arm, Left Updated: 05/06/23 2107     WBC 10 47 Thousand/uL      RBC 5 08 Million/uL      Hemoglobin 13 3 g/dL      Hematocrit 39 5 %      MCV 78 fL      MCH 26 2 pg      MCHC 33 7 g/dL      RDW 13 3 %      MPV 9 9 fL      Platelets 181 Thousands/uL      nRBC 0 /100 WBCs      Neutrophils Relative 54 %      Immat GRANS % 0 %      Lymphocytes Relative 38 %      Monocytes Relative 6 %      Eosinophils Relative 2 %      Basophils Relative 0 %      Neutrophils Absolute 5 63 Thousands/µL      Immature Grans Absolute 0 03 Thousand/uL      Lymphocytes Absolute 3 98 Thousands/µL      Monocytes Absolute 0 62 Thousand/µL      Eosinophils Absolute 0 18 Thousand/µL      Basophils Absolute 0 03 Thousands/µL                  US pelvis complete w transvaginal   Final Result by Audelia Palmer MD (05/06 5917)      1  No ovarian torsion   2  Simple cyst in the right adnexa not significantly changed in size, please note, while this appeared to be ovarian on the prior CT, cyst is located within the adnexa   3    Polycystic right ovary (defined as 20 or more follicles present in either ovary and/or a single ovarian volume > 10 mL)  Correlate for polycystic ovarian syndrome  Workstation performed: [id number]                    Procedures  Procedures         ED Course                               SBIRT 20yo+    Flowsheet Row Most Recent Value   Initial Alcohol Screen: US AUDIT-C     1  How often do you have a drink containing alcohol? 0 Filed at: 05/06/2023 2016   2  How many drinks containing alcohol do you have on a typical day you are drinking? 0 Filed at: 05/06/2023 2016   3b  FEMALE Any Age, or MALE 65+: How often do you have 4 or more drinks on one occassion? 0 Filed at: 05/06/2023 2016   Audit-C Score 0 Filed at: 05/06/2023 2016   KYREE: How many times in the past year have you    Used an illegal drug or used a prescription medication for non-medical reasons? Never Filed at: 05/06/2023 2016                    Medical Decision Making  28yo female with right adnexal cyst p/w right flank and RLQ pain worsening, concern for acute ovarian torsion, will get u/s to r/o torison, will also get labs and UA to evaluate for pregnancy and UTI  Pt also has h/o chronic back pain and sciatica so it might a flare but must r/o torsion first     Adnexal cyst: acute illness or injury  Back pain: acute illness or injury  Amount and/or Complexity of Data Reviewed  Labs: ordered  Radiology: ordered  Risk  Prescription drug management            Disposition  Final diagnoses:   Adnexal cyst   Back pain     Time reflects when diagnosis was documented in both MDM as applicable and the Disposition within this note     Time User Action Codes Description Comment    5/7/2023 12:50 AM Jeoffrey Distance Add [N94 9] Adnexal cyst     5/7/2023 12:50 AM Sky Frequencyoffrey Distance Add [M54 9] Back pain       ED Disposition     ED Disposition   Discharge    Condition   Stable    Date/Time   Sun May 7, 2023 12:50 AM    Comment   Anshu Jernigan 57 discharge to home/self care                Follow-up Information     Follow up With Specialties Details Why Contact Info    Mohit Anaya, DO Family Medicine Go to  If symptoms worsen [address]            Discharge Medication List as of 5/7/2023 12:53 AM      START taking these medications    Details   methocarbamol (ROBAXIN) 750 mg tablet Take 1 tablet (750 mg total) by mouth 4 (four) times a day as needed for muscle spasms for up to 10 days, Starting Sun 5/7/2023, Until Wed 5/17/2023 at 2359, Normal         CONTINUE these medications which have NOT CHANGED    Details   divalproex sodium (DEPAKOTE ER) 500 mg 24 hr tablet Take 4 tablets (2,000 mg total) by mouth daily, Starting Fri 1/20/2023, Normal             No discharge procedures on file      PDMP Review       Value Time User    PDMP Reviewed  Yes 11/23/2021 10:52 AM [address] [place] [address],[address]          ED Provider  Electronically Signed by           Jo Yun MD  05/11/23 3007

## 2023-05-15 ENCOUNTER — OFFICE VISIT (OUTPATIENT)
Age: 28
End: 2023-05-15

## 2023-05-15 VITALS
BODY MASS INDEX: 39.18 KG/M2 | WEIGHT: 249.6 LBS | SYSTOLIC BLOOD PRESSURE: 114 MMHG | DIASTOLIC BLOOD PRESSURE: 74 MMHG | HEIGHT: 67 IN

## 2023-05-15 DIAGNOSIS — N94.9 ADNEXAL CYST: Primary | ICD-10-CM

## 2023-05-15 DIAGNOSIS — E66.09 CLASS 2 OBESITY DUE TO EXCESS CALORIES WITH BODY MASS INDEX (BMI) OF 39.0 TO 39.9 IN ADULT, UNSPECIFIED WHETHER SERIOUS COMORBIDITY PRESENT: ICD-10-CM

## 2023-05-15 NOTE — PROGRESS NOTES
Assessment/Plan:     Problem List Items Addressed This Visit        Other    Class 2 obesity due to excess calories with body mass index (BMI) of 39 0 to 39 9 in adult   Other Visit Diagnoses     Adnexal cyst    -  Primary          - discussed that cyst appears to be adjacent to ovary, rather than within the ovary  I agree with recent radiology read regarding this  Discussed that this significantly decreases the risk of ovarian torsion  Given this, as well as the simple nature of the cyst, I do not feel that surgical intervention is emergent, but I do recommend scheduling surgery  She is in agreement    -Desires definitive management with right adnexal cystectomy  We discussed potential for ovarian cystectomy, possible need for oophorectomy  We discussed risks, benefits, alternatives  We discussed risks including bleeding, infection, damage to surrounding organs (bowel, bladder), nerves and vessels, DVT/PE, very unlikely risk of death  Increased surgical and anesthesia risk given BMI 39  Discussed minimal risks with blood transfusion to include transfusion reaction, TRALI, rare transmission of HIV/Hepatitis C  Discussed plan to leave ovaries in situ, would only remove if necessary  Patient wishes to proceed  -Will plan to proceed with right adnexal cystectomy  Consent form reviewed in detail with patient, signed by patient      Subjective:      Patient ID: Rox Maria is a 25 y o  female  HPI  She presents today for preop consultation for removal of an enlarging simple adnexal cyst  She has previously been counseled regarding this and would like to proceed with surgery for removal  Cyst most recently imaged on 23 in the ED, demonstrated 6 4cm adnexal cyst adjacent to right ovary  She is also interested in referral to [facility] for discussion of tubal reanastamosis  She underwent BTL at the time of  at a very young age   Felt this was appropriate at the time because of poorly controlled "epilepsy, but now would like another child  The following portions of the patient's history were reviewed and updated as appropriate: allergies, current medications, past family history, past medical history, past social history, past surgical history and problem list     Review of Systems      Objective:  /74 (BP Location: Left arm, Patient Position: Sitting, Cuff Size: Large)   Ht 5' 7\" (1 702 m)   Wt 113 kg (249 lb 9 6 oz)   LMP 05/04/2023   BMI 39 09 kg/m²      Physical Exam  Vitals reviewed  Constitutional:       Appearance: She is well-developed  HENT:      Head: Normocephalic  Cardiovascular:      Rate and Rhythm: Normal rate and regular rhythm  Pulmonary:      Effort: Pulmonary effort is normal    Abdominal:      Palpations: Abdomen is soft  Musculoskeletal:         General: Normal range of motion  Cervical back: Normal range of motion  Skin:     General: Skin is warm and dry  Neurological:      Mental Status: She is alert and oriented to person, place, and time     Psychiatric:         Behavior: Behavior normal          Ultrasound reviewed: interpretation above    Overall MDM: moderate   Diagnosis moderate, Data moderate, Risk low    "

## 2023-07-17 ENCOUNTER — TELEPHONE (OUTPATIENT)
Dept: NEUROLOGY | Facility: CLINIC | Age: 28
End: 2023-07-17

## 2023-07-17 ENCOUNTER — APPOINTMENT (OUTPATIENT)
Dept: LAB | Facility: HOSPITAL | Age: 28
End: 2023-07-17
Payer: COMMERCIAL

## 2023-07-17 DIAGNOSIS — Z01.818 PRE-OP TESTING: ICD-10-CM

## 2023-07-17 LAB
ANION GAP SERPL CALCULATED.3IONS-SCNC: 7 MMOL/L
BUN SERPL-MCNC: 9 MG/DL (ref 5–25)
CALCIUM SERPL-MCNC: 9.5 MG/DL (ref 8.4–10.2)
CHLORIDE SERPL-SCNC: 105 MMOL/L (ref 96–108)
CO2 SERPL-SCNC: 26 MMOL/L (ref 21–32)
CREAT SERPL-MCNC: 0.75 MG/DL (ref 0.6–1.3)
ERYTHROCYTE [DISTWIDTH] IN BLOOD BY AUTOMATED COUNT: 13.5 % (ref 11.6–15.1)
GFR SERPL CREATININE-BSD FRML MDRD: 107 ML/MIN/1.73SQ M
GLUCOSE P FAST SERPL-MCNC: 83 MG/DL (ref 65–99)
HCT VFR BLD AUTO: 41.2 % (ref 34.8–46.1)
HGB BLD-MCNC: 13.3 G/DL (ref 11.5–15.4)
MCH RBC QN AUTO: 26 PG (ref 26.8–34.3)
MCHC RBC AUTO-ENTMCNC: 32.3 G/DL (ref 31.4–37.4)
MCV RBC AUTO: 81 FL (ref 82–98)
PLATELET # BLD AUTO: 391 THOUSANDS/UL (ref 149–390)
PMV BLD AUTO: 10.5 FL (ref 8.9–12.7)
POTASSIUM SERPL-SCNC: 4.1 MMOL/L (ref 3.5–5.3)
RBC # BLD AUTO: 5.12 MILLION/UL (ref 3.81–5.12)
SODIUM SERPL-SCNC: 138 MMOL/L (ref 135–147)
WBC # BLD AUTO: 11.38 THOUSAND/UL (ref 4.31–10.16)

## 2023-07-17 PROCEDURE — 80048 BASIC METABOLIC PNL TOTAL CA: CPT

## 2023-07-17 PROCEDURE — 85027 COMPLETE CBC AUTOMATED: CPT

## 2023-07-17 PROCEDURE — 36415 COLL VENOUS BLD VENIPUNCTURE: CPT

## 2023-07-17 NOTE — TELEPHONE ENCOUNTER
Patient called to switch her appt to virtual because she will not have any transportation that day. I updated the appt to virtual via [facility].

## 2023-07-21 ENCOUNTER — TELEMEDICINE (OUTPATIENT)
Dept: NEUROLOGY | Facility: CLINIC | Age: 28
End: 2023-07-21
Payer: COMMERCIAL

## 2023-07-21 VITALS — WEIGHT: 249 LBS | BODY MASS INDEX: 39.08 KG/M2 | HEIGHT: 67 IN

## 2023-07-21 DIAGNOSIS — R56.9 SEIZURE (HCC): ICD-10-CM

## 2023-07-21 DIAGNOSIS — G43.809 OTHER MIGRAINE WITHOUT STATUS MIGRAINOSUS, NOT INTRACTABLE: Primary | ICD-10-CM

## 2023-07-21 PROCEDURE — 99213 OFFICE O/P EST LOW 20 MIN: CPT | Performed by: NURSE PRACTITIONER

## 2023-07-21 RX ORDER — RIZATRIPTAN BENZOATE 5 MG/1
5 TABLET, ORALLY DISINTEGRATING ORAL AS NEEDED
Qty: 9 TABLET | Refills: 2 | Status: SHIPPED | OUTPATIENT
Start: 2023-07-21

## 2023-07-21 RX ORDER — DIVALPROEX SODIUM 500 MG/1
2500 TABLET, EXTENDED RELEASE ORAL DAILY
Qty: 450 TABLET | Refills: 3 | Status: SHIPPED | OUTPATIENT
Start: 2023-07-21

## 2023-07-21 NOTE — PROGRESS NOTES
Patient ID: Chely Neff is a 25 y.o. female with events concerning for seizure and/or nonepileptic spells, who is returning to Neurology office for follow up of her seizures. Virtual Regular Visit    Verification of patient location: home    Patient is located at Home in the following state in which I hold an active license [place]    Assessment/Plan:    Problem List Items Addressed This Visit        Cardiovascular and Mediastinum    Migraine - Primary     Recent worsening of migraines, currently 2-3 times per week. Will increase Depakote 2500 mg daily. Maxalt ordered to be used for migraines as needed given ibuprofen and aleve have not been helpful. Consider alternative medication options in the future as Depakote use could contribute to weight gain and other long term side effects. Since she is having surgery next week would prefer to not add a new medication. After surgery could consider decreasing Depakote back to 2000 mg daily and starting an alternative medication such as nortriptyline, venlafaxine, etc for control of migraines. If there is worsening of migraines or concerns she will call the office. Relevant Medications    divalproex sodium (DEPAKOTE ER) 500 mg 24 hr tablet    rizatriptan (Maxalt-MLT) 5 mg disintegrating tablet       Other    Seizure ([address])     Patient GTC seizures, well controlled on Depakote ER monotherapy of 2000 mg daily. She has been seizure free since March 2022. Overall she has been doing quite well. She has returned to driving without issues. Will increase Depakote ER to 2500 mg daily due to her migraine headaches. Check blood work in one week. History of tubal ligation so no concerns regarding pregnancy on this medicaiton. She will call the office with breakthrough seizures or concerns. Follow up in 6 months or sooner if needed.           Relevant Medications    divalproex sodium (DEPAKOTE ER) 500 mg 24 hr tablet    Other Relevant Orders    Valproic acid level, total            Reason for visit is   Chief Complaint   Patient presents with   • Virtual Regular Visit        [facility]    Provider located at [facility] [address]      Recent Visits  Date Type Provider Dept   07/21/23 Telemedicine [facility], [address] [place] [address]   07/17/23 [facility] recent visits within past 7 days and meeting all other requirements  Future Appointments  No visits were found meeting these conditions. Showing future appointments within next 150 days and meeting all other requirements       The patient was identified by name and date of birth. Bennett Zaman was informed that this is a telemedicine visit and that the visit is being conducted through the [address] [place] [address] 22 Now platform. She agrees to proceed. .  My office door was closed. No one else was in the room. She acknowledged consent and understanding of privacy and security of the video platform. The patient has agreed to participate and understands they can discontinue the visit at any time. Patient is aware this is a billable service. Subjective  Bennett Zaman is a 25 y.o. female with events concerning for seizure and/or nonepileptic spells, who is returning to Neurology office for follow up of her seizures. She was last seen in the office on 1/20/2023. At that time generalized tonic-clonic seizures were well controlled on Depakote monotherapy of 2000 mg daily. Seizure-free since March 2, 2022, overall has been doing quite well. She has made a big effort to manage stress and lifestyle factors and would like to return to driving given she has not had any events concerning for seizures. Paperwork was submitted to [place] in [facility] for return to driving.   Recommended continuing current medication at that time, no concerns regarding pregnancy given history of tubal ligation. She was to call the office with breakthrough seizures or concerns and follow-up in 6 months or sooner if needed. Since their last visit, she did get a car and has been driving since her last visit. She is still in school and got a new job. Working for a [employer]. There was an episode yesterday when the fire alarm yesterday that went off that startled her. She did have a migraine, was very hot. No spacing out no convulsions. She got dizzy eye was twitching. After her vision was a little bit blurred but it did go away. She reports that she ALWAYS has an aura prior to seizures and migraines. Taking Depakote consistently. Migraines are 2-3 times per week. Same as typical migraines. She was taking ibuprofen and aleve which does not help so she just typically does not take anything and tries to go to sleep and sleep it off. Since she is now working, it is not as easy to sleep it off. Since ibuprofen/aleve are not helpful she is agreeable to trying a triptan. She has surgery coming up for a large adenexal cyst.    Current seizure medications:  - Depakote ER 2000 mg daily  Other medications as per Epic. Prior AEDs:  Lacosamide (started early 2017, stopped by Dr. Angela Rojo)  [address], continued to have events on 4000 mg total daily     Prior Evaluation:  About 72 hours of continuous VEEG in 8/2017: No events. No epileptiform discharges. Mild slowing of PDR.   REEG likely while admitted at [facility] 11/2020: normal  24 hour AEEG 3/9/2021: normal, no events. Brain MRI 11/16/2020: read as normal by radiology- on personal review- there may be slight asymmetry of the hippocampi, more CSF space surrounding the right hippocampus.      EMU admission November 2021:  EMU CONCLUSION  Summary interictal findings:      Rare interictal diffuse sharply contoured waves. This pattern was exclusively seen while awake and looking at her phone. Likely benign waveform.      Summary event findings:   Events of right facial tingling without loss of awareness and events of staring off, blurred vision, and pressure to bilateral head are without EEG change concerning for seizure. Seizure Classification:   N/A     Epilepsy Classification:   N/A     EMU findings and discussion:  Patient had two events concerning for seizure during hospitalization. Neither of the events are with EEG changes consistent with seizures. There is a possibility that these events could represent focal aware seizures that are not appreciated on scalp EEG.  There were findings on her EEG that are likely benign noted as rare generalized contoured waves during awake, exclusively observed when looking at her phone. Since the patient has had convincing generalized tonic clonic seizures in the past, it is likely that the depakote has controlled her GTC seizures though her new events are more related to stress and change in schedule. If events do not get improve with lifestyle changes, discussed establishing with mental health for cognitive behavioral therapy.   She will continue with her current dose of depakote upon discharge. This medication is likely helpful for mood and headache as well. Recommend that she follow up with Dr Sandra Henao as scheduled in [place] not able to get a sooner appointment but she will call the office with any issues or concerns prior to her appointment. I reviewed prior neurology notes, most recent labs, recent EMU admission documentation, as documented in Epic/[employer] [facility], and summarized above.       I reviewed prior neurology notes, most recent labs, as documented in Epic/[employer], and summarized above. Past Medical History:   Diagnosis Date   • Depression    • Migraine    • Morbid obesity with BMI of 40.0-44.9, adult ([address])    • Obesity (BMI 30-39. 9)    • Seizure ([address])    • Seizures ([address])        Past Surgical History:   Procedure Laterality Date   •  SECTION     •  SECTION     • SHOULDER SURGERY     • TUBAL LIGATION     • TUMOR REMOVAL      from ear       Current Outpatient Medications   Medication Sig Dispense Refill   • divalproex sodium (DEPAKOTE ER) 500 mg 24 hr tablet Take 5 tablets (2,500 mg total) by mouth daily 450 tablet 3   • rizatriptan (Maxalt-MLT) 5 mg disintegrating tablet Take 1 tablet (5 mg total) by mouth as needed for migraine Take at the onset of migraine; if symptoms continue or return, may take another dose at least 2 hours after first dose. Take no more than 2 doses in a day, no more than 3 doses per weeks. 9 tablet 2   • methocarbamol (ROBAXIN) 750 mg tablet Take 1 tablet (750 mg total) by mouth 4 (four) times a day as needed for muscle spasms for up to 10 days (Patient not taking: Reported on 5/15/2023) 20 tablet 0     No current facility-administered medications for this visit. Allergies   Allergen Reactions   • Erythromycin Swelling   • Peanut Oil - Food Allergy Hives and Anaphylaxis     Does not have Epi-pen prescribed    • Azithromycin Other (See Comments)     . Other reaction(s): Other (See Comments)  . Review of Systems  ROS:  Constitutional: Negative for appetite change, fatigue and fever. HENT: Negative. Negative for hearing loss, tinnitus, trouble swallowing and voice change. Eyes: Positive for visual disturbance (fluttering). Negative for photophobia and pain. Respiratory: Negative. Negative for shortness of breath. Cardiovascular: Negative. Negative for palpitations. Gastrointestinal: Negative. Negative for nausea and vomiting. Endocrine: Negative. Negative for cold intolerance. Genitourinary: Negative. Negative for dysuria, frequency and urgency. Musculoskeletal: Negative for back pain, gait problem, myalgias and neck pain. Skin: Negative. Negative for rash. Allergic/Immunologic: Negative.     Neurological: Positive for dizziness (yesterday), seizures (no recent sz) and headaches (2-3x a week). Negative for tremors, syncope, facial asymmetry, speech difficulty, weakness, light-headedness and numbness. Hematological: Negative. Does not bruise/bleed easily. Psychiatric/Behavioral: Negative. Negative for confusion, hallucinations and sleep disturbance. All other systems reviewed and are negative.     ROS obtained by MA and reviewed by myself. Video Exam    Vitals:    07/21/23 1409   Weight: 113 kg (249 lb)   Height: 5' 7" (1.702 m)       Physical Exam     Visit Time  Total Visit Duration: 20 minutes    This note may have been created using voice recognition software. There may be unintentional errors such as grammatical errors, spelling errors, or pronoun errors.

## 2023-07-21 NOTE — PATIENT INSTRUCTIONS
- Increase Depakote to 5 tablets daily  - Try maxalt as needed for migraine headaches  - Blood work on 7/27  - Call the office with worsening of headaches or possible seizures  - Follow up in 6 months or sooner if needed

## 2023-07-21 NOTE — PROGRESS NOTES
Review of Systems   Constitutional: Negative for appetite change, fatigue and fever. HENT: Negative. Negative for hearing loss, tinnitus, trouble swallowing and voice change. Eyes: Positive for visual disturbance (fluttering). Negative for photophobia and pain. Respiratory: Negative. Negative for shortness of breath. Cardiovascular: Negative. Negative for palpitations. Gastrointestinal: Negative. Negative for nausea and vomiting. Endocrine: Negative. Negative for cold intolerance. Genitourinary: Negative. Negative for dysuria, frequency and urgency. Musculoskeletal: Negative for back pain, gait problem, myalgias and neck pain. Skin: Negative. Negative for rash. Allergic/Immunologic: Negative. Neurological: Positive for dizziness (yesterday), seizures (no recent sz) and headaches (2-3x a week). Negative for tremors, syncope, facial asymmetry, speech difficulty, weakness, light-headedness and numbness. Hematological: Negative. Does not bruise/bleed easily. Psychiatric/Behavioral: Negative. Negative for confusion, hallucinations and sleep disturbance. All other systems reviewed and are negative.

## 2023-07-24 NOTE — ASSESSMENT & PLAN NOTE
Recent worsening of migraines, currently 2-3 times per week. Will increase Depakote 2500 mg daily. Maxalt ordered to be used for migraines as needed given ibuprofen and aleve have not been helpful. Consider alternative medication options in the future as Depakote use could contribute to weight gain and other long term side effects. Since she is having surgery next week would prefer to not add a new medication. After surgery could consider decreasing Depakote back to 2000 mg daily and starting an alternative medication such as nortriptyline, venlafaxine, etc for control of migraines. If there is worsening of migraines or concerns she will call the office.

## 2023-07-24 NOTE — ASSESSMENT & PLAN NOTE
Patient GTC seizures, well controlled on Depakote ER monotherapy of 2000 mg daily. She has been seizure free since March 2022. Overall she has been doing quite well. She has returned to driving without issues. Will increase Depakote ER to 2500 mg daily due to her migraine headaches. Check blood work in one week. History of tubal ligation so no concerns regarding pregnancy on this medicaiton. She will call the office with breakthrough seizures or concerns. Follow up in 6 months or sooner if needed.

## 2023-07-28 ENCOUNTER — ANESTHESIA (OUTPATIENT)
Dept: PERIOP | Facility: HOSPITAL | Age: 28
End: 2023-07-28
Payer: COMMERCIAL

## 2023-07-28 ENCOUNTER — HOSPITAL ENCOUNTER (OUTPATIENT)
Facility: HOSPITAL | Age: 28
Setting detail: OUTPATIENT SURGERY
Discharge: HOME | End: 2023-07-28
Attending: STUDENT IN AN ORGANIZED HEALTH CARE EDUCATION/TRAINING PROGRAM | Admitting: STUDENT IN AN ORGANIZED HEALTH CARE EDUCATION/TRAINING PROGRAM
Payer: COMMERCIAL

## 2023-07-28 ENCOUNTER — ANESTHESIA EVENT (OUTPATIENT)
Dept: PERIOP | Facility: HOSPITAL | Age: 28
End: 2023-07-28
Payer: COMMERCIAL

## 2023-07-28 VITALS
HEIGHT: 67 IN | RESPIRATION RATE: 15 BRPM | SYSTOLIC BLOOD PRESSURE: 119 MMHG | OXYGEN SATURATION: 98 % | BODY MASS INDEX: 38.75 KG/M2 | WEIGHT: 246.91 LBS | TEMPERATURE: 97.5 F | DIASTOLIC BLOOD PRESSURE: 64 MMHG | HEART RATE: 103 BPM

## 2023-07-28 DIAGNOSIS — N94.9 ADNEXAL CYST: ICD-10-CM

## 2023-07-28 DIAGNOSIS — G89.18 POSTOPERATIVE PAIN: Primary | ICD-10-CM

## 2023-07-28 LAB
EXT PREGNANCY TEST URINE: NEGATIVE
EXT. CONTROL: NORMAL

## 2023-07-28 PROCEDURE — 88305 TISSUE EXAM BY PATHOLOGIST: CPT | Performed by: PATHOLOGY

## 2023-07-28 PROCEDURE — 81025 URINE PREGNANCY TEST: CPT | Performed by: STUDENT IN AN ORGANIZED HEALTH CARE EDUCATION/TRAINING PROGRAM

## 2023-07-28 PROCEDURE — NC001 PR NO CHARGE: Performed by: STUDENT IN AN ORGANIZED HEALTH CARE EDUCATION/TRAINING PROGRAM

## 2023-07-28 PROCEDURE — 58662 LAPAROSCOPY EXCISE LESIONS: CPT | Performed by: STUDENT IN AN ORGANIZED HEALTH CARE EDUCATION/TRAINING PROGRAM

## 2023-07-28 RX ORDER — ACETAMINOPHEN 325 MG/1
975 TABLET ORAL ONCE
Status: COMPLETED | OUTPATIENT
Start: 2023-07-28 | End: 2023-07-28

## 2023-07-28 RX ORDER — HYDROMORPHONE HCL/PF 1 MG/ML
0.2 SYRINGE (ML) INJECTION
Status: DISCONTINUED | OUTPATIENT
Start: 2023-07-28 | End: 2023-07-28 | Stop reason: HOSPADM

## 2023-07-28 RX ORDER — ONDANSETRON 2 MG/ML
4 INJECTION INTRAMUSCULAR; INTRAVENOUS ONCE AS NEEDED
Status: DISCONTINUED | OUTPATIENT
Start: 2023-07-28 | End: 2023-07-28 | Stop reason: HOSPADM

## 2023-07-28 RX ORDER — PROMETHAZINE HYDROCHLORIDE 25 MG/ML
6.25 INJECTION, SOLUTION INTRAMUSCULAR; INTRAVENOUS ONCE
Status: DISCONTINUED | OUTPATIENT
Start: 2023-07-28 | End: 2023-07-28 | Stop reason: HOSPADM

## 2023-07-28 RX ORDER — ROCURONIUM BROMIDE 10 MG/ML
INJECTION, SOLUTION INTRAVENOUS AS NEEDED
Status: DISCONTINUED | OUTPATIENT
Start: 2023-07-28 | End: 2023-07-28

## 2023-07-28 RX ORDER — FENTANYL CITRATE/PF 50 MCG/ML
50 SYRINGE (ML) INJECTION
Status: DISCONTINUED | OUTPATIENT
Start: 2023-07-28 | End: 2023-07-28 | Stop reason: HOSPADM

## 2023-07-28 RX ORDER — SCOLOPAMINE TRANSDERMAL SYSTEM 1 MG/1
1 PATCH, EXTENDED RELEASE TRANSDERMAL
Status: DISCONTINUED | OUTPATIENT
Start: 2023-07-28 | End: 2023-07-28 | Stop reason: HOSPADM

## 2023-07-28 RX ORDER — MIDAZOLAM HYDROCHLORIDE 2 MG/2ML
INJECTION, SOLUTION INTRAMUSCULAR; INTRAVENOUS AS NEEDED
Status: DISCONTINUED | OUTPATIENT
Start: 2023-07-28 | End: 2023-07-28

## 2023-07-28 RX ORDER — GABAPENTIN 300 MG/1
300 CAPSULE ORAL ONCE
Status: COMPLETED | OUTPATIENT
Start: 2023-07-28 | End: 2023-07-28

## 2023-07-28 RX ORDER — GLYCOPYRROLATE 0.2 MG/ML
INJECTION INTRAMUSCULAR; INTRAVENOUS AS NEEDED
Status: DISCONTINUED | OUTPATIENT
Start: 2023-07-28 | End: 2023-07-28

## 2023-07-28 RX ORDER — SODIUM CHLORIDE, SODIUM LACTATE, POTASSIUM CHLORIDE, CALCIUM CHLORIDE 600; 310; 30; 20 MG/100ML; MG/100ML; MG/100ML; MG/100ML
100 INJECTION, SOLUTION INTRAVENOUS CONTINUOUS
Status: CANCELLED | OUTPATIENT
Start: 2023-07-28

## 2023-07-28 RX ORDER — OXYCODONE HYDROCHLORIDE 5 MG/1
5 TABLET ORAL EVERY 6 HOURS PRN
Qty: 5 TABLET | Refills: 0 | Status: CANCELLED | OUTPATIENT
Start: 2023-07-28

## 2023-07-28 RX ORDER — CELECOXIB 100 MG/1
100 CAPSULE ORAL ONCE
Status: COMPLETED | OUTPATIENT
Start: 2023-07-28 | End: 2023-07-28

## 2023-07-28 RX ORDER — NEOSTIGMINE METHYLSULFATE 1 MG/ML
INJECTION INTRAVENOUS AS NEEDED
Status: DISCONTINUED | OUTPATIENT
Start: 2023-07-28 | End: 2023-07-28

## 2023-07-28 RX ORDER — PROPOFOL 10 MG/ML
INJECTION, EMULSION INTRAVENOUS AS NEEDED
Status: DISCONTINUED | OUTPATIENT
Start: 2023-07-28 | End: 2023-07-28

## 2023-07-28 RX ORDER — IBUPROFEN 800 MG/1
800 TABLET ORAL EVERY 8 HOURS PRN
Qty: 30 TABLET | Refills: 0 | Status: CANCELLED | OUTPATIENT
Start: 2023-07-28

## 2023-07-28 RX ORDER — ONDANSETRON 2 MG/ML
INJECTION INTRAMUSCULAR; INTRAVENOUS AS NEEDED
Status: DISCONTINUED | OUTPATIENT
Start: 2023-07-28 | End: 2023-07-28

## 2023-07-28 RX ORDER — LIDOCAINE HYDROCHLORIDE 10 MG/ML
INJECTION, SOLUTION EPIDURAL; INFILTRATION; INTRACAUDAL; PERINEURAL AS NEEDED
Status: DISCONTINUED | OUTPATIENT
Start: 2023-07-28 | End: 2023-07-28 | Stop reason: HOSPADM

## 2023-07-28 RX ORDER — KETOROLAC TROMETHAMINE 30 MG/ML
INJECTION, SOLUTION INTRAMUSCULAR; INTRAVENOUS AS NEEDED
Status: DISCONTINUED | OUTPATIENT
Start: 2023-07-28 | End: 2023-07-28

## 2023-07-28 RX ORDER — MAGNESIUM HYDROXIDE 1200 MG/15ML
LIQUID ORAL AS NEEDED
Status: DISCONTINUED | OUTPATIENT
Start: 2023-07-28 | End: 2023-07-28 | Stop reason: HOSPADM

## 2023-07-28 RX ORDER — DEXAMETHASONE SODIUM PHOSPHATE 10 MG/ML
INJECTION, SOLUTION INTRAMUSCULAR; INTRAVENOUS AS NEEDED
Status: DISCONTINUED | OUTPATIENT
Start: 2023-07-28 | End: 2023-07-28

## 2023-07-28 RX ORDER — SODIUM CHLORIDE, SODIUM LACTATE, POTASSIUM CHLORIDE, CALCIUM CHLORIDE 600; 310; 30; 20 MG/100ML; MG/100ML; MG/100ML; MG/100ML
INJECTION, SOLUTION INTRAVENOUS CONTINUOUS PRN
Status: DISCONTINUED | OUTPATIENT
Start: 2023-07-28 | End: 2023-07-28

## 2023-07-28 RX ORDER — FENTANYL CITRATE 50 UG/ML
INJECTION, SOLUTION INTRAMUSCULAR; INTRAVENOUS AS NEEDED
Status: DISCONTINUED | OUTPATIENT
Start: 2023-07-28 | End: 2023-07-28

## 2023-07-28 RX ORDER — LIDOCAINE HYDROCHLORIDE 10 MG/ML
INJECTION, SOLUTION EPIDURAL; INFILTRATION; INTRACAUDAL; PERINEURAL AS NEEDED
Status: DISCONTINUED | OUTPATIENT
Start: 2023-07-28 | End: 2023-07-28

## 2023-07-28 RX ORDER — SODIUM CHLORIDE, SODIUM LACTATE, POTASSIUM CHLORIDE, CALCIUM CHLORIDE 600; 310; 30; 20 MG/100ML; MG/100ML; MG/100ML; MG/100ML
75 INJECTION, SOLUTION INTRAVENOUS CONTINUOUS
Status: DISCONTINUED | OUTPATIENT
Start: 2023-07-28 | End: 2023-07-28 | Stop reason: HOSPADM

## 2023-07-28 RX ADMIN — ROCURONIUM BROMIDE 50 MG: 10 INJECTION, SOLUTION INTRAVENOUS at 10:42

## 2023-07-28 RX ADMIN — SODIUM CHLORIDE, SODIUM LACTATE, POTASSIUM CHLORIDE, AND CALCIUM CHLORIDE 75 ML/HR: .6; .31; .03; .02 INJECTION, SOLUTION INTRAVENOUS at 09:14

## 2023-07-28 RX ADMIN — SODIUM CHLORIDE, SODIUM LACTATE, POTASSIUM CHLORIDE, AND CALCIUM CHLORIDE: .6; .31; .03; .02 INJECTION, SOLUTION INTRAVENOUS at 11:40

## 2023-07-28 RX ADMIN — CELECOXIB 100 MG: 100 CAPSULE ORAL at 09:14

## 2023-07-28 RX ADMIN — ACETAMINOPHEN 975 MG: 325 TABLET, FILM COATED ORAL at 09:14

## 2023-07-28 RX ADMIN — SODIUM CHLORIDE, SODIUM LACTATE, POTASSIUM CHLORIDE, AND CALCIUM CHLORIDE: .6; .31; .03; .02 INJECTION, SOLUTION INTRAVENOUS at 09:10

## 2023-07-28 RX ADMIN — LIDOCAINE HYDROCHLORIDE 50 MG: 10 INJECTION, SOLUTION EPIDURAL; INFILTRATION; INTRACAUDAL; PERINEURAL at 10:42

## 2023-07-28 RX ADMIN — PROPOFOL 200 MG: 10 INJECTION, EMULSION INTRAVENOUS at 10:42

## 2023-07-28 RX ADMIN — FENTANYL CITRATE 50 MCG: 50 INJECTION, SOLUTION INTRAMUSCULAR; INTRAVENOUS at 10:42

## 2023-07-28 RX ADMIN — GABAPENTIN 300 MG: 300 CAPSULE ORAL at 09:13

## 2023-07-28 RX ADMIN — DEXAMETHASONE SODIUM PHOSPHATE 10 MG: 10 INJECTION, SOLUTION INTRAMUSCULAR; INTRAVENOUS at 10:42

## 2023-07-28 RX ADMIN — MIDAZOLAM HYDROCHLORIDE 2 MG: 1 INJECTION, SOLUTION INTRAMUSCULAR; INTRAVENOUS at 10:38

## 2023-07-28 RX ADMIN — ONDANSETRON 4 MG: 2 INJECTION INTRAMUSCULAR; INTRAVENOUS at 10:57

## 2023-07-28 RX ADMIN — KETOROLAC TROMETHAMINE 30 MG: 30 INJECTION, SOLUTION INTRAMUSCULAR at 11:22

## 2023-07-28 RX ADMIN — FENTANYL CITRATE 50 MCG: 50 INJECTION, SOLUTION INTRAMUSCULAR; INTRAVENOUS at 11:01

## 2023-07-28 RX ADMIN — NEOSTIGMINE METHYLSULFATE 3 MG: 1 INJECTION INTRAVENOUS at 11:20

## 2023-07-28 RX ADMIN — GLYCOPYRROLATE 0.6 MG: 0.2 INJECTION, SOLUTION INTRAMUSCULAR; INTRAVENOUS at 11:20

## 2023-07-28 RX ADMIN — SCOPALAMINE 1 PATCH: 1 PATCH, EXTENDED RELEASE TRANSDERMAL at 10:34

## 2023-07-28 NOTE — ANESTHESIA PREPROCEDURE EVALUATION
Procedure:  LAPAROSCOPIC ADNEXAL CYSTECTOMY POSSIBLE OVARIAN CYSTECTOMY (Right: Pelvis)  POSSIBLE RIGHT OOPHORECTOMY (Right: Pelvis)    Relevant Problems   ANESTHESIA (within normal limits)      CARDIO (within normal limits)   (+) Migraine      ENDO (within normal limits)      GI/HEPATIC (within normal limits)      HEMATOLOGY (within normal limits)      MUSCULOSKELETAL   (+) Chronic low back pain      NEURO/PSYCH   (+) Chronic low back pain   (+) Major depressive disorder, recurrent episode with anxious distress (HCC)   (+) Migraine   (+) Nightmares   (+) Seizure (HCC)      PULMONARY (within normal limits)   (-) URI (upper respiratory infection)      Other   (+) Bipolar 2 disorder (HCC)      Allergies   Allergen Reactions   • Erythromycin Swelling   • Peanut Oil - Food Allergy Hives and Anaphylaxis     Does not have Epi-pen prescribed    • Azithromycin Other (See Comments)     . Other reaction(s): Other (See Comments)  . Social History     Tobacco Use   • Smoking status: Never     Passive exposure: Never   • Smokeless tobacco: Never   Vaping Use   • Vaping Use: Never used   Substance Use Topics   • Alcohol use: Not Currently     Comment: socially   • Drug use: Never     Current Outpatient Medications   Medication Instructions   • divalproex sodium (DEPAKOTE ER) 2,500 mg, Oral, Daily   • methocarbamol (ROBAXIN) 750 mg, Oral, 4 times daily PRN   • rizatriptan (MAXALT-MLT) 5 mg, Oral, As needed, Take at the onset of migraine; if symptoms continue or return, may take another dose at least 2 hours after first dose. Take no more than 2 doses in a day, no more than 3 doses per weeks.      Lab Results   Component Value Date    WBC 11.38 (H) 07/17/2023    HGB 13.3 07/17/2023    HCT 41.2 07/17/2023     (H) 07/17/2023    SODIUM 138 07/17/2023    K 4.1 07/17/2023     07/17/2023    CO2 26 07/17/2023    BUN 9 07/17/2023    CREATININE 0.75 07/17/2023    GLUC 127 05/06/2023    HGBA1C 5.6 11/06/2020    AST 16 05/06/2023    ALT 28 05/06/2023    ALKPHOS 93 05/06/2023    TBILI 0.37 05/06/2023    ALB 4.4 05/06/2023    PROTIME 13.1 07/06/2021    PTT 32 07/06/2021    INR 0.99 07/06/2021     Vitals:    07/28/23 0903   BP: 112/68   Pulse: 90   Resp: 16   Temp: 98.6 °F (37 °C)   SpO2: 97%     EKG 11/16/21  Normal sinus rhythm  Voltage criteria for left ventricular hypertrophy  Abnormal ECG  When compared with ECG of 14-JUN-2021 02:10,  No significant change was found  Confirmed by Julienne Rooney ([id number]) on 11/16/2021 4:42:24 PM    Physical Exam    Airway    Mallampati score: II  TM Distance: >3 FB  Neck ROM: full     Dental   Comment: Denies loose/chipped teeth, No notable dental hx     Cardiovascular  Rhythm: regular, Rate: normal, Cardiovascular exam normal    Pulmonary  Pulmonary exam normal Breath sounds clear to auscultation,     Other Findings        Anesthesia Plan  ASA Score- 3     Anesthesia Type- general with ASA Monitors. Additional Monitors:   Airway Plan: ETT. Plan Factors-Exercise tolerance (METS): >4 METS. Chart reviewed. EKG reviewed. Existing labs reviewed. Patient summary reviewed. Patient is not a current smoker. Induction- intravenous. Postoperative Plan- Plan for postoperative opioid use. Informed Consent- Anesthetic plan and risks discussed with patient. I personally reviewed this patient with the CRNA. Discussed and agreed on the Anesthesia Plan with the CRNA. Theodor Points

## 2023-07-28 NOTE — ANESTHESIA POSTPROCEDURE EVALUATION
Post-Op Assessment Note    CV Status:  Stable  Pain Score: 0    Pain management: adequate  Multimodal analgesia used between 6 hours prior to anesthesia start to PACU discharge    Mental Status:  Alert and sleepy   Hydration Status:  Stable   PONV Controlled:  None   Airway Patency:  Patent  Airway: intubated   Two or more mitigation strategies used for obstructive sleep apnea   Post Op Vitals Reviewed: Yes      Staff: CRNA         No notable events documented.     BP  109/61    Temp   97.9   Pulse  76   Resp  18    SpO2  98

## 2023-07-28 NOTE — H&P
H&P Exam - Gynecology   [facility] 25 [facility] MRN: [id number]  Unit/Bed#: OR POOL Encounter: [id number]    Assessment/Plan   30yo with large right adnexal cyst, for cystectomy. - void on call to OR  - abx not indicated  - plan for outpatient procedure    History of Present Illness     Violeta Rahman is a 25 y.o. female who presents "for removal of an enlarging simple adnexal cyst. She has previously been counseled regarding this and would like to proceed with surgery for removal. Cyst most recently imaged on 23 in the ED, demonstrated 6.4cm adnexal cyst adjacent to right ovary."    Review of Systems as above    Historical Information   Past Medical History:   Diagnosis Date   • Depression    • Migraine    • Morbid obesity with BMI of 40.0-44.9, adult ([address])    • Obesity (BMI 30-39. 9)    • Seizure ([address])    • Seizures ([address])      Past Surgical History:   Procedure Laterality Date   •  SECTION     •  SECTION     • FRACTURE SURGERY Left     left clavical   • SHOULDER SURGERY     • TUBAL LIGATION     • TUMOR REMOVAL      from ear     OB/GYN History:   Family History   Problem Relation Age of Onset   • No Known Problems Mother    • No Known Problems Father    • No Known Problems Sister    • No Known Problems Brother    • No Known Problems Maternal Aunt    • No Known Problems Maternal Uncle    • No Known Problems Paternal Aunt    • No Known Problems Paternal Uncle    • No Known Problems Maternal Grandmother    • No Known Problems Maternal Grandfather    • No Known Problems Paternal Grandmother    • No Known Problems Paternal Grandfather    • No Known Problems Cousin    • ADD / ADHD Neg Hx    • Alcohol abuse Neg Hx    • Anxiety disorder Neg Hx    • Bipolar disorder Neg Hx    • Completed Suicide  Neg Hx    • Dementia Neg Hx    • Depression Neg Hx    • Drug abuse Neg Hx    • OCD Neg Hx    • Psychiatric Illness Neg Hx    • Psychosis Neg Hx    • Schizoaffective Disorder  Neg Hx    • Schizophrenia Neg Hx    • Self-Injury Neg Hx    • Suicide Attempts Neg Hx    • Breast cancer Neg Hx    • Colon cancer Neg Hx    • Ovarian cancer Neg Hx    • Uterine cancer Neg Hx    • Cervical cancer Neg Hx      Social History   Social History     Substance and Sexual Activity   Alcohol Use Not Currently    Comment: socially     Social History     Substance and Sexual Activity   Drug Use Never     Social History     Tobacco Use   Smoking Status Never   • Passive exposure: Never   Smokeless Tobacco Never     E-Cigarette/Vaping   • E-Cigarette Use Never User      E-Cigarette/Vaping Substances   • Nicotine No    • THC No    • CBD No    • Flavoring No        Meds/Allergies   all current active meds have been reviewed  Allergies   Allergen Reactions   • Erythromycin Swelling   • Peanut Oil - Food Allergy Hives and Anaphylaxis     Does not have Epi-pen prescribed    • Azithromycin Other (See Comments)     . Other reaction(s): Other (See Comments)  . Objective   Vitals: Blood pressure 112/68, pulse 90, temperature 98.6 °F (37 °C), temperature source Temporal, resp. rate 16, height 5' 7" (1.702 m), weight 112 kg (246 lb 14.6 oz), last menstrual period 04/09/2023, SpO2 97 %, unknown if currently breastfeeding. No intake or output data in the 24 hours ending 07/28/23 1007    Invasive Devices: Invasive Devices     Peripheral Intravenous Line  Duration           Peripheral IV 05/06/23 Left Antecubital 82 days    Peripheral IV 07/28/23 Left;Ventral (anterior) Forearm <1 day                Physical Exam  Constitutional:       Appearance: She is well-developed. HENT:      Head: Normocephalic. Cardiovascular:      Rate and Rhythm: Normal rate and regular rhythm. Pulmonary:      Effort: Pulmonary effort is normal.   Abdominal:      Palpations: Abdomen is soft. Musculoskeletal:         General: Normal range of motion. Cervical back: Normal range of motion. Skin:     General: Skin is warm and dry. Neurological:      Mental Status: She is alert and oriented to person, place, and time.    Psychiatric:         Behavior: Behavior normal.

## 2023-07-28 NOTE — OP NOTE
OPERATIVE REPORT  PATIENT NAME: Miriam Recio    :  1995  MRN: [id number]  Pt Location: [facility] OR ROOM 02    SURGERY DATE: 2023    Surgeon(s) and Role:     * Elisabet Gruber MD - Primary    Preop Diagnosis:  Adnexal cyst [N94.9]    Post-Op Diagnosis Codes:     * Adnexal cyst [N94.9]    Procedure(s):  Right - LAPAROSCOPIC ADNEXAL CYSTECTOMY POSSIBLE OVARIAN CYSTECTOMY    Specimen(s):  ID Type Source Tests Collected by Time Destination   1 : Adnexal Cyst Tissue Cyst TISSUE EXAM Valentina Birmingham MD 2023 1115        Estimated Blood Loss: 5 mL    Drains: none    Anesthesia Type: General    Operative Indications:  Adnexal cyst [N94.9]    Operative Findings:  Normal uterus and bilateral ovaries. Bilateral fallopian tubes s/p tubal ligation. Large simple right adnexal cyst, torsed x3    Complications:   None    Procedure and Technique:    Patient was taken to the operating room were a time out was performed to confirm correct patient and correct procedure. General endotracheal anesthesia (GET) was administered and the patient was positioned on the OR table in the dorsal lithotomy position. All pressure points were padded and a Zarina hugger was placed to maintain control of core body temperature. The patient was prepped and draped in the usual sterile fashion with chloroprep on the abdomen, vagina and perineum. Operative Technique  Attention was then turned to the abdomen for laparoscopy. Raysa clamp was used to invert the umbilicus for accessibility. The base of the umbilicus was grasped and everted with penetrating towel clamps. The veres needle was introduced, under low flow, with entry into the abdominal cavity confirmed with a drop in pressure. The abdomen was insufflated to 15mmHg, after which a 10 mm incision was made at the base of the umbilicus, after the injection of marcaine. A 10 mm trochar was introduced under direct visualization and without difficulty.  The area underlying the Veres entry was examined, with no damage to the bowel or overlying omentum noted. Subsequently, the entire abdomen and pelvis was inspected and there was no evidence of injury to bowel, bladder, vasculature, or other structures. Liver edge and stomach were noted to be normal.        Patient was placed in Trendelenburg and two additional trochars were placed in the the left and right lower abdomen, approximately 2cm superior and medial to the iliac crests, after injection of lidocaine. Survey of the pelvis revealed the above findings, namely large right adnexal cyst, otherwise normal anatomy. The Harmonic scalpel was used to transect the stalk of the adnexal cyst. The cyst was placed in a 10 mm specimen bag and brought to the level of the abdomen. The cyst was ruptured, in the bag, outside of the abdomen, with all contents suctioned. Attention was returned to the pelvis and excellent hemostasis was noted. All instruments were removed from the abdomen and the pneumoperitoneum was released. All counts were correct times two. The skin incisions were closed with 4-0 monocryl in a subcuticular fashion. The patient tolerated the procedure well and was taken to the recovery room in stable condition. The patient will be discharged from the PACU after all criteria are met, with 2 week follow up in the office. I was present for the entire procedure.     Patient Disposition:  extubated and stable    SIGNATURE: Shayne Hernandez MD  DATE: July 28, 2023  TIME: 11:25 AM

## 2023-08-01 PROCEDURE — 88305 TISSUE EXAM BY PATHOLOGIST: CPT | Performed by: PATHOLOGY

## 2023-08-02 ENCOUNTER — TELEPHONE (OUTPATIENT)
Dept: OBGYN CLINIC | Facility: CLINIC | Age: 28
End: 2023-08-02

## 2023-08-22 ENCOUNTER — OFFICE VISIT (OUTPATIENT)
Age: 28
End: 2023-08-22

## 2023-08-22 VITALS — BODY MASS INDEX: 39.75 KG/M2 | WEIGHT: 253.8 LBS | DIASTOLIC BLOOD PRESSURE: 68 MMHG | SYSTOLIC BLOOD PRESSURE: 118 MMHG

## 2023-08-22 DIAGNOSIS — Z98.890 S/P OVARIAN CYSTECTOMY: Primary | ICD-10-CM

## 2023-08-22 DIAGNOSIS — Z87.42 S/P OVARIAN CYSTECTOMY: Primary | ICD-10-CM

## 2023-08-22 PROCEDURE — 99024 POSTOP FOLLOW-UP VISIT: CPT | Performed by: STUDENT IN AN ORGANIZED HEALTH CARE EDUCATION/TRAINING PROGRAM

## 2023-08-22 NOTE — PROGRESS NOTES
Assessment/Plan:       Problem List Items Addressed This Visit        Other    S/P ovarian cystectomy - Primary         Plan     1. Pathology reviewed  2. Wound care discussed  3. Activity restrictions: none  4. Follow up: routine    Subjective:      Patient ID: Cherrie Yang is a 25 y.o. y.o. female. HPI She presents today for postoperative follow up. She is feeling very well. Denies fevers, chills, nausea, vomiting, diarrhea, constipation. No difficulty with activity. The following portions of the patient's history were reviewed and updated as appropriate: allergies, current medications, past family history, past medical history, past social history, past surgical history and problem list.    Review of Systems  as above    Objective:  Vitals:    08/22/23 1303   BP: 118/68        Physical Exam   Constitutional: She is oriented to person, place, and time. She appears well-developed and well-nourished. HENT:   Head: Normocephalic. Eyes: EOM are normal.   Neck: Normal range of motion. Pulmonary/Chest: Effort normal.   Abdominal: Soft. She exhibits no distension and no mass. There is no tenderness. There is no rebound and no guarding. Incisions well-healed  Musculoskeletal: Normal range of motion. Neurological: She is alert and oriented to person, place, and time. Skin: Skin is warm and dry. Psychiatric: She has a normal mood and affect. Her behavior is normal.   Vitals reviewed.

## 2023-10-14 ENCOUNTER — ANESTHESIA (EMERGENCY)
Dept: PERIOP | Facility: HOSPITAL | Age: 28
End: 2023-10-14
Payer: COMMERCIAL

## 2023-10-14 ENCOUNTER — APPOINTMENT (EMERGENCY)
Dept: CT IMAGING | Facility: HOSPITAL | Age: 28
End: 2023-10-14
Payer: COMMERCIAL

## 2023-10-14 ENCOUNTER — HOSPITAL ENCOUNTER (EMERGENCY)
Facility: HOSPITAL | Age: 28
Discharge: HOME | End: 2023-10-14
Payer: COMMERCIAL

## 2023-10-14 ENCOUNTER — ANESTHESIA EVENT (EMERGENCY)
Dept: PERIOP | Facility: HOSPITAL | Age: 28
End: 2023-10-14
Payer: COMMERCIAL

## 2023-10-14 VITALS
TEMPERATURE: 97.3 F | RESPIRATION RATE: 24 BRPM | SYSTOLIC BLOOD PRESSURE: 114 MMHG | BODY MASS INDEX: 40.83 KG/M2 | DIASTOLIC BLOOD PRESSURE: 60 MMHG | HEIGHT: 67 IN | HEART RATE: 95 BPM | WEIGHT: 260.14 LBS | OXYGEN SATURATION: 95 %

## 2023-10-14 DIAGNOSIS — K37 APPENDICITIS, UNSPECIFIED APPENDICITIS TYPE: Primary | ICD-10-CM

## 2023-10-14 DIAGNOSIS — K37 APPENDICITIS: ICD-10-CM

## 2023-10-14 PROBLEM — K35.80 ACUTE APPENDICITIS: Status: ACTIVE | Noted: 2023-10-14

## 2023-10-14 LAB
ALBUMIN SERPL BCP-MCNC: 4.1 G/DL (ref 3.5–5)
ALP SERPL-CCNC: 81 U/L (ref 34–104)
ALT SERPL W P-5'-P-CCNC: 19 U/L (ref 7–52)
ANION GAP SERPL CALCULATED.3IONS-SCNC: 8 MMOL/L
AST SERPL W P-5'-P-CCNC: 12 U/L (ref 13–39)
ATRIAL RATE: 93 BPM
BASOPHILS # BLD AUTO: 0.05 THOUSANDS/ÂΜL (ref 0–0.1)
BASOPHILS NFR BLD AUTO: 0 % (ref 0–1)
BILIRUB SERPL-MCNC: 0.43 MG/DL (ref 0.2–1)
BILIRUB UR QL STRIP: NEGATIVE
BUN SERPL-MCNC: 11 MG/DL (ref 5–25)
CALCIUM SERPL-MCNC: 9 MG/DL (ref 8.4–10.2)
CHLORIDE SERPL-SCNC: 107 MMOL/L (ref 96–108)
CLARITY UR: CLEAR
CO2 SERPL-SCNC: 22 MMOL/L (ref 21–32)
COLOR UR: COLORLESS
CREAT SERPL-MCNC: 0.74 MG/DL (ref 0.6–1.3)
EOSINOPHIL # BLD AUTO: 0.28 THOUSAND/ÂΜL (ref 0–0.61)
EOSINOPHIL NFR BLD AUTO: 2 % (ref 0–6)
ERYTHROCYTE [DISTWIDTH] IN BLOOD BY AUTOMATED COUNT: 13.9 % (ref 11.6–15.1)
GFR SERPL CREATININE-BSD FRML MDRD: 109 ML/MIN/1.73SQ M
GLUCOSE SERPL-MCNC: 140 MG/DL (ref 65–140)
GLUCOSE UR STRIP-MCNC: NEGATIVE MG/DL
HCG SERPL QL: NEGATIVE
HCT VFR BLD AUTO: 40.4 % (ref 34.8–46.1)
HGB BLD-MCNC: 13.6 G/DL (ref 11.5–15.4)
HGB UR QL STRIP.AUTO: NEGATIVE
IMM GRANULOCYTES # BLD AUTO: 0.04 THOUSAND/UL (ref 0–0.2)
IMM GRANULOCYTES NFR BLD AUTO: 0 % (ref 0–2)
KETONES UR STRIP-MCNC: NEGATIVE MG/DL
LEUKOCYTE ESTERASE UR QL STRIP: NEGATIVE
LIPASE SERPL-CCNC: 25 U/L (ref 11–82)
LYMPHOCYTES # BLD AUTO: 4.71 THOUSANDS/ÂΜL (ref 0.6–4.47)
LYMPHOCYTES NFR BLD AUTO: 32 % (ref 14–44)
MCH RBC QN AUTO: 26.4 PG (ref 26.8–34.3)
MCHC RBC AUTO-ENTMCNC: 33.7 G/DL (ref 31.4–37.4)
MCV RBC AUTO: 78 FL (ref 82–98)
MONOCYTES # BLD AUTO: 0.82 THOUSAND/ÂΜL (ref 0.17–1.22)
MONOCYTES NFR BLD AUTO: 6 % (ref 4–12)
NEUTROPHILS # BLD AUTO: 8.75 THOUSANDS/ÂΜL (ref 1.85–7.62)
NEUTS SEG NFR BLD AUTO: 60 % (ref 43–75)
NITRITE UR QL STRIP: NEGATIVE
NRBC BLD AUTO-RTO: 0 /100 WBCS
P AXIS: 59 DEGREES
PH UR STRIP.AUTO: 6 [PH]
PLATELET # BLD AUTO: 346 THOUSANDS/UL (ref 149–390)
PMV BLD AUTO: 9.8 FL (ref 8.9–12.7)
POTASSIUM SERPL-SCNC: 4.1 MMOL/L (ref 3.5–5.3)
PR INTERVAL: 158 MS
PROT SERPL-MCNC: 7.5 G/DL (ref 6.4–8.4)
PROT UR STRIP-MCNC: NEGATIVE MG/DL
QRS AXIS: 15 DEGREES
QRSD INTERVAL: 84 MS
QT INTERVAL: 348 MS
QTC INTERVAL: 432 MS
RBC # BLD AUTO: 5.16 MILLION/UL (ref 3.81–5.12)
SODIUM SERPL-SCNC: 137 MMOL/L (ref 135–147)
SP GR UR STRIP.AUTO: >=1.05 (ref 1–1.03)
T WAVE AXIS: 28 DEGREES
UROBILINOGEN UR STRIP-ACNC: <2 MG/DL
VENTRICULAR RATE: 93 BPM
WBC # BLD AUTO: 14.65 THOUSAND/UL (ref 4.31–10.16)

## 2023-10-14 PROCEDURE — 81003 URINALYSIS AUTO W/O SCOPE: CPT | Performed by: NURSE PRACTITIONER

## 2023-10-14 PROCEDURE — 96375 TX/PRO/DX INJ NEW DRUG ADDON: CPT

## 2023-10-14 PROCEDURE — 83690 ASSAY OF LIPASE: CPT | Performed by: NURSE PRACTITIONER

## 2023-10-14 PROCEDURE — 85025 COMPLETE CBC W/AUTO DIFF WBC: CPT | Performed by: NURSE PRACTITIONER

## 2023-10-14 PROCEDURE — 93010 ELECTROCARDIOGRAM REPORT: CPT | Performed by: INTERNAL MEDICINE

## 2023-10-14 PROCEDURE — 96366 THER/PROPH/DIAG IV INF ADDON: CPT

## 2023-10-14 PROCEDURE — 80053 COMPREHEN METABOLIC PANEL: CPT | Performed by: NURSE PRACTITIONER

## 2023-10-14 PROCEDURE — 36415 COLL VENOUS BLD VENIPUNCTURE: CPT | Performed by: NURSE PRACTITIONER

## 2023-10-14 PROCEDURE — 99285 EMERGENCY DEPT VISIT HI MDM: CPT

## 2023-10-14 PROCEDURE — 74177 CT ABD & PELVIS W/CONTRAST: CPT

## 2023-10-14 PROCEDURE — 99284 EMERGENCY DEPT VISIT MOD MDM: CPT | Performed by: STUDENT IN AN ORGANIZED HEALTH CARE EDUCATION/TRAINING PROGRAM

## 2023-10-14 PROCEDURE — 96372 THER/PROPH/DIAG INJ SC/IM: CPT

## 2023-10-14 PROCEDURE — 93005 ELECTROCARDIOGRAM TRACING: CPT

## 2023-10-14 PROCEDURE — 96365 THER/PROPH/DIAG IV INF INIT: CPT

## 2023-10-14 PROCEDURE — 71260 CT THORAX DX C+: CPT

## 2023-10-14 PROCEDURE — 88304 TISSUE EXAM BY PATHOLOGIST: CPT | Performed by: STUDENT IN AN ORGANIZED HEALTH CARE EDUCATION/TRAINING PROGRAM

## 2023-10-14 PROCEDURE — 84703 CHORIONIC GONADOTROPIN ASSAY: CPT | Performed by: NURSE PRACTITIONER

## 2023-10-14 RX ORDER — LIDOCAINE HYDROCHLORIDE 10 MG/ML
INJECTION, SOLUTION EPIDURAL; INFILTRATION; INTRACAUDAL; PERINEURAL AS NEEDED
Status: DISCONTINUED | OUTPATIENT
Start: 2023-10-14 | End: 2023-10-14

## 2023-10-14 RX ORDER — TRAMADOL HYDROCHLORIDE 50 MG/1
50 TABLET ORAL EVERY 6 HOURS PRN
Status: DISCONTINUED | OUTPATIENT
Start: 2023-10-14 | End: 2023-10-14 | Stop reason: HOSPADM

## 2023-10-14 RX ORDER — DOCUSATE SODIUM 100 MG/1
100 CAPSULE, LIQUID FILLED ORAL 2 TIMES DAILY
Qty: 28 CAPSULE | Refills: 0 | Status: SHIPPED | OUTPATIENT
Start: 2023-10-14 | End: 2023-10-28

## 2023-10-14 RX ORDER — ONDANSETRON 2 MG/ML
INJECTION INTRAMUSCULAR; INTRAVENOUS AS NEEDED
Status: DISCONTINUED | OUTPATIENT
Start: 2023-10-14 | End: 2023-10-14

## 2023-10-14 RX ORDER — MIDAZOLAM HYDROCHLORIDE 2 MG/2ML
INJECTION, SOLUTION INTRAMUSCULAR; INTRAVENOUS AS NEEDED
Status: DISCONTINUED | OUTPATIENT
Start: 2023-10-14 | End: 2023-10-14

## 2023-10-14 RX ORDER — DEXAMETHASONE SODIUM PHOSPHATE 10 MG/ML
INJECTION, SOLUTION INTRAMUSCULAR; INTRAVENOUS AS NEEDED
Status: DISCONTINUED | OUTPATIENT
Start: 2023-10-14 | End: 2023-10-14

## 2023-10-14 RX ORDER — ONDANSETRON 2 MG/ML
4 INJECTION INTRAMUSCULAR; INTRAVENOUS EVERY 6 HOURS PRN
Status: DISCONTINUED | OUTPATIENT
Start: 2023-10-14 | End: 2023-10-14 | Stop reason: HOSPADM

## 2023-10-14 RX ORDER — CEFAZOLIN SODIUM 2 G/50ML
SOLUTION INTRAVENOUS AS NEEDED
Status: DISCONTINUED | OUTPATIENT
Start: 2023-10-14 | End: 2023-10-14

## 2023-10-14 RX ORDER — FENTANYL CITRATE/PF 50 MCG/ML
50 SYRINGE (ML) INJECTION
Status: DISCONTINUED | OUTPATIENT
Start: 2023-10-14 | End: 2023-10-14 | Stop reason: HOSPADM

## 2023-10-14 RX ORDER — HEPARIN SODIUM 5000 [USP'U]/ML
5000 INJECTION, SOLUTION INTRAVENOUS; SUBCUTANEOUS EVERY 8 HOURS SCHEDULED
Status: DISCONTINUED | OUTPATIENT
Start: 2023-10-14 | End: 2023-10-14 | Stop reason: HOSPADM

## 2023-10-14 RX ORDER — HYDROCODONE BITARTRATE AND ACETAMINOPHEN 5; 325 MG/1; MG/1
1 TABLET ORAL EVERY 6 HOURS PRN
Qty: 10 TABLET | Refills: 0 | Status: SHIPPED | OUTPATIENT
Start: 2023-10-14 | End: 2023-10-24

## 2023-10-14 RX ORDER — KETOROLAC TROMETHAMINE 30 MG/ML
INJECTION, SOLUTION INTRAMUSCULAR; INTRAVENOUS AS NEEDED
Status: DISCONTINUED | OUTPATIENT
Start: 2023-10-14 | End: 2023-10-14

## 2023-10-14 RX ORDER — HYDROMORPHONE HCL/PF 1 MG/ML
SYRINGE (ML) INJECTION AS NEEDED
Status: DISCONTINUED | OUTPATIENT
Start: 2023-10-14 | End: 2023-10-14

## 2023-10-14 RX ORDER — ROCURONIUM BROMIDE 10 MG/ML
INJECTION, SOLUTION INTRAVENOUS AS NEEDED
Status: DISCONTINUED | OUTPATIENT
Start: 2023-10-14 | End: 2023-10-14

## 2023-10-14 RX ORDER — MORPHINE SULFATE 4 MG/ML
4 INJECTION, SOLUTION INTRAMUSCULAR; INTRAVENOUS ONCE
Status: COMPLETED | OUTPATIENT
Start: 2023-10-14 | End: 2023-10-14

## 2023-10-14 RX ORDER — SODIUM CHLORIDE, SODIUM GLUCONATE, SODIUM ACETATE, POTASSIUM CHLORIDE, MAGNESIUM CHLORIDE, SODIUM PHOSPHATE, DIBASIC, AND POTASSIUM PHOSPHATE .53; .5; .37; .037; .03; .012; .00082 G/100ML; G/100ML; G/100ML; G/100ML; G/100ML; G/100ML; G/100ML
1000 INJECTION, SOLUTION INTRAVENOUS ONCE
Status: COMPLETED | OUTPATIENT
Start: 2023-10-14 | End: 2023-10-14

## 2023-10-14 RX ORDER — IPRATROPIUM BROMIDE AND ALBUTEROL SULFATE 2.5; .5 MG/3ML; MG/3ML
3 SOLUTION RESPIRATORY (INHALATION)
Status: DISCONTINUED | OUTPATIENT
Start: 2023-10-14 | End: 2023-10-14 | Stop reason: HOSPADM

## 2023-10-14 RX ORDER — MAGNESIUM HYDROXIDE 1200 MG/15ML
LIQUID ORAL AS NEEDED
Status: DISCONTINUED | OUTPATIENT
Start: 2023-10-14 | End: 2023-10-14 | Stop reason: HOSPADM

## 2023-10-14 RX ORDER — ALBUTEROL SULFATE 90 UG/1
AEROSOL, METERED RESPIRATORY (INHALATION) AS NEEDED
Status: DISCONTINUED | OUTPATIENT
Start: 2023-10-14 | End: 2023-10-14

## 2023-10-14 RX ORDER — OXYCODONE HYDROCHLORIDE 5 MG/1
5 TABLET ORAL EVERY 6 HOURS PRN
Status: DISCONTINUED | OUTPATIENT
Start: 2023-10-14 | End: 2023-10-14 | Stop reason: HOSPADM

## 2023-10-14 RX ORDER — BUPIVACAINE HYDROCHLORIDE 2.5 MG/ML
INJECTION, SOLUTION EPIDURAL; INFILTRATION; INTRACAUDAL AS NEEDED
Status: DISCONTINUED | OUTPATIENT
Start: 2023-10-14 | End: 2023-10-14 | Stop reason: HOSPADM

## 2023-10-14 RX ORDER — SCOLOPAMINE TRANSDERMAL SYSTEM 1 MG/1
PATCH, EXTENDED RELEASE TRANSDERMAL AS NEEDED
Status: DISCONTINUED | OUTPATIENT
Start: 2023-10-14 | End: 2023-10-14

## 2023-10-14 RX ORDER — ONDANSETRON 2 MG/ML
4 INJECTION INTRAMUSCULAR; INTRAVENOUS ONCE AS NEEDED
Status: DISCONTINUED | OUTPATIENT
Start: 2023-10-14 | End: 2023-10-14 | Stop reason: HOSPADM

## 2023-10-14 RX ORDER — PROPOFOL 10 MG/ML
INJECTION, EMULSION INTRAVENOUS AS NEEDED
Status: DISCONTINUED | OUTPATIENT
Start: 2023-10-14 | End: 2023-10-14

## 2023-10-14 RX ORDER — SODIUM CHLORIDE, SODIUM LACTATE, POTASSIUM CHLORIDE, CALCIUM CHLORIDE 600; 310; 30; 20 MG/100ML; MG/100ML; MG/100ML; MG/100ML
INJECTION, SOLUTION INTRAVENOUS CONTINUOUS PRN
Status: DISCONTINUED | OUTPATIENT
Start: 2023-10-14 | End: 2023-10-14

## 2023-10-14 RX ORDER — SCOLOPAMINE TRANSDERMAL SYSTEM 1 MG/1
PATCH, EXTENDED RELEASE TRANSDERMAL
Status: COMPLETED
Start: 2023-10-14 | End: 2023-10-14

## 2023-10-14 RX ORDER — ONDANSETRON 2 MG/ML
4 INJECTION INTRAMUSCULAR; INTRAVENOUS ONCE
Status: COMPLETED | OUTPATIENT
Start: 2023-10-14 | End: 2023-10-14

## 2023-10-14 RX ORDER — METRONIDAZOLE 500 MG/100ML
INJECTION, SOLUTION INTRAVENOUS CONTINUOUS PRN
Status: DISCONTINUED | OUTPATIENT
Start: 2023-10-14 | End: 2023-10-14

## 2023-10-14 RX ORDER — FENTANYL CITRATE 50 UG/ML
INJECTION, SOLUTION INTRAMUSCULAR; INTRAVENOUS AS NEEDED
Status: DISCONTINUED | OUTPATIENT
Start: 2023-10-14 | End: 2023-10-14

## 2023-10-14 RX ORDER — HYDROMORPHONE HCL/PF 1 MG/ML
0.5 SYRINGE (ML) INJECTION
Status: DISCONTINUED | OUTPATIENT
Start: 2023-10-14 | End: 2023-10-14 | Stop reason: HOSPADM

## 2023-10-14 RX ORDER — KETOROLAC TROMETHAMINE 30 MG/ML
15 INJECTION, SOLUTION INTRAMUSCULAR; INTRAVENOUS ONCE
Status: COMPLETED | OUTPATIENT
Start: 2023-10-14 | End: 2023-10-14

## 2023-10-14 RX ORDER — ACETAMINOPHEN 325 MG/1
650 TABLET ORAL EVERY 6 HOURS PRN
Status: DISCONTINUED | OUTPATIENT
Start: 2023-10-14 | End: 2023-10-14 | Stop reason: HOSPADM

## 2023-10-14 RX ADMIN — ALBUTEROL SULFATE 4 PUFF: 90 AEROSOL, METERED RESPIRATORY (INHALATION) at 12:34

## 2023-10-14 RX ADMIN — ALBUTEROL SULFATE 8 PUFF: 90 AEROSOL, METERED RESPIRATORY (INHALATION) at 11:36

## 2023-10-14 RX ADMIN — ONDANSETRON 4 MG: 2 INJECTION INTRAMUSCULAR; INTRAVENOUS at 12:09

## 2023-10-14 RX ADMIN — PROPOFOL 50 MG: 10 INJECTION, EMULSION INTRAVENOUS at 12:27

## 2023-10-14 RX ADMIN — METRONIDAZOLE: 500 INJECTION, SOLUTION INTRAVENOUS at 11:42

## 2023-10-14 RX ADMIN — DEXAMETHASONE SODIUM PHOSPHATE 10 MG: 10 INJECTION, SOLUTION INTRAMUSCULAR; INTRAVENOUS at 11:37

## 2023-10-14 RX ADMIN — KETOROLAC TROMETHAMINE 30 MG: 30 INJECTION INTRAMUSCULAR; INTRAVENOUS at 12:23

## 2023-10-14 RX ADMIN — ACETAMINOPHEN 650 MG: 325 TABLET, FILM COATED ORAL at 13:53

## 2023-10-14 RX ADMIN — ROCURONIUM BROMIDE 50 MG: 10 INJECTION, SOLUTION INTRAVENOUS at 11:26

## 2023-10-14 RX ADMIN — SODIUM CHLORIDE, SODIUM LACTATE, POTASSIUM CHLORIDE, AND CALCIUM CHLORIDE: .6; .31; .03; .02 INJECTION, SOLUTION INTRAVENOUS at 12:23

## 2023-10-14 RX ADMIN — PROPOFOL 200 MG: 10 INJECTION, EMULSION INTRAVENOUS at 11:26

## 2023-10-14 RX ADMIN — ALBUTEROL SULFATE 4 PUFF: 90 AEROSOL, METERED RESPIRATORY (INHALATION) at 12:27

## 2023-10-14 RX ADMIN — FENTANYL CITRATE 100 MCG: 50 INJECTION, SOLUTION INTRAMUSCULAR; INTRAVENOUS at 11:37

## 2023-10-14 RX ADMIN — HEPARIN SODIUM 5000 UNITS: 5000 INJECTION INTRAVENOUS; SUBCUTANEOUS at 10:30

## 2023-10-14 RX ADMIN — KETOROLAC TROMETHAMINE 15 MG: 30 INJECTION, SOLUTION INTRAMUSCULAR; INTRAVENOUS at 07:37

## 2023-10-14 RX ADMIN — HYDROMORPHONE HYDROCHLORIDE 0.25 MG: 1 INJECTION, SOLUTION INTRAMUSCULAR; INTRAVENOUS; SUBCUTANEOUS at 12:07

## 2023-10-14 RX ADMIN — LIDOCAINE HYDROCHLORIDE 50 MG: 10 INJECTION, SOLUTION EPIDURAL; INFILTRATION; INTRACAUDAL; PERINEURAL at 11:26

## 2023-10-14 RX ADMIN — DEXMEDETOMIDINE HYDROCHLORIDE 4 MCG: 100 INJECTION, SOLUTION INTRAVENOUS at 11:31

## 2023-10-14 RX ADMIN — SCOPALAMINE 1 PATCH: 1 PATCH, EXTENDED RELEASE TRANSDERMAL at 11:15

## 2023-10-14 RX ADMIN — SODIUM CHLORIDE, SODIUM LACTATE, POTASSIUM CHLORIDE, AND CALCIUM CHLORIDE: .6; .31; .03; .02 INJECTION, SOLUTION INTRAVENOUS at 11:13

## 2023-10-14 RX ADMIN — MORPHINE SULFATE 4 MG: 4 INJECTION INTRAVENOUS at 07:37

## 2023-10-14 RX ADMIN — IOHEXOL 100 ML: 350 INJECTION, SOLUTION INTRAVENOUS at 08:31

## 2023-10-14 RX ADMIN — CEFAZOLIN SODIUM 2000 MG: 2 SOLUTION INTRAVENOUS at 11:24

## 2023-10-14 RX ADMIN — DEXMEDETOMIDINE HYDROCHLORIDE 4 MCG: 100 INJECTION, SOLUTION INTRAVENOUS at 12:00

## 2023-10-14 RX ADMIN — FENTANYL CITRATE 50 MCG: 50 INJECTION INTRAMUSCULAR; INTRAVENOUS at 13:05

## 2023-10-14 RX ADMIN — MIDAZOLAM HYDROCHLORIDE 2 MG: 1 INJECTION, SOLUTION INTRAMUSCULAR; INTRAVENOUS at 11:24

## 2023-10-14 RX ADMIN — SODIUM CHLORIDE, SODIUM GLUCONATE, SODIUM ACETATE, POTASSIUM CHLORIDE, MAGNESIUM CHLORIDE, SODIUM PHOSPHATE, DIBASIC, AND POTASSIUM PHOSPHATE 1000 ML: .53; .5; .37; .037; .03; .012; .00082 INJECTION, SOLUTION INTRAVENOUS at 07:37

## 2023-10-14 RX ADMIN — ONDANSETRON 4 MG: 2 INJECTION INTRAMUSCULAR; INTRAVENOUS at 07:37

## 2023-10-14 RX ADMIN — IPRATROPIUM BROMIDE AND ALBUTEROL SULFATE 3 ML: 2.5; .5 SOLUTION RESPIRATORY (INHALATION) at 12:49

## 2023-10-14 RX ADMIN — SUGAMMADEX 200 MG: 100 INJECTION, SOLUTION INTRAVENOUS at 12:23

## 2023-10-14 RX ADMIN — DEXMEDETOMIDINE HYDROCHLORIDE 4 MCG: 100 INJECTION, SOLUTION INTRAVENOUS at 11:46

## 2023-10-14 NOTE — ANESTHESIA PREPROCEDURE EVALUATION
Procedure:  APPENDECTOMY LAPAROSCOPIC, Possible Open (Abdomen)    Relevant Problems   CARDIO   (+) Migraine      /RENAL   (+) EVELYN (acute kidney injury) (HCC)      MUSCULOSKELETAL   (+) Chronic low back pain      NEURO/PSYCH   (+) Chronic low back pain   (+) Major depressive disorder, recurrent episode with anxious distress (HCC)   (+) Migraine   (+) Nightmares   (+) Seizure (HCC)   BMI 40.74  Well controlled epilepsy- on depakote ER. No GTC in >1 year, absence seizure ~ 1 mo prior   Physical Exam    Airway    Mallampati score: II  TM Distance: >3 FB  Neck ROM: full     Dental   No notable dental hx     Cardiovascular  Rhythm: regular, Cardiovascular exam normal    Pulmonary  Pulmonary exam normal     Other Findings        Anesthesia Plan  ASA Score- 3     Anesthesia Type- general with ASA Monitors. Additional Monitors:     Airway Plan: ETT. Plan Factors-Exercise tolerance (METS): >4 METS. Chart reviewed. EKG reviewed. Imaging results reviewed. Existing labs reviewed. Patient summary reviewed. Patient is not a current smoker. Induction- intravenous. Postoperative Plan-     Informed Consent- Anesthetic plan and risks discussed with patient. I personally reviewed this patient with the CRNA. Discussed and agreed on the Anesthesia Plan with the CRNA. Shai Craig

## 2023-10-14 NOTE — DISCHARGE INSTR - AVS FIRST PAGE
Follow up: Following discharge from the hospital call the office in 1-2 days to set up a post operative appointment to be seen in 2 weeks. [phone]  Call the office if you have increased pain not relieved with pain medicine. Call the office if you have a fever,redness, the wound opens up, you have pus draining from your incision. AFTER YOU LEAVE: Following discharge from the hospital, you may have some questions about your procedure, your activities or your general condition. These instructions may answer some of your questions and help you adjust during the first few days following your operation. You can expect to be sore and tender mostly around the incisions. This pain should last approximally 5 days and gradually improve daily. Incisions: You may apply ice to the incisions to help with pain. It is normal to have some bruising, swelling or mild discoloration around the incision. If increasing redness or pain develops, call our office immediately. Do not apply any creams, lotions, or ointments. If you have dressings: You may remove the gauze dressing from your incisions 48 hours after surgery. Underneath this dressing is a tape like dressing called Steri Strips. Leave the steri strips in place for 5-7 days. They may fall off on their own. This is okay. Bathing: You may shower daily with soap and water the day after the procedure. It is OK to GENTLY wash the incision with soap and water then pat dry. DO NOT SCRUB. Do NOT soak incision in a tub, pool, or hot tub for 2 weeks. Diet:   Resume your normal diet unless specified otherwise. We recommend you slowly advance your diet. Try to start with softer bland foods and gradually advance as tolerated. Be sure to consume plenty of water. Avoid alcohol. Activity/Restrictions: The evening following the procedure you should rest as much as possible, sitting, lying or reclining.   you should be sure someone remains with you until the next morning. Gradually increase your activity daily. Walking 3-4 times daily is good and stairs are ok. Listen to your body. If you start to get tired or sore then rest.   No strenuous activity or exercise for 3-4 weeks. No heavy lifting, pushing or pulling. No driving for 5 days or while taking narcotics for pain. Return or work: You may return to work or other activities as soon as your pain is controlled and you feel comfortable. For many people, this is 5 to 7 days after surgery. If your job requires heavy lifting you will need to be on light duty for 2-3 weeks. Medication:   If you were given a prescription for Percocet, Norco, or Vicodin for pain be sure to eat prior to taking as these medications as they may cause nausea and vomiting on an empty stomach. If you do not want to take stronger medications for pain, you may take Tylenol, Aleve OR Ibuprofen  DO NOT take Tylenol  (acetaminophen) with these medication for a fever or for further pain control as these medications already contain Tylenol in them. Take one or the other. Do not exceed more than 4000 mg of acetaminophen in 24 hours or 3000 mg if you have liver disease. If you were given an antibiotic take it until it is finished. Constipation:   Patients often experience constipation after surgery. You may take a stool softener (Colace) to help prevent constipation. If you experience significant nausea or vomiting after abdominal surgery, call the office before trying any stronger medications or laxatives  Call the office if you haven't had a Bowel movement in 2-3days after surgery    Contact your healthcare provider if:   You have a fever over 101°F (38°C) or chills. You have pain or nausea that is not relieved by medicine. You have redness and swelling around your incisions, or blood or pus is leaking from your incisions. You are constipated or have diarrhea.     Your skin or eyes are yellow, or your bowel movements are pale. You have questions or concerns about your surgery, condition, or care. Seek care immediately or call 911 if:   You cannot stop vomiting. Your bowel movements are black or bloody. You have pain in your abdomen and it is swollen or hard. Your arm or leg feels warm, tender, and painful. It may look swollen and red. You feel lightheaded, short of breath, and have chest pain. You cough up blood.

## 2023-10-14 NOTE — H&P
H&P Exam - General Surgery   Tony Mccray [address] 25 y.o. female MRN: [id number]  Unit/Bed#: ED 12 Encounter: [id number]    Assessment/Plan     Abby Fisher is a 25 y.o. female with acute appendicitis. AVSS, WBC 14.6, remainder of labs are within normal limits. CTAP: early acute uncomplicated appendicitis     Plan:  OR today for laparoscopic appendectomy  N.p.o.  IVF  IV antibiotics on call to OR  Monitor abd exam, labs, and vitals  PRN pain medication and anti-emetics  Encourage ambulation TID  DVT ppx: heparin  Continue home medications as prescribed   General surgery is primary    History of Present Illness     HPI:  Abby Fisher is a 25 y.o. female with a past medical history of morbid obesity, depression, bipolar disorder seizures on depakote, reports last grand mal about 1 years ago, last absence seizure last week who presents with abdominal pain and vomiting. She reports generalized abdominal pain started yesterday and one episode of vomiting. Pain increased early this AM in the RLQ. Decreased appetite. No other complaints at this time. No history of similar symptoms. The patient denies CP, SOB, palpitations, headache, fever, chills, unintentional weight loss, night sweats, constipation, diarrhea. Abdominal surgery including  x 2, laparoscopic ovarian cyst removal    Review of Systems   Constitutional:  Negative for activity change, appetite change and fever. HENT:  Negative for congestion, hearing loss and sore throat. Eyes:  Negative for discharge and visual disturbance. Respiratory:  Negative for cough, chest tightness and shortness of breath. Cardiovascular:  Negative for chest pain and palpitations. Gastrointestinal:  Positive for abdominal pain, nausea and vomiting. Negative for abdominal distention, constipation and diarrhea. Endocrine: Negative for polyuria. Genitourinary:  Negative for difficulty urinating, dysuria and urgency.    Musculoskeletal: Negative for arthralgias, myalgias and neck stiffness. Skin:  Negative for color change and wound. Neurological:  Negative for dizziness, seizures, syncope and headaches. Psychiatric/Behavioral:  Negative for behavioral problems. Historical Information   Past Medical History:   Diagnosis Date    Depression     Migraine     Morbid obesity with BMI of 40.0-44.9, adult ([address])     Obesity (BMI 30-39. 9)     Seizure ([address])     Seizures ([address])      Past Surgical History:   Procedure Laterality Date     SECTION       SECTION      FRACTURE SURGERY Left     left clavical    TX LAPS FULG/EXC OVARY VISCERA/PERITONEAL SURFACE Right 2023    Procedure: LAPAROSCOPIC ADNEXAL CYSTECTOMY POSSIBLE OVARIAN CYSTECTOMY;  Surgeon: Oscar Dover MD;  Location: [facility];  Service: Gynecology    SHOULDER SURGERY      TUBAL LIGATION      TUMOR REMOVAL      from ear     Social History   Social History     Substance and Sexual Activity   Alcohol Use Not Currently    Comment: socially     Social History     Substance and Sexual Activity   Drug Use Never     Social History     Tobacco Use   Smoking Status Never    Passive exposure: Never   Smokeless Tobacco Never     Family History: non-contributory    Meds/Allergies   all medications and allergies reviewed  Allergies   Allergen Reactions    Erythromycin Swelling    Peanut Oil - Food Allergy Hives and Anaphylaxis     Does not have Epi-pen prescribed     Azithromycin Other (See Comments)     . Other reaction(s): Other (See Comments)  .        Objective   First Vitals:   Blood Pressure: 138/89 (10/14/23 0724)  Pulse: 98 (10/14/23 0724)  Temperature: 97.5 °F (36.4 °C) (10/14/23 0724)  Temp Source: Oral (10/14/23 0724)  Respirations: 19 (10/14/23 0724)  Height: 5' 7" (170.2 cm) (10/14/23 0724)  Weight - Scale: 113 kg (249 lb) (10/14/23 0724)  SpO2: 98 % (10/14/23 0724)    Current Vitals:   Blood Pressure: 116/57 (10/14/23 0900)  Pulse: 74 (10/14/23 0900)  Temperature: 97.5 °F (36.4 °C) (10/14/23 0724)  Temp Source: Oral (10/14/23 0724)  Respirations: 18 (10/14/23 0900)  Height: 5' 7" (170.2 cm) (10/14/23 0724)  Weight - Scale: 118 kg (260 lb 2.3 oz) (10/14/23 0725)  SpO2: 97 % (10/14/23 0900)    No intake or output data in the 24 hours ending 10/14/23 0930    Invasive Devices       Peripheral Intravenous Line  Duration             Peripheral IV 10/14/23 Left Antecubital <1 day                    Physical Exam  Vitals reviewed. Constitutional:       General: She is not in acute distress. Appearance: Normal appearance. She is obese. She is not ill-appearing or toxic-appearing. HENT:      Head: Normocephalic and atraumatic. Right Ear: External ear normal.      Left Ear: External ear normal.      Nose: Nose normal.      Mouth/Throat:      Mouth: Mucous membranes are moist.   Eyes:      General: No scleral icterus. Right eye: No discharge. Left eye: No discharge. Conjunctiva/sclera: Conjunctivae normal.   Cardiovascular:      Rate and Rhythm: Normal rate and regular rhythm. Pulmonary:      Effort: Pulmonary effort is normal. No respiratory distress. Abdominal:      General: There is no distension. Palpations: Abdomen is soft. Tenderness: There is abdominal tenderness. Musculoskeletal:         General: Normal range of motion. Cervical back: Normal range of motion. Skin:     General: Skin is warm. Coloration: Skin is not jaundiced. Neurological:      General: No focal deficit present. Mental Status: She is alert and oriented to person, place, and time. Psychiatric:         Mood and Affect: Mood normal.         Behavior: Behavior normal.         Thought Content: Thought content normal.         Judgment: Judgment normal.         Lab Results: I have personally reviewed pertinent lab results.     Recent Results (from the past 36 hour(s))   ECG 12 lead    Collection Time: 10/14/23  7:27 AM   Result Value Ref Range    Ventricular Rate 93 BPM    Atrial Rate 93 BPM    TN Interval 158 ms    QRSD Interval 84 ms    QT Interval 348 ms    QTC Interval 432 ms    P Topping 59 degrees    QRS Axis 15 degrees    T Wave Axis 28 degrees   CBC and differential    Collection Time: 10/14/23  7:35 AM   Result Value Ref Range    WBC 14.65 (H) 4.31 - 10.16 Thousand/uL    RBC 5.16 (H) 3.81 - 5.12 Million/uL    Hemoglobin 13.6 11.5 - 15.4 g/dL    Hematocrit 40.4 34.8 - 46.1 %    MCV 78 (L) 82 - 98 fL    MCH 26.4 (L) 26.8 - 34.3 pg    MCHC 33.7 31.4 - 37.4 g/dL    RDW 13.9 11.6 - 15.1 %    MPV 9.8 8.9 - 12.7 fL    Platelets 877 680 - 515 Thousands/uL    nRBC 0 /100 WBCs    Neutrophils Relative 60 43 - 75 %    Immat GRANS % 0 0 - 2 %    Lymphocytes Relative 32 14 - 44 %    Monocytes Relative 6 4 - 12 %    Eosinophils Relative 2 0 - 6 %    Basophils Relative 0 0 - 1 %    Neutrophils Absolute 8.75 (H) 1.85 - 7.62 Thousands/µL    Immature Grans Absolute 0.04 0.00 - 0.20 Thousand/uL    Lymphocytes Absolute 4.71 (H) 0.60 - 4.47 Thousands/µL    Monocytes Absolute 0.82 0.17 - 1.22 Thousand/µL    Eosinophils Absolute 0.28 0.00 - 0.61 Thousand/µL    Basophils Absolute 0.05 0.00 - 0.10 Thousands/µL   Comprehensive metabolic panel    Collection Time: 10/14/23  7:35 AM   Result Value Ref Range    Sodium 137 135 - 147 mmol/L    Potassium 4.1 3.5 - 5.3 mmol/L    Chloride 107 96 - 108 mmol/L    CO2 22 21 - 32 mmol/L    ANION GAP 8 mmol/L    BUN 11 5 - 25 mg/dL    Creatinine 0.74 0.60 - 1.30 mg/dL    Glucose 140 65 - 140 mg/dL    Calcium 9.0 8.4 - 10.2 mg/dL    AST 12 (L) 13 - 39 U/L    ALT 19 7 - 52 U/L    Alkaline Phosphatase 81 34 - 104 U/L    Total Protein 7.5 6.4 - 8.4 g/dL    Albumin 4.1 3.5 - 5.0 g/dL    Total Bilirubin 0.43 0.20 - 1.00 mg/dL    eGFR 109 ml/min/1.73sq m   Lipase    Collection Time: 10/14/23  7:35 AM   Result Value Ref Range    Lipase 25 11 - 82 u/L   hCG, qualitative pregnancy    Collection Time: 10/14/23  7:35 AM   Result Value Ref Range    Preg, Serum Negative Negative     Imaging: I have personally reviewed pertinent reports. CT chest abdomen pelvis w contrast    Result Date: 10/14/2023  Impression: Imaging findings concerning for early acute uncomplicated appendicitis. Clinical correlation is recommended Hepatomegaly with patchy hepatic steatosis. Apparent 2.8 cm right inferior hepatic lobe heterogenous low-attenuation lesion, incompletely characterized on this single phase CT examination. Differential diagnoses include artifact, focal fatty infiltration  or other focal benign/malignant hepatic lesion. Correlation with LFT and nonemergent MRI abdomen with and without contrast is recommended. A 1.1 cm right thyroid low-attenuation nodule. Nonemergent thyroid ultrasound is recommended for further assessment. The study was marked in [facility] for immediate notification. Workstation performed: [id number]        EKG, Pathology, and Other Studies: I have personally reviewed pertinent reports.

## 2023-10-14 NOTE — OP NOTE
OPERATIVE REPORT  PATIENT NAME: [address]    :  1995  MRN: [id number]  Pt Location: [facility] OR ROOM 04    SURGERY DATE: 10/14/2023    Surgeon(s) and Role:     * Tristian Evans DO - Primary     * Tg Moraes PA-C    Preop Diagnosis:  Appendicitis, unspecified appendicitis type [K37]    Post-Op Diagnosis Codes:     * Appendicitis, unspecified appendicitis type [K37]    Procedure(s):  APPENDECTOMY LAPAROSCOPIC    Specimen(s):  ID Type Source Tests Collected by Time Destination   1 : appendix Tissue Appendix TISSUE EXAM Tristian Evans DO 10/14/2023 1206        Estimated Blood Loss:   Minimal    Drains:  * No LDAs found *    Anesthesia Type:   General    Operative Indications:  Appendicitis, unspecified appendicitis type [K37]    Operative Findings:  Appendix was acutely inflamed without signs of perforation or necrosis    Complications:   None    Procedure and Technique:  The patient was seen again in Preoperative Holding. All the risks, benefits, complications, treatment options, and expected outcomes were discussed with the patient and family at length. All questions were answered to satisfaction. There was concurrence with the proposed plan and informed consent was obtained. The site of surgery was properly noted/marked. The patient was taken to Operating Room, identified, and the procedure verified as laparoscopic appendectomy, possible open. The patient was placed in the supine position on the operating room table. The patient had received preoperative antibiotics and SCDs placed on the bilateral lower extremities. Anesthesia was then induced and the patient was intubated. A Rivera catheter was not placed as the patient voided prior to coming to the operating room. The patient's left arm was tucked. The abdomen was then prepped and draped in the usual sterile fashion using ChloraPrep.   A Time-Out was then performed with all involved present confirming the correct patient, procedure, antibiotics, and any additional concerns. A 1.5 centimeter supraumbilical incision was made in a transverse fashion using a #15 blade and the umbilical stalk was grasped and elevated. Using blunt dissection the fascia was exposed and was incised. Using a large blunt Elaine clamp the peritoneum was entered under direct visualization. A 12 mm port was then placed in the fascial opening and pneumoperitoneum was established. Initial pressure upon establishing pneumoperitoneum was noted to be low. An additional 5 mm port was then placed suprapubically in the midline under direct visualization. An additional 5 mm port was then placed in the left lower quadrant in the midclavicular line under direct visualization. The patient was then placed in Trendelenburg and left lateral decubitus position. The small intestines were retracted in the cephalad and left lateral direction away from the pelvis and right lower quadrant. The appendix was noted to be acutely inflamed without signs of perforation or necrosis. The appendix was then carefully dissected and freed from any surrounding structures. A window was made in the mesoappendix at the base of the appendix. The appendix was then divided at the base using an Endo-BAN stapler with a blue load. A vascular load Endo-BAN was used to transect the mesentery. There was no evidence of bleeding or leakage after division of the appendix and mesoappendix. A small amount of irrigation was instilled and suctioned out using the suction . The appendix was placed in the Endo-Catch bag and removed via the umbilical port. The abdomen was desufflated and the supraumbilical fascial incision was closed with 0 Vicryl in an interrupted fashion using 5 stitches. The abdomen was then reinsufflated and the fascial incision was inspected and noted to be hemostatic without any insufflation leakage.   The remaining 5 mm ports were removed and the abdomen was desufflated. Local anesthesia infiltrated in the port sites using 0.25% Marcaine. The port sites were then closed using 4-0 Monocryl. The abdomen was then cleansed and dried and Steri-Strips, 2 x 2, Tegaderm were used to cover the sites. All instrument, sponge, and needle counts were noted to be correct at the conclusion of the case. The patient was brought to the PACU in stable and satisfactory condition.     Patient Disposition:  extubated and stable    This procedure was not performed to treat colon cancer through resection      SIGNATURE: Ronnell Chávez DO  DATE: October 14, 2023  TIME: 12:24 PM

## 2023-10-14 NOTE — ED PROVIDER NOTES
History  Chief Complaint   Patient presents with    Abdominal Pain     C/o generalized abd pain and dizziness since 5 am this morning. 47-year-old female presenting here with a chief complaint of generalized abdominal pain since 2 AM this morning. She denies any suspicious food intake. She has associated nausea. Pain is constant. She denies any fever. She denies diarrhea. She denies any difficulty urinating. Her physical exam is concerning for  peritoneal inflamation      Abdominal Pain  Associated symptoms: no chest pain, no cough, no diarrhea, no dysuria, no fatigue, no fever, no shortness of breath, no sore throat and no vomiting        Prior to Admission Medications   Prescriptions Last Dose Informant Patient Reported? Taking?   divalproex sodium (DEPAKOTE ER) 500 mg 24 hr tablet   No No   Sig: Take 5 tablets (2,500 mg total) by mouth daily   methocarbamol (ROBAXIN) 750 mg tablet  Self No No   Sig: Take 1 tablet (750 mg total) by mouth 4 (four) times a day as needed for muscle spasms for up to 10 days   Patient not taking: Reported on 2023   rizatriptan (Maxalt-MLT) 5 mg disintegrating tablet   No No   Sig: Take 1 tablet (5 mg total) by mouth as needed for migraine Take at the onset of migraine; if symptoms continue or return, may take another dose at least 2 hours after first dose. Take no more than 2 doses in a day, no more than 3 doses per weeks. Facility-Administered Medications: None       Past Medical History:   Diagnosis Date    Depression     Migraine     Morbid obesity with BMI of 40.0-44.9, adult (HCC)     Obesity (BMI 30-39. 9)     Seizure ([address])     Seizures ([address])        Past Surgical History:   Procedure Laterality Date     SECTION       SECTION      FRACTURE SURGERY Left     left clavical    CO LAPS FULG/EXC OVARY VISCERA/PERITONEAL SURFACE Right 2023    Procedure: LAPAROSCOPIC ADNEXAL CYSTECTOMY POSSIBLE OVARIAN CYSTECTOMY;  Surgeon: Dipak Rodriguez MD; Location: [facility];  Service: Gynecology    SHOULDER SURGERY      TUBAL LIGATION      TUMOR REMOVAL      from ear       Family History   Problem Relation Age of Onset    No Known Problems Mother     No Known Problems Father     No Known Problems Sister     No Known Problems Brother     No Known Problems Maternal Aunt     No Known Problems Maternal Uncle     No Known Problems Paternal Aunt     No Known Problems Paternal Uncle     No Known Problems Maternal Grandmother     No Known Problems Maternal Grandfather     No Known Problems Paternal Grandmother     No Known Problems Paternal Grandfather     No Known Problems Cousin     ADD / ADHD Neg Hx     Alcohol abuse Neg Hx     Anxiety disorder Neg Hx     Bipolar disorder Neg Hx     Completed Suicide  Neg Hx     Dementia Neg Hx     Depression Neg Hx     Drug abuse Neg Hx     OCD Neg Hx     Psychiatric Illness Neg Hx     Psychosis Neg Hx     Schizoaffective Disorder  Neg Hx     Schizophrenia Neg Hx     Self-Injury Neg Hx     Suicide Attempts Neg Hx     Breast cancer Neg Hx     Colon cancer Neg Hx     Ovarian cancer Neg Hx     Uterine cancer Neg Hx     Cervical cancer Neg Hx      I have reviewed and agree with the history as documented. E-Cigarette/Vaping    E-Cigarette Use Never User      E-Cigarette/Vaping Substances    Nicotine No     THC No     CBD No     Flavoring No      Social History     Tobacco Use    Smoking status: Never     Passive exposure: Never    Smokeless tobacco: Never   Vaping Use    Vaping Use: Never used   Substance Use Topics    Alcohol use: Not Currently     Comment: socially    Drug use: Never       Review of Systems   Constitutional:  Negative for diaphoresis, fatigue and fever. HENT:  Negative for congestion, ear pain, nosebleeds and sore throat. Eyes:  Negative for photophobia, pain, discharge and visual disturbance. Respiratory:  Negative for cough, choking, chest tightness, shortness of breath and wheezing.     Cardiovascular: Negative for chest pain and palpitations. Gastrointestinal:  Positive for abdominal pain. Negative for abdominal distention, diarrhea and vomiting. Genitourinary:  Negative for dysuria, flank pain and frequency. Musculoskeletal:  Negative for back pain, gait problem and joint swelling. Skin:  Negative for color change and rash. Neurological:  Negative for dizziness, syncope and headaches. Psychiatric/Behavioral:  Negative for behavioral problems and confusion. The patient is not nervous/anxious. All other systems reviewed and are negative. Physical Exam  Physical Exam  Vitals and nursing note reviewed. Constitutional:       General: She is not in acute distress. Appearance: She is well-developed. She is not ill-appearing or toxic-appearing. HENT:      Head: Normocephalic and atraumatic. Mouth/Throat:      Dentition: Normal dentition. Eyes:      General:         Right eye: No discharge. Left eye: No discharge. Cardiovascular:      Rate and Rhythm: Normal rate and regular rhythm. Pulmonary:      Effort: Pulmonary effort is normal. No accessory muscle usage or respiratory distress. Abdominal:      General: There is no distension. Tenderness: There is abdominal tenderness. There is rebound. There is no guarding. Positive signs include Rovsing's sign and McBurney's sign. Musculoskeletal:         General: Normal range of motion. Cervical back: Normal range of motion and neck supple. Skin:     General: Skin is warm and dry. Neurological:      Mental Status: She is alert and oriented to person, place, and time. Coordination: Coordination normal.   Psychiatric:         Behavior: Behavior is cooperative.          Vital Signs  ED Triage Vitals   Temperature Pulse Respirations Blood Pressure SpO2   10/14/23 0724 10/14/23 0724 10/14/23 0724 10/14/23 0724 10/14/23 0724   97.5 °F (36.4 °C) 98 19 138/89 98 %      Temp Source Heart Rate Source Patient Position - Orthostatic VS BP Location FiO2 (%)   10/14/23 0724 10/14/23 0724 10/14/23 0724 10/14/23 0724 --   Oral Monitor Sitting Left arm       Pain Score       10/14/23 0737       7           Vitals:    10/14/23 0724 10/14/23 0845 10/14/23 0900 10/14/23 1000   BP: 138/89 124/57 116/57 116/74   Pulse: 98 85 74 79   Patient Position - Orthostatic VS: Sitting Lying Lying Lying         Visual Acuity      ED Medications  Medications   ondansetron (ZOFRAN) injection 4 mg ( Intravenous MAR Hold 10/14/23 1125)   heparin (porcine) subcutaneous injection 5,000 Units ( Subcutaneous Dose Auto Held 10/17/23 2200)   acetaminophen (TYLENOL) tablet 650 mg ( Oral MAR Hold 10/14/23 1125)   traMADol (ULTRAM) tablet 50 mg ( Oral MAR Hold 10/14/23 1125)   oxyCODONE (ROXICODONE) IR tablet 5 mg ( Oral MAR Hold 10/14/23 1125)   morphine injection 2 mg ( Intravenous MAR Hold 10/14/23 1125)   multi-electrolyte (ISOLYTE-S PH 7.4) bolus 1,000 mL (1,000 mL Intravenous New Bag 10/14/23 0737)   ketorolac (TORADOL) injection 15 mg (15 mg Intravenous Given 10/14/23 0737)   ondansetron (ZOFRAN) injection 4 mg (4 mg Intravenous Given 10/14/23 0737)   morphine injection 4 mg (4 mg Intravenous Given 10/14/23 0737)   iohexol (OMNIPAQUE) 350 MG/ML injection (MULTI-DOSE) 100 mL (100 mL Intravenous Given 10/14/23 0831)   scopolamine (TRANSDERM-SCOP) 1 mg/3 days TD 72 hr patch **ADS Override Pull** (  Override pull for Anesthesia 10/14/23 1153)       Diagnostic Studies  Results Reviewed       Procedure Component Value Units Date/Time    UA w Reflex to Microscopic w Reflex to Culture [[id number]]  (Abnormal) Collected: 10/14/23 1031    Lab Status: Final result Specimen: Urine, Clean Catch Updated: 10/14/23 1043     Color, UA Colorless     Clarity, UA Clear     Specific Gravity, UA >=1.050     pH, UA 6.0     Leukocytes, UA Negative     Nitrite, UA Negative     Protein, UA Negative mg/dl      Glucose, UA Negative mg/dl      Ketones, UA Negative mg/dl      Urobilinogen, UA <2.0 mg/dl      Bilirubin, UA Negative     Occult Blood, UA Negative    hCG, qualitative pregnancy [[id number]]  (Normal) Collected: 10/14/23 0735    Lab Status: Final result Specimen: Blood from Arm, Left Updated: 10/14/23 0817     Preg, Serum Negative    Comprehensive metabolic panel [[id number]]  (Abnormal) Collected: 10/14/23 0735    Lab Status: Final result Specimen: Blood from Arm, Left Updated: 10/14/23 0809     Sodium 137 mmol/L      Potassium 4.1 mmol/L      Chloride 107 mmol/L      CO2 22 mmol/L      ANION GAP 8 mmol/L      BUN 11 mg/dL      Creatinine 0.74 mg/dL      Glucose 140 mg/dL      Calcium 9.0 mg/dL      AST 12 U/L      ALT 19 U/L      Alkaline Phosphatase 81 U/L      Total Protein 7.5 g/dL      Albumin 4.1 g/dL      Total Bilirubin 0.43 mg/dL      eGFR 109 ml/min/1.73sq m     Narrative:      [place] guidelines for Chronic Kidney Disease (CKD):     Stage 1 with normal or high GFR (GFR > 90 mL/min/1.73 square meters)    Stage 2 Mild CKD (GFR = 60-89 mL/min/1.73 square meters)    Stage 3A Moderate CKD (GFR = 45-59 mL/min/1.73 square meters)    Stage 3B Moderate CKD (GFR = 30-44 mL/min/1.73 square meters)    Stage 4 Severe CKD (GFR = 15-29 mL/min/1.73 square meters)    Stage 5 End Stage CKD (GFR <15 mL/min/1.73 square meters)  Note: GFR calculation is accurate only with a steady state creatinine    Lipase [[id number]]  (Normal) Collected: 10/14/23 0735    Lab Status: Final result Specimen: Blood from Arm, Left Updated: 10/14/23 0809     Lipase 25 u/L     CBC and differential [[id number]]  (Abnormal) Collected: 10/14/23 0735    Lab Status: Final result Specimen: Blood from Arm, Left Updated: 10/14/23 0751     WBC 14.65 Thousand/uL      RBC 5.16 Million/uL      Hemoglobin 13.6 g/dL      Hematocrit 40.4 %      MCV 78 fL      MCH 26.4 pg      MCHC 33.7 g/dL      RDW 13.9 %      MPV 9.8 fL      Platelets 963 Thousands/uL      nRBC 0 /100 WBCs      Neutrophils Relative 60 % Immat GRANS % 0 %      Lymphocytes Relative 32 %      Monocytes Relative 6 %      Eosinophils Relative 2 %      Basophils Relative 0 %      Neutrophils Absolute 8.75 Thousands/µL      Immature Grans Absolute 0.04 Thousand/uL      Lymphocytes Absolute 4.71 Thousands/µL      Monocytes Absolute 0.82 Thousand/µL      Eosinophils Absolute 0.28 Thousand/µL      Basophils Absolute 0.05 Thousands/µL                    CT chest abdomen pelvis w contrast   Final Result by Jake Sifuentes MD (06/25 4297)      Imaging findings concerning for early acute uncomplicated appendicitis. Clinical correlation is recommended      Hepatomegaly with patchy hepatic steatosis. Apparent 2.8 cm right inferior hepatic lobe heterogenous low-attenuation lesion, incompletely characterized on this single phase CT examination. Differential diagnoses include artifact, focal fatty infiltration    or other focal benign/malignant hepatic lesion. Correlation with LFT and nonemergent MRI abdomen with and without contrast is recommended. A 1.1 cm right thyroid low-attenuation nodule. Nonemergent thyroid ultrasound is recommended for further assessment. The study was marked in [facility] for immediate notification. Workstation performed: [id number]                    Procedures  Procedures         ED Course                               SBIRT 20yo+      Flowsheet Row Most Recent Value   Initial Alcohol Screen: US AUDIT-C     1. How often do you have a drink containing alcohol? 0 Filed at: 10/14/2023 0724   2. How many drinks containing alcohol do you have on a typical day you are drinking? 0 Filed at: 10/14/2023 0724   3a. Male UNDER 65: How often do you have five or more drinks on one occasion? 0 Filed at: 10/14/2023 0724   3b. FEMALE Any Age, or MALE 65+: How often do you have 4 or more drinks on one occassion? 0 Filed at: 10/14/2023 0724   Audit-C Score 0 Filed at: 10/14/2023 0162   KYREE: How many times in the past year have you. .. Used an illegal drug or used a prescription medication for non-medical reasons? Never Filed at: 10/14/2023 7762                      Medical Decision Making  Acute appendicitis requiring OR intervention. Case communicated with general surgery who will take the patient to the operating room    Amount and/or Complexity of Data Reviewed  Labs: ordered. Radiology: ordered. Risk  Prescription drug management. Decision regarding hospitalization. Disposition  Final diagnoses:   Appendicitis     Time reflects when diagnosis was documented in both MDM as applicable and the Disposition within this note       Time User Action Codes Description Comment    10/14/2023  9:31 AM Deon Bran Alisialivier Jiménez Appendicitis, unspecified appendicitis type     10/14/2023  9:33 AM Olayinka Jaquez Add [K37] Appendicitis           ED Disposition       ED Disposition   Admit    Condition   Stable    Date/Time   Sat Oct 14, 2023 7386    Comment   Case was discussed with Dagoberto and the patient's admission status was agreed to be Admission Status: observation status to the service of Dr. Neeru Broussard .                Follow-up Information       Follow up With Specialties Details Why Contact Info    Florentino Crisostomo,  General Surgery, Osteopathic Medicine Follow up in 2 week(s)  [address]  [phone]              Current Discharge Medication List        CONTINUE these medications which have NOT CHANGED    Details   divalproex sodium (DEPAKOTE ER) 500 mg 24 hr tablet Take 5 tablets (2,500 mg total) by mouth daily  Qty: 450 tablet, Refills: 3    Associated Diagnoses: Seizure (HCC)      methocarbamol (ROBAXIN) 750 mg tablet Take 1 tablet (750 mg total) by mouth 4 (four) times a day as needed for muscle spasms for up to 10 days  Qty: 20 tablet, Refills: 0    Associated Diagnoses: Back pain      rizatriptan (Maxalt-MLT) 5 mg disintegrating tablet Take 1 tablet (5 mg total) by mouth as needed for migraine Take at the onset of migraine; if symptoms continue or return, may take another dose at least 2 hours after first dose. Take no more than 2 doses in a day, no more than 3 doses per weeks. Qty: 9 tablet, Refills: 2    Associated Diagnoses: Other migraine without status migrainosus, not intractable             No discharge procedures on file.     PDMP Review         Value Time User    PDMP Reviewed  Yes 11/23/2021 10:52  [facility]            ED Provider  Electronically Signed by             JESSICA Salguero Caro  10/14/23 3571

## 2023-10-14 NOTE — ANESTHESIA POSTPROCEDURE EVALUATION
Post-Op Assessment Note    CV Status:  Stable  Pain Score: 0    Pain management: adequate     Mental Status:  Awake and sleepy   Hydration Status:  Euvolemic   PONV Controlled:  Controlled   Airway Patency:  Patent and adequate   Two or more mitigation strategies used for obstructive sleep apnea   Post Op Vitals Reviewed: Yes      Staff: CRNA         No notable events documented.     BP   107/55   Temp   97.8   Pulse  100   Resp   20   SpO2   96

## 2023-10-17 PROCEDURE — 88304 TISSUE EXAM BY PATHOLOGIST: CPT | Performed by: STUDENT IN AN ORGANIZED HEALTH CARE EDUCATION/TRAINING PROGRAM

## 2024-01-29 ENCOUNTER — HOSPITAL ENCOUNTER (EMERGENCY)
Facility: HOSPITAL | Age: 29
Discharge: HOME | End: 2024-01-29
Attending: EMERGENCY MEDICINE
Payer: COMMERCIAL

## 2024-01-29 ENCOUNTER — APPOINTMENT (EMERGENCY)
Dept: RADIOLOGY | Facility: HOSPITAL | Age: 29
End: 2024-01-29
Payer: COMMERCIAL

## 2024-01-29 VITALS
DIASTOLIC BLOOD PRESSURE: 80 MMHG | BODY MASS INDEX: 39.08 KG/M2 | WEIGHT: 249 LBS | HEART RATE: 92 BPM | SYSTOLIC BLOOD PRESSURE: 154 MMHG | TEMPERATURE: 98 F | OXYGEN SATURATION: 99 % | HEIGHT: 67 IN | RESPIRATION RATE: 18 BRPM

## 2024-01-29 DIAGNOSIS — S61.219A FINGER LACERATION: Primary | ICD-10-CM

## 2024-01-29 PROCEDURE — 73140 X-RAY EXAM OF FINGER(S): CPT

## 2024-01-29 PROCEDURE — 96365 THER/PROPH/DIAG IV INF INIT: CPT

## 2024-01-29 PROCEDURE — 99284 EMERGENCY DEPT VISIT MOD MDM: CPT | Performed by: EMERGENCY MEDICINE

## 2024-01-29 PROCEDURE — 99283 EMERGENCY DEPT VISIT LOW MDM: CPT

## 2024-01-29 RX ORDER — CEPHALEXIN 500 MG/1
500 CAPSULE ORAL EVERY 6 HOURS SCHEDULED
Qty: 28 CAPSULE | Refills: 0 | Status: SHIPPED | OUTPATIENT
Start: 2024-01-29 | End: 2024-02-05

## 2024-01-29 RX ORDER — CEFAZOLIN SODIUM 2 G/50ML
2000 SOLUTION INTRAVENOUS ONCE
Status: COMPLETED | OUTPATIENT
Start: 2024-01-29 | End: 2024-01-29

## 2024-01-29 RX ORDER — LIDOCAINE HYDROCHLORIDE 20 MG/ML
10 INJECTION, SOLUTION EPIDURAL; INFILTRATION; INTRACAUDAL; PERINEURAL ONCE
Status: COMPLETED | OUTPATIENT
Start: 2024-01-29 | End: 2024-01-29

## 2024-01-29 RX ADMIN — LIDOCAINE HYDROCHLORIDE 10 ML: 20 INJECTION, SOLUTION EPIDURAL; INFILTRATION; INTRACAUDAL; PERINEURAL at 18:45

## 2024-01-29 RX ADMIN — CEFAZOLIN SODIUM 2000 MG: 2 SOLUTION INTRAVENOUS at 19:19

## 2024-01-29 NOTE — ED PROVIDER NOTES
History  Chief Complaint   Patient presents with    Finger Laceration     R pinky lac from glass      Patient is a 28-year-old female past medical history of seizure disorder presenting with right fifth digit laceration.  Patient states yesterday evening around 6 PM she lacerated her right fifth digit on broken glass.  Notes some numbness and tingling to the finger as well as severe pain worse with movement.  Has not take any pain medication today.  States her last tetanus shot was in .  Denies any fevers.  Unsure purulent drainage.        Prior to Admission Medications   Prescriptions Last Dose Informant Patient Reported? Taking?   divalproex sodium (DEPAKOTE ER) 500 mg 24 hr tablet   No No   Sig: Take 5 tablets (2,500 mg total) by mouth daily   docusate sodium (COLACE) 100 mg capsule   No No   Sig: Take 1 capsule (100 mg total) by mouth 2 (two) times a day for 14 days   rizatriptan (Maxalt-MLT) 5 mg disintegrating tablet   No No   Sig: Take 1 tablet (5 mg total) by mouth as needed for migraine Take at the onset of migraine; if symptoms continue or return, may take another dose at least 2 hours after first dose. Take no more than 2 doses in a day, no more than 3 doses per weeks.      Facility-Administered Medications: None       Past Medical History:   Diagnosis Date    Depression     Migraine     Morbid obesity with BMI of 40.0-44.9, adult (HCC)     Obesity (BMI 30-39.9)     Seizure (HCC)     Seizures (HCC)        Past Surgical History:   Procedure Laterality Date    APPENDECTOMY LAPAROSCOPIC N/A 10/14/2023    Procedure: APPENDECTOMY LAPAROSCOPIC;  Surgeon: Brandon Arenas DO;  Location: [facility];  Service: General     SECTION       SECTION      FRACTURE SURGERY Left     left clavical    CO LAPS FULG/EXC OVARY VISCERA/PERITONEAL SURFACE Right 2023    Procedure: LAPAROSCOPIC ADNEXAL CYSTECTOMY POSSIBLE OVARIAN CYSTECTOMY;  Surgeon: Valentina Birmingham MD;  Location: [facility];   Service: Gynecology    SHOULDER SURGERY      TUBAL LIGATION      TUMOR REMOVAL      from ear       Family History   Problem Relation Age of Onset    No Known Problems Mother     No Known Problems Father     No Known Problems Sister     No Known Problems Brother     No Known Problems Maternal Aunt     No Known Problems Maternal Uncle     No Known Problems Paternal Aunt     No Known Problems Paternal Uncle     No Known Problems Maternal Grandmother     No Known Problems Maternal Grandfather     No Known Problems Paternal Grandmother     No Known Problems Paternal Grandfather     No Known Problems Cousin     ADD / ADHD Neg Hx     Alcohol abuse Neg Hx     Anxiety disorder Neg Hx     Bipolar disorder Neg Hx     Completed Suicide  Neg Hx     Dementia Neg Hx     Depression Neg Hx     Drug abuse Neg Hx     OCD Neg Hx     Psychiatric Illness Neg Hx     Psychosis Neg Hx     Schizoaffective Disorder  Neg Hx     Schizophrenia Neg Hx     Self-Injury Neg Hx     Suicide Attempts Neg Hx     Breast cancer Neg Hx     Colon cancer Neg Hx     Ovarian cancer Neg Hx     Uterine cancer Neg Hx     Cervical cancer Neg Hx      I have reviewed and agree with the history as documented.    E-Cigarette/Vaping    E-Cigarette Use Never User      E-Cigarette/Vaping Substances    Nicotine No     THC No     CBD No     Flavoring No      Social History     Tobacco Use    Smoking status: Never     Passive exposure: Never    Smokeless tobacco: Never   Vaping Use    Vaping status: Never Used   Substance Use Topics    Alcohol use: Not Currently     Comment: socially    Drug use: Never       Review of Systems   All other systems reviewed and are negative.      Physical Exam  Physical Exam  Vitals reviewed.   Constitutional:       General: She is not in acute distress.     Appearance: Normal appearance. She is not ill-appearing.   HENT:      Mouth/Throat:      Mouth: Mucous membranes are moist.   Eyes:      Conjunctiva/sclera: Conjunctivae normal.    Cardiovascular:      Rate and Rhythm: Normal rate and regular rhythm.   Pulmonary:      Effort: Pulmonary effort is normal.   Musculoskeletal:         General: Tenderness present. No swelling.      Cervical back: Neck supple.      Comments: Patient has roughly 4 cm laceration causing flap to the right fifth digit with decreased flexion at the DIP, intact sensation and capillary refill, some granulation tissue in place is flap cannot be easily  from finger   Skin:     General: Skin is warm and dry.   Neurological:      General: No focal deficit present.      Mental Status: She is alert.   Psychiatric:         Mood and Affect: Mood normal.         Vital Signs  ED Triage Vitals [01/29/24 1611]   Temperature Pulse Respirations Blood Pressure SpO2   98 °F (36.7 °C) 92 18 154/80 99 %      Temp Source Heart Rate Source Patient Position - Orthostatic VS BP Location FiO2 (%)   Oral Monitor Sitting Left arm --      Pain Score       --           Vitals:    01/29/24 1611   BP: 154/80   Pulse: 92   Patient Position - Orthostatic VS: Sitting         Visual Acuity      ED Medications  Medications   lidocaine (PF) (XYLOCAINE-MPF) 2 % injection 10 mL (10 mL Infiltration Given 1/29/24 1845)   ceFAZolin (ANCEF) IVPB (premix in dextrose) 2,000 mg 50 mL (0 mg Intravenous Stopped 1/29/24 1949)       Diagnostic Studies  Results Reviewed       None                   XR finger fifth digit-pinkie RIGHT   ED Interpretation by Lis Jimenes DO (01/29 1800)   Nondisplaced fracture of the right fifth middle phalanx      Final Result by Guy Nazario MD (01/30 1433)      Small fragment of glass adjacent to the head of the fifth middle phalanx.      I personally discussed this study with LIS JIMENES on 1/30/2024 1:52 PM.               Workstation performed: [id number]                    Procedures  Procedures         ED Course  ED Course as of 01/30/24 7846   Mon Jan 29, 2024   1914 7 stitches                                  SBIRT 20yo+      Flowsheet Row Most Recent Value   Initial Alcohol Screen: US AUDIT-C     1. How often do you have a drink containing alcohol? 0 Filed at: 01/29/2024 1612   2. How many drinks containing alcohol do you have on a typical day you are drinking?  0 Filed at: 01/29/2024 1612   3a. Male UNDER 65: How often do you have five or more drinks on one occasion? 0 Filed at: 01/29/2024 1612   3b. FEMALE Any Age, or MALE 65+: How often do you have 4 or more drinks on one occassion? 0 Filed at: 01/29/2024 1612   Audit-C Score 0 Filed at: 01/29/2024 1612   KYREE: How many times in the past year have you...    Used an illegal drug or used a prescription medication for non-medical reasons? Never Filed at: 01/29/2024 1612                      Medical Decision Making  Patient is a 28-year-old female no past medical history presenting with right fifth digit laceration.  Patient is well-appearing at bedside with stable vitals and in no acute distress.  She has laceration which is already in initial stages of healing and flap is difficult to separate from index finger.  Has decreased flexion at DIP and does appear to have possible fracture to the distal portion of the proximal phalanx.  Will place in splint, discussed with hand surgery, give antibiotics for possible open fracture, clean and repair.    Amount and/or Complexity of Data Reviewed  Radiology: ordered and independent interpretation performed.    Risk  Prescription drug management.             Disposition  Final diagnoses:   Finger laceration     Time reflects when diagnosis was documented in both MDM as applicable and the Disposition within this note       Time User Action Codes Description Comment    1/29/2024  7:14 PM Lis Jimenes Add [S61.219A] Finger laceration           ED Disposition       ED Disposition   Discharge    Condition   Stable    Date/Time   Mon Jan 29, 2024 1914    Comment   Qing ROGERS [facility] discharge to  home/self care.                   Follow-up Information       Follow up With Specialties Details Why Contact Info Additional Information    Umberto Castorena MD Orthopedic Surgery, Hand Surgery Schedule an appointment as soon as possible for a visit   [address]  [address]  [facility] [facility] [address] [phone]       [facility] Emergency Department Emergency Medicine  If symptoms worsen [address] [facility] [id number] [phone] [facility] Emergency Department, [address] [place], [place], [address]            Discharge Medication List as of 1/29/2024  7:15 PM        START taking these medications    Details   cephalexin (KEFLEX) 500 mg capsule Take 1 capsule (500 mg total) by mouth every 6 (six) hours for 7 days, Starting Mon 1/29/2024, Until Mon 2/5/2024, Normal           CONTINUE these medications which have NOT CHANGED    Details   divalproex sodium (DEPAKOTE ER) 500 mg 24 hr tablet Take 5 tablets (2,500 mg total) by mouth daily, Starting Fri 7/21/2023, Normal      docusate sodium (COLACE) 100 mg capsule Take 1 capsule (100 mg total) by mouth 2 (two) times a day for 14 days, Starting Sat 10/14/2023, Until Sat 10/28/2023, Normal      rizatriptan (Maxalt-MLT) 5 mg disintegrating tablet Take 1 tablet (5 mg total) by mouth as needed for migraine Take at the onset of migraine; if symptoms continue or return, may take another dose at least 2 hours after first dose. Take no more than 2 doses in a day, no more than 3 doses per weeks., Starti ng Fri 7/21/2023, Normal             No discharge procedures on file.    PDMP Review         Value Time User    PDMP Reviewed  Yes 11/23/2021 10:52 AM JESSICA Hunter            ED Provider  Electronically Signed by             Lis Jimenes DO  01/30/24 1052

## 2024-01-30 NOTE — RESULT ENCOUNTER NOTE
Have spoken with hand surgery on-call who stated that patient can follow-up as outpatient for glass removal as intended and I have discussed this with the patient who states that she understands.  Patient does not need to return to the emergency department for removal as per hand surgery.

## 2024-02-02 ENCOUNTER — HOSPITAL ENCOUNTER (OUTPATIENT)
Dept: RADIOLOGY | Facility: HOSPITAL | Age: 29
End: 2024-02-02
Attending: STUDENT IN AN ORGANIZED HEALTH CARE EDUCATION/TRAINING PROGRAM
Payer: COMMERCIAL

## 2024-02-02 ENCOUNTER — OFFICE VISIT (OUTPATIENT)
Dept: OBGYN CLINIC | Facility: CLINIC | Age: 29
End: 2024-02-02
Payer: COMMERCIAL

## 2024-02-02 VITALS — WEIGHT: 249 LBS | BODY MASS INDEX: 39.08 KG/M2 | HEIGHT: 67 IN

## 2024-02-02 DIAGNOSIS — S61.216A LACERATION OF RIGHT LITTLE FINGER, FOREIGN BODY PRESENCE UNSPECIFIED, NAIL DAMAGE STATUS UNSPECIFIED, INITIAL ENCOUNTER: ICD-10-CM

## 2024-02-02 DIAGNOSIS — S61.216A LACERATION OF RIGHT LITTLE FINGER, FOREIGN BODY PRESENCE UNSPECIFIED, NAIL DAMAGE STATUS UNSPECIFIED, INITIAL ENCOUNTER: Primary | ICD-10-CM

## 2024-02-02 PROCEDURE — 73140 X-RAY EXAM OF FINGER(S): CPT

## 2024-02-02 PROCEDURE — 99204 OFFICE O/P NEW MOD 45 MIN: CPT | Performed by: STUDENT IN AN ORGANIZED HEALTH CARE EDUCATION/TRAINING PROGRAM

## 2024-02-02 NOTE — PROGRESS NOTES
ORTHOPAEDIC HAND, WRIST, AND ELBOW OFFICE  VISIT      ASSESSMENT/PLAN:      Diagnoses and all orders for this visit:    Laceration of right little finger, foreign body presence unspecified, nail damage status unspecified, initial encounter  -     XR finger right fifth digit-anay; Future  -     US MSK limited; Future          28 y.o. female with a right small finger laceration, DOI 1/28/24  Treatment options and expected outcomes were discussed.  The patient verbalized understanding of exam findings and treatment plan.   The patient was given the opportunity to ask questions.  Questions were answered to the patient's satisfaction.  Discussed that clinical exam regarding difficulty flexing finger and 2 point discrimination not intact to radial and ulnar aspect of digits does not make complete anatomic sense as laceration is over ulnar aspect of digit.  The patient decided to move forward with a STAT ultrasound of the right small finger to evaluate for flexor tendon laceration, digital nerve laceration and a foreign via shared decision making.  Advised to work on finger flexion and continue oral ABX as prescribed    Follow up in 1 weeks time for suture removal and to review US results and repeat clinical examination    Follow Up:  1 week       To Do Next Visit:  Re-evaluation of current issue and Sutures out      Umberto Castorena MD  Attending, Orthopaedic Surgery  Hand, Wrist, and Elbow Surgery  [facility]    ______________________________________________________________________________________________    CHIEF COMPLAINT:  Chief Complaint   Patient presents with   • Right Little Finger - Pain       SUBJECTIVE:  Patient is a 28 y.o. RHD female who presents today for evaluation and treatment of a right small finger injury. She states on 1/28/24 she was washing a cup when it broke in her hand, cutting her small finger. She presented to Urgent care after injury and was told nothing can be done  for her. The following day on 24 she presented to the ED at which time x-rays were performed, her laceration was sutured and she was provided with oral Keflex. She is taking the oral ABX as prescribed. She notes pain to her finger that radiates to her hand. She is not currently taking anything for pain control.       Occupation: [employer]      I have personally reviewed all the relevant PMH, PSH, SH, FH, Medications and allergies      PAST MEDICAL HISTORY:  Past Medical History:   Diagnosis Date   • Depression    • Migraine    • Morbid obesity with BMI of 40.0-44.9, adult (HCC)    • Obesity (BMI 30-39.9)    • Seizure (HCC)    • Seizures (HCC)        PAST SURGICAL HISTORY:  Past Surgical History:   Procedure Laterality Date   • APPENDECTOMY LAPAROSCOPIC N/A 10/14/2023    Procedure: APPENDECTOMY LAPAROSCOPIC;  Surgeon: Brandon Arenas DO;  [facility];  Service: General   •  SECTION     •  SECTION     • FRACTURE SURGERY Left     left clavical   • ND LAPS FULG/EXC OVARY VISCERA/PERITONEAL SURFACE Right 2023    Procedure: LAPAROSCOPIC ADNEXAL CYSTECTOMY POSSIBLE OVARIAN CYSTECTOMY;  Surgeon: Valentina Birmingham MD;  [facility];  Service: Gynecology   • SHOULDER SURGERY     • TUBAL LIGATION     • TUMOR REMOVAL      from ear       FAMILY HISTORY:  Family History   Problem Relation Age of Onset   • No Known Problems Mother    • No Known Problems Father    • No Known Problems Sister    • No Known Problems Brother    • No Known Problems Maternal Aunt    • No Known Problems Maternal Uncle    • No Known Problems Paternal Aunt    • No Known Problems Paternal Uncle    • No Known Problems Maternal Grandmother    • No Known Problems Maternal Grandfather    • No Known Problems Paternal Grandmother    • No Known Problems Paternal Grandfather    • No Known Problems Cousin    • ADD / ADHD Neg Hx    • Alcohol abuse Neg Hx    • Anxiety disorder Neg Hx    • Bipolar  disorder Neg Hx    • Completed Suicide  Neg Hx    • Dementia Neg Hx    • Depression Neg Hx    • Drug abuse Neg Hx    • OCD Neg Hx    • Psychiatric Illness Neg Hx    • Psychosis Neg Hx    • Schizoaffective Disorder  Neg Hx    • Schizophrenia Neg Hx    • Self-Injury Neg Hx    • Suicide Attempts Neg Hx    • Breast cancer Neg Hx    • Colon cancer Neg Hx    • Ovarian cancer Neg Hx    • Uterine cancer Neg Hx    • Cervical cancer Neg Hx        SOCIAL HISTORY:  Social History     Tobacco Use   • Smoking status: Never     Passive exposure: Never   • Smokeless tobacco: Never   Vaping Use   • Vaping status: Never Used   Substance Use Topics   • Alcohol use: Not Currently     Comment: socially   • Drug use: Never       MEDICATIONS:    Current Outpatient Medications:   •  cephalexin (KEFLEX) 500 mg capsule, Take 1 capsule (500 mg total) by mouth every 6 (six) hours for 7 days, Disp: 28 capsule, Rfl: 0  •  divalproex sodium (DEPAKOTE ER) 500 mg 24 hr tablet, Take 5 tablets (2,500 mg total) by mouth daily, Disp: 450 tablet, Rfl: 3  •  rizatriptan (Maxalt-MLT) 5 mg disintegrating tablet, Take 1 tablet (5 mg total) by mouth as needed for migraine Take at the onset of migraine; if symptoms continue or return, may take another dose at least 2 hours after first dose. Take no more than 2 doses in a day, no more than 3 doses per weeks., Disp: 9 tablet, Rfl: 2  •  docusate sodium (COLACE) 100 mg capsule, Take 1 capsule (100 mg total) by mouth 2 (two) times a day for 14 days, Disp: 28 capsule, Rfl: 0    ALLERGIES:  Allergies   Allergen Reactions   • Erythromycin Swelling   • Peanut Oil - Food Allergy Hives and Anaphylaxis     Does not have Epi-pen prescribed    • Azithromycin Other (See Comments)     .  Other reaction(s): Other (See Comments)  .           REVIEW OF SYSTEMS:  Musculoskeletal:        As noted in HPI.   All other systems reviewed and are negative.    VITALS:  There were no vitals filed for this visit.    LABS:  HgA1c:   Lab  Results   Component Value Date    HGBA1C 5.6 11/06/2020     BMP:   Lab Results   Component Value Date    GLUCOSE 96 07/06/2021    CALCIUM 9.0 10/14/2023    K 4.1 10/14/2023    CO2 22 10/14/2023     10/14/2023    BUN 11 10/14/2023    CREATININE 0.74 10/14/2023       _____________________________________________________  PHYSICAL EXAMINATION:  General: Well developed and well nourished, alert & oriented x 3, appears comfortable  Psychiatric: Normal  HEENT: Normocephalic, Atraumatic Trachea Midline, No torticollis  Pulmonary: No audible wheezing or respiratory distress   Abdomen/GI: Non tender, non distended   Cardiovascular: No pitting edema, 2+ radial pulse   Skin: No Masses, No Erythema, No Fluctuation, No Ulcerations  Neurovascular: Sensation Intact to the Median, Ulnar, Radial Nerve, Motor Intact to the Median, Ulnar, Radial Nerve, and Pulses Intact  Musculoskeletal: Normal, except as noted in detailed exam and in HPI.      MUSCULOSKELETAL EXAMINATION:    Right small finger:   No erythema or ecchymosis   3 cm longitudinal laceration ulnar aspect of the finger with sutures intact   Full extension of all digits   Difficulty flexing small finger. No obvious FDP or FDS function  2 point discrimination not intact to 15 mm over radial and ulnar aspect of digit    ___________________________________________________  STUDIES REVIEWED:  Xrays of the right small finger were reviewed and independently interpreted in PACS by Dr. Castorena and demonstrate no acute fracture or dislocation. No evidence of foreign body.          PROCEDURES PERFORMED:  Procedures  No Procedures performed today    _____________________________________________________      Scribe Attestation    I,:  Selina Oates am acting as a scribe while in the presence of the attending physician.:       I,:  Umberto Castorena MD personally performed the services described in this documentation    as scribed in my presence.:

## 2024-02-05 ENCOUNTER — TELEPHONE (OUTPATIENT)
Dept: OBGYN CLINIC | Facility: CLINIC | Age: 29
End: 2024-02-05

## 2024-02-05 ENCOUNTER — HOSPITAL ENCOUNTER (OUTPATIENT)
Dept: RADIOLOGY | Facility: HOSPITAL | Age: 29
Discharge: HOME | End: 2024-02-05
Attending: STUDENT IN AN ORGANIZED HEALTH CARE EDUCATION/TRAINING PROGRAM
Payer: COMMERCIAL

## 2024-02-05 DIAGNOSIS — S61.216A LACERATION OF RIGHT LITTLE FINGER, FOREIGN BODY PRESENCE UNSPECIFIED, NAIL DAMAGE STATUS UNSPECIFIED, INITIAL ENCOUNTER: ICD-10-CM

## 2024-02-05 PROCEDURE — 76882 US LMTD JT/FCL EVL NVASC XTR: CPT

## 2024-02-05 NOTE — TELEPHONE ENCOUNTER
Called and LVM for pt to cb to schedule an appt on Friday 2/9 to review the US done 2/5.     ----- Message from Gaviota Billy sent at 2/2/2024  4:03 PM EST -----  Please call patient to be scheduled next Friday. If there is an open slot please schedule, if not we allow DOUBLE bookings in any slot except 3 before lunch and 3 before the EOD. If you can't force it on please message me or Marcelina WHITE with the date and time and we can put it on the schedule. Luis.    ----- Message -----  From: Sapphire [place]  Sent: 2/2/2024   3:19 PM EST  To: Gaviota Billy    FERNANDO NEEDS A FOLLOW UP APPOINTMENT PER DR. MENDEZ IN 1 WEEK, THERE IS NO OPENINGS AVAILABLE, AND SHE IS SCHEDULED FOR HER US MSK ON 2/5/24 @ 3:30, PLEASE PUT PATIENT ON THE SCHEDULE AND CALL HER WITH DATE AND TIME, THANK YOU

## 2024-02-14 ENCOUNTER — HOSPITAL ENCOUNTER (EMERGENCY)
Facility: HOSPITAL | Age: 29
Discharge: HOME | End: 2024-02-14
Attending: EMERGENCY MEDICINE
Payer: COMMERCIAL

## 2024-02-14 VITALS
SYSTOLIC BLOOD PRESSURE: 160 MMHG | OXYGEN SATURATION: 97 % | DIASTOLIC BLOOD PRESSURE: 74 MMHG | RESPIRATION RATE: 18 BRPM | TEMPERATURE: 98 F | HEART RATE: 99 BPM

## 2024-02-14 DIAGNOSIS — Z48.02 VISIT FOR SUTURE REMOVAL: Primary | ICD-10-CM

## 2024-02-14 PROCEDURE — 99283 EMERGENCY DEPT VISIT LOW MDM: CPT | Performed by: EMERGENCY MEDICINE

## 2024-02-14 RX ORDER — GINSENG 100 MG
1 CAPSULE ORAL ONCE
Status: COMPLETED | OUTPATIENT
Start: 2024-02-14 | End: 2024-02-14

## 2024-02-14 RX ADMIN — BACITRACIN ZINC 1 SMALL APPLICATION: 500 OINTMENT TOPICAL at 13:20

## 2024-02-14 NOTE — ED PROVIDER NOTES
History  Chief Complaint   Patient presents with    Hand Swelling     Right pinkey finger swelling and pain. Sutures placed on      HPI  27 yo F presents for R fifth finger suture removal. She had sutures placed . Followed up once with hand surgery and was told to make repeat appointment for recheck and suture removal one week from , however reports she could not get appointment so came to ED today. Denies any redness or pain. States that finger feels itchy. No purulent drainage or fevers.  Prior to Admission Medications   Prescriptions Last Dose Informant Patient Reported? Taking?   divalproex sodium (DEPAKOTE ER) 500 mg 24 hr tablet   No No   Sig: Take 5 tablets (2,500 mg total) by mouth daily   docusate sodium (COLACE) 100 mg capsule   No No   Sig: Take 1 capsule (100 mg total) by mouth 2 (two) times a day for 14 days   rizatriptan (Maxalt-MLT) 5 mg disintegrating tablet   No No   Sig: Take 1 tablet (5 mg total) by mouth as needed for migraine Take at the onset of migraine; if symptoms continue or return, may take another dose at least 2 hours after first dose. Take no more than 2 doses in a day, no more than 3 doses per weeks.      Facility-Administered Medications: None       Past Medical History:   Diagnosis Date    Depression     Migraine     Morbid obesity with BMI of 40.0-44.9, adult (HCC)     Obesity (BMI 30-39.9)     Seizure (HCC)     Seizures (HCC)        Past Surgical History:   Procedure Laterality Date    APPENDECTOMY LAPAROSCOPIC N/A 10/14/2023    Procedure: APPENDECTOMY LAPAROSCOPIC;  Surgeon: Brandon Arenas DO;  Location: [facility];  Service: General     SECTION       SECTION      FRACTURE SURGERY Left     left clavical    UT LAPS FULG/EXC OVARY VISCERA/PERITONEAL SURFACE Right 2023    Procedure: LAPAROSCOPIC ADNEXAL CYSTECTOMY POSSIBLE OVARIAN CYSTECTOMY;  Surgeon: Valentina Birmingham MD;  Location: [facility];  Service: Gynecology    SHOULDER SURGERY       TUBAL LIGATION      TUMOR REMOVAL      from ear       Family History   Problem Relation Age of Onset    No Known Problems Mother     No Known Problems Father     No Known Problems Sister     No Known Problems Brother     No Known Problems Maternal Aunt     No Known Problems Maternal Uncle     No Known Problems Paternal Aunt     No Known Problems Paternal Uncle     No Known Problems Maternal Grandmother     No Known Problems Maternal Grandfather     No Known Problems Paternal Grandmother     No Known Problems Paternal Grandfather     No Known Problems Cousin     ADD / ADHD Neg Hx     Alcohol abuse Neg Hx     Anxiety disorder Neg Hx     Bipolar disorder Neg Hx     Completed Suicide  Neg Hx     Dementia Neg Hx     Depression Neg Hx     Drug abuse Neg Hx     OCD Neg Hx     Psychiatric Illness Neg Hx     Psychosis Neg Hx     Schizoaffective Disorder  Neg Hx     Schizophrenia Neg Hx     Self-Injury Neg Hx     Suicide Attempts Neg Hx     Breast cancer Neg Hx     Colon cancer Neg Hx     Ovarian cancer Neg Hx     Uterine cancer Neg Hx     Cervical cancer Neg Hx      I have reviewed and agree with the history as documented.    E-Cigarette/Vaping    E-Cigarette Use Never User      E-Cigarette/Vaping Substances    Nicotine No     THC No     CBD No     Flavoring No      Social History     Tobacco Use    Smoking status: Never     Passive exposure: Never    Smokeless tobacco: Never   Vaping Use    Vaping status: Never Used   Substance Use Topics    Alcohol use: Not Currently     Comment: socially    Drug use: Never       Review of Systems   Constitutional:  Negative for chills and fever.   HENT:  Negative for dental problem and ear pain.    Eyes:  Negative for pain and redness.   Respiratory:  Negative for cough and shortness of breath.    Cardiovascular:  Negative for chest pain and palpitations.   Gastrointestinal:  Negative for abdominal pain and nausea.   Endocrine: Negative for polydipsia and polyphagia.   Genitourinary:   Negative for dysuria and frequency.   Musculoskeletal:  Negative for arthralgias and joint swelling.   Skin:  Positive for wound. Negative for color change and rash.   Neurological:  Negative for dizziness and headaches.   Psychiatric/Behavioral:  Negative for behavioral problems and confusion.    All other systems reviewed and are negative.      Physical Exam  Physical Exam  Vitals and nursing note reviewed.   Constitutional:       General: She is not in acute distress.     Appearance: She is well-developed. She is not diaphoretic.   HENT:      Head: Normocephalic and atraumatic.      Right Ear: External ear normal.      Left Ear: External ear normal.   Eyes:      General: No scleral icterus.        Right eye: No discharge.         Left eye: No discharge.      Conjunctiva/sclera: Conjunctivae normal.      Pupils: Pupils are equal, round, and reactive to light.   Neck:      Vascular: No JVD.   Cardiovascular:      Rate and Rhythm: Normal rate.   Pulmonary:      Effort: Pulmonary effort is normal.   Musculoskeletal:         General: No deformity. Normal range of motion.      Cervical back: Normal range of motion and neck supple.      Comments: R fifth digit with sutures in place. No erythema or purulence   Skin:     General: Skin is dry.      Findings: No erythema.   Neurological:      Mental Status: She is alert and oriented to person, place, and time.         Vital Signs  ED Triage Vitals [02/14/24 1158]   Temperature Pulse Respirations Blood Pressure SpO2   98 °F (36.7 °C) 99 18 160/74 97 %      Temp src Heart Rate Source Patient Position - Orthostatic VS BP Location FiO2 (%)   -- Monitor Sitting Left arm --      Pain Score       8           Vitals:    02/14/24 1158   BP: 160/74   Pulse: 99   Patient Position - Orthostatic VS: Sitting         Visual Acuity      ED Medications  Medications   bacitracin topical ointment 1 small application (1 small application Topical Given 2/14/24 1320)       Diagnostic  Studies  Results Reviewed       None                   No orders to display              Procedures  Suture removal    Date/Time: 2/14/2024 1:46 PM    Performed by: Kendal Caruso MD  Authorized by: Kendal Caruso MD  Universal Protocol:  Consent given by: patient  Patient identity confirmed: verbally with patient and arm band      Patient location:  ED  Location:     Laterality:  Right  Procedure details:     Tools used:  Suture removal kit    Number of sutures removed:  7  Post-procedure details:     Post-removal:  Antibiotic ointment applied    Patient tolerance of procedure:  Tolerated well, no immediate complications           ED Course                                             Medical Decision Making  Risk  OTC drugs.           Patient encouraged to follow up with hand surgery as scheduled. No signs of infection. Sutures removed.  Disposition  Final diagnoses:   Visit for suture removal     Time reflects when diagnosis was documented in both MDM as applicable and the Disposition within this note       Time User Action Codes Description Comment    2/14/2024 12:35 PM Kendal Caruso Add [Z48.02] Visit for suture removal           ED Disposition       ED Disposition   Discharge    Condition   Stable    Date/Time   Wed Feb 14, 2024 12:35 PM    Comment   [facility]ing ROG[facility] discharge to home/self care.                   Follow-up Information       Follow up With Specialties Details Why Contact Info    Umberto Castorena MD Orthopedic Surgery, Hand Surgery Call  for hand follow up [address] [address]  [phone]              Discharge Medication List as of 2/14/2024 12:36 PM        CONTINUE these medications which have NOT CHANGED    Details   divalproex sodium (DEPAKOTE ER) 500 mg 24 hr tablet Take 5 tablets (2,500 mg total) by mouth daily, Starting Fri 7/21/2023, Normal      docusate sodium (COLACE) 100 mg capsule Take 1 capsule (100 mg total) by mouth 2 (two) times a day for 14  days, Starting Sat 10/14/2023, Until Sat 10/28/2023, Normal      rizatriptan (Maxalt-MLT) 5 mg disintegrating tablet Take 1 tablet (5 mg total) by mouth as needed for migraine Take at the onset of migraine; if symptoms continue or return, may take another dose at least 2 hours after first dose. Take no more than 2 doses in a day, no more than 3 doses per weeks., Starti ng Fri 7/21/2023, Normal             No discharge procedures on file.    PDMP Review         Value Time User    PDMP Reviewed  Yes 11/23/2021 10:52 AM JESSICA Hunter            ED Provider  Electronically Signed by             Kendal Caruso MD  02/14/24 1356

## 2024-02-14 NOTE — DISCHARGE INSTRUCTIONS
Please follow up with hand surgery for recheck appointment as soon as possible. If any pus drainage, fevers, redness return to ED

## 2024-02-15 ENCOUNTER — TELEPHONE (OUTPATIENT)
Dept: ADMINISTRATIVE | Facility: OTHER | Age: 29
End: 2024-02-15

## 2024-02-15 DIAGNOSIS — Z71.89 COMPLEX CARE COORDINATION: Primary | ICD-10-CM

## 2024-02-15 NOTE — TELEPHONE ENCOUNTER
02/15/24 2:33 PM    Patient contacted post ED visit, VBI phone outreaches documented. Patient called practice and scheduled a follow-up ED visit.     Thank you.  Zeny Sweeney [facility]

## 2024-02-19 ENCOUNTER — PATIENT OUTREACH (OUTPATIENT)
Age: 29
End: 2024-02-19

## 2024-02-19 NOTE — PROGRESS NOTES
Complex Care Management Note:  Inbasket notification for complex outpatient care management received as identified via .  Chart review completed.      ED visits and Hospitalizations:   Patient recently seen in ED at [facility] on 1/29/24 for right fifth finger 4 cm laceration.  Xray also showed non displaced fracture of right 5th middle phalanx and small fragment of glass adjacent to head of finger.  Patient received 7 stitches.  Patient followed with hand ortho but returned to [facility] ED on 2/14/24 for suture removal.     PMH Includes: hx of seizures, depression, anxiety,   Refer to problem list for complete list of medical diagnosis.     Admission or ED risk score is 81.    Future Appointments are:      None     Case does not meet screening criteria for complex care management.  Referral closed and I've removed myself from care team.

## 2024-02-21 PROBLEM — Z01.419 ENCOUNTER FOR ANNUAL ROUTINE GYNECOLOGICAL EXAMINATION: Status: RESOLVED | Noted: 2022-11-03 | Resolved: 2024-02-21

## 2024-03-23 ENCOUNTER — APPOINTMENT (OUTPATIENT)
Dept: RADIOLOGY | Facility: CLINIC | Age: 29
End: 2024-03-23
Payer: COMMERCIAL

## 2024-03-23 ENCOUNTER — OFFICE VISIT (OUTPATIENT)
Dept: URGENT CARE | Facility: CLINIC | Age: 29
End: 2024-03-23
Payer: COMMERCIAL

## 2024-03-23 VITALS
WEIGHT: 265.4 LBS | DIASTOLIC BLOOD PRESSURE: 103 MMHG | OXYGEN SATURATION: 95 % | TEMPERATURE: 99.2 F | SYSTOLIC BLOOD PRESSURE: 138 MMHG | HEART RATE: 97 BPM | RESPIRATION RATE: 16 BRPM | BODY MASS INDEX: 41.57 KG/M2

## 2024-03-23 DIAGNOSIS — R05.2 SUBACUTE COUGH: Primary | ICD-10-CM

## 2024-03-23 DIAGNOSIS — R05.2 SUBACUTE COUGH: ICD-10-CM

## 2024-03-23 DIAGNOSIS — R09.89 CHEST CONGESTION: ICD-10-CM

## 2024-03-23 DIAGNOSIS — R06.02 SOB (SHORTNESS OF BREATH): ICD-10-CM

## 2024-03-23 PROCEDURE — 71046 X-RAY EXAM CHEST 2 VIEWS: CPT

## 2024-03-23 PROCEDURE — 99214 OFFICE O/P EST MOD 30 MIN: CPT | Performed by: PHYSICIAN ASSISTANT

## 2024-03-23 RX ORDER — BENZONATATE 200 MG/1
200 CAPSULE ORAL 3 TIMES DAILY PRN
Qty: 20 CAPSULE | Refills: 0 | Status: SHIPPED | OUTPATIENT
Start: 2024-03-23

## 2024-03-23 RX ORDER — ALBUTEROL SULFATE 90 UG/1
1 AEROSOL, METERED RESPIRATORY (INHALATION) EVERY 6 HOURS PRN
Qty: 6.7 G | Refills: 0 | Status: SHIPPED | OUTPATIENT
Start: 2024-03-23

## 2024-03-23 NOTE — PROGRESS NOTES
[facility]        NAME: Qing Schneider is a 28 y.o. female  : 1995    MRN: [id number]  DATE: 2024  TIME: 1:17 PM    Assessment and Plan   Subacute cough [R05.2]  1. Subacute cough  XR chest pa & lateral    benzonatate (TESSALON) 200 MG capsule    albuterol (Proventil HFA) 90 mcg/act inhaler      2. SOB (shortness of breath)  XR chest pa & lateral    albuterol (Proventil HFA) 90 mcg/act inhaler      3. Chest congestion  XR chest pa & lateral    albuterol (Proventil HFA) 90 mcg/act inhaler            Patient Instructions     The preliminary x-rays of the chest appear normal.  I suspect viral upper respiratory infection  Supportive measures discussed  Tessalon Perles as directed  Albuterol MDI 1 puff every 6 hours as needed    Follow-up with your primary care doctor in 3 to 5 days    Go immediately to emergency department if your symptoms worsen    Chief Complaint     Chief Complaint   Patient presents with    Cough     Cough with trouble breathing she states right side is tight. Sharp Pain behind left ear. All started a week ago this past Thursday. Taking ibuprofen with last dose on Monday.         History of Present Illness       Cough  This is a new problem. The current episode started 1 to 4 weeks ago. The problem has been gradually worsening. The problem occurs every few hours. The cough is Non-productive. Associated symptoms include ear pain and nasal congestion. Pertinent negatives include no chills, fever, hemoptysis, myalgias or wheezing. The symptoms are aggravated by lying down and cold air. She has tried rest for the symptoms. The treatment provided no relief. There is no history of asthma, emphysema, environmental allergies or pneumonia.       Review of Systems   Review of Systems   Constitutional:  Positive for appetite change. Negative for activity change, chills and fever.   HENT:  Positive for congestion and ear pain.    Respiratory:  Positive for cough. Negative for  hemoptysis and wheezing.    Musculoskeletal:  Negative for myalgias.   Allergic/Immunologic: Negative for environmental allergies.         Current Medications       Current Outpatient Medications:     albuterol (Proventil HFA) 90 mcg/act inhaler, Inhale 1 puff every 6 (six) hours as needed for wheezing or shortness of breath, Disp: 6.7 g, Rfl: 0    benzonatate (TESSALON) 200 MG capsule, Take 1 capsule (200 mg total) by mouth 3 (three) times a day as needed for cough, Disp: 20 capsule, Rfl: 0    divalproex sodium (DEPAKOTE ER) 500 mg 24 hr tablet, Take 5 tablets (2,500 mg total) by mouth daily, Disp: 450 tablet, Rfl: 3    docusate sodium (COLACE) 100 mg capsule, Take 1 capsule (100 mg total) by mouth 2 (two) times a day for 14 days, Disp: 28 capsule, Rfl: 0    rizatriptan (Maxalt-MLT) 5 mg disintegrating tablet, Take 1 tablet (5 mg total) by mouth as needed for migraine Take at the onset of migraine; if symptoms continue or return, may take another dose at least 2 hours after first dose. Take no more than 2 doses in a day, no more than 3 doses per weeks. (Patient not taking: Reported on 3/23/2024), Disp: 9 tablet, Rfl: 2    Current Allergies     Allergies as of 03/23/2024 - Reviewed 03/23/2024   Allergen Reaction Noted    Erythromycin Swelling 11/25/2016    Peanut oil - food allergy Hives and Anaphylaxis 03/02/2017    Azithromycin Other (See Comments) 07/06/2021            The following portions of the patient's history were reviewed and updated as appropriate: allergies, current medications, past family history, past medical history, past social history, past surgical history and problem list.     Past Medical History:   Diagnosis Date    Depression     Migraine     Morbid obesity with BMI of 40.0-44.9, adult (HCC)     Obesity (BMI 30-39.9)     Seizure ([facility])     Seizures ([facility])        Past Surgical History:   Procedure Laterality Date    APPENDECTOMY LAPAROSCOPIC N/A 10/14/2023    Procedure: APPENDECTOMY  LAPAROSCOPIC;  Surgeon: Brandon Arenas DO;  Location: [facility];  Service: General     SECTION       SECTION      FRACTURE SURGERY Left     left clavical    MN LAPS FULG/EXC OVARY VISCERA/PERITONEAL SURFACE Right 2023    Procedure: LAPAROSCOPIC ADNEXAL CYSTECTOMY POSSIBLE OVARIAN CYSTECTOMY;  Surgeon: Valentina Birmingham MD;  Location: [facility];  Service: Gynecology    SHOULDER SURGERY      TUBAL LIGATION      TUMOR REMOVAL      from ear       Family History   Problem Relation Age of Onset    No Known Problems Mother     No Known Problems Father     No Known Problems Sister     No Known Problems Brother     No Known Problems Maternal Aunt     No Known Problems Maternal Uncle     No Known Problems Paternal Aunt     No Known Problems Paternal Uncle     No Known Problems Maternal Grandmother     No Known Problems Maternal Grandfather     No Known Problems Paternal Grandmother     No Known Problems Paternal Grandfather     No Known Problems Cousin     ADD / ADHD Neg Hx     Alcohol abuse Neg Hx     Anxiety disorder Neg Hx     Bipolar disorder Neg Hx     Completed Suicide  Neg Hx     Dementia Neg Hx     Depression Neg Hx     Drug abuse Neg Hx     OCD Neg Hx     Psychiatric Illness Neg Hx     Psychosis Neg Hx     Schizoaffective Disorder  Neg Hx     Schizophrenia Neg Hx     Self-Injury Neg Hx     Suicide Attempts Neg Hx     Breast cancer Neg Hx     Colon cancer Neg Hx     Ovarian cancer Neg Hx     Uterine cancer Neg Hx     Cervical cancer Neg Hx          Medications have been verified.        Objective   BP (!) 138/103   Pulse 97   Temp 99.2 °F (37.3 °C)   Resp 16   Wt 120 kg (265 lb 6.4 oz)   LMP 02/15/2024 (Approximate) Comment: tubal ligation  SpO2 95%   BMI 41.57 kg/m²        Physical Exam     Physical Exam  Vitals and nursing note reviewed.   Constitutional:       General: She is not in acute distress.     Appearance: She is well-developed and normal weight. She is not  ill-appearing, toxic-appearing or diaphoretic.   HENT:      Head: Normocephalic and atraumatic.      Right Ear: Tympanic membrane, ear canal and external ear normal.      Left Ear: Tympanic membrane, ear canal and external ear normal.      Nose: Congestion and rhinorrhea present.      Mouth/Throat:      Mouth: Mucous membranes are moist. No oral lesions.      Pharynx: Oropharynx is clear. No oropharyngeal exudate or posterior oropharyngeal erythema.   Eyes:      General: No scleral icterus.     Extraocular Movements: Extraocular movements intact.      Right eye: Normal extraocular motion.      Left eye: Normal extraocular motion.      Conjunctiva/sclera: Conjunctivae normal.      Pupils: Pupils are equal, round, and reactive to light.   Cardiovascular:      Rate and Rhythm: Normal rate and regular rhythm.      Pulses: Normal pulses.      Heart sounds: Normal heart sounds.   Pulmonary:      Effort: Pulmonary effort is normal. No respiratory distress.      Breath sounds: Rhonchi present. No wheezing or rales.   Musculoskeletal:         General: Normal range of motion.      Cervical back: Normal range of motion and neck supple. No tenderness.   Lymphadenopathy:      Cervical: No cervical adenopathy.   Skin:     General: Skin is warm and dry.      Capillary Refill: Capillary refill takes less than 2 seconds.      Findings: No rash.   Neurological:      General: No focal deficit present.      Mental Status: She is alert and oriented to person, place, and time.      Coordination: Coordination normal.      Gait: Gait normal.   Psychiatric:         Mood and Affect: Mood normal.         Behavior: Behavior normal.

## 2024-03-23 NOTE — PATIENT INSTRUCTIONS
Cold Symptoms   WHAT YOU NEED TO KNOW:   A cold is an infection caused by a virus. The infection causes your upper respiratory system to become inflamed. Common symptoms of a cold include sneezing, dry throat, a stuffy nose, headache, watery eyes, and a cough. Your cough may be dry, or you may cough up mucus. You may also have muscle aches, joint pain, and tiredness. Rarely, you may have a fever. Most colds go away without treatment.  DISCHARGE INSTRUCTIONS:   Return to the emergency department if:   You have increased tiredness and weakness.    You are unable to eat.     Your heart is beating much faster than usual for you.     You see white spots in the back of your throat and your neck is swollen and sore to the touch.     You see pinpoint or larger reddish-purple dots on your skin.    Contact your healthcare provider if:   You have a fever higher than 102°F (38.9°C).    You have new or worsening shortness of breath.     You have thick nasal drainage for more than 2 days.     Your symptoms do not improve or get worse within 5 days.     You have questions or concerns about your condition or care.    Medicines:  The following medicines may be suggested by your healthcare provider to decrease your cold symptoms. These medicines are available without a doctor's order. Ask which medicines to take and when to take them. Follow directions.  NSAIDs or acetaminophen  help to bring down a fever or decrease pain.    Decongestants  help decrease nasal stuffiness.     Antihistamines  help decrease sneezing and a runny nose.     Cough suppressants  help decrease how much you cough.    Expectorants  help loosen mucus so you can cough it up.    Take your medicine as directed.  Contact your healthcare provider if you think your medicine is not helping or if you have side effects. Tell your provider if you are allergic to any medicine. Keep a list of the medicines, vitamins, and herbs you take. Include the amounts, and when and  why you take them. Bring the list or the pill bottles to follow-up visits. Carry your medicine list with you in case of an emergency.    Symptom relief:  The following may help relieve cold symptoms, such as a dry throat and congestion:  Gargle with mouthwash or warm salt water as directed.     Suck on throat lozenges or hard candy.     Use a cold or warm vaporizer or humidifier to ease your breathing.     Rest for at least 2 days and then as needed to decrease tiredness and weakness.     Use petroleum based jelly around your nostrils to decrease irritation from blowing your nose.    Drink liquids:  Liquids will help thin and loosen thick mucus so you can cough it up. Liquids will also keep you hydrated. Ask your healthcare provider which liquids are best for you and how much to drink each day.  Prevent the spread of germs:  You can spread your cold germs to others for at least 3 days after your symptoms start. Wash your hands often. Do not share items, such as eating utensils. Cover your nose and mouth when you cough or sneeze using the crook of your elbow instead of your hands. Throw used tissues in the garbage.  Do not smoke:  Smoking may worsen your symptoms and increase the length of time you feel sick. Talk with your healthcare provider if you need help to stop smoking.  Follow up with your doctor as directed:  Write down your questions so you remember to ask them during your visits.  © Copyright [employer] 2023 Information is for End User's use only and may not be sold, redistributed or otherwise used for commercial purposes.  The above information is an  only. It is not intended as medical advice for individual conditions or treatments. Talk to your doctor, nurse or pharmacist before following any medical regimen to see if it is safe and effective for you.      The preliminary x-rays of the chest appear normal.  I suspect viral upper respiratory infection  Supportive measures discussed  Tessalon  Perles as directed  Albuterol MDI 1 puff every 6 hours as needed  Follow-up with your primary care doctor in 3 to 5 days  Go immediately to emergency department if your symptoms worsen

## 2024-03-28 ENCOUNTER — HOSPITAL ENCOUNTER (EMERGENCY)
Facility: HOSPITAL | Age: 29
Discharge: HOME | End: 2024-03-28
Attending: EMERGENCY MEDICINE
Payer: COMMERCIAL

## 2024-03-28 ENCOUNTER — APPOINTMENT (EMERGENCY)
Dept: RADIOLOGY | Facility: HOSPITAL | Age: 29
End: 2024-03-28
Payer: COMMERCIAL

## 2024-03-28 VITALS
DIASTOLIC BLOOD PRESSURE: 85 MMHG | OXYGEN SATURATION: 98 % | RESPIRATION RATE: 18 BRPM | TEMPERATURE: 98.9 F | SYSTOLIC BLOOD PRESSURE: 129 MMHG | HEART RATE: 88 BPM

## 2024-03-28 DIAGNOSIS — R07.89 RIGHT-SIDED CHEST WALL PAIN: ICD-10-CM

## 2024-03-28 DIAGNOSIS — R07.81 PLEURITIC CHEST PAIN: ICD-10-CM

## 2024-03-28 DIAGNOSIS — R05.9 COUGH: ICD-10-CM

## 2024-03-28 DIAGNOSIS — J40 BRONCHITIS: Primary | ICD-10-CM

## 2024-03-28 LAB
ALBUMIN SERPL BCP-MCNC: 3.9 G/DL (ref 3.5–5)
ALP SERPL-CCNC: 75 U/L (ref 34–104)
ALT SERPL W P-5'-P-CCNC: 25 U/L (ref 7–52)
ANION GAP SERPL CALCULATED.3IONS-SCNC: 4 MMOL/L (ref 4–13)
AST SERPL W P-5'-P-CCNC: 16 U/L (ref 13–39)
ATRIAL RATE: 81 BPM
BASOPHILS # BLD AUTO: 0.03 THOUSANDS/ÂΜL (ref 0–0.1)
BASOPHILS NFR BLD AUTO: 0 % (ref 0–1)
BILIRUB DIRECT SERPL-MCNC: 0.06 MG/DL (ref 0–0.2)
BILIRUB SERPL-MCNC: 0.45 MG/DL (ref 0.2–1)
BILIRUB UR QL STRIP: NEGATIVE
BNP SERPL-MCNC: 13 PG/ML (ref 0–100)
BUN SERPL-MCNC: 8 MG/DL (ref 5–25)
CALCIUM SERPL-MCNC: 9 MG/DL (ref 8.4–10.2)
CARDIAC TROPONIN I PNL SERPL HS: <2 NG/L
CHLORIDE SERPL-SCNC: 104 MMOL/L (ref 96–108)
CLARITY UR: CLEAR
CO2 SERPL-SCNC: 29 MMOL/L (ref 21–32)
COLOR UR: COLORLESS
CREAT SERPL-MCNC: 0.68 MG/DL (ref 0.6–1.3)
D DIMER PPP FEU-MCNC: 0.42 UG/ML FEU
EOSINOPHIL # BLD AUTO: 0.25 THOUSAND/ÂΜL (ref 0–0.61)
EOSINOPHIL NFR BLD AUTO: 2 % (ref 0–6)
ERYTHROCYTE [DISTWIDTH] IN BLOOD BY AUTOMATED COUNT: 14 % (ref 11.6–15.1)
FLUAV RNA RESP QL NAA+PROBE: NEGATIVE
FLUBV RNA RESP QL NAA+PROBE: NEGATIVE
GFR SERPL CREATININE-BSD FRML MDRD: 117 ML/MIN/1.73SQ M
GLUCOSE SERPL-MCNC: 146 MG/DL (ref 65–140)
GLUCOSE UR STRIP-MCNC: NEGATIVE MG/DL
HCG SERPL QL: NEGATIVE
HCT VFR BLD AUTO: 38 % (ref 34.8–46.1)
HGB BLD-MCNC: 12.4 G/DL (ref 11.5–15.4)
HGB UR QL STRIP.AUTO: NEGATIVE
IMM GRANULOCYTES # BLD AUTO: 0.04 THOUSAND/UL (ref 0–0.2)
IMM GRANULOCYTES NFR BLD AUTO: 0 % (ref 0–2)
KETONES UR STRIP-MCNC: NEGATIVE MG/DL
LEUKOCYTE ESTERASE UR QL STRIP: NEGATIVE
LYMPHOCYTES # BLD AUTO: 3.31 THOUSANDS/ÂΜL (ref 0.6–4.47)
LYMPHOCYTES NFR BLD AUTO: 32 % (ref 14–44)
MAGNESIUM SERPL-MCNC: 2.1 MG/DL (ref 1.9–2.7)
MCH RBC QN AUTO: 25.4 PG (ref 26.8–34.3)
MCHC RBC AUTO-ENTMCNC: 32.6 G/DL (ref 31.4–37.4)
MCV RBC AUTO: 78 FL (ref 82–98)
MONOCYTES # BLD AUTO: 0.53 THOUSAND/ÂΜL (ref 0.17–1.22)
MONOCYTES NFR BLD AUTO: 5 % (ref 4–12)
NEUTROPHILS # BLD AUTO: 6.13 THOUSANDS/ÂΜL (ref 1.85–7.62)
NEUTS SEG NFR BLD AUTO: 61 % (ref 43–75)
NITRITE UR QL STRIP: NEGATIVE
NRBC BLD AUTO-RTO: 0 /100 WBCS
P AXIS: 25 DEGREES
PH UR STRIP.AUTO: 7.5 [PH]
PLATELET # BLD AUTO: 286 THOUSANDS/UL (ref 149–390)
PMV BLD AUTO: 9.7 FL (ref 8.9–12.7)
POTASSIUM SERPL-SCNC: 3.9 MMOL/L (ref 3.5–5.3)
PR INTERVAL: 150 MS
PROT SERPL-MCNC: 7 G/DL (ref 6.4–8.4)
PROT UR STRIP-MCNC: NEGATIVE MG/DL
QRS AXIS: 4 DEGREES
QRSD INTERVAL: 88 MS
QT INTERVAL: 376 MS
QTC INTERVAL: 436 MS
RBC # BLD AUTO: 4.88 MILLION/UL (ref 3.81–5.12)
RSV RNA RESP QL NAA+PROBE: NEGATIVE
SARS-COV-2 RNA RESP QL NAA+PROBE: NEGATIVE
SODIUM SERPL-SCNC: 137 MMOL/L (ref 135–147)
SP GR UR STRIP.AUTO: 1.01 (ref 1–1.03)
T WAVE AXIS: 8 DEGREES
UROBILINOGEN UR STRIP-ACNC: <2 MG/DL
VENTRICULAR RATE: 81 BPM
WBC # BLD AUTO: 10.29 THOUSAND/UL (ref 4.31–10.16)

## 2024-03-28 PROCEDURE — 85025 COMPLETE CBC W/AUTO DIFF WBC: CPT | Performed by: EMERGENCY MEDICINE

## 2024-03-28 PROCEDURE — 96366 THER/PROPH/DIAG IV INF ADDON: CPT

## 2024-03-28 PROCEDURE — 80048 BASIC METABOLIC PNL TOTAL CA: CPT | Performed by: EMERGENCY MEDICINE

## 2024-03-28 PROCEDURE — 81003 URINALYSIS AUTO W/O SCOPE: CPT | Performed by: EMERGENCY MEDICINE

## 2024-03-28 PROCEDURE — 36415 COLL VENOUS BLD VENIPUNCTURE: CPT | Performed by: EMERGENCY MEDICINE

## 2024-03-28 PROCEDURE — 80076 HEPATIC FUNCTION PANEL: CPT | Performed by: EMERGENCY MEDICINE

## 2024-03-28 PROCEDURE — 94640 AIRWAY INHALATION TREATMENT: CPT

## 2024-03-28 PROCEDURE — 84703 CHORIONIC GONADOTROPIN ASSAY: CPT | Performed by: EMERGENCY MEDICINE

## 2024-03-28 PROCEDURE — 0241U HB NFCT DS VIR RESP RNA 4 TRGT: CPT | Performed by: EMERGENCY MEDICINE

## 2024-03-28 PROCEDURE — 71046 X-RAY EXAM CHEST 2 VIEWS: CPT

## 2024-03-28 PROCEDURE — 85379 FIBRIN DEGRADATION QUANT: CPT | Performed by: EMERGENCY MEDICINE

## 2024-03-28 PROCEDURE — 84484 ASSAY OF TROPONIN QUANT: CPT | Performed by: EMERGENCY MEDICINE

## 2024-03-28 PROCEDURE — 93005 ELECTROCARDIOGRAM TRACING: CPT

## 2024-03-28 PROCEDURE — 96365 THER/PROPH/DIAG IV INF INIT: CPT

## 2024-03-28 PROCEDURE — 99285 EMERGENCY DEPT VISIT HI MDM: CPT | Performed by: EMERGENCY MEDICINE

## 2024-03-28 PROCEDURE — 93010 ELECTROCARDIOGRAM REPORT: CPT | Performed by: INTERNAL MEDICINE

## 2024-03-28 PROCEDURE — 83880 ASSAY OF NATRIURETIC PEPTIDE: CPT | Performed by: EMERGENCY MEDICINE

## 2024-03-28 PROCEDURE — 83735 ASSAY OF MAGNESIUM: CPT | Performed by: EMERGENCY MEDICINE

## 2024-03-28 PROCEDURE — 96375 TX/PRO/DX INJ NEW DRUG ADDON: CPT

## 2024-03-28 PROCEDURE — 99285 EMERGENCY DEPT VISIT HI MDM: CPT

## 2024-03-28 RX ORDER — DOXYCYCLINE HYCLATE 100 MG/1
100 CAPSULE ORAL ONCE
Status: COMPLETED | OUTPATIENT
Start: 2024-03-28 | End: 2024-03-28

## 2024-03-28 RX ORDER — ALBUTEROL SULFATE 90 UG/1
2 AEROSOL, METERED RESPIRATORY (INHALATION) ONCE
Status: COMPLETED | OUTPATIENT
Start: 2024-03-28 | End: 2024-03-28

## 2024-03-28 RX ORDER — DOXYCYCLINE HYCLATE 100 MG/1
100 CAPSULE ORAL EVERY 12 HOURS SCHEDULED
Qty: 14 CAPSULE | Refills: 0 | Status: SHIPPED | OUTPATIENT
Start: 2024-03-28 | End: 2024-04-04

## 2024-03-28 RX ORDER — ACETAMINOPHEN 10 MG/ML
1000 INJECTION, SOLUTION INTRAVENOUS ONCE
Status: COMPLETED | OUTPATIENT
Start: 2024-03-28 | End: 2024-03-28

## 2024-03-28 RX ORDER — IPRATROPIUM BROMIDE AND ALBUTEROL SULFATE 2.5; .5 MG/3ML; MG/3ML
3 SOLUTION RESPIRATORY (INHALATION) ONCE
Status: COMPLETED | OUTPATIENT
Start: 2024-03-28 | End: 2024-03-28

## 2024-03-28 RX ORDER — KETOROLAC TROMETHAMINE 30 MG/ML
15 INJECTION, SOLUTION INTRAMUSCULAR; INTRAVENOUS ONCE
Status: COMPLETED | OUTPATIENT
Start: 2024-03-28 | End: 2024-03-28

## 2024-03-28 RX ORDER — LIDOCAINE 50 MG/G
1 PATCH TOPICAL ONCE
Status: DISCONTINUED | OUTPATIENT
Start: 2024-03-28 | End: 2024-03-28 | Stop reason: HOSPADM

## 2024-03-28 RX ORDER — NAPROXEN 500 MG/1
500 TABLET ORAL EVERY 12 HOURS PRN
Qty: 20 TABLET | Refills: 0 | Status: SHIPPED | OUTPATIENT
Start: 2024-03-28

## 2024-03-28 RX ADMIN — SODIUM CHLORIDE 1000 ML: 0.9 INJECTION, SOLUTION INTRAVENOUS at 14:21

## 2024-03-28 RX ADMIN — IPRATROPIUM BROMIDE AND ALBUTEROL SULFATE 3 ML: 2.5; .5 SOLUTION RESPIRATORY (INHALATION) at 14:27

## 2024-03-28 RX ADMIN — LIDOCAINE 5% 1 PATCH: 700 PATCH TOPICAL at 14:18

## 2024-03-28 RX ADMIN — DOXYCYCLINE HYCLATE 100 MG: 100 CAPSULE ORAL at 16:04

## 2024-03-28 RX ADMIN — ALBUTEROL SULFATE 2 PUFF: 90 AEROSOL, METERED RESPIRATORY (INHALATION) at 16:04

## 2024-03-28 RX ADMIN — ACETAMINOPHEN 1000 MG: 10 INJECTION INTRAVENOUS at 14:18

## 2024-03-28 RX ADMIN — KETOROLAC TROMETHAMINE 15 MG: 30 INJECTION, SOLUTION INTRAMUSCULAR; INTRAVENOUS at 15:05

## 2024-03-28 NOTE — ED PROVIDER NOTES
History  Chief Complaint   Patient presents with    Cough     Cough and feeling unwell x 2 wks      Patient is a 28-year-old female with past medical history of depression, migraines, epilepsy, presents to the emergency department for 2 weeks of a dry cough, mild runny nose and progressively worsening right lateral chest pain.  Patient states she feels sharp pain in the right lateral chest wall that started with her cough and it has been getting progressively worse.  She states it is worse when she takes a deep breath as well as when she is coughing.  Denies ever having this type of pain before.  She reports mild dyspnea.  Denies any associated fevers but just reports feeling generally unwell.  She denies any palpitations, hemoptysis, headache, dizziness or near syncope, abdominal pain, nausea, vomiting, diarrhea, constipation, blood per rectum or melena, urinary symptoms, flank pain, skin rash or color change, leg pain or swelling, extremity weakness or paresthesia or other focal neurologic deficits.        History provided by:  Patient   used: No    Cough  Associated symptoms: chest pain and rhinorrhea    Associated symptoms: no chills, no ear pain, no fever, no headaches, no rash, no shortness of breath, no sore throat and no wheezing        Prior to Admission Medications   Prescriptions Last Dose Informant Patient Reported? Taking?   albuterol (Proventil HFA) 90 mcg/act inhaler   No No   Sig: Inhale 1 puff every 6 (six) hours as needed for wheezing or shortness of breath   benzonatate (TESSALON) 200 MG capsule   No No   Sig: Take 1 capsule (200 mg total) by mouth 3 (three) times a day as needed for cough   divalproex sodium (DEPAKOTE ER) 500 mg 24 hr tablet   No No   Sig: Take 5 tablets (2,500 mg total) by mouth daily   docusate sodium (COLACE) 100 mg capsule   No No   Sig: Take 1 capsule (100 mg total) by mouth 2 (two) times a day for 14 days   rizatriptan (Maxalt-MLT) 5 mg disintegrating  tablet   No No   Sig: Take 1 tablet (5 mg total) by mouth as needed for migraine Take at the onset of migraine; if symptoms continue or return, may take another dose at least 2 hours after first dose. Take no more than 2 doses in a day, no more than 3 doses per weeks.   Patient not taking: Reported on 3/23/2024      Facility-Administered Medications: None       Past Medical History:   Diagnosis Date    Depression     Migraine     Morbid obesity with BMI of 40.0-44.9, adult (HCC)     Obesity (BMI 30-39.9)     Seizure (HCC)     Seizures (HCC)        Past Surgical History:   Procedure Laterality Date    APPENDECTOMY LAPAROSCOPIC N/A 10/14/2023    Procedure: APPENDECTOMY LAPAROSCOPIC;  Surgeon: Brandon Arenas DO;  Location: [facility];  Service: General     SECTION       SECTION      FRACTURE SURGERY Left     left clavical    NJ LAPS FULG/EXC OVARY VISCERA/PERITONEAL SURFACE Right 2023    Procedure: LAPAROSCOPIC ADNEXAL CYSTECTOMY POSSIBLE OVARIAN CYSTECTOMY;  Surgeon: Valentina Birmingham MD;  Location: [facility];  Service: Gynecology    SHOULDER SURGERY      TUBAL LIGATION      TUMOR REMOVAL      from ear       Family History   Problem Relation Age of Onset    No Known Problems Mother     No Known Problems Father     No Known Problems Sister     No Known Problems Brother     No Known Problems Maternal Aunt     No Known Problems Maternal Uncle     No Known Problems Paternal Aunt     No Known Problems Paternal Uncle     No Known Problems Maternal Grandmother     No Known Problems Maternal Grandfather     No Known Problems Paternal Grandmother     No Known Problems Paternal Grandfather     No Known Problems Cousin     ADD / ADHD Neg Hx     Alcohol abuse Neg Hx     Anxiety disorder Neg Hx     Bipolar disorder Neg Hx     Completed Suicide  Neg Hx     Dementia Neg Hx     Depression Neg Hx     Drug abuse Neg Hx     OCD Neg Hx     Psychiatric Illness Neg Hx     Psychosis Neg Hx     Schizoaffective  Disorder  Neg Hx     Schizophrenia Neg Hx     Self-Injury Neg Hx     Suicide Attempts Neg Hx     Breast cancer Neg Hx     Colon cancer Neg Hx     Ovarian cancer Neg Hx     Uterine cancer Neg Hx     Cervical cancer Neg Hx      I have reviewed and agree with the history as documented.    E-Cigarette/Vaping    E-Cigarette Use Never User      E-Cigarette/Vaping Substances    Nicotine No     THC No     CBD No     Flavoring No      Social History     Tobacco Use    Smoking status: Never     Passive exposure: Never    Smokeless tobacco: Never   Vaping Use    Vaping status: Never Used   Substance Use Topics    Alcohol use: Not Currently     Comment: occ    Drug use: Never       Review of Systems   Constitutional:  Positive for fatigue. Negative for chills and fever.   HENT:  Positive for rhinorrhea. Negative for congestion, ear pain and sore throat.    Respiratory:  Positive for cough. Negative for chest tightness, shortness of breath and wheezing.    Cardiovascular:  Positive for chest pain. Negative for palpitations.   Gastrointestinal:  Negative for abdominal distention, abdominal pain, blood in stool, constipation, diarrhea, nausea and vomiting.   Genitourinary:  Negative for dysuria, flank pain, frequency and hematuria.   Musculoskeletal:  Negative for back pain, neck pain and neck stiffness.   Skin:  Negative for color change, pallor, rash and wound.   Allergic/Immunologic: Negative for immunocompromised state.   Neurological:  Negative for dizziness, syncope, weakness, light-headedness, numbness and headaches.   Hematological:  Negative for adenopathy. Does not bruise/bleed easily.   Psychiatric/Behavioral:  Negative for confusion and decreased concentration.    All other systems reviewed and are negative.      Physical Exam  Physical Exam  Vitals and nursing note reviewed.   Constitutional:       General: She is not in acute distress.     Appearance: Normal appearance. She is well-developed. She is obese. She is not  ill-appearing, toxic-appearing or diaphoretic.   HENT:      Head: Normocephalic and atraumatic.      Right Ear: External ear normal.      Left Ear: External ear normal.      Nose: Nose normal.      Mouth/Throat:      Mouth: Mucous membranes are moist.      Pharynx: Oropharynx is clear.   Eyes:      Extraocular Movements: Extraocular movements intact.      Conjunctiva/sclera: Conjunctivae normal.   Neck:      Vascular: No JVD.   Cardiovascular:      Rate and Rhythm: Normal rate and regular rhythm.      Pulses: Normal pulses.      Heart sounds: Normal heart sounds. No murmur heard.     No friction rub. No gallop.   Pulmonary:      Effort: Pulmonary effort is normal. No respiratory distress.      Breath sounds: Normal breath sounds. No wheezing, rhonchi or rales.      Comments: Right lateral lower and mid chest wall tenderness.  No chest wall crepitus.  Chest:      Chest wall: Tenderness present.   Abdominal:      General: There is no distension.      Palpations: Abdomen is soft.      Tenderness: There is no abdominal tenderness. There is no right CVA tenderness, left CVA tenderness, guarding or rebound.   Musculoskeletal:         General: No swelling or tenderness. Normal range of motion.      Cervical back: Normal range of motion and neck supple. No rigidity.      Right lower leg: No edema.      Left lower leg: No edema.   Skin:     General: Skin is warm and dry.      Coloration: Skin is not pale.      Findings: No erythema or rash.   Neurological:      General: No focal deficit present.      Mental Status: She is alert and oriented to person, place, and time.      Sensory: No sensory deficit.      Motor: No weakness.   Psychiatric:         Mood and Affect: Mood normal.         Behavior: Behavior normal.         Vital Signs  ED Triage Vitals [03/28/24 1312]   Temperature Pulse Respirations Blood Pressure SpO2   98.9 °F (37.2 °C) 89 22 138/82 100 %      Temp Source Heart Rate Source Patient Position - Orthostatic VS  BP Location FiO2 (%)   Temporal Monitor Sitting Left arm --      Pain Score       --         Vitals:    03/28/24 1312   BP: 138/82   BP Location: Left arm   Pulse: 89   Resp: 22   Temp: 98.9 °F (37.2 °C)   TempSrc: Temporal   SpO2: 100%          Visual Acuity      ED Medications  Medications   lidocaine (LIDODERM) 5 % patch 1 patch (1 patch Topical Medication Applied 3/28/24 1418)   sodium chloride 0.9 % bolus 1,000 mL (0 mL Intravenous Stopped 3/28/24 1559)   ipratropium-albuterol (DUO-NEB) 0.5-2.5 mg/3 mL inhalation solution 3 mL (3 mL Nebulization Given 3/28/24 1427)   ketorolac (TORADOL) injection 15 mg (15 mg Intravenous Given 3/28/24 1505)   acetaminophen (Ofirmev) injection 1,000 mg (0 mg Intravenous Stopped 3/28/24 1559)   albuterol (PROVENTIL HFA,VENTOLIN HFA) inhaler 2 puff (2 puffs Inhalation Given 3/28/24 1604)   doxycycline hyclate (VIBRAMYCIN) capsule 100 mg (100 mg Oral Given 3/28/24 1604)       Diagnostic Studies  Results Reviewed       Procedure Component Value Units Date/Time    UA (URINE) with reflex to Scope [[id number]] Collected: 03/28/24 1602    Lab Status: Final result Specimen: Urine, Clean Catch Updated: 03/28/24 1617     Color, UA Colorless     Clarity, UA Clear     Specific Gravity, UA 1.011     pH, UA 7.5     Leukocytes, UA Negative     Nitrite, UA Negative     Protein, UA Negative mg/dl      Glucose, UA Negative mg/dl      Ketones, UA Negative mg/dl      Urobilinogen, UA <2.0 mg/dl      Bilirubin, UA Negative     Occult Blood, UA Negative    FLU/RSV/COVID - if FLU/RSV clinically relevant [[id number]]  (Normal) Collected: 03/28/24 1417    Lab Status: Final result Specimen: Nares from Nose Updated: 03/28/24 1507     SARS-CoV-2 Negative     INFLUENZA A PCR Negative     INFLUENZA B PCR Negative     RSV PCR Negative    Narrative:      FOR PEDIATRIC PATIENTS - copy/paste COVID Guidelines URL to browser: [email or web address]    SARS-CoV-2  assay is a Nucleic Acid Amplification assay intended for the  qualitative detection of nucleic acid from SARS-CoV-2 in nasopharyngeal  swabs. Results are for the presumptive identification of SARS-CoV-2 RNA.    Positive results are indicative of infection with SARS-CoV-2, the virus  causing COVID-19, but do not rule out bacterial infection or co-infection  with other viruses. Laboratories within the [place] and its  territories are required to report all positive results to the appropriate  public health authorities. Negative results do not preclude SARS-CoV-2  infection and should not be used as the sole basis for treatment or other  patient management decisions. Negative results must be combined with  clinical observations, patient history, and epidemiological information.  This test has not been FDA cleared or approved.    This test has been authorized by FDA under an Emergency Use Authorization  (EUA). This test is only authorized for the duration of time the  declaration that circumstances exist justifying the authorization of the  emergency use of an in vitro diagnostic tests for detection of SARS-CoV-2  virus and/or diagnosis of COVID-19 infection under section 564(b)(1) of  the Act, 21 U.S.C. 360bbb-3(b)(1), unless the authorization is terminated  or revoked sooner. The test has been validated but independent review by FDA  and CLIA is pending.    Test performed using [employer] GeneXpert: This RT-PCR assay targets N2,  a region unique to SARS-CoV-2. A conserved region in the E-gene was chosen  for pan-Sarbecovirus detection which includes SARS-CoV-2.    According to CMS-2020-01-R, this platform meets the definition of high-throughput technology.    hCG, qualitative pregnancy [[id number]]  (Normal) Collected: 03/28/24 1417    Lab Status: Final result Specimen: Blood from Arm, Left Updated: 03/28/24 1455     Preg, Serum Negative    D-Dimer [[id number]]  (Normal) Collected: 03/28/24 1417    Lab Status: Final  result Specimen: Blood from Arm, Left Updated: 03/28/24 1455     D-Dimer, Quant 0.42 ug/ml FEU     HS Troponin 0hr (reflex protocol) [[id number]]  (Normal) Collected: 03/28/24 1417    Lab Status: Final result Specimen: Blood from Arm, Left Updated: 03/28/24 1454     hs TnI 0hr <2 ng/L     B-Type Natriuretic Peptide(BNP) [[id number]]  (Normal) Collected: 03/28/24 1417    Lab Status: Final result Specimen: Blood from Arm, Left Updated: 03/28/24 1453     BNP 13 pg/mL     Basic metabolic panel [[id number]]  (Abnormal) Collected: 03/28/24 1417    Lab Status: Final result Specimen: Blood from Arm, Left Updated: 03/28/24 1447     Sodium 137 mmol/L      Potassium 3.9 mmol/L      Chloride 104 mmol/L      CO2 29 mmol/L      ANION GAP 4 mmol/L      BUN 8 mg/dL      Creatinine 0.68 mg/dL      Glucose 146 mg/dL      Calcium 9.0 mg/dL      eGFR 117 ml/min/1.73sq m     Narrative:      National Kidney Disease Foundation guidelines for Chronic Kidney Disease (CKD):     Stage 1 with normal or high GFR (GFR > 90 mL/min/1.73 square meters)    Stage 2 Mild CKD (GFR = 60-89 mL/min/1.73 square meters)    Stage 3A Moderate CKD (GFR = 45-59 mL/min/1.73 square meters)    Stage 3B Moderate CKD (GFR = 30-44 mL/min/1.73 square meters)    Stage 4 Severe CKD (GFR = 15-29 mL/min/1.73 square meters)    Stage 5 End Stage CKD (GFR <15 mL/min/1.73 square meters)  Note: GFR calculation is accurate only with a steady state creatinine    Hepatic function panel [[id number]]  (Normal) Collected: 03/28/24 1417    Lab Status: Final result Specimen: Blood from Arm, Left Updated: 03/28/24 1447     Total Bilirubin 0.45 mg/dL      Bilirubin, Direct 0.06 mg/dL      Alkaline Phosphatase 75 U/L      AST 16 U/L      ALT 25 U/L      Total Protein 7.0 g/dL      Albumin 3.9 g/dL     Magnesium [[id number]]  (Normal) Collected: 03/28/24 1417    Lab Status: Final result Specimen: Blood from Arm, Left Updated: 03/28/24 1447     Magnesium 2.1 mg/dL     CBC and differential  [[id number]]  (Abnormal) Collected: 03/28/24 1417    Lab Status: Final result Specimen: Blood from Arm, Left Updated: 03/28/24 1430     WBC 10.29 Thousand/uL      RBC 4.88 Million/uL      Hemoglobin 12.4 g/dL      Hematocrit 38.0 %      MCV 78 fL      MCH 25.4 pg      MCHC 32.6 g/dL      RDW 14.0 %      MPV 9.7 fL      Platelets 286 Thousands/uL      nRBC 0 /100 WBCs      Neutrophils Relative 61 %      Immature Grans % 0 %      Lymphocytes Relative 32 %      Monocytes Relative 5 %      Eosinophils Relative 2 %      Basophils Relative 0 %      Neutrophils Absolute 6.13 Thousands/µL      Absolute Immature Grans 0.04 Thousand/uL      Absolute Lymphocytes 3.31 Thousands/µL      Absolute Monocytes 0.53 Thousand/µL      Eosinophils Absolute 0.25 Thousand/µL      Basophils Absolute 0.03 Thousands/µL                    XR chest 2 views   ED Interpretation by Debbie Matta DO (03/28 1435)   No acute abnormality in the chest.                 Procedures  ECG 12 Lead Documentation Only    Date/Time: 3/28/2024 2:02 PM    Performed by: Debbie Matta DO  Authorized by: Debbie Matta DO    ECG reviewed by me, the ED Provider: yes    Patient location:  ED  Rate:     ECG rate:  81    ECG rate assessment: normal    Rhythm:     Rhythm: sinus rhythm    Ectopy:     Ectopy: none    QRS:     QRS axis:  Normal    QRS intervals:  Normal  Conduction:     Conduction: normal    ST segments:     ST segments:  Normal  T waves:     T waves: normal             ED Course  ED Course as of 03/28/24 1630   Thu Mar 28, 2024   1506 D-Dimer, Quant: 0.42   1506 hs TnI 0hr: <2   1506 BNP: 13   1556 Updated patient about workup thus far being essentially normal.  Suspect bronchitis with right-sided chest wall strain.  Will cover with a course of antibiotics for bacterial bronchitis/clinical pneumonia.  Will provide an albuterol inhaler as needed as well as prescription for Naprosyn to be used as needed for pain in addition to  Tylenol.  Advised to follow-up with PCP.  Discussed ED return parameters.                               SBIRT 20yo+      Flowsheet Row Most Recent Value   Initial Alcohol Screen: US AUDIT-C     1. How often do you have a drink containing alcohol? 0 Filed at: 03/28/2024 1313   2. How many drinks containing alcohol do you have on a typical day you are drinking?  0 Filed at: 03/28/2024 1313   3a. Male UNDER 65: How often do you have five or more drinks on one occasion? 0 Filed at: 03/28/2024 1313   3b. FEMALE Any Age, or MALE 65+: How often do you have 4 or more drinks on one occassion? 0 Filed at: 03/28/2024 1313   Audit-C Score 0 Filed at: 03/28/2024 1313   KYREE: How many times in the past year have you...    Used an illegal drug or used a prescription medication for non-medical reasons? Never Filed at: 03/28/2024 1313                      Medical Decision Making  28-year-old female presents to the ED with 2 weeks of dry cough and progressively worsening right lateral pleuritic chest pain.  I suspect muscle strain from coughing however differential also includes right-sided pneumonia, PE, atypical presentation of ACS but less likely, UTI or pyelonephritis, spontaneous pneumothorax from recent coughing, pleural effusion.  Will evaluate with cardiac labs, D-dimer, EKG and chest x-ray.  Will provide Toradol, Tylenol, lidocaine patch and IV fluids for symptom relief.  Will also give DuoNeb breathing treatment.    Amount and/or Complexity of Data Reviewed  Labs: ordered. Decision-making details documented in ED Course.  Radiology: ordered and independent interpretation performed. Decision-making details documented in ED Course.  ECG/medicine tests: ordered and independent interpretation performed. Decision-making details documented in ED Course.    Risk  Prescription drug management.             Disposition  Final diagnoses:   Bronchitis   Cough   Pleuritic chest pain   Right-sided chest wall pain     Time reflects when  diagnosis was documented in both MDM as applicable and the Disposition within this note       Time User Action Codes Description Comment    3/28/2024  4:28 PM Debbie Matta [J40] Bronchitis     3/28/2024  4:28 PM Debbie Matta [R05.9] Cough     3/28/2024  4:28 PM Debbie Matta Add [R07.81] Pleuritic chest pain     3/28/2024  4:28 PM Debbie Matta Add [R07.89] Right-sided chest wall pain           ED Disposition       ED Disposition   Discharge    Condition   Stable    Date/Time   Thu Mar 28, 2024 1628    Comment   Qing ROGERS [facility] discharge to home/self care.                   Follow-up Information       Follow up With Specialties Details Why Contact Info Additional Information    Yudith Alejandro DO Family Medicine Schedule an appointment as soon as possible for a visit   [address] [address]  [phone]       [facility] Emergency Department Emergency Medicine Go to  If symptoms worsen [address] [id number] [phone] [facility] Emergency Department, [address] [place], [place], [address]            Patient's Medications   Discharge Prescriptions    DOXYCYCLINE HYCLATE (VIBRAMYCIN) 100 MG CAPSULE    Take 1 capsule (100 mg total) by mouth every 12 (twelve) hours for 7 days       Start Date: 3/28/2024 End Date: 4/4/2024       Order Dose: 100 mg       Quantity: 14 capsule    Refills: 0    NAPROXEN (NAPROSYN) 500 MG TABLET    Take 1 tablet (500 mg total) by mouth every 12 (twelve) hours as needed for mild pain or moderate pain       Start Date: 3/28/2024 End Date: --       Order Dose: 500 mg       Quantity: 20 tablet    Refills: 0       No discharge procedures on file.    PDMP Review         Value Time User    PDMP Reviewed  Yes 11/23/2021 10:52 AM JESSICA Hunter            ED Provider  Electronically Signed by             Debbie Matta DO  03/28/24  1381

## 2024-03-29 ENCOUNTER — VBI (OUTPATIENT)
Age: 29
End: 2024-03-29

## 2024-03-29 NOTE — LETTER
[facility] PRIMARY [facility]  [address]  [place] [address]  [phone]    Date: 04/01/24    Qing ROGERS [address] [place] [address]    Dear Qing:                                                                                                                                Thank you for choosing [facility] emergency department for care.  Your primary care provider wants to make sure that your ongoing medical care is being addressed. If you require follow up care as a result of your emergency department visit, there are a few things the practice would like you to know.                As part of the network's continuing commitment to caring for our patients, we have added more same day appointments and have extended office hours to meet your medical needs. After hours, on-call physicians are available via your primary care provider's main office line.               We encourage you to contact our office prior to seeking treatment to discuss your symptoms with the medical staff.  Together, we can determine the correct course of action.  A majority of non-emergent conditions such as: common cold, flu-like symptoms, fevers, strains/sprains, dislocations, minor burns, cuts and animal bites can be treated at [facility] facilities. Diagnostic testing is available at some sites.               Of course, if you are experiencing a life threatening medical emergency call 911 or proceed directly to the nearest emergency room.    Your nearest [facility] facility is conveniently located at:    [address] [address]  [phone]  SKIP THE WAIT  Conveniently offered at most [facility] locations  [place] your spot online at [email or web address] or on the [facility] Angie    Sincerely,    [facility]  Dept: [phone]

## 2024-03-29 NOTE — TELEPHONE ENCOUNTER
03/29/24 9:56 AM    Patient contacted post ED visit, first outreach attempt made. Message was left for patient to return a call to the VBI Department at [facility]: Phone [phone].    Thank you.  Pauline Wells  [facility]

## 2024-04-01 NOTE — TELEPHONE ENCOUNTER
04/01/24 3:33 PM    Patient contacted post ED visit, third outreach attempt made. Message was left for patient to return a call to either the VBI Department at [facility]: Phone [phone] or the PCP office.   CM letter sent    Thank you.  Pauline Wells  [facility]UE BASED VIR

## 2024-04-01 NOTE — TELEPHONE ENCOUNTER
04/01/24 12:18 PM    Patient contacted post ED visit, second outreach attempt made. Message was left for patient to return a call to the VBI Department at [facility]: Phone [phone].    Thank you.  Pauline Wells  [facility]UE BASED VIR

## 2024-04-01 NOTE — TELEPHONE ENCOUNTER
04/01/24 3:37 PM    Patient contacted post ED visit, phone outreaches were unsuccessful and a MyChart letter has been sent to the patient as follow-up.    Thank you.  Pauline Wells  [facility] VIR

## 2024-05-15 ENCOUNTER — TELEPHONE (OUTPATIENT)
Age: 29
End: 2024-05-15

## 2024-05-15 DIAGNOSIS — R93.89 ABNORMAL FINDING ON IMAGING: ICD-10-CM

## 2024-05-15 DIAGNOSIS — E04.1 THYROID NODULE: Primary | ICD-10-CM

## 2024-05-15 NOTE — TELEPHONE ENCOUNTER
----- Message from JESSICA Haley sent at 5/15/2024  2:51 PM EDT -----  Please call and let pt know that I have placed an order for thyroid ultrasound to follow up on thyroid nodule noted on her CT scan done 10/2023; based on recommendations. She should schedule test as soon as possible.  ----- Message -----  From: Denise STERN Espitia  Sent: 5/14/2024   7:41 PM EDT  To: Yudith Alejandro DO        Dear Provider,    Our records indicate that your patient was recommended to have a follow up exam based on a prior imaging study.    This letter is to notify you that your patient has not returned to a [facility] site for the recommended study. Enclosed, please find a copy of the patient report for your review. The Radiologist's recommendation can be found in the impression section at the bottom of the report.     If you have further questions, please feel free to contact us at [phone]. If you need assistance in making a scheduled appointment for your patient, please contact Central Scheduling at [phone].    Thank You,      Alin Hernández M.D.  , Department of Radiology

## 2024-06-07 ENCOUNTER — HOSPITAL ENCOUNTER (OUTPATIENT)
Dept: ULTRASOUND IMAGING | Facility: CLINIC | Age: 29
Discharge: HOME | End: 2024-06-07
Payer: COMMERCIAL

## 2024-06-07 DIAGNOSIS — E04.1 THYROID NODULE: ICD-10-CM

## 2024-06-07 DIAGNOSIS — R93.89 ABNORMAL FINDING ON IMAGING: ICD-10-CM

## 2024-06-07 PROCEDURE — 76536 US EXAM OF HEAD AND NECK: CPT

## 2024-06-20 ENCOUNTER — TELEPHONE (OUTPATIENT)
Age: 29
End: 2024-06-20

## 2024-06-20 NOTE — TELEPHONE ENCOUNTER
----- Message from Yudith Alejandro DO sent at 6/20/2024  9:15 AM EDT -----  Ultrasound of the thyroid does not show any significantly enlarged nodules that need biopsy or any follow-up

## 2024-12-11 DIAGNOSIS — R56.9 SEIZURE (HCC): ICD-10-CM

## 2024-12-11 RX ORDER — DIVALPROEX SODIUM 500 MG/1
TABLET, FILM COATED, EXTENDED RELEASE ORAL
Qty: 450 TABLET | Refills: 0 | Status: SHIPPED | OUTPATIENT
Start: 2024-12-11

## 2024-12-11 NOTE — TELEPHONE ENCOUNTER
Rx sent.   Also, she should have CMP, CBC and valproate level done prior to her 12/16 visit. Thanks.

## 2024-12-11 NOTE — TELEPHONE ENCOUNTER
Pharmacy called patient stopped taking Divalproex sodium   mg now would like to start again pharmacy needs new prescription sent     Reason for call:   [x] Refill   [] Prior Auth  [] Other:     Office:   [] PCP/Provider -   [x] Specialty/Provider - Neuro Michelle Jennings     Medication: Divalproex sodium  ER     Dose/Frequency: 500 mg 5 tablets Daily     Quantity: 450    Pharmacy: [facility] [place] [address]     Does the patient have enough for 3 days?   [] Yes   [x] No - Send as HP to POD

## 2024-12-12 NOTE — TELEPHONE ENCOUNTER
Called and advised pt of the below. She verbalized understanding. She will get bw done tmrw morning at  facility

## 2024-12-13 ENCOUNTER — APPOINTMENT (OUTPATIENT)
Dept: RADIOLOGY | Facility: CLINIC | Age: 29
End: 2024-12-13
Payer: COMMERCIAL

## 2024-12-13 ENCOUNTER — OFFICE VISIT (OUTPATIENT)
Dept: URGENT CARE | Facility: CLINIC | Age: 29
End: 2024-12-13
Payer: COMMERCIAL

## 2024-12-13 VITALS
DIASTOLIC BLOOD PRESSURE: 90 MMHG | RESPIRATION RATE: 18 BRPM | SYSTOLIC BLOOD PRESSURE: 144 MMHG | HEART RATE: 90 BPM | TEMPERATURE: 97.4 F | OXYGEN SATURATION: 96 %

## 2024-12-13 DIAGNOSIS — J06.9 ACUTE URI: ICD-10-CM

## 2024-12-13 DIAGNOSIS — J06.9 ACUTE URI: Primary | ICD-10-CM

## 2024-12-13 PROCEDURE — 99214 OFFICE O/P EST MOD 30 MIN: CPT | Performed by: NURSE PRACTITIONER

## 2024-12-13 PROCEDURE — 71046 X-RAY EXAM CHEST 2 VIEWS: CPT

## 2024-12-13 RX ORDER — DEXTROMETHORPHAN HYDROBROMIDE AND PROMETHAZINE HYDROCHLORIDE 15; 6.25 MG/5ML; MG/5ML
5 SYRUP ORAL
Qty: 118 ML | Refills: 0 | Status: CANCELLED | OUTPATIENT
Start: 2024-12-13

## 2024-12-13 RX ORDER — ALBUTEROL SULFATE 90 UG/1
2 INHALANT RESPIRATORY (INHALATION) EVERY 6 HOURS PRN
Qty: 8.5 G | Refills: 0 | Status: SHIPPED | OUTPATIENT
Start: 2024-12-13

## 2024-12-13 RX ORDER — PREDNISONE 20 MG/1
40 TABLET ORAL DAILY
Qty: 10 TABLET | Refills: 0 | Status: SHIPPED | OUTPATIENT
Start: 2024-12-13 | End: 2024-12-18

## 2024-12-13 RX ORDER — BENZONATATE 200 MG/1
200 CAPSULE ORAL 3 TIMES DAILY PRN
Qty: 20 CAPSULE | Refills: 0 | Status: SHIPPED | OUTPATIENT
Start: 2024-12-13

## 2024-12-13 RX ORDER — DOXYCYCLINE 100 MG/1
100 TABLET ORAL 2 TIMES DAILY
Qty: 10 TABLET | Refills: 0 | Status: SHIPPED | OUTPATIENT
Start: 2024-12-13 | End: 2024-12-18

## 2024-12-13 NOTE — PROGRESS NOTES
[facility]        NAME: Qing Schneider is a 29 y.o. female  : 1995    MRN: [id number]  DATE: 2024  TIME: 6:25 PM    Assessment and Plan   Acute URI [J06.9]  1. Acute URI  XR chest pa and lateral    doxycycline (ADOXA) 100 MG tablet    predniSONE 20 mg tablet    albuterol (ProAir HFA) 90 mcg/act inhaler    benzonatate (TESSALON) 200 MG capsule        Xray reviewed and there is suspicion for pneumonia. Pending final read from radiology. Will treat with doxy as she has allergy to zpack. Prednisone as directed, take with food in the mornings. Albuterol inhaler prn. Tessalon prn.     Patient Instructions       Follow up with PCP in 3-5 days.  Proceed to  ER if symptoms worsen.    If tests are performed, our office will contact you with results only if changes need to made to the care plan discussed with you at the visit. [facility]    Chief Complaint     Chief Complaint   Patient presents with    Cough     Patient here with cough since October, which she states was bronchitis, however the cough is still not going away and is starting to become painful.          History of Present Illness       Has had a cough since October but getting worse over the past few days and feels tightness in the chest.     Cough  This is a recurrent problem. The current episode started more than 1 month ago (since October). The problem has been gradually worsening. The problem occurs constantly. The cough is Non-productive. Associated symptoms include nasal congestion, postnasal drip, shortness of breath and wheezing. Pertinent negatives include no chest pain, chills, ear congestion, ear pain, fever, headaches, heartburn, hemoptysis, myalgias, rash, rhinorrhea, sore throat, sweats or weight loss. The symptoms are aggravated by lying down. She has tried nothing for the symptoms. The treatment provided no relief.       Review of Systems   Review of Systems    Constitutional:  Negative for chills, fever and weight loss.   HENT:  Positive for postnasal drip. Negative for ear pain, rhinorrhea and sore throat.    Respiratory:  Positive for cough, chest tightness, shortness of breath and wheezing. Negative for hemoptysis.    Cardiovascular:  Negative for chest pain.   Gastrointestinal:  Negative for heartburn.   Musculoskeletal:  Negative for myalgias.   Skin:  Negative for rash.   Neurological:  Negative for headaches.         Current Medications       Current Outpatient Medications:     albuterol (ProAir HFA) 90 mcg/act inhaler, Inhale 2 puffs every 6 (six) hours as needed for wheezing or shortness of breath, Disp: 8.5 g, Rfl: 0    albuterol (Proventil HFA) 90 mcg/act inhaler, Inhale 1 puff every 6 (six) hours as needed for wheezing or shortness of breath, Disp: 6.7 g, Rfl: 0    benzonatate (TESSALON) 200 MG capsule, Take 1 capsule (200 mg total) by mouth 3 (three) times a day as needed for cough, Disp: 20 capsule, Rfl: 0    divalproex sodium (DEPAKOTE ER) 500 mg 24 hr tablet, TAKE FIVE TABLETS BY MOUTH EVERY DAY, Disp: 450 tablet, Rfl: 0    doxycycline (ADOXA) 100 MG tablet, Take 1 tablet (100 mg total) by mouth 2 (two) times a day for 5 days, Disp: 10 tablet, Rfl: 0    predniSONE 20 mg tablet, Take 2 tablets (40 mg total) by mouth daily for 5 days, Disp: 10 tablet, Rfl: 0    benzonatate (TESSALON) 200 MG capsule, Take 1 capsule (200 mg total) by mouth 3 (three) times a day as needed for cough (Patient not taking: Reported on 12/13/2024), Disp: 20 capsule, Rfl: 0    docusate sodium (COLACE) 100 mg capsule, Take 1 capsule (100 mg total) by mouth 2 (two) times a day for 14 days, Disp: 28 capsule, Rfl: 0    naproxen (NAPROSYN) 500 mg tablet, Take 1 tablet (500 mg total) by mouth every 12 (twelve) hours as needed for mild pain or moderate pain (Patient not taking: Reported on 12/13/2024), Disp: 20 tablet, Rfl: 0    rizatriptan (Maxalt-MLT) 5 mg disintegrating tablet, Take 1  tablet (5 mg total) by mouth as needed for migraine Take at the onset of migraine; if symptoms continue or return, may take another dose at least 2 hours after first dose. Take no more than 2 doses in a day, no more than 3 doses per weeks. (Patient not taking: Reported on 2024), Disp: 9 tablet, Rfl: 2    Current Allergies     Allergies as of 2024 - Reviewed 2024   Allergen Reaction Noted    Erythromycin Swelling 2016    Peanut oil - food allergy Hives and Anaphylaxis 2017    Azithromycin Other (See Comments) 2021            The following portions of the patient's history were reviewed and updated as appropriate: allergies, current medications, past family history, past medical history, past social history, past surgical history and problem list.     Past Medical History:   Diagnosis Date    Depression     Migraine     Morbid obesity with BMI of 40.0-44.9, adult (HCC)     Obesity (BMI 30-39.9)     Seizure (HCC)     Seizures (HCC)        Past Surgical History:   Procedure Laterality Date    APPENDECTOMY LAPAROSCOPIC N/A 10/14/2023    Procedure: APPENDECTOMY LAPAROSCOPIC;  Surgeon: Brandon Arenas DO;  Location: [facility];  Service: General     SECTION       SECTION      FRACTURE SURGERY Left     left clavical    NC LAPS FULG/EXC OVARY VISCERA/PERITONEAL SURFACE Right 2023    Procedure: LAPAROSCOPIC ADNEXAL CYSTECTOMY POSSIBLE OVARIAN CYSTECTOMY;  Surgeon: Valentina Birmingham MD;  Location: [facility];  Service: Gynecology    SHOULDER SURGERY      TUBAL LIGATION      TUMOR REMOVAL      from ear       Family History   Problem Relation Age of Onset    No Known Problems Mother     No Known Problems Father     No Known Problems Sister     No Known Problems Brother     No Known Problems Maternal Aunt     No Known Problems Maternal Uncle     No Known Problems Paternal Aunt     No Known Problems Paternal Uncle     No Known Problems Maternal Grandmother     No Known  Problems Maternal Grandfather     No Known Problems Paternal Grandmother     No Known Problems Paternal Grandfather     No Known Problems Cousin     ADD / ADHD Neg Hx     Alcohol abuse Neg Hx     Anxiety disorder Neg Hx     Bipolar disorder Neg Hx     Completed Suicide  Neg Hx     Dementia Neg Hx     Depression Neg Hx     Drug abuse Neg Hx     OCD Neg Hx     Psychiatric Illness Neg Hx     Psychosis Neg Hx     Schizoaffective Disorder  Neg Hx     Schizophrenia Neg Hx     Self-Injury Neg Hx     Suicide Attempts Neg Hx     Breast cancer Neg Hx     Colon cancer Neg Hx     Ovarian cancer Neg Hx     Uterine cancer Neg Hx     Cervical cancer Neg Hx          Medications have been verified.        Objective   /90   Pulse 90   Temp (!) 97.4 °F (36.3 °C)   Resp 18   SpO2 96%        Physical Exam     Physical Exam  Vitals and nursing note reviewed.   Constitutional:       General: She is not in acute distress.     Appearance: Normal appearance. She is not ill-appearing.   HENT:      Head: Normocephalic and atraumatic.      Right Ear: Tympanic membrane, ear canal and external ear normal.      Left Ear: Tympanic membrane, ear canal and external ear normal.      Nose: Congestion and rhinorrhea present.      Mouth/Throat:      Mouth: Mucous membranes are moist.      Pharynx: Posterior oropharyngeal erythema present. No oropharyngeal exudate.   Eyes:      Pupils: Pupils are equal, round, and reactive to light.   Cardiovascular:      Rate and Rhythm: Normal rate and regular rhythm.      Pulses: Normal pulses.      Heart sounds: Normal heart sounds.   Pulmonary:      Effort: Pulmonary effort is normal. No tachypnea, accessory muscle usage or respiratory distress.      Breath sounds: Decreased air movement present. Examination of the right-lower field reveals decreased breath sounds. Examination of the left-lower field reveals decreased breath sounds. Decreased breath sounds present. No wheezing, rhonchi or rales.    Musculoskeletal:      Cervical back: Normal range of motion and neck supple.   Skin:     General: Skin is warm and dry.   Neurological:      Mental Status: She is alert.

## 2024-12-14 ENCOUNTER — APPOINTMENT (OUTPATIENT)
Dept: LAB | Facility: HOSPITAL | Age: 29
End: 2024-12-14
Attending: PSYCHIATRY & NEUROLOGY
Payer: COMMERCIAL

## 2024-12-14 ENCOUNTER — RESULTS FOLLOW-UP (OUTPATIENT)
Dept: URGENT CARE | Facility: CLINIC | Age: 29
End: 2024-12-14

## 2024-12-14 DIAGNOSIS — R56.9 SEIZURE (HCC): ICD-10-CM

## 2024-12-14 LAB
ALBUMIN SERPL BCG-MCNC: 4.3 G/DL (ref 3.5–5)
ALP SERPL-CCNC: 85 U/L (ref 34–104)
ALT SERPL W P-5'-P-CCNC: 21 U/L (ref 7–52)
ANION GAP SERPL CALCULATED.3IONS-SCNC: 7 MMOL/L (ref 4–13)
AST SERPL W P-5'-P-CCNC: 10 U/L (ref 13–39)
BILIRUB SERPL-MCNC: 0.4 MG/DL (ref 0.2–1)
BUN SERPL-MCNC: 11 MG/DL (ref 5–25)
CALCIUM SERPL-MCNC: 9.6 MG/DL (ref 8.4–10.2)
CHLORIDE SERPL-SCNC: 105 MMOL/L (ref 96–108)
CO2 SERPL-SCNC: 25 MMOL/L (ref 21–32)
CREAT SERPL-MCNC: 0.62 MG/DL (ref 0.6–1.3)
ERYTHROCYTE [DISTWIDTH] IN BLOOD BY AUTOMATED COUNT: 13.6 % (ref 11.6–15.1)
GFR SERPL CREATININE-BSD FRML MDRD: 120 ML/MIN/1.73SQ M
GLUCOSE P FAST SERPL-MCNC: 179 MG/DL (ref 65–99)
HCT VFR BLD AUTO: 40 % (ref 34.8–46.1)
HGB BLD-MCNC: 13 G/DL (ref 11.5–15.4)
MCH RBC QN AUTO: 25.4 PG (ref 26.8–34.3)
MCHC RBC AUTO-ENTMCNC: 32.5 G/DL (ref 31.4–37.4)
MCV RBC AUTO: 78 FL (ref 82–98)
PLATELET # BLD AUTO: 352 THOUSANDS/UL (ref 149–390)
PMV BLD AUTO: 10.4 FL (ref 8.9–12.7)
POTASSIUM SERPL-SCNC: 4.3 MMOL/L (ref 3.5–5.3)
PROT SERPL-MCNC: 7.9 G/DL (ref 6.4–8.4)
RBC # BLD AUTO: 5.12 MILLION/UL (ref 3.81–5.12)
SODIUM SERPL-SCNC: 137 MMOL/L (ref 135–147)
VALPROATE SERPL-MCNC: 10 UG/ML (ref 50–100)
WBC # BLD AUTO: 13.04 THOUSAND/UL (ref 4.31–10.16)

## 2024-12-14 PROCEDURE — 85027 COMPLETE CBC AUTOMATED: CPT

## 2024-12-14 PROCEDURE — 80164 ASSAY DIPROPYLACETIC ACD TOT: CPT

## 2024-12-14 PROCEDURE — 80053 COMPREHEN METABOLIC PANEL: CPT

## 2024-12-14 PROCEDURE — 36415 COLL VENOUS BLD VENIPUNCTURE: CPT

## 2024-12-23 ENCOUNTER — TELEPHONE (OUTPATIENT)
Age: 29
End: 2024-12-23

## 2025-01-02 ENCOUNTER — OFFICE VISIT (OUTPATIENT)
Age: 30
End: 2025-01-02
Payer: COMMERCIAL

## 2025-01-02 VITALS
BODY MASS INDEX: 40.3 KG/M2 | WEIGHT: 256.8 LBS | RESPIRATION RATE: 17 BRPM | OXYGEN SATURATION: 99 % | HEIGHT: 67 IN | DIASTOLIC BLOOD PRESSURE: 76 MMHG | TEMPERATURE: 98 F | HEART RATE: 87 BPM | SYSTOLIC BLOOD PRESSURE: 110 MMHG

## 2025-01-02 DIAGNOSIS — Z12.4 SCREENING FOR CERVICAL CANCER: ICD-10-CM

## 2025-01-02 DIAGNOSIS — E04.1 THYROID NODULE: ICD-10-CM

## 2025-01-02 DIAGNOSIS — Z00.00 ANNUAL PHYSICAL EXAM: Primary | ICD-10-CM

## 2025-01-02 DIAGNOSIS — R05.3 CHRONIC COUGH: ICD-10-CM

## 2025-01-02 DIAGNOSIS — R73.01 IMPAIRED FASTING GLUCOSE: ICD-10-CM

## 2025-01-02 DIAGNOSIS — Z11.59 NEED FOR HEPATITIS C SCREENING TEST: ICD-10-CM

## 2025-01-02 DIAGNOSIS — Z13.228 SCREENING FOR METABOLIC DISORDER: ICD-10-CM

## 2025-01-02 DIAGNOSIS — G43.809 OTHER MIGRAINE WITHOUT STATUS MIGRAINOSUS, NOT INTRACTABLE: ICD-10-CM

## 2025-01-02 PROBLEM — K35.80 ACUTE APPENDICITIS: Status: RESOLVED | Noted: 2023-10-14 | Resolved: 2025-01-02

## 2025-01-02 PROBLEM — E87.29 INCREASED ANION GAP METABOLIC ACIDOSIS: Status: RESOLVED | Noted: 2019-09-27 | Resolved: 2025-01-02
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
## 2025-01-02 PROBLEM — N17.9 AKI (ACUTE KIDNEY INJURY) (HCC): Status: RESOLVED | Noted: 2019-09-27 | Resolved: 2025-01-02

## 2025-01-02 PROCEDURE — 99395 PREV VISIT EST AGE 18-39: CPT | Performed by: STUDENT IN AN ORGANIZED HEALTH CARE EDUCATION/TRAINING PROGRAM

## 2025-01-02 RX ORDER — RIZATRIPTAN BENZOATE 5 MG/1
5 TABLET, ORALLY DISINTEGRATING ORAL AS NEEDED
Qty: 9 TABLET | Refills: 2 | Status: SHIPPED | OUTPATIENT
Start: 2025-01-02

## 2025-01-02 NOTE — PROGRESS NOTES
Adult Annual Physical  Name: Qing Schneider      : 1995      MRN: [id number]  Encounter Provider: Stevie Fox MD  Encounter Date: 2025   [facility]    Assessment & Plan  Annual physical exam  Immunizations: Recommended influenza and COVID vaccines at the pharmacy  Labs: CBC, CMP, A1c, lipids, TSH  Screening: Hep C; referral to OBGYN for cervical cancer screening        Chronic cough  Based on history, seems to be most in line with GERD/LPR. Also on differential is asthma as patient reports when she is sick her cough often lingers.  Trial PPI x 6-8 weeks  Counseled on lifestyle modification for GERD  If no improvement with PPI, will obtain PFTs to r/o asthma    Orders:    omeprazole (PriLOSEC) 20 mg delayed release capsule; Take 1 capsule (20 mg total) by mouth daily    Other migraine without status migrainosus, not intractable  Follows with Neurology but not due for appointment until next month, needs a refill on her rizatriptan until then.    Orders:    rizatriptan (Maxalt-MLT) 5 mg disintegrating tablet; Take 1 tablet (5 mg total) by mouth as needed for migraine Take at the onset of migraine; if symptoms continue or return, may take another dose at least 2 hours after first dose. Take no more than 2 doses in a day, no more than 3 doses per weeks.    Thyroid nodule  Likely benign thyroid nodule seen on thyroid ultrasound a few months ago. Recheck TSH.    Orders:    TSH, 3rd generation with Free T4 reflex; Future    Impaired fasting glucose  Her fasting sugar has been either prediabetic/diabetic range on labs dating the last few years. Check A1c.    Orders:    Hemoglobin A1C; Future    Need for hepatitis C screening test    Orders:    Hepatitis C Antibody; Future    Screening for cervical cancer    Orders:    Ambulatory Referral to Obstetrics / Gynecology; Future    Screening for metabolic disorder    Orders:    CBC and differential; Future     Comprehensive metabolic panel; Future    Lipid panel; Future      Immunizations and preventive care screenings were discussed with patient today. Appropriate education was printed on patient's after visit summary.    Counseling:  Dental Health: discussed importance of regular tooth brushing, flossing, and dental visits.         History of Present Illness     Adult Annual Physical:  Patient presents for annual physical. Qing Schneider is a 30 yo F with PMH of BP2D, seizure, and migraine who presents today for annual physical. She reports chronic cough since October. Cough is worse at night, especially when lying down. She reports snacking before bed often. She wakes up at night coughing and in the morning wakes up with phlegm and sometimes sour taste in her mouth. She denies history of asthma or significant seasonal allergies. No SOB. She does state that sometimes when she is sick, her cough lingers but never for this long..     Diet and Physical Activity:  - Diet/Nutrition:. Trying to improve diet; cutting down on fried food and soda  - Exercise:. Trying to increase stairs at work    General Health:  - Sleep:. Trouble staying asleep, coughing at night  - Hearing: decreased hearing right ear.  - Vision: wears glasses and most recent eye exam > 1 year ago.  - Dental: no dental visits for > 1 year.    /GYN Health:  - Follows with GYN: no.   - Menopause: premenopausal.   - Last menstrual cycle: 12/31/2024.   - History of STDs: yes  - Contraception:. tubal ligation      Advanced Care Planning:  - Has an advanced directive?: no    - Has a durable medical POA?: no    - ACP document given to patient?: yes      Review of Systems   Constitutional:  Negative for appetite change, chills and fever.   HENT:  Negative for congestion and sore throat.    Eyes:  Negative for visual disturbance.   Respiratory:  Positive for cough. Negative for shortness of breath.    Cardiovascular:  Negative for chest pain and leg swelling.    Gastrointestinal:  Negative for abdominal pain, constipation, diarrhea and nausea.   Genitourinary:  Negative for difficulty urinating and dysuria.   Musculoskeletal:  Negative for arthralgias and myalgias.   Skin:  Negative for rash.   Neurological:  Positive for headaches. Negative for dizziness and light-headedness.   Psychiatric/Behavioral:  Negative for confusion.      Medical History Reviewed by provider this encounter:  Tobacco  Allergies  Meds  Problems  Med Hx  Surg Hx  Fam Hx     .  Past Medical History   Past Medical History:   Diagnosis Date    Acute appendicitis 10/14/2023    EVELYN (acute kidney injury) ([facility]) 2019    Anxiety     Depression     Fall down stairs 2021    Increased anion gap metabolic acidosis 2019    Migraine     Morbid obesity with BMI of 40.0-44.9, adult ([facility])     Obesity     Obesity (BMI 30-39.9)     Seizure ([facility])     Seizures ([facility])      Past Surgical History:   Procedure Laterality Date    APPENDECTOMY LAPAROSCOPIC N/A 10/14/2023    Procedure: APPENDECTOMY LAPAROSCOPIC;  Surgeon: Brandon Arenas DO;  Location: [facility];  Service: General     SECTION       SECTION      FRACTURE SURGERY Left     left clavical    [place] LAPS FULG/EXC OVARY VISCERA/PERITONEAL SURFACE Right 2023    Procedure: LAPAROSCOPIC ADNEXAL CYSTECTOMY POSSIBLE OVARIAN CYSTECTOMY;  Surgeon: Valentina Birmingham MD;  Location: [facility];  Service: Gynecology    SHOULDER SURGERY      TUBAL LIGATION      TUMOR REMOVAL      from ear     Family History   Problem Relation Age of Onset    No Known Problems Mother     No Known Problems Father     No Known Problems Sister     No Known Problems Brother     No Known Problems Maternal Aunt     No Known Problems Maternal Uncle     No Known Problems Paternal Aunt     No Known Problems Paternal Uncle     No Known Problems Maternal Grandmother     No Known Problems Maternal Grandfather     No Known Problems Paternal Grandmother     No  Known Problems Paternal Grandfather     No Known Problems Cousin     ADD / ADHD Neg Hx     Alcohol abuse Neg Hx     Anxiety disorder Neg Hx     Bipolar disorder Neg Hx     Completed Suicide  Neg Hx     Dementia Neg Hx     Depression Neg Hx     Drug abuse Neg Hx     OCD Neg Hx     Psychiatric Illness Neg Hx     Psychosis Neg Hx     Schizoaffective Disorder  Neg Hx     Schizophrenia Neg Hx     Self-Injury Neg Hx     Suicide Attempts Neg Hx     Breast cancer Neg Hx     Colon cancer Neg Hx     Ovarian cancer Neg Hx     Uterine cancer Neg Hx     Cervical cancer Neg Hx       reports that she has never smoked. She has never been exposed to tobacco smoke. She has never used smokeless tobacco. She reports that she does not currently use alcohol. She reports that she does not use drugs.  Current Outpatient Medications on File Prior to Visit   Medication Sig Dispense Refill    albuterol (ProAir HFA) 90 mcg/act inhaler Inhale 2 puffs every 6 (six) hours as needed for wheezing or shortness of breath 8.5 g 0    albuterol (Proventil HFA) 90 mcg/act inhaler Inhale 1 puff every 6 (six) hours as needed for wheezing or shortness of breath 6.7 g 0    divalproex sodium (DEPAKOTE ER) 500 mg 24 hr tablet TAKE FIVE TABLETS BY MOUTH EVERY  tablet 0    [DISCONTINUED] benzonatate (TESSALON) 200 MG capsule Take 1 capsule (200 mg total) by mouth 3 (three) times a day as needed for cough (Patient not taking: Reported on 1/2/2025) 20 capsule 0    [DISCONTINUED] benzonatate (TESSALON) 200 MG capsule Take 1 capsule (200 mg total) by mouth 3 (three) times a day as needed for cough (Patient not taking: Reported on 1/2/2025) 20 capsule 0    [DISCONTINUED] docusate sodium (COLACE) 100 mg capsule Take 1 capsule (100 mg total) by mouth 2 (two) times a day for 14 days (Patient not taking: Reported on 1/2/2025) 28 capsule 0    [DISCONTINUED] naproxen (NAPROSYN) 500 mg tablet Take 1 tablet (500 mg total) by mouth every 12 (twelve) hours as needed for  mild pain or moderate pain (Patient not taking: Reported on 1/2/2025) 20 tablet 0    [DISCONTINUED] rizatriptan (Maxalt-MLT) 5 mg disintegrating tablet Take 1 tablet (5 mg total) by mouth as needed for migraine Take at the onset of migraine; if symptoms continue or return, may take another dose at least 2 hours after first dose. Take no more than 2 doses in a day, no more than 3 doses per weeks. (Patient not taking: Reported on 1/2/2025) 9 tablet 2     No current facility-administered medications on file prior to visit.     Allergies   Allergen Reactions    Erythromycin Swelling    Peanut Oil - Food Allergy Hives and Anaphylaxis     Does not have Epi-pen prescribed     Azithromycin Other (See Comments)     .  Other reaction(s): Other (See Comments)  .      Current Outpatient Medications on File Prior to Visit   Medication Sig Dispense Refill    albuterol (ProAir HFA) 90 mcg/act inhaler Inhale 2 puffs every 6 (six) hours as needed for wheezing or shortness of breath 8.5 g 0    albuterol (Proventil HFA) 90 mcg/act inhaler Inhale 1 puff every 6 (six) hours as needed for wheezing or shortness of breath 6.7 g 0    divalproex sodium (DEPAKOTE ER) 500 mg 24 hr tablet TAKE FIVE TABLETS BY MOUTH EVERY  tablet 0    [DISCONTINUED] benzonatate (TESSALON) 200 MG capsule Take 1 capsule (200 mg total) by mouth 3 (three) times a day as needed for cough (Patient not taking: Reported on 1/2/2025) 20 capsule 0    [DISCONTINUED] benzonatate (TESSALON) 200 MG capsule Take 1 capsule (200 mg total) by mouth 3 (three) times a day as needed for cough (Patient not taking: Reported on 1/2/2025) 20 capsule 0    [DISCONTINUED] docusate sodium (COLACE) 100 mg capsule Take 1 capsule (100 mg total) by mouth 2 (two) times a day for 14 days (Patient not taking: Reported on 1/2/2025) 28 capsule 0    [DISCONTINUED] naproxen (NAPROSYN) 500 mg tablet Take 1 tablet (500 mg total) by mouth every 12 (twelve) hours as needed for mild pain or moderate  "pain (Patient not taking: Reported on 1/2/2025) 20 tablet 0    [DISCONTINUED] rizatriptan (Maxalt-MLT) 5 mg disintegrating tablet Take 1 tablet (5 mg total) by mouth as needed for migraine Take at the onset of migraine; if symptoms continue or return, may take another dose at least 2 hours after first dose. Take no more than 2 doses in a day, no more than 3 doses per weeks. (Patient not taking: Reported on 1/2/2025) 9 tablet 2     No current facility-administered medications on file prior to visit.      Social History     Tobacco Use    Smoking status: Never     Passive exposure: Never    Smokeless tobacco: Never   Vaping Use    Vaping status: Never Used   Substance and Sexual Activity    Alcohol use: Not Currently     Comment: occ    Drug use: Never    Sexual activity: Not Currently     Partners: Male     Birth control/protection: Surgical     Comment: Tubal       Objective   /76 (BP Location: Right arm, Patient Position: Sitting, Cuff Size: Standard)   Pulse 87   Temp 98 °F (36.7 °C) (Tympanic)   Resp 17   Ht 5' 7\" (1.702 m)   Wt 116 kg (256 lb 12.8 oz)   SpO2 99%   BMI 40.22 kg/m²     Physical Exam  Constitutional:       General: She is not in acute distress.  HENT:      Right Ear: Tympanic membrane, ear canal and external ear normal.      Left Ear: Tympanic membrane, ear canal and external ear normal.      Nose: Nose normal.      Mouth/Throat:      Mouth: Mucous membranes are moist.      Pharynx: Oropharynx is clear.   Eyes:      Conjunctiva/sclera: Conjunctivae normal.      Pupils: Pupils are equal, round, and reactive to light.   Neck:      Thyroid: No thyroid mass or thyroid tenderness.   Cardiovascular:      Rate and Rhythm: Normal rate and regular rhythm.      Heart sounds: Normal heart sounds.   Pulmonary:      Effort: Pulmonary effort is normal.      Breath sounds: Normal breath sounds.   Abdominal:      General: There is no distension.      Tenderness: There is no abdominal tenderness. "   Musculoskeletal:      Cervical back: Neck supple.      Right lower leg: No edema.      Left lower leg: No edema.   Skin:     General: Skin is warm and dry.   Neurological:      Mental Status: She is alert.      Sensory: Sensation is intact.      Motor: Motor function is intact.   Psychiatric:         Mood and Affect: Mood normal.         Speech: Speech normal.         Behavior: Behavior normal. Behavior is cooperative.

## 2025-01-02 NOTE — PATIENT INSTRUCTIONS
"Omeprazole, pantoprazole, esomeprazole for 6-8 weeks    Patient Education     Routine physical for adults   The Basics   Written by the doctors and editors at [facility]   What is a physical? -- A physical is a routine visit, or \"check-up,\" with your doctor. You might also hear it called a \"wellness visit\" or \"preventive visit.\"  During each visit, the doctor will:   Ask about your physical and mental health   Ask about your habits, behaviors, and lifestyle   Do an exam   Give you vaccines if needed   Talk to you about any medicines you take   Give advice about your health   Answer your questions  Getting regular check-ups is an important part of taking care of your health. It can help your doctor find and treat any problems you have. But it's also important for preventing health problems.  A routine physical is different from a \"sick visit.\" A sick visit is when you see a doctor because of a health concern or problem. Since physicals are scheduled ahead of time, you can think about what you want to ask the doctor.  How often should I get a physical? -- It depends on your age and health. In general, for people age 21 years and older:   If you are younger than 50 years, you might be able to get a physical every 3 years.   If you are 50 years or older, your doctor might recommend a physical every year.  If you have an ongoing health condition, like diabetes or high blood pressure, your doctor will probably want to see you more often.  What happens during a physical? -- In general, each visit will include:   Physical exam - The doctor or nurse will check your height, weight, heart rate, and blood pressure. They will also look at your eyes and ears. They will ask about how you are feeling and whether you have any symptoms that bother you.   Medicines - It's a good idea to bring a list of all the medicines you take to each doctor visit. Your doctor will talk to you about your medicines and answer any questions. Tell them " "if you are having any side effects that bother you. You should also tell them if you are having trouble paying for any of your medicines.   Habits and behaviors - This includes:   Your diet   Your exercise habits   Whether you smoke, drink alcohol, or use drugs   Whether you are sexually active   Whether you feel safe at home  Your doctor will talk to you about things you can do to improve your health and lower your risk of health problems. They will also offer help and support. For example, if you want to quit smoking, they can give you advice and might prescribe medicines. If you want to improve your diet or get more physical activity, they can help you with this, too.   Lab tests, if needed - The tests you get will depend on your age and situation. For example, your doctor might want to check your:   Cholesterol   Blood sugar   Iron level   Vaccines - The recommended vaccines will depend on your age, health, and what vaccines you already had. Vaccines are very important because they can prevent certain serious or deadly infections.   Discussion of screening - \"Screening\" means checking for diseases or other health problems before they cause symptoms. Your doctor can recommend screening based on your age, risk, and preferences. This might include tests to check for:   Cancer, such as breast, prostate, cervical, ovarian, colorectal, prostate, lung, or skin cancer   Sexually transmitted infections, such as chlamydia and gonorrhea   Mental health conditions like depression and anxiety  Your doctor will talk to you about the different types of screening tests. They can help you decide which screenings to have. They can also explain what the results might mean.   Answering questions - The physical is a good time to ask the doctor or nurse questions about your health. If needed, they can refer you to other doctors or specialists, too.  Adults older than 65 years often need other care, too. As you get older, your doctor " will talk to you about:   How to prevent falling at home   Hearing or vision tests   Memory testing   How to take your medicines safely   Making sure that you have the help and support you need at home  All topics are updated as new evidence becomes available and our peer review process is complete.  This topic retrieved from [employer] on: May 02, 2024.  Topic 310208 Version 1.0  Release: 32.4.3 - C32.122  © 2024 UpToDate, Inc. and/or its affiliates. All rights reserved.  Consumer Information Use and Disclaimer   Disclaimer: This generalized information is a limited summary of diagnosis, treatment, and/or medication information. It is not meant to be comprehensive and should be used as a tool to help the user understand and/or assess potential diagnostic and treatment options. It does NOT include all information about conditions, treatments, medications, side effects, or risks that may apply to a specific patient. It is not intended to be medical advice or a substitute for the medical advice, diagnosis, or treatment of a health care provider based on the health care provider's examination and assessment of a patient's specific and unique circumstances. Patients must speak with a health care provider for complete information about their health, medical questions, and treatment options, including any risks or benefits regarding use of medications. This information does not endorse any treatments or medications as safe, effective, or approved for treating a specific patient. UpToDate, Inc. and its affiliates disclaim any warranty or liability relating to this information or the use thereof.The use of this information is governed by the Terms of Use, available at https://www.wolterskluwer.com/en/know/clinical-effectiveness-terms. 2024© UpToDate, Inc. and its affiliates and/or licensors. All rights reserved.  Copyright   © 2024 UpToDate, Inc. and/or its affiliates. All rights reserved.

## 2025-01-02 NOTE — ASSESSMENT & PLAN NOTE
Follows with Neurology but not due for appointment until next month, needs a refill on her rizatriptan until then.    Orders:    rizatriptan (Maxalt-MLT) 5 mg disintegrating tablet; Take 1 tablet (5 mg total) by mouth as needed for migraine Take at the onset of migraine; if symptoms continue or return, may take another dose at least 2 hours after first dose. Take no more than 2 doses in a day, no more than 3 doses per weeks.

## 2025-03-17 ENCOUNTER — OFFICE VISIT (OUTPATIENT)
Dept: NEUROLOGY | Facility: CLINIC | Age: 30
End: 2025-03-17
Payer: COMMERCIAL

## 2025-03-17 VITALS
RESPIRATION RATE: 18 BRPM | BODY MASS INDEX: 40.35 KG/M2 | TEMPERATURE: 97.9 F | HEIGHT: 67 IN | DIASTOLIC BLOOD PRESSURE: 90 MMHG | SYSTOLIC BLOOD PRESSURE: 140 MMHG | WEIGHT: 257.1 LBS | OXYGEN SATURATION: 94 % | HEART RATE: 85 BPM

## 2025-03-17 DIAGNOSIS — R56.9 SEIZURE (HCC): Primary | ICD-10-CM

## 2025-03-17 DIAGNOSIS — G43.809 OTHER MIGRAINE WITHOUT STATUS MIGRAINOSUS, NOT INTRACTABLE: ICD-10-CM

## 2025-03-17 PROCEDURE — 99214 OFFICE O/P EST MOD 30 MIN: CPT | Performed by: PSYCHIATRY & NEUROLOGY

## 2025-03-17 RX ORDER — DIVALPROEX SODIUM 500 MG/1
2500 TABLET, FILM COATED, EXTENDED RELEASE ORAL DAILY
Qty: 450 TABLET | Refills: 3 | Status: SHIPPED | OUTPATIENT
Start: 2025-03-17

## 2025-03-17 RX ORDER — ACETAMINOPHEN 160 MG
2000 TABLET,DISINTEGRATING ORAL DAILY
Qty: 90 CAPSULE | Refills: 3 | Status: SHIPPED | OUTPATIENT
Start: 2025-03-17

## 2025-03-17 NOTE — ASSESSMENT & PLAN NOTE
Qing ROGERS [facility] is a 29 y.o. female with epilepsy manifest as generalized tonic clonic seizures and nonepileptic spells. Seizures controlled on divalproex.      Orders:    divalproex sodium (DEPAKOTE ER) 500 mg 24 hr tablet; Take 5 tablets (2,500 mg total) by mouth daily    Cholecalciferol (Vitamin D3) 50 MCG (2000 UT) capsule; Take 1 capsule (2,000 Units total) by mouth daily

## 2025-03-17 NOTE — PATIENT INSTRUCTIONS
Continue divalproex   Take vitamin D and calcium daily.   Use rizatriptan at first sign of headache.   Establish care with neurologist in [place].

## 2025-03-17 NOTE — PROGRESS NOTES
Review of Systems   Constitutional:  Positive for fatigue. Negative for appetite change and fever.   HENT: Negative.  Negative for hearing loss, tinnitus, trouble swallowing and voice change.    Eyes: Negative.  Negative for photophobia, pain and visual disturbance.   Respiratory: Negative.  Negative for shortness of breath.    Cardiovascular: Negative.  Negative for palpitations.   Gastrointestinal: Negative.  Negative for nausea and vomiting.   Endocrine: Negative.  Negative for cold intolerance.   Genitourinary: Negative.  Negative for dysuria, frequency and urgency.   Musculoskeletal: Negative.  Negative for back pain, gait problem, myalgias, neck pain and neck stiffness.   Skin: Negative.  Negative for rash.   Allergic/Immunologic: Negative.    Neurological:  Positive for headaches (migraines). Negative for dizziness, tremors, seizures, syncope, facial asymmetry, speech difficulty, weakness, light-headedness and numbness.   Hematological: Negative.  Does not bruise/bleed easily.   Psychiatric/Behavioral: Negative.  Negative for confusion, hallucinations and sleep disturbance.

## 2025-03-17 NOTE — PROGRESS NOTES
[facility]  Follow Up Visit    Impression/Plan        Assessment & Plan  Seizure (HCC)  Qing Schneider is a 29 y.o. female with epilepsy manifest as generalized tonic clonic seizures and nonepileptic spells. Seizures controlled on divalproex.      Orders:    divalproex sodium (DEPAKOTE ER) 500 mg 24 hr tablet; Take 5 tablets (2,500 mg total) by mouth daily    Cholecalciferol (Vitamin D3) 50 MCG (2000 UT) capsule; Take 1 capsule (2,000 Units total) by mouth daily    Other migraine without status migrainosus, not intractable  Divalproex for prevention. Maxalt prn.             Patient Instructions   Continue divalproex   Take vitamin D and calcium daily.   Use rizatriptan at first sign of headache.   Establish care with neurologist in [place].         Subjective    Qing Schneider is returning to the [facility] for follow up.     Interval Events:   Seizures since last visit: None    Last seen 7/2023 by JESSICA Cormier. Divalproex increased to 2500 mg due to headache. Noted to be seizure free since 3/2022.     Headaches are decreased in frequency, but still occurring. Maxalt helps, only once in past month. Often tries to sleep it off. Having a couple per week over the last 1-2 months. Feeling fatigued. There is stress related to moving. Was out of supply for a while. Restarted in early January. Headaches were worse when off of it.     Current AEDs:  Divalproex ER 2500 mg morning  Medication side effects: None  Medication adherence: Yes    Prior AEDs:  Lacosamide (started early 2017, stopped by Dr. Ogden)  Levetiracetam - SI, continued to have events on 4000 mg total daily     Prior Evaluation:  About 72 hours of continuous VEEG in 8/2017: No events. No epileptiform discharges. Mild slowing of PDR.   REEG likely while admitted at [facility]  REE 11/2020: normal  24 hour AEEG 3/9/2021: normal, no events.   Brain MRI 11/16/2020: read as normal  "by radiology- on personal review- there may be slight asymmetry of the hippocampi, more CSF space surrounding the right hippocampus.      EMU admission November 2021:  EMU CONCLUSION  Summary interictal findings:      Rare interictal diffuse sharply contoured waves. This pattern was exclusively seen while awake and looking at her phone. Likely benign waveform.      Summary event findings:   Events of right facial tingling without loss of awareness and events of staring off, blurred vision, and pressure to bilateral head are without EEG change concerning for seizure.    Seizure Classification:   N/A     Epilepsy Classification:   N/A     EMU findings and discussion:  Patient had two events concerning for seizure during hospitalization. Neither of the events are with EEG changes consistent with seizures. There is a possibility that these events could represent focal aware seizures that are not appreciated on scalp EEG. There were findings on her EEG that are likely benign noted as rare generalized contoured waves during awake, exclusively observed when looking at her phone.   Since the patient has had convincing generalized tonic clonic seizures in the past, it is likely that the depakote has controlled her GTC seizures though her new events are more related to stress and change in schedule. If events do not get improve with lifestyle changes, discussed establishing with mental health for cognitive behavioral therapy.   She will continue with her current dose of depakote upon discharge. This medication is likely helpful for mood and headache as well. Recommend that she follow up with Dr Fox as scheduled in February if not able to get a sooner appointment but she will call the office with any issues or concerns prior to her appointment.      Objective    /90 (BP Location: Left arm, Patient Position: Sitting, Cuff Size: Standard)   Pulse 85   Temp 97.9 °F (36.6 °C) (Temporal)   Resp 18   Ht 5' 7\" (1.702 " m)   Wt 117 kg (257 lb 1.6 oz)   SpO2 94%   BMI 40.27 kg/m²      General Exam  No acute distress.    Neurologic Exam  Mental Status:  Alert and oriented x 3.  Language: normal fluency and comprehension.  Cranial Nerves:  Face symmetric. No dysarthria.  Gait: Normal casual gait.       I have spent a total time of 30 minutes in caring for this patient on the day of the visit/encounter including Diagnostic results, Instructions for management, Patient and family education, Importance of tx compliance, Risk factor reductions, Impressions, Counseling / Coordination of care, Documenting in the medical record, Reviewing/placing orders in the medical record (including tests, medications, and/or procedures), and Obtaining or reviewing history  .

## 2025-04-28 ENCOUNTER — VBI (OUTPATIENT)
Dept: ADMINISTRATIVE | Facility: OTHER | Age: 30
End: 2025-04-28

## 2025-04-28 NOTE — TELEPHONE ENCOUNTER
04/28/25 8:02 AM     Chart reviewed for Pap Smear (HPV) aka Cervical Cancer Screening was/were not submitted to the patient's insurance.     Amy Woody MA   [facility]

## 2025-07-23 ENCOUNTER — TELEPHONE (OUTPATIENT)
Age: 30
End: 2025-07-23

## 2025-07-23 NOTE — TELEPHONE ENCOUNTER
Pt called in requesting [facility] Phone # [phone].    pt said she is moving or Recently moved to [place] and is trying to establish with a neurologist there in order to continue medication refills.   Pt will call back once she find a neurologist and will let us know where to send a referral to and provide us with a fax # if needed.     LUCÍA

## (undated) DEVICE — NEPTUNE E-SEP SMOKE EVACUATION PENCIL, COATED, 70MM BLADE, PUSH BUTTON SWITCH: Brand: NEPTUNE E-SEP

## (undated) DEVICE — GLOVE INDICATOR PI UNDERGLOVE SZ 6.5 BLUE

## (undated) DEVICE — TUBING SMOKE EVAC W/FILTRATION DEVICE PLUMEPORT ACTIV

## (undated) DEVICE — SUT VICRYL 0 UR-6 27 IN J603H

## (undated) DEVICE — IRRIG ENDO FLO TUBING

## (undated) DEVICE — ALLENTOWN LAP CHOLE APP PACK: Brand: CARDINAL HEALTH

## (undated) DEVICE — LIGHT HANDLE COVER SLEEVE DISP BLUE STELLAR

## (undated) DEVICE — GAUZE SPONGES,8 PLY: Brand: CURITY

## (undated) DEVICE — 3M™ STERI-STRIP™ REINFORCED ADHESIVE SKIN CLOSURES, R1546, 1/4 IN X 4 IN (6 MM X 100 MM), 10 STRIPS/ENVELOPE: Brand: 3M™ STERI-STRIP™

## (undated) DEVICE — ADHESIVE SKIN HIGH VISCOSITY EXOFIN 1ML

## (undated) DEVICE — GLOVE SRG BIOGEL 7

## (undated) DEVICE — SCD SEQUENTIAL COMPRESSION COMFORT SLEEVE MEDIUM KNEE LENGTH: Brand: KENDALL SCD

## (undated) DEVICE — [HIGH FLOW INSUFFLATOR,  DO NOT USE IF PACKAGE IS DAMAGED,  KEEP DRY,  KEEP AWAY FROM SUNLIGHT,  PROTECT FROM HEAT AND RADIOACTIVE SOURCES.]: Brand: PNEUMOSURE

## (undated) DEVICE — TISSUE RETRIEVAL SYSTEM: Brand: INZII RETRIEVAL SYSTEM

## (undated) DEVICE — SUT MONOCRYL 4-0 PS-2 18 IN Y496G

## (undated) DEVICE — ENDOPATH ETS-FLEX45 ARTICULATING ENDOSCOPIC LINEAR CUTTER, NO RELOAD: Brand: ENDOPATH

## (undated) DEVICE — CHLORAPREP HI-LITE 26ML ORANGE

## (undated) DEVICE — TROCAR: Brand: KII FIOS FIRST ENTRY

## (undated) DEVICE — 4-PORT MANIFOLD: Brand: NEPTUNE 2

## (undated) DEVICE — TROCAR: Brand: KII® SLEEVE

## (undated) DEVICE — PAD GROUNDING ADULT

## (undated) DEVICE — BETHLEHEM UNIVERSAL GYN LAP PK: Brand: CARDINAL HEALTH

## (undated) DEVICE — DRAPE EQUIPMENT RF WAND

## (undated) DEVICE — ENDOPATH ETS45 2.5MM RELOADS (VASCULAR/THIN): Brand: ENDOPATH

## (undated) DEVICE — CHLORHEXIDINE 4PCT 4 OZ

## (undated) DEVICE — 3M™ TEGADERM™ TRANSPARENT FILM DRESSING FRAME STYLE, 1624W, 2-3/8 IN X 2-3/4 IN (6 CM X 7 CM), 100/CT 4CT/CASE: Brand: 3M™ TEGADERM™

## (undated) DEVICE — INTENDED FOR TISSUE SEPARATION, AND OTHER PROCEDURES THAT REQUIRE A SHARP SURGICAL BLADE TO PUNCTURE OR CUT.: Brand: BARD-PARKER SAFETY BLADES SIZE 11, STERILE

## (undated) DEVICE — SPONGE 4 X 4 XRAY 16 PLY STRL LF RFD

## (undated) DEVICE — ENDOPATH PNEUMONEEDLE INSUFFLATION NEEDLES WITH LUER LOCK CONNECTORS 120MM: Brand: ENDOPATH

## (undated) DEVICE — TROCAR: Brand: KII SLEEVE

## (undated) DEVICE — ELECTRODE LAP L WIRE E-Z CLEAN 33CM -0100

## (undated) DEVICE — GLOVE INDICATOR PI UNDERGLOVE SZ 7 BLUE

## (undated) DEVICE — INTENDED FOR TISSUE SEPARATION, AND OTHER PROCEDURES THAT REQUIRE A SHARP SURGICAL BLADE TO PUNCTURE OR CUT.: Brand: BARD-PARKER SAFETY BLADES SIZE 15, STERILE

## (undated) DEVICE — HARMONIC ACE 5MM DIAMETER SHEARS 36CM SHAFT LENGTH + ADAPTIVE TISSUE TECHNOLOGY FOR USE WITH GENERATOR G11: Brand: HARMONIC ACE

## (undated) DEVICE — 3M™ STERI-STRIP™ REINFORCED ADHESIVE SKIN CLOSURES, R1541, 1/4 IN X 3 IN (6 MM X 75 MM), 3 STRIPS/ENVELOPE: Brand: 3M™ STERI-STRIP™

## (undated) DEVICE — ETS45 RELOAD STANDARD 45MM: Brand: ENDOPATH